# Patient Record
Sex: MALE | Race: WHITE | NOT HISPANIC OR LATINO | Employment: OTHER | URBAN - METROPOLITAN AREA
[De-identification: names, ages, dates, MRNs, and addresses within clinical notes are randomized per-mention and may not be internally consistent; named-entity substitution may affect disease eponyms.]

---

## 2017-01-03 ENCOUNTER — COMMUNICATION - HEALTHEAST (OUTPATIENT)
Dept: FAMILY MEDICINE | Facility: CLINIC | Age: 77
End: 2017-01-03

## 2017-01-03 DIAGNOSIS — M19.90 INFLAMMATORY ARTHRITIS: ICD-10-CM

## 2017-01-05 ENCOUNTER — COMMUNICATION - HEALTHEAST (OUTPATIENT)
Dept: RHEUMATOLOGY | Facility: CLINIC | Age: 77
End: 2017-01-05

## 2017-01-05 DIAGNOSIS — M19.90 INFLAMMATORY ARTHRITIS: ICD-10-CM

## 2017-01-07 ENCOUNTER — COMMUNICATION - HEALTHEAST (OUTPATIENT)
Dept: FAMILY MEDICINE | Facility: CLINIC | Age: 77
End: 2017-01-07

## 2017-01-07 DIAGNOSIS — M19.90 INFLAMMATORY ARTHRITIS: ICD-10-CM

## 2017-01-09 ENCOUNTER — AMBULATORY - HEALTHEAST (OUTPATIENT)
Dept: FAMILY MEDICINE | Facility: CLINIC | Age: 77
End: 2017-01-09

## 2017-01-09 DIAGNOSIS — M19.90 INFLAMMATORY ARTHRITIS: ICD-10-CM

## 2017-01-09 DIAGNOSIS — I10 ESSENTIAL HYPERTENSION, BENIGN: ICD-10-CM

## 2017-01-09 DIAGNOSIS — I25.10 UNSPECIFIED CARDIOVASCULAR DISEASE: ICD-10-CM

## 2017-01-13 ENCOUNTER — COMMUNICATION - HEALTHEAST (OUTPATIENT)
Dept: FAMILY MEDICINE | Facility: CLINIC | Age: 77
End: 2017-01-13

## 2017-01-13 ENCOUNTER — OFFICE VISIT - HEALTHEAST (OUTPATIENT)
Dept: FAMILY MEDICINE | Facility: CLINIC | Age: 77
End: 2017-01-13

## 2017-01-13 DIAGNOSIS — I10 ESSENTIAL HYPERTENSION, BENIGN: ICD-10-CM

## 2017-01-13 DIAGNOSIS — E11.9 DM2 (DIABETES MELLITUS, TYPE 2) (H): ICD-10-CM

## 2017-01-13 DIAGNOSIS — I10 BENIGN ESSENTIAL HYPERTENSION: ICD-10-CM

## 2017-01-13 LAB — HBA1C MFR BLD: 5.6 % (ref 3.5–6)

## 2017-01-13 ASSESSMENT — MIFFLIN-ST. JEOR: SCORE: 1471.96

## 2017-01-18 ENCOUNTER — COMMUNICATION - HEALTHEAST (OUTPATIENT)
Dept: FAMILY MEDICINE | Facility: CLINIC | Age: 77
End: 2017-01-18

## 2017-01-18 DIAGNOSIS — M10.9 GOUT: ICD-10-CM

## 2017-01-30 ENCOUNTER — COMMUNICATION - HEALTHEAST (OUTPATIENT)
Dept: FAMILY MEDICINE | Facility: CLINIC | Age: 77
End: 2017-01-30

## 2017-01-30 DIAGNOSIS — E78.5 HYPERLIPIDEMIA: ICD-10-CM

## 2017-02-01 ENCOUNTER — COMMUNICATION - HEALTHEAST (OUTPATIENT)
Dept: FAMILY MEDICINE | Facility: CLINIC | Age: 77
End: 2017-02-01

## 2017-02-16 ENCOUNTER — COMMUNICATION - HEALTHEAST (OUTPATIENT)
Dept: FAMILY MEDICINE | Facility: CLINIC | Age: 77
End: 2017-02-16

## 2017-02-16 DIAGNOSIS — I10 ESSENTIAL HYPERTENSION, BENIGN: ICD-10-CM

## 2017-02-16 DIAGNOSIS — I25.10 UNSPECIFIED CARDIOVASCULAR DISEASE: ICD-10-CM

## 2017-03-19 ENCOUNTER — COMMUNICATION - HEALTHEAST (OUTPATIENT)
Dept: FAMILY MEDICINE | Facility: CLINIC | Age: 77
End: 2017-03-19

## 2017-03-19 DIAGNOSIS — I10 HYPERTENSION: ICD-10-CM

## 2017-03-22 ENCOUNTER — OFFICE VISIT - HEALTHEAST (OUTPATIENT)
Dept: FAMILY MEDICINE | Facility: CLINIC | Age: 77
End: 2017-03-22

## 2017-03-22 ENCOUNTER — COMMUNICATION - HEALTHEAST (OUTPATIENT)
Dept: FAMILY MEDICINE | Facility: CLINIC | Age: 77
End: 2017-03-22

## 2017-03-22 ENCOUNTER — RECORDS - HEALTHEAST (OUTPATIENT)
Dept: GENERAL RADIOLOGY | Facility: CLINIC | Age: 77
End: 2017-03-22

## 2017-03-22 DIAGNOSIS — E11.9 DM2 (DIABETES MELLITUS, TYPE 2) (H): ICD-10-CM

## 2017-03-22 DIAGNOSIS — F52.8 PSYCHOSEXUAL DYSFUNCTION WITH INHIBITED SEXUAL EXCITEMENT: ICD-10-CM

## 2017-03-22 DIAGNOSIS — M10.9 GOUT: ICD-10-CM

## 2017-03-22 DIAGNOSIS — E79.0 ELEVATED BLOOD URIC ACID LEVEL: ICD-10-CM

## 2017-03-22 DIAGNOSIS — J61 PNEUMOCONIOSIS DUE TO ASBESTOS AND OTHER MINERAL FIBERS (H): ICD-10-CM

## 2017-03-22 DIAGNOSIS — I10 ESSENTIAL HYPERTENSION, BENIGN: ICD-10-CM

## 2017-03-22 DIAGNOSIS — E78.00 HYPERCHOLESTEREMIA: ICD-10-CM

## 2017-03-22 DIAGNOSIS — M10.9 GOUT, UNSPECIFIED: ICD-10-CM

## 2017-03-22 DIAGNOSIS — Z00.00 INITIAL MEDICARE ANNUAL WELLNESS VISIT: ICD-10-CM

## 2017-03-22 DIAGNOSIS — J61 ASBESTOSIS (H): ICD-10-CM

## 2017-03-22 LAB
ATRIAL RATE - MUSE: 51 BPM
CHOLEST SERPL-MCNC: 154 MG/DL
DIASTOLIC BLOOD PRESSURE - MUSE: NORMAL MMHG
FASTING STATUS PATIENT QL REPORTED: YES
HBA1C MFR BLD: 5.4 % (ref 3.5–6)
HDLC SERPL-MCNC: 56 MG/DL
INTERPRETATION ECG - MUSE: NORMAL
LDLC SERPL CALC-MCNC: 76 MG/DL
P AXIS - MUSE: 36 DEGREES
PR INTERVAL - MUSE: 198 MS
PSA SERPL-MCNC: 5.7 NG/ML (ref 0–6.5)
QRS DURATION - MUSE: 84 MS
QT - MUSE: 442 MS
QTC - MUSE: 407 MS
R AXIS - MUSE: 10 DEGREES
SYSTOLIC BLOOD PRESSURE - MUSE: NORMAL MMHG
T AXIS - MUSE: 45 DEGREES
TRIGL SERPL-MCNC: 110 MG/DL
VENTRICULAR RATE- MUSE: 51 BPM

## 2017-03-22 ASSESSMENT — MIFFLIN-ST. JEOR: SCORE: 1459.48

## 2017-03-24 ENCOUNTER — COMMUNICATION - HEALTHEAST (OUTPATIENT)
Dept: FAMILY MEDICINE | Facility: CLINIC | Age: 77
End: 2017-03-24

## 2017-04-03 ENCOUNTER — COMMUNICATION - HEALTHEAST (OUTPATIENT)
Dept: FAMILY MEDICINE | Facility: CLINIC | Age: 77
End: 2017-04-03

## 2017-04-03 DIAGNOSIS — E11.9 DIABETES MELLITUS, TYPE 2 (H): ICD-10-CM

## 2017-04-11 ENCOUNTER — COMMUNICATION - HEALTHEAST (OUTPATIENT)
Dept: FAMILY MEDICINE | Facility: CLINIC | Age: 77
End: 2017-04-11

## 2017-04-11 DIAGNOSIS — I10 BENIGN ESSENTIAL HYPERTENSION: ICD-10-CM

## 2017-04-12 ENCOUNTER — RECORDS - HEALTHEAST (OUTPATIENT)
Dept: ADMINISTRATIVE | Facility: OTHER | Age: 77
End: 2017-04-12

## 2017-04-13 ENCOUNTER — COMMUNICATION - HEALTHEAST (OUTPATIENT)
Dept: FAMILY MEDICINE | Facility: CLINIC | Age: 77
End: 2017-04-13

## 2017-04-13 DIAGNOSIS — M10.9 GOUT: ICD-10-CM

## 2017-04-14 ENCOUNTER — RECORDS - HEALTHEAST (OUTPATIENT)
Dept: ADMINISTRATIVE | Facility: OTHER | Age: 77
End: 2017-04-14

## 2017-04-14 ENCOUNTER — COMMUNICATION - HEALTHEAST (OUTPATIENT)
Dept: FAMILY MEDICINE | Facility: CLINIC | Age: 77
End: 2017-04-14

## 2017-04-27 ENCOUNTER — COMMUNICATION - HEALTHEAST (OUTPATIENT)
Dept: FAMILY MEDICINE | Facility: CLINIC | Age: 77
End: 2017-04-27

## 2017-04-27 DIAGNOSIS — E78.5 HYPERLIPIDEMIA: ICD-10-CM

## 2017-04-28 ENCOUNTER — COMMUNICATION - HEALTHEAST (OUTPATIENT)
Dept: FAMILY MEDICINE | Facility: CLINIC | Age: 77
End: 2017-04-28

## 2017-05-11 ENCOUNTER — COMMUNICATION - HEALTHEAST (OUTPATIENT)
Dept: FAMILY MEDICINE | Facility: CLINIC | Age: 77
End: 2017-05-11

## 2017-05-11 DIAGNOSIS — K21.9 GERD (GASTROESOPHAGEAL REFLUX DISEASE): ICD-10-CM

## 2017-05-16 ENCOUNTER — COMMUNICATION - HEALTHEAST (OUTPATIENT)
Dept: FAMILY MEDICINE | Facility: CLINIC | Age: 77
End: 2017-05-16

## 2017-05-16 DIAGNOSIS — M10.9 GOUT: ICD-10-CM

## 2017-05-27 ENCOUNTER — RECORDS - HEALTHEAST (OUTPATIENT)
Dept: ADMINISTRATIVE | Facility: OTHER | Age: 77
End: 2017-05-27

## 2017-05-31 ENCOUNTER — COMMUNICATION - HEALTHEAST (OUTPATIENT)
Dept: FAMILY MEDICINE | Facility: CLINIC | Age: 77
End: 2017-05-31

## 2017-05-31 ENCOUNTER — RECORDS - HEALTHEAST (OUTPATIENT)
Dept: ADMINISTRATIVE | Facility: OTHER | Age: 77
End: 2017-05-31

## 2017-06-01 ENCOUNTER — COMMUNICATION - HEALTHEAST (OUTPATIENT)
Dept: FAMILY MEDICINE | Facility: CLINIC | Age: 77
End: 2017-06-01

## 2017-06-01 DIAGNOSIS — E11.9 DM TYPE 2 (DIABETES MELLITUS, TYPE 2) (H): ICD-10-CM

## 2017-06-22 ENCOUNTER — COMMUNICATION - HEALTHEAST (OUTPATIENT)
Dept: FAMILY MEDICINE | Facility: CLINIC | Age: 77
End: 2017-06-22

## 2017-06-22 DIAGNOSIS — E11.9 DIABETES MELLITUS, TYPE 2 (H): ICD-10-CM

## 2017-09-01 ENCOUNTER — COMMUNICATION - HEALTHEAST (OUTPATIENT)
Dept: FAMILY MEDICINE | Facility: CLINIC | Age: 77
End: 2017-09-01

## 2017-09-01 DIAGNOSIS — E11.9 DM TYPE 2 (DIABETES MELLITUS, TYPE 2) (H): ICD-10-CM

## 2017-09-02 ENCOUNTER — COMMUNICATION - HEALTHEAST (OUTPATIENT)
Dept: FAMILY MEDICINE | Facility: CLINIC | Age: 77
End: 2017-09-02

## 2017-09-02 DIAGNOSIS — E78.5 HYPERLIPIDEMIA: ICD-10-CM

## 2017-09-03 ENCOUNTER — COMMUNICATION - HEALTHEAST (OUTPATIENT)
Dept: FAMILY MEDICINE | Facility: CLINIC | Age: 77
End: 2017-09-03

## 2017-09-03 DIAGNOSIS — I10 ESSENTIAL HYPERTENSION, BENIGN: ICD-10-CM

## 2017-09-03 DIAGNOSIS — E78.5 HYPERLIPIDEMIA: ICD-10-CM

## 2017-09-05 ENCOUNTER — COMMUNICATION - HEALTHEAST (OUTPATIENT)
Dept: FAMILY MEDICINE | Facility: CLINIC | Age: 77
End: 2017-09-05

## 2017-09-06 ENCOUNTER — AMBULATORY - HEALTHEAST (OUTPATIENT)
Dept: FAMILY MEDICINE | Facility: CLINIC | Age: 77
End: 2017-09-06

## 2017-09-08 ENCOUNTER — COMMUNICATION - HEALTHEAST (OUTPATIENT)
Dept: FAMILY MEDICINE | Facility: CLINIC | Age: 77
End: 2017-09-08

## 2017-09-25 ENCOUNTER — COMMUNICATION - HEALTHEAST (OUTPATIENT)
Dept: FAMILY MEDICINE | Facility: CLINIC | Age: 77
End: 2017-09-25

## 2017-09-25 DIAGNOSIS — E11.9 DIABETES MELLITUS, TYPE 2 (H): ICD-10-CM

## 2017-09-27 ENCOUNTER — COMMUNICATION - HEALTHEAST (OUTPATIENT)
Dept: FAMILY MEDICINE | Facility: CLINIC | Age: 77
End: 2017-09-27

## 2017-09-27 DIAGNOSIS — E11.9 DIABETES MELLITUS, TYPE 2 (H): ICD-10-CM

## 2017-10-11 ENCOUNTER — OFFICE VISIT - HEALTHEAST (OUTPATIENT)
Dept: FAMILY MEDICINE | Facility: CLINIC | Age: 77
End: 2017-10-11

## 2017-10-11 DIAGNOSIS — Z00.00 HEALTHCARE MAINTENANCE: ICD-10-CM

## 2017-10-11 DIAGNOSIS — E11.9 DM TYPE 2 (DIABETES MELLITUS, TYPE 2) (H): ICD-10-CM

## 2017-10-11 DIAGNOSIS — Z51.81 THERAPEUTIC DRUG MONITORING: ICD-10-CM

## 2017-10-11 LAB — HBA1C MFR BLD: 5.8 % (ref 3.5–6)

## 2017-10-11 RX ORDER — ESCITALOPRAM OXALATE 20 MG/1
TABLET ORAL
Refills: 3 | Status: SHIPPED | COMMUNITY
Start: 2017-09-16 | End: 2022-06-07

## 2017-10-11 ASSESSMENT — MIFFLIN-ST. JEOR: SCORE: 1468.56

## 2017-11-14 ENCOUNTER — COMMUNICATION - HEALTHEAST (OUTPATIENT)
Dept: FAMILY MEDICINE | Facility: CLINIC | Age: 77
End: 2017-11-14

## 2017-11-30 ENCOUNTER — COMMUNICATION - HEALTHEAST (OUTPATIENT)
Dept: FAMILY MEDICINE | Facility: CLINIC | Age: 77
End: 2017-11-30

## 2017-11-30 DIAGNOSIS — E78.5 HYPERLIPIDEMIA: ICD-10-CM

## 2017-12-01 ENCOUNTER — RECORDS - HEALTHEAST (OUTPATIENT)
Dept: ADMINISTRATIVE | Facility: OTHER | Age: 77
End: 2017-12-01

## 2017-12-27 ENCOUNTER — COMMUNICATION - HEALTHEAST (OUTPATIENT)
Dept: FAMILY MEDICINE | Facility: CLINIC | Age: 77
End: 2017-12-27

## 2017-12-27 DIAGNOSIS — E11.9 DIABETES MELLITUS, TYPE 2 (H): ICD-10-CM

## 2018-01-05 ENCOUNTER — COMMUNICATION - HEALTHEAST (OUTPATIENT)
Dept: FAMILY MEDICINE | Facility: CLINIC | Age: 78
End: 2018-01-05

## 2018-01-05 DIAGNOSIS — M19.90 INFLAMMATORY ARTHRITIS: ICD-10-CM

## 2018-02-14 ENCOUNTER — COMMUNICATION - HEALTHEAST (OUTPATIENT)
Dept: FAMILY MEDICINE | Facility: CLINIC | Age: 78
End: 2018-02-14

## 2018-02-14 DIAGNOSIS — I25.10 CARDIOVASCULAR DISEASE: ICD-10-CM

## 2018-02-14 DIAGNOSIS — I10 ESSENTIAL HYPERTENSION, BENIGN: ICD-10-CM

## 2018-02-21 ENCOUNTER — COMMUNICATION - HEALTHEAST (OUTPATIENT)
Dept: FAMILY MEDICINE | Facility: CLINIC | Age: 78
End: 2018-02-21

## 2018-02-21 DIAGNOSIS — I25.10 CARDIOVASCULAR DISEASE: ICD-10-CM

## 2018-02-21 DIAGNOSIS — I10 ESSENTIAL HYPERTENSION, BENIGN: ICD-10-CM

## 2018-03-05 ENCOUNTER — COMMUNICATION - HEALTHEAST (OUTPATIENT)
Dept: FAMILY MEDICINE | Facility: CLINIC | Age: 78
End: 2018-03-05

## 2018-03-05 DIAGNOSIS — M19.90 INFLAMMATORY ARTHRITIS: ICD-10-CM

## 2018-03-07 ENCOUNTER — COMMUNICATION - HEALTHEAST (OUTPATIENT)
Dept: FAMILY MEDICINE | Facility: CLINIC | Age: 78
End: 2018-03-07

## 2018-03-07 DIAGNOSIS — M19.90 INFLAMMATORY ARTHRITIS: ICD-10-CM

## 2018-03-08 ENCOUNTER — COMMUNICATION - HEALTHEAST (OUTPATIENT)
Dept: FAMILY MEDICINE | Facility: CLINIC | Age: 78
End: 2018-03-08

## 2018-03-08 DIAGNOSIS — M19.90 INFLAMMATORY ARTHRITIS: ICD-10-CM

## 2018-03-13 ENCOUNTER — COMMUNICATION - HEALTHEAST (OUTPATIENT)
Dept: FAMILY MEDICINE | Facility: CLINIC | Age: 78
End: 2018-03-13

## 2018-03-13 DIAGNOSIS — I10 HYPERTENSION: ICD-10-CM

## 2018-03-21 ENCOUNTER — COMMUNICATION - HEALTHEAST (OUTPATIENT)
Dept: FAMILY MEDICINE | Facility: CLINIC | Age: 78
End: 2018-03-21

## 2018-03-21 DIAGNOSIS — I10 HYPERTENSION: ICD-10-CM

## 2018-04-09 ENCOUNTER — COMMUNICATION - HEALTHEAST (OUTPATIENT)
Dept: FAMILY MEDICINE | Facility: CLINIC | Age: 78
End: 2018-04-09

## 2018-04-09 DIAGNOSIS — I10 BENIGN ESSENTIAL HYPERTENSION: ICD-10-CM

## 2018-04-10 ENCOUNTER — COMMUNICATION - HEALTHEAST (OUTPATIENT)
Dept: FAMILY MEDICINE | Facility: CLINIC | Age: 78
End: 2018-04-10

## 2018-04-10 DIAGNOSIS — I10 BENIGN ESSENTIAL HYPERTENSION: ICD-10-CM

## 2018-04-12 ENCOUNTER — COMMUNICATION - HEALTHEAST (OUTPATIENT)
Dept: FAMILY MEDICINE | Facility: CLINIC | Age: 78
End: 2018-04-12

## 2018-04-12 DIAGNOSIS — M10.9 GOUT: ICD-10-CM

## 2018-04-13 ENCOUNTER — COMMUNICATION - HEALTHEAST (OUTPATIENT)
Dept: FAMILY MEDICINE | Facility: CLINIC | Age: 78
End: 2018-04-13

## 2018-04-13 DIAGNOSIS — M10.9 GOUT: ICD-10-CM

## 2018-05-19 ENCOUNTER — COMMUNICATION - HEALTHEAST (OUTPATIENT)
Dept: FAMILY MEDICINE | Facility: CLINIC | Age: 78
End: 2018-05-19

## 2018-05-19 DIAGNOSIS — I25.10 CARDIOVASCULAR DISEASE: ICD-10-CM

## 2018-05-19 DIAGNOSIS — I10 ESSENTIAL HYPERTENSION, BENIGN: ICD-10-CM

## 2018-05-22 ENCOUNTER — COMMUNICATION - HEALTHEAST (OUTPATIENT)
Dept: FAMILY MEDICINE | Facility: CLINIC | Age: 78
End: 2018-05-22

## 2018-05-22 DIAGNOSIS — I25.10 CARDIOVASCULAR DISEASE: ICD-10-CM

## 2018-05-22 DIAGNOSIS — I10 ESSENTIAL HYPERTENSION, BENIGN: ICD-10-CM

## 2018-05-24 ENCOUNTER — COMMUNICATION - HEALTHEAST (OUTPATIENT)
Dept: FAMILY MEDICINE | Facility: CLINIC | Age: 78
End: 2018-05-24

## 2018-05-24 DIAGNOSIS — K21.9 GERD (GASTROESOPHAGEAL REFLUX DISEASE): ICD-10-CM

## 2018-05-28 ENCOUNTER — COMMUNICATION - HEALTHEAST (OUTPATIENT)
Dept: FAMILY MEDICINE | Facility: CLINIC | Age: 78
End: 2018-05-28

## 2018-05-28 DIAGNOSIS — K21.9 GERD (GASTROESOPHAGEAL REFLUX DISEASE): ICD-10-CM

## 2018-05-29 ENCOUNTER — COMMUNICATION - HEALTHEAST (OUTPATIENT)
Dept: FAMILY MEDICINE | Facility: CLINIC | Age: 78
End: 2018-05-29

## 2018-05-29 ENCOUNTER — RECORDS - HEALTHEAST (OUTPATIENT)
Dept: ADMINISTRATIVE | Facility: OTHER | Age: 78
End: 2018-05-29

## 2018-05-29 DIAGNOSIS — K21.9 GERD (GASTROESOPHAGEAL REFLUX DISEASE): ICD-10-CM

## 2018-05-30 ENCOUNTER — RECORDS - HEALTHEAST (OUTPATIENT)
Dept: ADMINISTRATIVE | Facility: OTHER | Age: 78
End: 2018-05-30

## 2018-05-30 ENCOUNTER — COMMUNICATION - HEALTHEAST (OUTPATIENT)
Dept: FAMILY MEDICINE | Facility: CLINIC | Age: 78
End: 2018-05-30

## 2018-05-31 ENCOUNTER — COMMUNICATION - HEALTHEAST (OUTPATIENT)
Dept: FAMILY MEDICINE | Facility: CLINIC | Age: 78
End: 2018-05-31

## 2018-05-31 DIAGNOSIS — E78.5 HYPERLIPIDEMIA: ICD-10-CM

## 2018-06-02 ENCOUNTER — COMMUNICATION - HEALTHEAST (OUTPATIENT)
Dept: FAMILY MEDICINE | Facility: CLINIC | Age: 78
End: 2018-06-02

## 2018-06-02 DIAGNOSIS — E78.5 HYPERLIPIDEMIA: ICD-10-CM

## 2018-06-04 ENCOUNTER — COMMUNICATION - HEALTHEAST (OUTPATIENT)
Dept: FAMILY MEDICINE | Facility: CLINIC | Age: 78
End: 2018-06-04

## 2018-06-04 DIAGNOSIS — E78.5 HYPERLIPIDEMIA: ICD-10-CM

## 2018-06-14 ENCOUNTER — COMMUNICATION - HEALTHEAST (OUTPATIENT)
Dept: FAMILY MEDICINE | Facility: CLINIC | Age: 78
End: 2018-06-14

## 2018-06-14 DIAGNOSIS — M10.9 GOUT: ICD-10-CM

## 2018-06-15 ENCOUNTER — COMMUNICATION - HEALTHEAST (OUTPATIENT)
Dept: FAMILY MEDICINE | Facility: CLINIC | Age: 78
End: 2018-06-15

## 2018-06-15 DIAGNOSIS — I10 HYPERTENSION: ICD-10-CM

## 2018-06-18 ENCOUNTER — COMMUNICATION - HEALTHEAST (OUTPATIENT)
Dept: FAMILY MEDICINE | Facility: CLINIC | Age: 78
End: 2018-06-18

## 2018-06-18 DIAGNOSIS — M10.9 GOUT: ICD-10-CM

## 2018-06-18 DIAGNOSIS — I10 HYPERTENSION: ICD-10-CM

## 2018-07-13 ENCOUNTER — COMMUNICATION - HEALTHEAST (OUTPATIENT)
Dept: FAMILY MEDICINE | Facility: CLINIC | Age: 78
End: 2018-07-13

## 2018-07-13 ENCOUNTER — OFFICE VISIT - HEALTHEAST (OUTPATIENT)
Dept: FAMILY MEDICINE | Facility: CLINIC | Age: 78
End: 2018-07-13

## 2018-07-13 DIAGNOSIS — J64: ICD-10-CM

## 2018-07-13 DIAGNOSIS — I10 ESSENTIAL HYPERTENSION, BENIGN: ICD-10-CM

## 2018-07-13 DIAGNOSIS — E11.9 DM2 (DIABETES MELLITUS, TYPE 2) (H): ICD-10-CM

## 2018-07-13 DIAGNOSIS — Z00.00 MEDICARE ANNUAL WELLNESS VISIT, SUBSEQUENT: ICD-10-CM

## 2018-07-13 DIAGNOSIS — K21.9 ESOPHAGEAL REFLUX: ICD-10-CM

## 2018-07-13 DIAGNOSIS — E78.00 HYPERCHOLESTEREMIA: ICD-10-CM

## 2018-07-13 LAB
ALBUMIN SERPL-MCNC: 3.8 G/DL (ref 3.5–5)
ALBUMIN UR-MCNC: NEGATIVE MG/DL
ALP SERPL-CCNC: 72 U/L (ref 45–120)
ALT SERPL W P-5'-P-CCNC: 33 U/L (ref 0–45)
ANION GAP SERPL CALCULATED.3IONS-SCNC: 11 MMOL/L (ref 5–18)
APPEARANCE UR: CLEAR
AST SERPL W P-5'-P-CCNC: 35 U/L (ref 0–40)
ATRIAL RATE - MUSE: 48 BPM
BASOPHILS # BLD AUTO: 0.1 THOU/UL (ref 0–0.2)
BASOPHILS NFR BLD AUTO: 1 % (ref 0–2)
BILIRUB SERPL-MCNC: 0.6 MG/DL (ref 0–1)
BILIRUB UR QL STRIP: NEGATIVE
BUN SERPL-MCNC: 15 MG/DL (ref 8–28)
CALCIUM SERPL-MCNC: 9.6 MG/DL (ref 8.5–10.5)
CHLORIDE BLD-SCNC: 104 MMOL/L (ref 98–107)
CHOLEST SERPL-MCNC: 168 MG/DL
CO2 SERPL-SCNC: 26 MMOL/L (ref 22–31)
COLOR UR AUTO: YELLOW
CREAT SERPL-MCNC: 0.98 MG/DL (ref 0.7–1.3)
CREAT UR-MCNC: 62.3 MG/DL
DIASTOLIC BLOOD PRESSURE - MUSE: NORMAL MMHG
EOSINOPHIL # BLD AUTO: 0.4 THOU/UL (ref 0–0.4)
EOSINOPHIL NFR BLD AUTO: 5 % (ref 0–6)
ERYTHROCYTE [DISTWIDTH] IN BLOOD BY AUTOMATED COUNT: 12 % (ref 11–14.5)
FASTING STATUS PATIENT QL REPORTED: YES
GFR SERPL CREATININE-BSD FRML MDRD: >60 ML/MIN/1.73M2
GLUCOSE BLD-MCNC: 93 MG/DL (ref 70–125)
GLUCOSE UR STRIP-MCNC: NEGATIVE MG/DL
HBA1C MFR BLD: 5.7 % (ref 3.5–6)
HCT VFR BLD AUTO: 45.1 % (ref 40–54)
HDLC SERPL-MCNC: 64 MG/DL
HGB BLD-MCNC: 15.5 G/DL (ref 14–18)
HGB UR QL STRIP: NEGATIVE
INTERPRETATION ECG - MUSE: NORMAL
KETONES UR STRIP-MCNC: NEGATIVE MG/DL
LDLC SERPL CALC-MCNC: 84 MG/DL
LEUKOCYTE ESTERASE UR QL STRIP: NEGATIVE
LYMPHOCYTES # BLD AUTO: 2 THOU/UL (ref 0.8–4.4)
LYMPHOCYTES NFR BLD AUTO: 25 % (ref 20–40)
MCH RBC QN AUTO: 34 PG (ref 27–34)
MCHC RBC AUTO-ENTMCNC: 34.2 G/DL (ref 32–36)
MCV RBC AUTO: 99 FL (ref 80–100)
MICROALBUMIN UR-MCNC: <0.5 MG/DL (ref 0–1.99)
MICROALBUMIN/CREAT UR: NORMAL MG/G
MONOCYTES # BLD AUTO: 0.8 THOU/UL (ref 0–0.9)
MONOCYTES NFR BLD AUTO: 9 % (ref 2–10)
NEUTROPHILS # BLD AUTO: 4.8 THOU/UL (ref 2–7.7)
NEUTROPHILS NFR BLD AUTO: 60 % (ref 50–70)
NITRATE UR QL: NEGATIVE
P AXIS - MUSE: 46 DEGREES
PH UR STRIP: 7 [PH] (ref 5–8)
PLATELET # BLD AUTO: 230 THOU/UL (ref 140–440)
PMV BLD AUTO: 7.6 FL (ref 7–10)
POTASSIUM BLD-SCNC: 5 MMOL/L (ref 3.5–5)
PR INTERVAL - MUSE: 208 MS
PROT SERPL-MCNC: 7.5 G/DL (ref 6–8)
PSA SERPL-MCNC: 7.1 NG/ML (ref 0–6.5)
QRS DURATION - MUSE: 80 MS
QT - MUSE: 476 MS
QTC - MUSE: 425 MS
R AXIS - MUSE: 14 DEGREES
RBC # BLD AUTO: 4.54 MILL/UL (ref 4.4–6.2)
SODIUM SERPL-SCNC: 141 MMOL/L (ref 136–145)
SP GR UR STRIP: 1.01 (ref 1–1.03)
SYSTOLIC BLOOD PRESSURE - MUSE: NORMAL MMHG
T AXIS - MUSE: 43 DEGREES
TRIGL SERPL-MCNC: 99 MG/DL
UROBILINOGEN UR STRIP-ACNC: NORMAL
VENTRICULAR RATE- MUSE: 48 BPM
WBC: 8 THOU/UL (ref 4–11)

## 2018-07-13 ASSESSMENT — MIFFLIN-ST. JEOR: SCORE: 1492.14

## 2018-08-19 ENCOUNTER — COMMUNICATION - HEALTHEAST (OUTPATIENT)
Dept: FAMILY MEDICINE | Facility: CLINIC | Age: 78
End: 2018-08-19

## 2018-08-19 DIAGNOSIS — I25.10 CARDIOVASCULAR DISEASE: ICD-10-CM

## 2018-08-19 DIAGNOSIS — I10 ESSENTIAL HYPERTENSION, BENIGN: ICD-10-CM

## 2018-08-26 ENCOUNTER — COMMUNICATION - HEALTHEAST (OUTPATIENT)
Dept: FAMILY MEDICINE | Facility: CLINIC | Age: 78
End: 2018-08-26

## 2018-08-26 DIAGNOSIS — K21.9 GERD (GASTROESOPHAGEAL REFLUX DISEASE): ICD-10-CM

## 2018-09-03 ENCOUNTER — COMMUNICATION - HEALTHEAST (OUTPATIENT)
Dept: FAMILY MEDICINE | Facility: CLINIC | Age: 78
End: 2018-09-03

## 2018-09-03 DIAGNOSIS — E78.5 HYPERLIPIDEMIA: ICD-10-CM

## 2018-09-15 ENCOUNTER — COMMUNICATION - HEALTHEAST (OUTPATIENT)
Dept: FAMILY MEDICINE | Facility: CLINIC | Age: 78
End: 2018-09-15

## 2018-09-15 DIAGNOSIS — M10.9 GOUT: ICD-10-CM

## 2018-09-27 ENCOUNTER — COMMUNICATION - HEALTHEAST (OUTPATIENT)
Dept: FAMILY MEDICINE | Facility: CLINIC | Age: 78
End: 2018-09-27

## 2018-09-27 DIAGNOSIS — E11.9 DM TYPE 2 (DIABETES MELLITUS, TYPE 2) (H): ICD-10-CM

## 2018-09-28 ENCOUNTER — COMMUNICATION - HEALTHEAST (OUTPATIENT)
Dept: FAMILY MEDICINE | Facility: CLINIC | Age: 78
End: 2018-09-28

## 2018-10-05 ENCOUNTER — COMMUNICATION - HEALTHEAST (OUTPATIENT)
Dept: FAMILY MEDICINE | Facility: CLINIC | Age: 78
End: 2018-10-05

## 2018-10-05 DIAGNOSIS — I10 BENIGN ESSENTIAL HYPERTENSION: ICD-10-CM

## 2018-10-07 ENCOUNTER — COMMUNICATION - HEALTHEAST (OUTPATIENT)
Dept: FAMILY MEDICINE | Facility: CLINIC | Age: 78
End: 2018-10-07

## 2018-10-07 DIAGNOSIS — E78.5 HYPERLIPIDEMIA: ICD-10-CM

## 2018-10-13 ENCOUNTER — COMMUNICATION - HEALTHEAST (OUTPATIENT)
Dept: FAMILY MEDICINE | Facility: CLINIC | Age: 78
End: 2018-10-13

## 2018-10-13 DIAGNOSIS — K21.9 GERD (GASTROESOPHAGEAL REFLUX DISEASE): ICD-10-CM

## 2018-11-12 ENCOUNTER — COMMUNICATION - HEALTHEAST (OUTPATIENT)
Dept: FAMILY MEDICINE | Facility: CLINIC | Age: 78
End: 2018-11-12

## 2018-11-12 ENCOUNTER — COMMUNICATION - HEALTHEAST (OUTPATIENT)
Dept: SCHEDULING | Facility: CLINIC | Age: 78
End: 2018-11-12

## 2018-11-12 DIAGNOSIS — I10 ESSENTIAL HYPERTENSION, BENIGN: ICD-10-CM

## 2018-11-15 ENCOUNTER — COMMUNICATION - HEALTHEAST (OUTPATIENT)
Dept: FAMILY MEDICINE | Facility: CLINIC | Age: 78
End: 2018-11-15

## 2018-11-15 DIAGNOSIS — M10.9 GOUT: ICD-10-CM

## 2018-11-15 DIAGNOSIS — I25.10 CARDIOVASCULAR DISEASE: ICD-10-CM

## 2018-11-15 DIAGNOSIS — I10 ESSENTIAL HYPERTENSION, BENIGN: ICD-10-CM

## 2018-11-23 ENCOUNTER — OFFICE VISIT - HEALTHEAST (OUTPATIENT)
Dept: FAMILY MEDICINE | Facility: CLINIC | Age: 78
End: 2018-11-23

## 2018-11-23 DIAGNOSIS — R10.31 ABDOMINAL PAIN, RIGHT LOWER QUADRANT: ICD-10-CM

## 2018-11-23 DIAGNOSIS — I10 ESSENTIAL HYPERTENSION, BENIGN: ICD-10-CM

## 2018-11-23 DIAGNOSIS — K57.32 DIVERTICULITIS OF COLON: ICD-10-CM

## 2018-11-23 DIAGNOSIS — K57.32 DIVERTICULITIS OF COLON (WITHOUT MENTION OF HEMORRHAGE)(562.11): ICD-10-CM

## 2018-11-23 ASSESSMENT — MIFFLIN-ST. JEOR: SCORE: 1509.38

## 2018-11-24 ENCOUNTER — COMMUNICATION - HEALTHEAST (OUTPATIENT)
Dept: FAMILY MEDICINE | Facility: CLINIC | Age: 78
End: 2018-11-24

## 2018-11-25 ENCOUNTER — COMMUNICATION - HEALTHEAST (OUTPATIENT)
Dept: FAMILY MEDICINE | Facility: CLINIC | Age: 78
End: 2018-11-25

## 2018-11-28 ENCOUNTER — COMMUNICATION - HEALTHEAST (OUTPATIENT)
Dept: FAMILY MEDICINE | Facility: CLINIC | Age: 78
End: 2018-11-28

## 2018-12-06 ENCOUNTER — OFFICE VISIT - HEALTHEAST (OUTPATIENT)
Dept: FAMILY MEDICINE | Facility: CLINIC | Age: 78
End: 2018-12-06

## 2018-12-06 DIAGNOSIS — R10.31 RLQ ABDOMINAL PAIN: ICD-10-CM

## 2018-12-06 DIAGNOSIS — I10 ESSENTIAL HYPERTENSION, BENIGN: ICD-10-CM

## 2018-12-06 DIAGNOSIS — E11.9 DM2 (DIABETES MELLITUS, TYPE 2) (H): ICD-10-CM

## 2018-12-06 LAB
ALBUMIN SERPL-MCNC: 3.9 G/DL (ref 3.5–5)
ALP SERPL-CCNC: 72 U/L (ref 45–120)
ALT SERPL W P-5'-P-CCNC: 70 U/L (ref 0–45)
ANION GAP SERPL CALCULATED.3IONS-SCNC: 14 MMOL/L (ref 5–18)
AST SERPL W P-5'-P-CCNC: 51 U/L (ref 0–40)
BILIRUB SERPL-MCNC: 0.6 MG/DL (ref 0–1)
BUN SERPL-MCNC: 15 MG/DL (ref 8–28)
CALCIUM SERPL-MCNC: 10 MG/DL (ref 8.5–10.5)
CHLORIDE BLD-SCNC: 91 MMOL/L (ref 98–107)
CO2 SERPL-SCNC: 23 MMOL/L (ref 22–31)
CREAT SERPL-MCNC: 1.03 MG/DL (ref 0.7–1.3)
GFR SERPL CREATININE-BSD FRML MDRD: >60 ML/MIN/1.73M2
GLUCOSE BLD-MCNC: 76 MG/DL (ref 70–125)
HBA1C MFR BLD: 5.9 % (ref 3.5–6)
POTASSIUM BLD-SCNC: 4.6 MMOL/L (ref 3.5–5)
PROT SERPL-MCNC: 7.4 G/DL (ref 6–8)
SODIUM SERPL-SCNC: 128 MMOL/L (ref 136–145)

## 2018-12-06 ASSESSMENT — MIFFLIN-ST. JEOR: SCORE: 1500.31

## 2018-12-07 ENCOUNTER — COMMUNICATION - HEALTHEAST (OUTPATIENT)
Dept: FAMILY MEDICINE | Facility: CLINIC | Age: 78
End: 2018-12-07

## 2018-12-11 ENCOUNTER — HOSPITAL ENCOUNTER (OUTPATIENT)
Dept: CT IMAGING | Facility: HOSPITAL | Age: 78
Discharge: HOME | End: 2018-12-11
Attending: FAMILY MEDICINE

## 2018-12-11 DIAGNOSIS — R10.31 RLQ ABDOMINAL PAIN: ICD-10-CM

## 2018-12-12 ENCOUNTER — COMMUNICATION - HEALTHEAST (OUTPATIENT)
Dept: FAMILY MEDICINE | Facility: CLINIC | Age: 78
End: 2018-12-12

## 2018-12-12 DIAGNOSIS — I10 HYPERTENSION: ICD-10-CM

## 2018-12-16 ENCOUNTER — COMMUNICATION - HEALTHEAST (OUTPATIENT)
Dept: FAMILY MEDICINE | Facility: CLINIC | Age: 78
End: 2018-12-16

## 2018-12-16 DIAGNOSIS — M10.9 GOUT: ICD-10-CM

## 2019-01-17 ENCOUNTER — RECORDS - HEALTHEAST (OUTPATIENT)
Dept: ADMINISTRATIVE | Facility: OTHER | Age: 79
End: 2019-01-17

## 2019-01-17 ENCOUNTER — COMMUNICATION - HEALTHEAST (OUTPATIENT)
Dept: FAMILY MEDICINE | Facility: CLINIC | Age: 79
End: 2019-01-17

## 2019-01-17 DIAGNOSIS — E11.9 DIABETES MELLITUS, TYPE 2 (H): ICD-10-CM

## 2019-01-24 ENCOUNTER — COMMUNICATION - HEALTHEAST (OUTPATIENT)
Dept: FAMILY MEDICINE | Facility: CLINIC | Age: 79
End: 2019-01-24

## 2019-01-24 ENCOUNTER — OFFICE VISIT - HEALTHEAST (OUTPATIENT)
Dept: FAMILY MEDICINE | Facility: CLINIC | Age: 79
End: 2019-01-24

## 2019-01-24 DIAGNOSIS — I10 ESSENTIAL HYPERTENSION, BENIGN: ICD-10-CM

## 2019-01-24 DIAGNOSIS — R43.0 ANOSMIA: ICD-10-CM

## 2019-01-30 ENCOUNTER — COMMUNICATION - HEALTHEAST (OUTPATIENT)
Dept: FAMILY MEDICINE | Facility: CLINIC | Age: 79
End: 2019-01-30

## 2019-03-11 ENCOUNTER — COMMUNICATION - HEALTHEAST (OUTPATIENT)
Dept: FAMILY MEDICINE | Facility: CLINIC | Age: 79
End: 2019-03-11

## 2019-03-12 RX ORDER — GLUCOSAMINE HCL/CHONDROITIN SU 500-400 MG
1 CAPSULE ORAL DAILY
Qty: 100 STRIP | Refills: 3 | Status: SHIPPED | OUTPATIENT
Start: 2019-03-12

## 2019-03-20 ENCOUNTER — COMMUNICATION - HEALTHEAST (OUTPATIENT)
Dept: FAMILY MEDICINE | Facility: CLINIC | Age: 79
End: 2019-03-20

## 2019-03-20 DIAGNOSIS — G47.00 INSOMNIA: ICD-10-CM

## 2019-03-20 DIAGNOSIS — F32.89 OTHER DEPRESSION: ICD-10-CM

## 2019-03-20 RX ORDER — TRAZODONE HYDROCHLORIDE 50 MG/1
TABLET, FILM COATED ORAL
Qty: 30 TABLET | Refills: 3 | Status: SHIPPED | OUTPATIENT
Start: 2019-03-20

## 2019-03-22 RX ORDER — TRAZODONE HYDROCHLORIDE 50 MG/1
TABLET, FILM COATED ORAL
Qty: 90 TABLET | Refills: 3 | Status: SHIPPED | OUTPATIENT
Start: 2019-03-22 | End: 2022-03-10

## 2019-04-24 ENCOUNTER — COMMUNICATION - HEALTHEAST (OUTPATIENT)
Dept: FAMILY MEDICINE | Facility: CLINIC | Age: 79
End: 2019-04-24

## 2019-04-24 DIAGNOSIS — I10 BENIGN ESSENTIAL HYPERTENSION: ICD-10-CM

## 2019-04-25 ENCOUNTER — COMMUNICATION - HEALTHEAST (OUTPATIENT)
Dept: FAMILY MEDICINE | Facility: CLINIC | Age: 79
End: 2019-04-25

## 2019-04-25 DIAGNOSIS — I10 BENIGN ESSENTIAL HYPERTENSION: ICD-10-CM

## 2019-04-26 RX ORDER — FUROSEMIDE 20 MG
TABLET ORAL
Qty: 90 TABLET | Refills: 1 | Status: SHIPPED | OUTPATIENT
Start: 2019-04-26 | End: 2022-04-11

## 2019-05-21 ENCOUNTER — SURGERY - HEALTHEAST (OUTPATIENT)
Dept: PODIATRY | Facility: CLINIC | Age: 79
End: 2019-05-21

## 2019-05-21 ENCOUNTER — COMMUNICATION - HEALTHEAST (OUTPATIENT)
Dept: VASCULAR SURGERY | Facility: CLINIC | Age: 79
End: 2019-05-21

## 2019-05-21 ENCOUNTER — OFFICE VISIT - HEALTHEAST (OUTPATIENT)
Dept: PODIATRY | Facility: CLINIC | Age: 79
End: 2019-05-21

## 2019-05-21 DIAGNOSIS — M20.42 HAMMER TOE OF LEFT FOOT: ICD-10-CM

## 2019-05-21 ASSESSMENT — MIFFLIN-ST. JEOR: SCORE: 1518.45

## 2019-05-23 ENCOUNTER — COMMUNICATION - HEALTHEAST (OUTPATIENT)
Dept: PODIATRY | Facility: CLINIC | Age: 79
End: 2019-05-23

## 2019-06-03 ENCOUNTER — COMMUNICATION - HEALTHEAST (OUTPATIENT)
Dept: FAMILY MEDICINE | Facility: CLINIC | Age: 79
End: 2019-06-03

## 2019-06-03 DIAGNOSIS — E78.5 HYPERLIPIDEMIA: ICD-10-CM

## 2019-06-11 ENCOUNTER — COMMUNICATION - HEALTHEAST (OUTPATIENT)
Dept: FAMILY MEDICINE | Facility: CLINIC | Age: 79
End: 2019-06-11

## 2019-06-11 DIAGNOSIS — I10 HYPERTENSION: ICD-10-CM

## 2019-06-26 ENCOUNTER — COMMUNICATION - HEALTHEAST (OUTPATIENT)
Dept: FAMILY MEDICINE | Facility: CLINIC | Age: 79
End: 2019-06-26

## 2019-06-26 DIAGNOSIS — K21.9 GERD (GASTROESOPHAGEAL REFLUX DISEASE): ICD-10-CM

## 2019-06-27 ENCOUNTER — COMMUNICATION - HEALTHEAST (OUTPATIENT)
Dept: FAMILY MEDICINE | Facility: CLINIC | Age: 79
End: 2019-06-27

## 2019-07-12 ENCOUNTER — RECORDS - HEALTHEAST (OUTPATIENT)
Dept: ADMINISTRATIVE | Facility: OTHER | Age: 79
End: 2019-07-12

## 2019-07-17 ENCOUNTER — COMMUNICATION - HEALTHEAST (OUTPATIENT)
Dept: FAMILY MEDICINE | Facility: CLINIC | Age: 79
End: 2019-07-17

## 2019-07-22 ENCOUNTER — OFFICE VISIT - HEALTHEAST (OUTPATIENT)
Dept: FAMILY MEDICINE | Facility: CLINIC | Age: 79
End: 2019-07-22

## 2019-07-22 DIAGNOSIS — E11.9 DM2 (DIABETES MELLITUS, TYPE 2) (H): ICD-10-CM

## 2019-07-22 DIAGNOSIS — M1A.09X0 IDIOPATHIC CHRONIC GOUT OF MULTIPLE SITES WITHOUT TOPHUS: ICD-10-CM

## 2019-07-22 DIAGNOSIS — I49.9 CARDIAC ARRHYTHMIA, UNSPECIFIED CARDIAC ARRHYTHMIA TYPE: ICD-10-CM

## 2019-07-22 DIAGNOSIS — I48.91 ATRIAL FIBRILLATION, UNSPECIFIED TYPE (H): ICD-10-CM

## 2019-07-22 LAB
ALBUMIN SERPL-MCNC: 4.1 G/DL (ref 3.5–5)
ALP SERPL-CCNC: 61 U/L (ref 45–120)
ALT SERPL W P-5'-P-CCNC: 44 U/L (ref 0–45)
ANION GAP SERPL CALCULATED.3IONS-SCNC: 11 MMOL/L (ref 5–18)
AST SERPL W P-5'-P-CCNC: 38 U/L (ref 0–40)
BILIRUB SERPL-MCNC: 0.5 MG/DL (ref 0–1)
BUN SERPL-MCNC: 13 MG/DL (ref 8–28)
CALCIUM SERPL-MCNC: 9.5 MG/DL (ref 8.5–10.5)
CHLORIDE BLD-SCNC: 102 MMOL/L (ref 98–107)
CO2 SERPL-SCNC: 26 MMOL/L (ref 22–31)
CREAT SERPL-MCNC: 0.96 MG/DL (ref 0.7–1.3)
CREAT UR-MCNC: 37.8 MG/DL
GFR SERPL CREATININE-BSD FRML MDRD: >60 ML/MIN/1.73M2
GLUCOSE BLD-MCNC: 109 MG/DL (ref 70–125)
HBA1C MFR BLD: 5.9 % (ref 3.5–6)
MICROALBUMIN UR-MCNC: <0.5 MG/DL (ref 0–1.99)
MICROALBUMIN/CREAT UR: NORMAL MG/G
POTASSIUM BLD-SCNC: 4.6 MMOL/L (ref 3.5–5)
PROT SERPL-MCNC: 7.1 G/DL (ref 6–8)
SODIUM SERPL-SCNC: 139 MMOL/L (ref 136–145)
URATE SERPL-MCNC: 6.2 MG/DL (ref 3–8)

## 2019-07-22 ASSESSMENT — MIFFLIN-ST. JEOR: SCORE: 1495.77

## 2019-07-23 ENCOUNTER — COMMUNICATION - HEALTHEAST (OUTPATIENT)
Dept: FAMILY MEDICINE | Facility: CLINIC | Age: 79
End: 2019-07-23

## 2019-07-24 LAB
ATRIAL RATE - MUSE: 55 BPM
DIASTOLIC BLOOD PRESSURE - MUSE: NORMAL MMHG
INTERPRETATION ECG - MUSE: NORMAL
P AXIS - MUSE: NORMAL DEGREES
PR INTERVAL - MUSE: NORMAL MS
QRS DURATION - MUSE: 92 MS
QT - MUSE: 420 MS
QTC - MUSE: 472 MS
R AXIS - MUSE: 17 DEGREES
SYSTOLIC BLOOD PRESSURE - MUSE: NORMAL MMHG
T AXIS - MUSE: 71 DEGREES
VENTRICULAR RATE- MUSE: 76 BPM

## 2019-07-25 ENCOUNTER — COMMUNICATION - HEALTHEAST (OUTPATIENT)
Dept: FAMILY MEDICINE | Facility: CLINIC | Age: 79
End: 2019-07-25

## 2019-08-07 ENCOUNTER — COMMUNICATION - HEALTHEAST (OUTPATIENT)
Dept: CARDIOLOGY | Facility: CLINIC | Age: 79
End: 2019-08-07

## 2019-08-07 ENCOUNTER — OFFICE VISIT - HEALTHEAST (OUTPATIENT)
Dept: CARDIOLOGY | Facility: TELEHEALTH | Age: 79
End: 2019-08-07

## 2019-08-07 DIAGNOSIS — R06.09 EXERTIONAL DYSPNEA: ICD-10-CM

## 2019-08-07 DIAGNOSIS — I48.91 NEW ONSET ATRIAL FIBRILLATION (H): ICD-10-CM

## 2019-08-07 DIAGNOSIS — R00.2 PALPITATIONS: ICD-10-CM

## 2019-08-07 DIAGNOSIS — Z71.89 ENCOUNTER FOR ANTICOAGULATION DISCUSSION AND COUNSELING: ICD-10-CM

## 2019-08-07 ASSESSMENT — MIFFLIN-ST. JEOR: SCORE: 1500.31

## 2019-08-10 ENCOUNTER — COMMUNICATION - HEALTHEAST (OUTPATIENT)
Dept: FAMILY MEDICINE | Facility: CLINIC | Age: 79
End: 2019-08-10

## 2019-08-10 DIAGNOSIS — I25.10 CARDIOVASCULAR DISEASE: ICD-10-CM

## 2019-08-10 DIAGNOSIS — I10 ESSENTIAL HYPERTENSION, BENIGN: ICD-10-CM

## 2019-08-12 ENCOUNTER — COMMUNICATION - HEALTHEAST (OUTPATIENT)
Dept: FAMILY MEDICINE | Facility: CLINIC | Age: 79
End: 2019-08-12

## 2019-08-16 ENCOUNTER — HOSPITAL ENCOUNTER (OUTPATIENT)
Dept: CARDIOLOGY | Facility: HOSPITAL | Age: 79
Discharge: HOME | End: 2019-08-16
Attending: INTERNAL MEDICINE

## 2019-08-16 DIAGNOSIS — I48.91 NEW ONSET ATRIAL FIBRILLATION (H): ICD-10-CM

## 2019-08-16 LAB
AORTIC ROOT: 4.2 CM
AORTIC VALVE MEAN VELOCITY: 71.6 CM/S
ASCENDING AORTA: 3.4 CM
AV DIMENSIONLESS INDEX VTI: 0.9
AV MEAN GRADIENT: 2 MMHG
AV PEAK GRADIENT: 4 MMHG
AV VALVE AREA: 3.3 CM2
BSA FOR ECHO PROCEDURE: 1.98 M2
CV BLOOD PRESSURE: NORMAL MMHG
CV ECHO HEIGHT: 66.5 IN
CV ECHO WEIGHT: 184 LBS
DOP CALC AO PEAK VEL: 100 CM/S
DOP CALC AO VTI: 23.6 CM
DOP CALC LVOT AREA: 3.8 CM2
DOP CALC LVOT DIAMETER: 2.2 CM
DOP CALC LVOT STROKE VOLUME: 79 CM3
DOP CALCLVOT PEAK VEL VTI: 20.8 CM
EJECTION FRACTION: 60 % (ref 55–75)
FRACTIONAL SHORTENING: 28 % (ref 28–44)
INTERVENTRICULAR SEPTUM IN END DIASTOLE: 0.6 CM (ref 0.6–1)
IVS/PW RATIO: 0.9
LA AREA 1: 26.1 CM2
LA AREA 2: 26.1 CM2
LEFT ATRIUM LENGTH: 6.51 CM
LEFT ATRIUM SIZE: 4.1 CM
LEFT ATRIUM TO AORTIC ROOT RATIO: 0.98 NO UNITS
LEFT ATRIUM VOLUME INDEX: 44.9 ML/M2
LEFT ATRIUM VOLUME: 88.9 ML
LEFT VENTRICLE CARDIAC INDEX: 2.9 L/MIN/M2
LEFT VENTRICLE CARDIAC OUTPUT: 5.8 L/MIN
LEFT VENTRICLE DIASTOLIC VOLUME INDEX: 62.6 CM3/M2 (ref 34–74)
LEFT VENTRICLE DIASTOLIC VOLUME: 124 CM3 (ref 62–150)
LEFT VENTRICLE HEART RATE: 73 BPM
LEFT VENTRICLE MASS INDEX: 52.8 G/M2
LEFT VENTRICLE SYSTOLIC VOLUME INDEX: 25.3 CM3/M2 (ref 11–31)
LEFT VENTRICLE SYSTOLIC VOLUME: 50 CM3 (ref 21–61)
LEFT VENTRICULAR INTERNAL DIMENSION IN DIASTOLE: 5 CM (ref 4.2–5.8)
LEFT VENTRICULAR INTERNAL DIMENSION IN SYSTOLE: 3.6 CM (ref 2.5–4)
LEFT VENTRICULAR MASS: 104.6 G
LEFT VENTRICULAR OUTFLOW TRACT MEAN GRADIENT: 2 MMHG
LEFT VENTRICULAR OUTFLOW TRACT MEAN VELOCITY: 61.7 CM/S
LEFT VENTRICULAR POSTERIOR WALL IN END DIASTOLE: 0.7 CM (ref 0.6–1)
LV STROKE VOLUME INDEX: 39.9 ML/M2
MITRAL VALVE E/A RATIO: 3.3
MV AVERAGE E/E' RATIO: 5.3 CM/S
MV DECELERATION TIME: 261 MS
MV E'TISSUE VEL-LAT: 16.4 CM/S
MV E'TISSUE VEL-MED: 13 CM/S
MV LATERAL E/E' RATIO: 4.7
MV MEDIAL E/E' RATIO: 6
MV PEAK A VELOCITY: 23.2 CM/S
MV PEAK E VELOCITY: 77.5 CM/S
NUC REST DIASTOLIC VOLUME INDEX: 2944 LBS
NUC REST SYSTOLIC VOLUME INDEX: 66.5 IN
PR MAX PG: 2 MMHG
PR PEAK VELOCITY: 70.8 CM/S
TRICUSPID REGURGITATION PEAK PRESSURE GRADIENT: 23.4 MMHG
TRICUSPID VALVE ANULAR PLANE SYSTOLIC EXCURSION: 1.2 CM
TRICUSPID VALVE PEAK REGURGITANT VELOCITY: 242 CM/S

## 2019-08-16 ASSESSMENT — MIFFLIN-ST. JEOR: SCORE: 1500.31

## 2019-08-20 ENCOUNTER — COMMUNICATION - HEALTHEAST (OUTPATIENT)
Dept: CARDIOLOGY | Facility: CLINIC | Age: 79
End: 2019-08-20

## 2019-08-20 DIAGNOSIS — I77.89 AORTIC ROOT ENLARGEMENT (H): ICD-10-CM

## 2019-08-20 DIAGNOSIS — R06.09 EXERTIONAL DYSPNEA: ICD-10-CM

## 2019-08-20 DIAGNOSIS — R93.1 ABNORMAL FINDINGS ON DIAGNOSTIC IMAGING OF HEART AND CORONARY CIRCULATION: ICD-10-CM

## 2019-08-20 DIAGNOSIS — I51.7 RIGHT VENTRICULAR ENLARGEMENT: ICD-10-CM

## 2019-08-29 ENCOUNTER — HOSPITAL ENCOUNTER (OUTPATIENT)
Dept: MRI IMAGING | Facility: HOSPITAL | Age: 79
Discharge: HOME | End: 2019-08-29
Attending: INTERNAL MEDICINE

## 2019-08-29 ENCOUNTER — HOSPITAL ENCOUNTER (OUTPATIENT)
Dept: MRI IMAGING | Facility: HOSPITAL | Age: 79
Discharge: HOME | End: 2019-08-29
Attending: PODIATRIST

## 2019-08-29 DIAGNOSIS — R06.09 OTHER FORMS OF DYSPNEA: ICD-10-CM

## 2019-08-29 DIAGNOSIS — I77.89 AORTIC ROOT ENLARGEMENT (H): ICD-10-CM

## 2019-08-29 DIAGNOSIS — M20.42 HAMMER TOE OF LEFT FOOT: ICD-10-CM

## 2019-08-29 DIAGNOSIS — R93.1 ABNORMAL FINDINGS ON DIAGNOSTIC IMAGING OF HEART AND CORONARY CIRCULATION: ICD-10-CM

## 2019-08-29 DIAGNOSIS — I51.7 RIGHT VENTRICULAR ENLARGEMENT: ICD-10-CM

## 2019-08-29 DIAGNOSIS — R06.09 EXERTIONAL DYSPNEA: ICD-10-CM

## 2019-08-29 LAB
CREAT BLD-MCNC: 0.9 MG/DL (ref 0.7–1.3)
GFR SERPL CREATININE-BSD FRML MDRD: >60 ML/MIN/1.73M2

## 2019-08-30 LAB
CCTA INTERVENTRICULAR SETPUM: 1 CM (ref 0.6–1.1)
CCTA POSTERIOR WALL: 1 CM (ref 0.6–1.1)
MR CARDIAC LEFT VENTRIAL CARDIAC INDEX: 2.5 L/MIN/M2 (ref 1.7–4.2)
MR CARDIAC LEFT VENTRICAL CARDIAC OUTPUT: 4.92 L/MIN (ref 2.8–8.8)
MR CARDIAC LEFT VENTRICULAR DIASTOLIC VOLUME INDEX: 66.19 ML/M2 (ref 47–92)
MR CARDIAC LEFT VENTRICULAR MASS INDEX: 72.39 G/M2 (ref 70–113)
MR CARDIAC LEFT VENTRICULAR MASS: 140 G (ref 118–238)
MR CARDIAC LEFT VENTRICULAR STROKE VOLUME INDEX: 36.09 ML/M2 (ref 32–62)
MR CARDIAC LEFT VENTRICULAR SYSTOLIC VOLUME INDEX: 30.09 ML/M2 (ref 13–30)
MR EJECTION FRACTION: 54.53 %
MR HEIGHT: 1.68 M
MR LEFT VENTRICULAR DYSTOLIC VOLUMEN: 128 ML (ref 77–195)
MR LEFT VENTRICULAR STROKE VOLUMEN: 69.8 ML (ref 51–133)
MR LEFT VENTRICULAR SYSTOLIC VOLUME: 58.2 ML (ref 19–72)
MR WEIGHT: 83.5 KG

## 2019-09-12 ENCOUNTER — OFFICE VISIT - HEALTHEAST (OUTPATIENT)
Dept: CARDIOLOGY | Facility: CLINIC | Age: 79
End: 2019-09-12

## 2019-09-12 DIAGNOSIS — E78.5 HYPERLIPIDEMIA LDL GOAL <100: ICD-10-CM

## 2019-09-12 DIAGNOSIS — I48.19 PERSISTENT ATRIAL FIBRILLATION (H): ICD-10-CM

## 2019-09-12 DIAGNOSIS — I48.0 PAROXYSMAL ATRIAL FIBRILLATION (H): ICD-10-CM

## 2019-09-12 DIAGNOSIS — I10 ESSENTIAL HYPERTENSION, BENIGN: ICD-10-CM

## 2019-09-12 ASSESSMENT — MIFFLIN-ST. JEOR: SCORE: 1482.16

## 2019-09-17 ENCOUNTER — HOSPITAL ENCOUNTER (OUTPATIENT)
Dept: CARDIOLOGY | Facility: HOSPITAL | Age: 79
Discharge: HOME | End: 2019-09-17
Attending: NURSE PRACTITIONER

## 2019-09-17 DIAGNOSIS — I48.0 PAROXYSMAL ATRIAL FIBRILLATION (H): ICD-10-CM

## 2019-10-08 ENCOUNTER — COMMUNICATION - HEALTHEAST (OUTPATIENT)
Dept: FAMILY MEDICINE | Facility: CLINIC | Age: 79
End: 2019-10-08

## 2019-10-08 DIAGNOSIS — E11.9 DM TYPE 2 (DIABETES MELLITUS, TYPE 2) (H): ICD-10-CM

## 2019-10-08 DIAGNOSIS — E78.5 HYPERLIPIDEMIA: ICD-10-CM

## 2019-10-23 ENCOUNTER — COMMUNICATION - HEALTHEAST (OUTPATIENT)
Dept: FAMILY MEDICINE | Facility: CLINIC | Age: 79
End: 2019-10-23

## 2019-10-23 DIAGNOSIS — I10 BENIGN ESSENTIAL HYPERTENSION: ICD-10-CM

## 2020-01-07 ENCOUNTER — COMMUNICATION - HEALTHEAST (OUTPATIENT)
Dept: FAMILY MEDICINE | Facility: CLINIC | Age: 80
End: 2020-01-07

## 2020-01-07 DIAGNOSIS — M10.9 GOUT: ICD-10-CM

## 2020-01-07 RX ORDER — PROBENECID 500 MG/1
500 TABLET, FILM COATED ORAL 2 TIMES DAILY
Qty: 180 TABLET | Refills: 3 | Status: SHIPPED | OUTPATIENT
Start: 2020-01-07 | End: 2021-12-13

## 2020-01-22 ENCOUNTER — OFFICE VISIT - HEALTHEAST (OUTPATIENT)
Dept: FAMILY MEDICINE | Facility: CLINIC | Age: 80
End: 2020-01-22

## 2020-01-22 ENCOUNTER — COMMUNICATION - HEALTHEAST (OUTPATIENT)
Dept: FAMILY MEDICINE | Facility: CLINIC | Age: 80
End: 2020-01-22

## 2020-01-22 DIAGNOSIS — I77.89 AORTIC ROOT ENLARGEMENT (H): ICD-10-CM

## 2020-01-22 DIAGNOSIS — M1A.00X0 IDIOPATHIC CHRONIC GOUT WITHOUT TOPHUS, UNSPECIFIED SITE: ICD-10-CM

## 2020-01-22 DIAGNOSIS — I10 ESSENTIAL HYPERTENSION, BENIGN: ICD-10-CM

## 2020-01-22 DIAGNOSIS — J64: ICD-10-CM

## 2020-01-22 DIAGNOSIS — I48.19 PERSISTENT ATRIAL FIBRILLATION (H): ICD-10-CM

## 2020-01-22 DIAGNOSIS — Z00.00 MEDICARE ANNUAL WELLNESS VISIT, SUBSEQUENT: ICD-10-CM

## 2020-01-22 DIAGNOSIS — K21.9 GASTROESOPHAGEAL REFLUX DISEASE WITHOUT ESOPHAGITIS: ICD-10-CM

## 2020-01-22 DIAGNOSIS — E11.65 TYPE 2 DIABETES MELLITUS WITH HYPERGLYCEMIA, WITHOUT LONG-TERM CURRENT USE OF INSULIN (H): ICD-10-CM

## 2020-01-22 DIAGNOSIS — Z12.5 SCREENING FOR PROSTATE CANCER: ICD-10-CM

## 2020-01-22 DIAGNOSIS — M19.90 INFLAMMATORY ARTHRITIS: ICD-10-CM

## 2020-01-22 LAB
ALBUMIN SERPL-MCNC: 3.8 G/DL (ref 3.5–5)
ALBUMIN UR-MCNC: NEGATIVE MG/DL
ALP SERPL-CCNC: 74 U/L (ref 45–120)
ALT SERPL W P-5'-P-CCNC: 38 U/L (ref 0–45)
ANION GAP SERPL CALCULATED.3IONS-SCNC: 12 MMOL/L (ref 5–18)
APPEARANCE UR: CLEAR
AST SERPL W P-5'-P-CCNC: 37 U/L (ref 0–40)
BILIRUB SERPL-MCNC: 0.7 MG/DL (ref 0–1)
BILIRUB UR QL STRIP: NEGATIVE
BUN SERPL-MCNC: 10 MG/DL (ref 8–28)
CALCIUM SERPL-MCNC: 9.1 MG/DL (ref 8.5–10.5)
CHLORIDE BLD-SCNC: 102 MMOL/L (ref 98–107)
CHOLEST SERPL-MCNC: 124 MG/DL
CO2 SERPL-SCNC: 24 MMOL/L (ref 22–31)
COLOR UR AUTO: YELLOW
CREAT SERPL-MCNC: 0.88 MG/DL (ref 0.7–1.3)
ERYTHROCYTE [DISTWIDTH] IN BLOOD BY AUTOMATED COUNT: 11.2 % (ref 11–14.5)
FASTING STATUS PATIENT QL REPORTED: YES
GFR SERPL CREATININE-BSD FRML MDRD: >60 ML/MIN/1.73M2
GLUCOSE BLD-MCNC: 104 MG/DL (ref 70–125)
GLUCOSE UR STRIP-MCNC: NEGATIVE MG/DL
HBA1C MFR BLD: 6 % (ref 3.5–6)
HCT VFR BLD AUTO: 44.3 % (ref 40–54)
HDLC SERPL-MCNC: 66 MG/DL
HGB BLD-MCNC: 15.1 G/DL (ref 14–18)
HGB UR QL STRIP: NEGATIVE
KETONES UR STRIP-MCNC: NEGATIVE MG/DL
LDLC SERPL CALC-MCNC: 46 MG/DL
LEUKOCYTE ESTERASE UR QL STRIP: NEGATIVE
MCH RBC QN AUTO: 33.7 PG (ref 27–34)
MCHC RBC AUTO-ENTMCNC: 34 G/DL (ref 32–36)
MCV RBC AUTO: 99 FL (ref 80–100)
NITRATE UR QL: NEGATIVE
PH UR STRIP: 7 [PH] (ref 5–8)
PLATELET # BLD AUTO: 218 THOU/UL (ref 140–440)
PMV BLD AUTO: 7.7 FL (ref 7–10)
POTASSIUM BLD-SCNC: 4.5 MMOL/L (ref 3.5–5)
PROT SERPL-MCNC: 7 G/DL (ref 6–8)
RBC # BLD AUTO: 4.47 MILL/UL (ref 4.4–6.2)
SODIUM SERPL-SCNC: 138 MMOL/L (ref 136–145)
SP GR UR STRIP: 1.01 (ref 1–1.03)
TRIGL SERPL-MCNC: 59 MG/DL
URATE SERPL-MCNC: 6.6 MG/DL (ref 3–8)
UROBILINOGEN UR STRIP-ACNC: NORMAL
WBC: 6.2 THOU/UL (ref 4–11)

## 2020-01-23 ENCOUNTER — COMMUNICATION - HEALTHEAST (OUTPATIENT)
Dept: FAMILY MEDICINE | Facility: CLINIC | Age: 80
End: 2020-01-23

## 2020-01-23 ENCOUNTER — AMBULATORY - HEALTHEAST (OUTPATIENT)
Dept: FAMILY MEDICINE | Facility: CLINIC | Age: 80
End: 2020-01-23

## 2020-01-23 DIAGNOSIS — Z12.5 ENCOUNTER FOR SCREENING FOR MALIGNANT NEOPLASM OF PROSTATE: ICD-10-CM

## 2020-01-23 DIAGNOSIS — N40.0 PROSTATISM: ICD-10-CM

## 2020-01-23 LAB
ATRIAL RATE - MUSE: 326 BPM
DIASTOLIC BLOOD PRESSURE - MUSE: NORMAL
INTERPRETATION ECG - MUSE: NORMAL
P AXIS - MUSE: NORMAL
PR INTERVAL - MUSE: NORMAL
PSA SERPL-MCNC: 6.9 NG/ML (ref 0–6.5)
QRS DURATION - MUSE: 78 MS
QT - MUSE: 412 MS
QTC - MUSE: 438 MS
R AXIS - MUSE: 144 DEGREES
SYSTOLIC BLOOD PRESSURE - MUSE: NORMAL
T AXIS - MUSE: 98 DEGREES
VENTRICULAR RATE- MUSE: 68 BPM

## 2020-02-10 ENCOUNTER — COMMUNICATION - HEALTHEAST (OUTPATIENT)
Dept: FAMILY MEDICINE | Facility: CLINIC | Age: 80
End: 2020-02-10

## 2020-02-10 DIAGNOSIS — I10 ESSENTIAL HYPERTENSION, BENIGN: ICD-10-CM

## 2020-02-11 ENCOUNTER — COMMUNICATION - HEALTHEAST (OUTPATIENT)
Dept: FAMILY MEDICINE | Facility: CLINIC | Age: 80
End: 2020-02-11

## 2020-03-06 ENCOUNTER — COMMUNICATION - HEALTHEAST (OUTPATIENT)
Dept: FAMILY MEDICINE | Facility: CLINIC | Age: 80
End: 2020-03-06

## 2020-03-06 DIAGNOSIS — E78.5 HYPERLIPIDEMIA: ICD-10-CM

## 2020-03-13 ENCOUNTER — COMMUNICATION - HEALTHEAST (OUTPATIENT)
Dept: FAMILY MEDICINE | Facility: CLINIC | Age: 80
End: 2020-03-13

## 2020-03-13 DIAGNOSIS — I10 ESSENTIAL HYPERTENSION, BENIGN: ICD-10-CM

## 2020-03-14 ENCOUNTER — COMMUNICATION - HEALTHEAST (OUTPATIENT)
Dept: FAMILY MEDICINE | Facility: CLINIC | Age: 80
End: 2020-03-14

## 2020-03-14 DIAGNOSIS — I10 ESSENTIAL HYPERTENSION, BENIGN: ICD-10-CM

## 2020-03-16 RX ORDER — ATENOLOL 50 MG/1
TABLET ORAL
Qty: 90 TABLET | Refills: 1 | Status: SHIPPED | OUTPATIENT
Start: 2020-03-16 | End: 2021-08-05

## 2020-04-13 ENCOUNTER — COMMUNICATION - HEALTHEAST (OUTPATIENT)
Dept: FAMILY MEDICINE | Facility: CLINIC | Age: 80
End: 2020-04-13

## 2020-04-13 DIAGNOSIS — I10 ESSENTIAL HYPERTENSION, BENIGN: ICD-10-CM

## 2020-05-07 ENCOUNTER — COMMUNICATION - HEALTHEAST (OUTPATIENT)
Dept: FAMILY MEDICINE | Facility: CLINIC | Age: 80
End: 2020-05-07

## 2020-05-07 DIAGNOSIS — I25.10 CARDIOVASCULAR DISEASE: ICD-10-CM

## 2020-05-07 DIAGNOSIS — I10 ESSENTIAL HYPERTENSION, BENIGN: ICD-10-CM

## 2020-06-02 ENCOUNTER — COMMUNICATION - HEALTHEAST (OUTPATIENT)
Dept: FAMILY MEDICINE | Facility: CLINIC | Age: 80
End: 2020-06-02

## 2020-06-02 DIAGNOSIS — E78.5 HYPERLIPIDEMIA: ICD-10-CM

## 2020-07-07 ENCOUNTER — COMMUNICATION - HEALTHEAST (OUTPATIENT)
Dept: FAMILY MEDICINE | Facility: CLINIC | Age: 80
End: 2020-07-07

## 2020-07-07 DIAGNOSIS — K21.9 GERD (GASTROESOPHAGEAL REFLUX DISEASE): ICD-10-CM

## 2020-07-09 ENCOUNTER — COMMUNICATION - HEALTHEAST (OUTPATIENT)
Dept: FAMILY MEDICINE | Facility: CLINIC | Age: 80
End: 2020-07-09

## 2020-07-18 ENCOUNTER — COMMUNICATION - HEALTHEAST (OUTPATIENT)
Dept: FAMILY MEDICINE | Facility: CLINIC | Age: 80
End: 2020-07-18

## 2020-07-18 DIAGNOSIS — I10 BENIGN ESSENTIAL HYPERTENSION: ICD-10-CM

## 2020-08-04 ENCOUNTER — COMMUNICATION - HEALTHEAST (OUTPATIENT)
Dept: FAMILY MEDICINE | Facility: CLINIC | Age: 80
End: 2020-08-04

## 2020-08-04 DIAGNOSIS — I25.10 CARDIOVASCULAR DISEASE: ICD-10-CM

## 2020-08-04 DIAGNOSIS — I10 ESSENTIAL HYPERTENSION, BENIGN: ICD-10-CM

## 2020-08-05 ENCOUNTER — COMMUNICATION - HEALTHEAST (OUTPATIENT)
Dept: FAMILY MEDICINE | Facility: CLINIC | Age: 80
End: 2020-08-05

## 2020-08-30 ENCOUNTER — COMMUNICATION - HEALTHEAST (OUTPATIENT)
Dept: FAMILY MEDICINE | Facility: CLINIC | Age: 80
End: 2020-08-30

## 2020-08-30 DIAGNOSIS — I10 HYPERTENSION: ICD-10-CM

## 2020-08-31 RX ORDER — LISINOPRIL 40 MG/1
40 TABLET ORAL DAILY
Qty: 90 TABLET | Refills: 3 | Status: SHIPPED | OUTPATIENT
Start: 2020-08-31 | End: 2021-11-26

## 2020-09-01 ENCOUNTER — COMMUNICATION - HEALTHEAST (OUTPATIENT)
Dept: FAMILY MEDICINE | Facility: CLINIC | Age: 80
End: 2020-09-01

## 2020-09-01 DIAGNOSIS — E78.5 HYPERLIPIDEMIA: ICD-10-CM

## 2020-09-01 DIAGNOSIS — I48.91 NEW ONSET ATRIAL FIBRILLATION (H): ICD-10-CM

## 2020-09-03 ENCOUNTER — COMMUNICATION - HEALTHEAST (OUTPATIENT)
Dept: FAMILY MEDICINE | Facility: CLINIC | Age: 80
End: 2020-09-03

## 2020-09-30 ENCOUNTER — OFFICE VISIT - HEALTHEAST (OUTPATIENT)
Dept: FAMILY MEDICINE | Facility: CLINIC | Age: 80
End: 2020-09-30

## 2020-09-30 DIAGNOSIS — Z12.5 ENCOUNTER FOR SCREENING FOR MALIGNANT NEOPLASM OF PROSTATE: ICD-10-CM

## 2020-09-30 DIAGNOSIS — J64: ICD-10-CM

## 2020-09-30 DIAGNOSIS — R97.20 ABNORMAL PSA: ICD-10-CM

## 2020-09-30 DIAGNOSIS — M1A.00X0 IDIOPATHIC CHRONIC GOUT WITHOUT TOPHUS, UNSPECIFIED SITE: ICD-10-CM

## 2020-09-30 DIAGNOSIS — I10 ESSENTIAL HYPERTENSION, BENIGN: ICD-10-CM

## 2020-09-30 DIAGNOSIS — E11.69 TYPE 2 DIABETES MELLITUS WITH OTHER SPECIFIED COMPLICATION, WITHOUT LONG-TERM CURRENT USE OF INSULIN (H): ICD-10-CM

## 2020-09-30 DIAGNOSIS — E78.5 HYPERLIPIDEMIA LDL GOAL <100: ICD-10-CM

## 2020-09-30 LAB
ALBUMIN SERPL-MCNC: 3.9 G/DL (ref 3.5–5)
ALBUMIN UR-MCNC: NEGATIVE MG/DL
ALP SERPL-CCNC: 90 U/L (ref 45–120)
ALT SERPL W P-5'-P-CCNC: 133 U/L (ref 0–45)
ANION GAP SERPL CALCULATED.3IONS-SCNC: 10 MMOL/L (ref 5–18)
APPEARANCE UR: CLEAR
AST SERPL W P-5'-P-CCNC: 89 U/L (ref 0–40)
BILIRUB SERPL-MCNC: 0.7 MG/DL (ref 0–1)
BILIRUB UR QL STRIP: NEGATIVE
BUN SERPL-MCNC: 8 MG/DL (ref 8–28)
CALCIUM SERPL-MCNC: 8.9 MG/DL (ref 8.5–10.5)
CHLORIDE BLD-SCNC: 103 MMOL/L (ref 98–107)
CO2 SERPL-SCNC: 26 MMOL/L (ref 22–31)
COLOR UR AUTO: YELLOW
CREAT SERPL-MCNC: 0.88 MG/DL (ref 0.7–1.3)
ERYTHROCYTE [DISTWIDTH] IN BLOOD BY AUTOMATED COUNT: 11.9 % (ref 11–14.5)
GFR SERPL CREATININE-BSD FRML MDRD: >60 ML/MIN/1.73M2
GLUCOSE BLD-MCNC: 101 MG/DL (ref 70–125)
GLUCOSE UR STRIP-MCNC: NEGATIVE MG/DL
HBA1C MFR BLD: 5.9 %
HCT VFR BLD AUTO: 41.1 % (ref 40–54)
HGB BLD-MCNC: 13.9 G/DL (ref 14–18)
HGB UR QL STRIP: NEGATIVE
KETONES UR STRIP-MCNC: NEGATIVE MG/DL
LEUKOCYTE ESTERASE UR QL STRIP: NEGATIVE
MCH RBC QN AUTO: 33.2 PG (ref 27–34)
MCHC RBC AUTO-ENTMCNC: 33.7 G/DL (ref 32–36)
MCV RBC AUTO: 98 FL (ref 80–100)
NITRATE UR QL: NEGATIVE
PH UR STRIP: 7.5 [PH] (ref 5–8)
PLATELET # BLD AUTO: 202 THOU/UL (ref 140–440)
PMV BLD AUTO: 8.2 FL (ref 7–10)
POTASSIUM BLD-SCNC: 4.4 MMOL/L (ref 3.5–5)
PROT SERPL-MCNC: 7.2 G/DL (ref 6–8)
PSA SERPL-MCNC: 6.9 NG/ML (ref 0–6.5)
RBC # BLD AUTO: 4.18 MILL/UL (ref 4.4–6.2)
SODIUM SERPL-SCNC: 139 MMOL/L (ref 136–145)
SP GR UR STRIP: 1.01 (ref 1–1.03)
URATE SERPL-MCNC: 6.8 MG/DL (ref 3–8)
UROBILINOGEN UR STRIP-ACNC: NORMAL
WBC: 5.9 THOU/UL (ref 4–11)

## 2020-09-30 ASSESSMENT — MIFFLIN-ST. JEOR: SCORE: 1486.97

## 2020-10-01 ENCOUNTER — COMMUNICATION - HEALTHEAST (OUTPATIENT)
Dept: FAMILY MEDICINE | Facility: CLINIC | Age: 80
End: 2020-10-01

## 2020-10-05 ENCOUNTER — COMMUNICATION - HEALTHEAST (OUTPATIENT)
Dept: FAMILY MEDICINE | Facility: CLINIC | Age: 80
End: 2020-10-05

## 2020-10-05 ENCOUNTER — AMBULATORY - HEALTHEAST (OUTPATIENT)
Dept: FAMILY MEDICINE | Facility: CLINIC | Age: 80
End: 2020-10-05

## 2020-10-05 DIAGNOSIS — E78.5 HYPERLIPIDEMIA: ICD-10-CM

## 2020-10-05 RX ORDER — EZETIMIBE 10 MG/1
10 TABLET ORAL DAILY
Qty: 90 TABLET | Refills: 3 | Status: SHIPPED | OUTPATIENT
Start: 2020-10-05 | End: 2021-09-30

## 2020-10-06 ENCOUNTER — COMMUNICATION - HEALTHEAST (OUTPATIENT)
Dept: FAMILY MEDICINE | Facility: CLINIC | Age: 80
End: 2020-10-06

## 2020-10-06 DIAGNOSIS — E78.5 HYPERLIPIDEMIA: ICD-10-CM

## 2020-10-21 ENCOUNTER — COMMUNICATION - HEALTHEAST (OUTPATIENT)
Dept: FAMILY MEDICINE | Facility: CLINIC | Age: 80
End: 2020-10-21

## 2020-10-21 DIAGNOSIS — E11.9 DM TYPE 2 (DIABETES MELLITUS, TYPE 2) (H): ICD-10-CM

## 2020-11-30 ENCOUNTER — COMMUNICATION - HEALTHEAST (OUTPATIENT)
Dept: FAMILY MEDICINE | Facility: CLINIC | Age: 80
End: 2020-11-30

## 2020-11-30 DIAGNOSIS — E78.5 HYPERLIPIDEMIA: ICD-10-CM

## 2020-11-30 RX ORDER — SIMVASTATIN 40 MG
40 TABLET ORAL DAILY
Qty: 90 TABLET | Refills: 0 | Status: SHIPPED | OUTPATIENT
Start: 2020-11-30 | End: 2022-05-30

## 2020-12-20 ENCOUNTER — COMMUNICATION - HEALTHEAST (OUTPATIENT)
Dept: FAMILY MEDICINE | Facility: CLINIC | Age: 80
End: 2020-12-20

## 2021-01-09 ENCOUNTER — COMMUNICATION - HEALTHEAST (OUTPATIENT)
Dept: FAMILY MEDICINE | Facility: CLINIC | Age: 81
End: 2021-01-09

## 2021-01-09 DIAGNOSIS — K21.9 GERD (GASTROESOPHAGEAL REFLUX DISEASE): ICD-10-CM

## 2021-01-10 ENCOUNTER — COMMUNICATION - HEALTHEAST (OUTPATIENT)
Dept: FAMILY MEDICINE | Facility: CLINIC | Age: 81
End: 2021-01-10

## 2021-01-10 DIAGNOSIS — K21.9 GERD (GASTROESOPHAGEAL REFLUX DISEASE): ICD-10-CM

## 2021-01-11 RX ORDER — OMEPRAZOLE 40 MG/1
CAPSULE, DELAYED RELEASE ORAL
Qty: 180 CAPSULE | Refills: 2 | Status: SHIPPED | OUTPATIENT
Start: 2021-01-11 | End: 2021-11-10

## 2021-01-14 ENCOUNTER — COMMUNICATION - HEALTHEAST (OUTPATIENT)
Dept: FAMILY MEDICINE | Facility: CLINIC | Age: 81
End: 2021-01-14

## 2021-01-14 DIAGNOSIS — E11.9 DM TYPE 2 (DIABETES MELLITUS, TYPE 2) (H): ICD-10-CM

## 2021-01-24 ENCOUNTER — COMMUNICATION - HEALTHEAST (OUTPATIENT)
Dept: FAMILY MEDICINE | Facility: CLINIC | Age: 81
End: 2021-01-24

## 2021-01-24 DIAGNOSIS — I10 ESSENTIAL HYPERTENSION, BENIGN: ICD-10-CM

## 2021-01-24 DIAGNOSIS — I25.10 CARDIOVASCULAR DISEASE: ICD-10-CM

## 2021-01-25 RX ORDER — AMLODIPINE BESYLATE 10 MG/1
TABLET ORAL
Qty: 90 TABLET | Refills: 2 | Status: SHIPPED | OUTPATIENT
Start: 2021-01-25 | End: 2021-10-25

## 2021-02-01 ENCOUNTER — COMMUNICATION - HEALTHEAST (OUTPATIENT)
Dept: FAMILY MEDICINE | Facility: CLINIC | Age: 81
End: 2021-02-01

## 2021-02-01 DIAGNOSIS — I10 ESSENTIAL HYPERTENSION, BENIGN: ICD-10-CM

## 2021-02-01 RX ORDER — ATENOLOL 50 MG/1
TABLET ORAL
Qty: 90 TABLET | Refills: 1 | Status: SHIPPED | OUTPATIENT
Start: 2021-02-01 | End: 2022-10-25

## 2021-03-16 ENCOUNTER — RECORDS - HEALTHEAST (OUTPATIENT)
Dept: ADMINISTRATIVE | Facility: OTHER | Age: 81
End: 2021-03-16

## 2021-04-15 ENCOUNTER — COMMUNICATION - HEALTHEAST (OUTPATIENT)
Dept: FAMILY MEDICINE | Facility: CLINIC | Age: 81
End: 2021-04-15

## 2021-04-15 DIAGNOSIS — I10 BENIGN ESSENTIAL HYPERTENSION: ICD-10-CM

## 2021-04-16 RX ORDER — FUROSEMIDE 20 MG
TABLET ORAL
Qty: 90 TABLET | Refills: 1 | Status: SHIPPED | OUTPATIENT
Start: 2021-04-16 | End: 2022-03-10

## 2021-04-18 ENCOUNTER — COMMUNICATION - HEALTHEAST (OUTPATIENT)
Dept: FAMILY MEDICINE | Facility: CLINIC | Age: 81
End: 2021-04-18

## 2021-04-18 DIAGNOSIS — E11.9 DM TYPE 2 (DIABETES MELLITUS, TYPE 2) (H): ICD-10-CM

## 2021-04-19 RX ORDER — GLIMEPIRIDE 2 MG/1
TABLET ORAL
Qty: 90 TABLET | Refills: 0 | Status: SHIPPED | OUTPATIENT
Start: 2021-04-19 | End: 2021-07-26

## 2021-05-22 ENCOUNTER — HEALTH MAINTENANCE LETTER (OUTPATIENT)
Age: 81
End: 2021-05-22

## 2021-05-25 ENCOUNTER — RECORDS - HEALTHEAST (OUTPATIENT)
Dept: ADMINISTRATIVE | Facility: CLINIC | Age: 81
End: 2021-05-25

## 2021-05-26 NOTE — TELEPHONE ENCOUNTER
Refill NOT Needed -> merely change to 90-day supplies.  Done now.  Medication was renewed by PCP on 3/20/2019.  Last OV 1/24/2019.    Shell Morrison, [facility] Connection Triage/Med Refill 3/22/2019     Requested Prescriptions   Pending Prescriptions Disp Refills     traZODone (DESYREL) 50 MG tablet [Pharmacy Med Name: TRAZODONE 50MG      TAB] 30 tablet 3     Sig: TAKE 1 TO 2 TABLETS BY MOUTH ONCE DAILY AT BEDTIME    Tricyclics/Misc Antidepressant/Antianxiety Meds Refill Protocol Passed - 3/20/2019  7:48 AM       Passed - PCP or prescribing provider visit in last year    Last office visit with prescriber/PCP: 1/24/2019 Mike Mcdowell MD OR same dept: 1/24/2019 Mike Mcdowell MD OR same specialty: 1/24/2019 Mike Mcdowell MD  Last physical: 7/13/2018 Last MTM visit: Visit date not found   Next visit within 3 mo: Visit date not found  Next physical within 3 mo: Visit date not found  Prescriber OR PCP: Mike Mcdowell MD  Last diagnosis associated with med order: There are no diagnoses linked to this encounter.  If protocol passes may refill for 12 months if within 3 months of last provider visit (or a total of 15 months).

## 2021-05-28 ENCOUNTER — RECORDS - HEALTHEAST (OUTPATIENT)
Dept: ADMINISTRATIVE | Facility: CLINIC | Age: 81
End: 2021-05-28

## 2021-05-28 NOTE — TELEPHONE ENCOUNTER
RN cannot approve Refill Request    RN can NOT refill this medication Protocol failed and NO refill given. Last office visit: 1/24/2019 Mike Mcdowell MD Last Physical: 7/13/2018 Last MTM visit: Visit date not found Last visit same specialty: 1/24/2019 Mike Mcdowell MD.  Next visit within 3 mo: Visit date not found  Next physical within 3 mo: Visit date not found      Isidra Caceres, [facility] Connection Triage/Med Refill 4/26/2019    Requested Prescriptions   Pending Prescriptions Disp Refills     furosemide (LASIX) 20 MG tablet 90 tablet 0     Sig: Take 1 tablet (20 mg total) by mouth daily.       There is no refill protocol information for this order

## 2021-05-28 NOTE — TELEPHONE ENCOUNTER
RN cannot approve Refill Request    RN can NOT refill this medication historical medication requested.       Alicia Dinero, [facility] Connection Triage/Med Refill 4/26/2019    Requested Prescriptions   Pending Prescriptions Disp Refills     furosemide (LASIX) 20 MG tablet [Pharmacy Med Name: FUROSEMIDE 20MG     TAB] 90 tablet 1     Sig: TAKE 1 TABLET BY MOUTH ONCE DAILY       Diuretics/Combination Diuretics Refill Protocol  Passed - 4/24/2019  8:01 AM        Passed - Visit with PCP or prescribing provider visit in past 12 months     Last office visit with prescriber/PCP: 1/24/2019 Mike Mcdowell MD OR same dept: 1/24/2019 Mike Mcdowell MD OR same specialty: 1/24/2019 Mike Mcdowell MD  Last physical: 7/13/2018 Last MTM visit: Visit date not found   Next visit within 3 mo: Visit date not found  Next physical within 3 mo: Visit date not found  Prescriber OR PCP: Mike Mcdowell MD  Last diagnosis associated with med order: 1. Benign essential hypertension  - furosemide (LASIX) 20 MG tablet [Pharmacy Med Name: FUROSEMIDE 20MG     TAB]; TAKE 1 TABLET BY MOUTH ONCE DAILY  Dispense: 90 tablet; Refill: 1    If protocol passes may refill for 12 months if within 3 months of last provider visit (or a total of 15 months).             Passed - Serum Potassium in past 12 months      Lab Results   Component Value Date    Potassium 4.6 12/06/2018             Passed - Serum Sodium in past 12 months      Lab Results   Component Value Date    Sodium 128 (L) 12/06/2018             Passed - Blood pressure on file in past 12 months     BP Readings from Last 1 Encounters:   01/24/19 170/80             Passed - Serum Creatinine in past 12 months      Creatinine   Date Value Ref Range Status   12/06/2018 1.03 0.70 - 1.30 mg/dL Final

## 2021-05-29 ENCOUNTER — RECORDS - HEALTHEAST (OUTPATIENT)
Dept: ADMINISTRATIVE | Facility: CLINIC | Age: 81
End: 2021-05-29

## 2021-05-29 ENCOUNTER — COMMUNICATION - HEALTHEAST (OUTPATIENT)
Dept: FAMILY MEDICINE | Facility: CLINIC | Age: 81
End: 2021-05-29

## 2021-05-29 DIAGNOSIS — E78.5 HYPERLIPIDEMIA: ICD-10-CM

## 2021-05-29 RX ORDER — SIMVASTATIN 40 MG
TABLET ORAL
Qty: 90 TABLET | Refills: 1 | Status: SHIPPED | OUTPATIENT
Start: 2021-05-29 | End: 2022-03-10

## 2021-05-29 NOTE — PATIENT INSTRUCTIONS - HE
Surgery Information    Surgery: Analia will call to schedule surgery date and time.        ( Arrive at the hospital one and a half hours prior to your surgery and 1 hour prior at the surgery center. Check in at the . The only exception is if you are scheduled for surgery at 7am, you should arrive at 5:30am) The nurse will call you a couple of day before surgery go with the time the nurse tells you.  All surgery times are estimated please go with what the nurse tells you when she calls.  Emergency's do happen and surgery times do get changed the nurse will let you know.  We give you the best estimate of time that we can at the time.      IF YOU NEED TO RESCHEDULE OR CANCEL YOUR SURGERY FOR ANY REASON PLEASE CALL THE CLINIC AS SOON AS POSSIBLE -[phone].    Admission Type: Outpatient    Surgeon: Dr. Levi    Surgery Procedure: Hammer toe correction, 5th toe left foot.    Surgery Location: [facility],[address] [place] [address], FAX [phone]    Additional Information: If you use a CPAP machine for sleep apnea please bring it with you for surgery. We will want to monitor your breathing using your normal equipment.     HOSPITAL AND SURGERY CENTER INFORMATION    We need to know a lot of information about you before surgery.     1-5 Days Before:     A nurse will call you for a pre-screening interview. The phone call with the nurse will be much faster and easier if you  Have organized your information prior to the call.   This is when you will be told when to show up and what to bring.    Please have the following information available:    Preoperative Exam completed and faxed to our office  has to be within 30 days will not except 31 days.    Primary care provider's and any specialty providers' contact information available. .    If requested by your primary care provider, have any heart and lung exams at least 3 days before surgery.    Have a complete and accurate list of medications  available.     Have a list of your allergies/sensitivities and reactions    Know your health history, surgical history, and medical problems    Know any anesthesia issues with you or within your family.     BE SURE TO NOTIFY US IF YOU ARE TAKING ANY BLOOD THINNING AGENTS: Coumadin, Plavix, Aspirin, Xarelto, Eliquis    Someone plan to bring you and stay with you after the surgery for 24 hours    Day Before Surgery    1. STOP Smoking and Drinking: It is important to stop smoking and drinking at least 24 hours before surgery.  Smoking and drinking may cause complications in your recovery from anesthesia and may lengthen the healing process.    Please bring with you the day of surgery      List of medication    Eyeglass case or contact case with solution. You cannot wear contacts during surgery    Copy of Health Care Directives, Living Will or Power of     Insurance Cards    Photo ID    3. NOTHING BY MOUTH 8 HOURS BEFORE. This includes gum, hard candy, water and mints. The only exception is if you have been instructed by your doctor to take your medications with sips of water. You may rinse your mouth or brush your teeth, but do not swallow water.     4. Remove Nail Polish on fingers as well as toes  Day of Surgery    1. Medications- Take as indicated with sips of water     2. Wear comfortable loose fitting clothes. Wear your glasses-Not contacts. Do not wear jewelry and remove body piercing's. Surgery may be cancelled if they are not removed.     3. If same day surgery-Have a someone come with you to surgery that can help you understand the surgeon's instructions, drive you home and stay with you overnight the first night.     4. A nurse will call you at home within 72 hours following surgery to see how you are doing. Our nurses and doctors will discuss recovery with you.

## 2021-05-29 NOTE — TELEPHONE ENCOUNTER
Refill Approved    Rx renewed per Medication Renewal Policy. Medication was last renewed on 9/4/18.    Alicia Dienro, [facility] Connection Triage/Med Refill 6/4/2019     Requested Prescriptions   Pending Prescriptions Disp Refills     simvastatin (ZOCOR) 40 MG tablet [Pharmacy Med Name: SIMVASTATIN 40MG    TAB] 90 tablet 2     Sig: TAKE 1 TABLET BY MOUTH ONCE DAILY       Statins Refill Protocol (Hmg CoA Reductase Inhibitors) Passed - 6/3/2019  6:51 AM        Passed - PCP or prescribing provider visit in past 12 months      Last office visit with prescriber/PCP: 1/24/2019 Mike Mcdowell MD OR same dept: 1/24/2019 Mike Mcdowell MD OR same specialty: 1/24/2019 Mike Mcdowell MD  Last physical: 7/13/2018 Last MTM visit: Visit date not found   Next visit within 3 mo: Visit date not found  Next physical within 3 mo: Visit date not found  Prescriber OR PCP: Mike Mcdowell MD  Last diagnosis associated with med order: 1. Hyperlipidemia  - simvastatin (ZOCOR) 40 MG tablet [Pharmacy Med Name: SIMVASTATIN 40MG    TAB]; TAKE 1 TABLET BY MOUTH ONCE DAILY  Dispense: 90 tablet; Refill: 2    If protocol passes may refill for 12 months if within 3 months of last provider visit (or a total of 15 months).

## 2021-05-29 NOTE — TELEPHONE ENCOUNTER
Pt. called requesting shoe's or inserts for shoe's to correct hammer toe. Dr. Levi advised: there is not a shoe or insert for this problem. The only correction is surgery.

## 2021-05-29 NOTE — PROGRESS NOTES
FOOT AND ANKLE SURGERY/PODIATRY CONSULT NOTE          ASSESSMENT:   Hammertoe fifth toe left foot      TREATMENT:  Recommended hammertoe correction painful fifth toe left foot        HPI:Jabari Haddad is a pleasant 78-year-old male who presented to the clinic today complaining of a very painful fifth toe left foot.  The patient stated that periodically he develops a severely painful corn on the top of this toe.  It becomes very painful and aggravated by his shoes.  He attempted to shave this corn down and caused some significant bleeding.  The patient stated that the pain can be moderate to severe.  He denies any trauma to the toe.  The pain is only relieved after removing his shoe gear.  He denies any other previous treatment.       Past Medical History:   Diagnosis Date     Arteriosclerotic Cardiovascular Disease (ASCVD)      Arthritis      Diverticulitis      DM2 (diabetes mellitus, type 2) (H)      Generalized Osteoarthritis Of Multiple Sites      Gout      Hemorrhoids      Hyperlipidemia      Hypertension      Male Erectile Disorder        History reviewed. No pertinent surgical history.    No Known Allergies      Current Outpatient Medications:      amLODIPine (NORVASC) 10 MG tablet, Take 1 tablet (10 mg total) by mouth daily., Disp: 90 tablet, Rfl: 2     aspirin 81 MG EC tablet, Take 1 tablet (81 mg total) by mouth daily., Disp: 150 tablet, Rfl: 2     atenolol (TENORMIN) 50 MG tablet, Take 1 tablet (50 mg total) by mouth daily., Disp: 30 tablet, Rfl: 11     blood glucose test (RELION PRIME TEST STRIPS) strips, Use 1 each As Directed daily. Dispense brand per patient's insurance at pharmacy discretion., Disp: 100 strip, Rfl: 3     escitalopram oxalate (LEXAPRO) 20 MG tablet, TK 1 T PO QAM, Disp: , Rfl: 3     ezetimibe (ZETIA) 10 mg tablet, Take 1 tablet (10 mg total) by mouth daily., Disp: 90 tablet, Rfl: 3     furosemide (LASIX) 20 MG tablet, TAKE 1 TABLET BY MOUTH ONCE DAILY, Disp: 90 tablet, Rfl: 1      furosemide (LASIX) 20 MG tablet, Take 1 tablet (20 mg total) by mouth daily., Disp: 90 tablet, Rfl: 0     glimepiride (AMARYL) 2 MG tablet, TAKE ONE TABLET BY MOUTH ONCE DAILY IN THE MORNING BEFORE BREAKFAST, Disp: 90 tablet, Rfl: 3     lisinopril (PRINIVIL,ZESTRIL) 40 MG tablet, Take 1 tablet (40 mg total) by mouth daily., Disp: 90 tablet, Rfl: 3     omeprazole (PRILOSEC) 40 MG capsule, TAKE ONE CAPSULE BY MOUTH 2 TIMES DAILY, Disp: 180 capsule, Rfl: 2     probenecid (BENEMID) 500 mg tablet, Take 1 tablet (500 mg total) by mouth 2 (two) times a day., Disp: 180 tablet, Rfl: 3     simvastatin (ZOCOR) 40 MG tablet, TAKE 1 TABLET BY MOUTH ONCE DAILY, Disp: 90 tablet, Rfl: 2     traZODone (DESYREL) 50 MG tablet, TAKE 1 TO 2 TABLETS BY MOUTH ONCE DAILY AT BEDTIME, Disp: 90 tablet, Rfl: 3     traZODone (DESYREL) 50 MG tablet, TK 1 TO 2 TS PO HS, Disp: 30 tablet, Rfl: 3     ULTIMA TEST STRIPS strips, USE ONE STRIP TO CHECK GLUCOSE ONCE DAILY DX  E11.9, Disp: 100 strip, Rfl: 3    Family History   Problem Relation Age of Onset     Arthritis Other      Rheum arthritis Other      Lupus Other      Osteoporosis Other      Stroke Other      Hypertension Other        Social History     Socioeconomic History     Marital status:      Spouse name: Not on file     Number of children: Not on file     Years of education: Not on file     Highest education level: Not on file   Occupational History     Not on file   Social Needs     Financial resource strain: Not on file     Food insecurity:     Worry: Not on file     Inability: Not on file     Transportation needs:     Medical: Not on file     Non-medical: Not on file   Tobacco Use     Smoking status: Former Smoker     Last attempt to quit: 1967     Years since quittin.4     Smokeless tobacco: Never Used   Substance and Sexual Activity     Alcohol use: Yes     Alcohol/week: 5.0 - 6.0 oz     Types: 10 - 12 Standard drinks or equivalent per week     Drug use: Not on file      Sexual activity: Not on file   Lifestyle     Physical activity:     Days per week: Not on file     Minutes per session: Not on file     Stress: Not on file   Relationships     Social connections:     Talks on phone: Not on file     Gets together: Not on file     Attends Mosque service: Not on file     Active member of club or organization: Not on file     Attends meetings of clubs or organizations: Not on file     Relationship status: Not on file     Intimate partner violence:     Fear of current or ex partner: Not on file     Emotionally abused: Not on file     Physically abused: Not on file     Forced sexual activity: Not on file   Other Topics Concern     Not on file   Social History Narrative    1/13/15- The patient lives alone.        Review of Systems - Patient denies fever, chills, rash, wound, stiffness, limping, numbness, weakness, heart burn, blood in stool, chest pain with activity, calf pain when walking, shortness of breath with activity, chronic cough, easy bleeding/bruising, swelling of ankles, excessive thirst, fatigue, depression, anxiety.  Patient admits to painful fifth toe left foot.      OBJECTIVE:  Appearance: alert, well appearing, and in no distress.    Vitals:    05/21/19 0841   BP: 122/70       BMI= Body mass index is 29.89 kg/m .    General appearance: Patient is alert and fully cooperative with history & exam.  No sign of distress is noted during the visit.  Psychiatric: Affect is pleasant & appropriate.  Patient appears motivated to improve health.  Respiratory: Breathing is regular & unlabored while sitting.  HEENT: Hearing is intact to spoken word.  Speech is clear.  No gross evidence of visual impairment that would impact ambulation.    Vascular: Dorsalis pedis and posterior tibial pulses are palpable. There is no pedal hair growth bilaterally.  CFT < 3 sec from anterior tibial surface to distal digits bilaterally. There is no appreciable edema noted.  Dermatologic: There is a  large hyperkeratotic lesion on the dorsal aspect fifth toe left foot.  Turgor and texture are within normal limits. No coloration or temperature changes. No other primary or secondary lesions noted.  Neurologic: All epicritic and proprioceptive sensations are grossly intact bilaterally.  Musculoskeletal: All active and passive ankle, subtalar, midtarsal, and 1st MPJ range of motion are grossly intact without pain or crepitus, with the exception of fifth proximal interphalangeal joint left foot. Manual muscle strength is within normal limits bilaterally.  The fifth toe left foot is held in flexion at the proximal interphalangeal joint.  This is a rigid deformity.  All dorsiflexors, plantarflexors, invertors, evertors are intact bilaterally. Tenderness present to fifth toe left foot on palpation. Tenderness to fifth toe left foot with range of motion. Calf is soft/non-tender without warmth/induration    Imaging:     No results found.       TREATMENT:  I have recommended a hammertoe correction of the fifth toe left foot to alleviate the patient's recurring pain.  He was told that this procedure is performed on an outpatient basis under local anesthesia with IV sedation.  He was told the procedure takes approximately 10 minutes to perform.  He would then be discharged weightbearing in a postop shoe for 2 weeks.  He was asked to go n.p.o. at midnight prior to the procedure and he was asked to see his primary care physician for preoperative consultation.  An x-ray of the left foot was also taken today.  The patient was given a written list of potential postoperative complications with the procedure.    Drew Levi; KAREL  [facility] Foot & Ankle Surgery/Podiatry

## 2021-05-30 ENCOUNTER — RECORDS - HEALTHEAST (OUTPATIENT)
Dept: ADMINISTRATIVE | Facility: CLINIC | Age: 81
End: 2021-05-30

## 2021-05-30 VITALS — HEIGHT: 67 IN | BODY MASS INDEX: 27.47 KG/M2 | WEIGHT: 175 LBS

## 2021-05-30 VITALS — HEIGHT: 67 IN | WEIGHT: 176 LBS | BODY MASS INDEX: 27.62 KG/M2

## 2021-05-30 NOTE — PROGRESS NOTES
Assessment:     1. Idiopathic chronic gout of multiple sites without tophus  Uric Acid    Comprehensive Metabolic Panel   2. Cardiac arrhythmia, unspecified cardiac arrhythmia type  Electrocardiogram Perform - Clinic   3. DM2 (diabetes mellitus, type 2) (H)  Microalbumin, Random Urine    Glycosylated Hemoglobin A1c   4. Atrial fibrillation, unspecified type (H)  Ambulatory referral to Cardiology       Plan:     1. Cardiac arrhythmia, unspecified cardiac arrhythmia type  Patient is in atrial fibrillation; this is a new onset finding; he has been on a baby aspirin; he is on beta-blockade.  He may be a candidate for ablation.  Referral to cardiology as indicated if blood pressure noted no change in medication at this time  - Electrocardiogram Perform - Clinic    2. DM2 (diabetes mellitus, type 2) (H)  Follow-up pending results  - Microalbumin, Random Urine  - Glycosylated Hemoglobin A1c    3. Atrial fibrillation, unspecified type (H)  Continue beta-blockade continue baby aspirin  - Ambulatory referral to Cardiology    4. Idiopathic chronic gout of multiple sites without tophus  Continue probenecid  - Uric Acid  - Comprehensive Metabolic Panel      Subjective:   João comes in.  He feels well and describes an asymptomatic state.  He did do a [facility] screening via the mail prompt.  Results came back essentially normal he was concerned about his carotid artery disease which is minimal according to that screening but he was told he had atrial fibrillation.  The patient comes in today denies any palpitations he may be a little bit short of breath after walking up 10 f stairs from his basement which is a new finding otherwise no symptoms.  We repeated the EKG today which does show atrial fibrillation.  Patient is already on beta-blockade baby aspirin blood pressure is controlled.  Best course here is cardiology referral and possibility of ablation therapy or medication change.  Patient understands we will screen him for his  "usual A1c and his microalbumin status at this visit.  Patient is on probenecid for chronic gout.  Cost of prescription has gone up almost $600 in 1 year.  Patient is now taking a half a tablet he is interested in his uric acid level he has not had a breakthrough gout attack.  No history of tophi.  No urinary tract symptoms system review otherwise unremarkable plan will be follow-up with cardiology once appointment is made.25 minute visit    Review of Systems: A complete 14 point review of systems was obtained and is negative or as stated in the history of present illness.    Past Medical History:   Diagnosis Date     Arteriosclerotic Cardiovascular Disease (ASCVD)      Arthritis      Diverticulitis      DM2 (diabetes mellitus, type 2) (H)      Generalized Osteoarthritis Of Multiple Sites      Gout      Hemorrhoids      Hyperlipidemia      Hypertension      Male Erectile Disorder      Family History   Problem Relation Age of Onset     Arthritis Other      Rheum arthritis Other      Lupus Other      Osteoporosis Other      Stroke Other      Hypertension Other      No past surgical history on file.  Social History     Tobacco Use     Smoking status: Former Smoker     Last attempt to quit: 1967     Years since quittin.5     Smokeless tobacco: Never Used   Substance Use Topics     Alcohol use: Yes     Alcohol/week: 5.0 - 6.0 oz     Types: 10 - 12 Standard drinks or equivalent per week     Drug use: Not on file         Objective:   /80   Pulse 72   Ht 5' 6.5\" (1.689 m)   Wt 183 lb (83 kg)   BMI 29.09 kg/m      General Appearance:  Alert, cooperative, no distress  Head:  Normocephalic, no obvious abnormality  Ears: TM anatomy normal  Eyes:  PERRL, EOM's intact, conjunctiva and corneas clear  Nose:  Nares symmetrical, septum midline, mucosa pink, no sinus tenderness  Throat:  Lips, tongue, and mucosa are moist, pink, and intact  Neck:  Supple, symmetrical, trachea midline, no adenopathy; thyroid: no " enlargement, symmetric,no tenderness/mass/nodules; no carotid bruit, no JVD  Back:  Symmetrical, no curvature, ROM normal, no CVA tenderness  Chest/Breast:  No mass or tenderness  Lungs:  Clear to auscultation bilaterally, respirations unlabored   Heart:  Normal PMI, regular rate & rhythm, S1 and S2 normal, no murmurs, rubs, or gallops  Abdomen:  Soft, non-tender, bowel sounds active all four quadrants, no mass, or organomegaly  Musculoskeletal:  Tone and strength strong and symmetrical, all extremities  Lymphatic:  No adenopathy  Skin/Hair/Nails:  Skin warm, dry, and intact, no rashes  Neurologic:  Alert and oriented x3, no cranial nerve deficits, normal strength and tone, gait steady  Extremities:  No edema.  Evi's sign negative.    Genitourinary: deferred  Pulses:  Equal bilaterally     I have had an Advance Directives discussion with the patient.      This note has been dictated using voice recognition software. Any grammatical or context distortions are unintentional and inherent to the the software.

## 2021-05-30 NOTE — TELEPHONE ENCOUNTER
"Called patient regarding message that he received a call from \"[facility]\" telling him he has \"AFIB\".      I did review this patient's chart to see if I could locate any tests that were ordered by Dr. Mcdowell or any of our specialists, and couldn't find anything.     Patient explained that he saw a commercial on TV offering to screen people for Stroke risks. He made an appointment with \"[facility]\" and said they performed an US of the neck, chest and legs, as well as an EKG.     I asked the patient if he had the results or if there was a way for his doctor's office to obtain the results and the patient said that \"[facility]\" would send the results to the patient and they are responsible to get results to their PCP.     I did inquire if patient was currently experiencing any concerning symptoms and he said he felt fine.     I asked patient to get the results to us ASA, and then we could make an appt to see Dr. Mcdowell.     I will forward this to Dr. Mcdowell, in case he wishes to see patient sooner.     Ayah Amaya RN  "

## 2021-05-31 ENCOUNTER — RECORDS - HEALTHEAST (OUTPATIENT)
Dept: ADMINISTRATIVE | Facility: CLINIC | Age: 81
End: 2021-05-31

## 2021-05-31 VITALS — WEIGHT: 177 LBS | HEIGHT: 67 IN | BODY MASS INDEX: 27.78 KG/M2

## 2021-05-31 NOTE — TELEPHONE ENCOUNTER
----- Message from Riley Mandujano DO sent at 8/19/2019 10:04 AM CDT -----  Please inform the patient that his left ventricle is normal size.  His right ventricle appears to be mildly enlarged with mild right ventricular systolic dysfunction.  He also has mild aortic root enlargement.  Given this I would recommend a cardiac MRI with MRA to assess his root in his right ventricular function.

## 2021-05-31 NOTE — TELEPHONE ENCOUNTER
Called patient review of recommendations. Pt agreeable to cardiac MRI test. Reports he has had MRI before, denies claustrophobia. Discussion of process; order placed, IV team, then call to schedule. Pt verbalized understanding. Also informed pt that cardiac MRI only scheduled at [facility]. Verbalized understanding. Order placed. SAMIRA,RN

## 2021-05-31 NOTE — TELEPHONE ENCOUNTER
=View-only below this line===    ----- Message -----  From: Riley Mandujano DO  Sent: 8/7/2019   2:16 PM  To: ANALI Valdes, are we able to get this patient a free trial card for Eliquis.  Could call the patient need to come  at [facility] or [facility].    Ankit

## 2021-05-31 NOTE — TELEPHONE ENCOUNTER
Spoke with pharmacy tech. Gave Eliquis 30 day free trial offer card information to start. No activation necessary. Pharmacy stated the 30 day free was accepted and ready for the patient. SAMIRA,RN

## 2021-05-31 NOTE — TELEPHONE ENCOUNTER
Called and informed patient that [facility] pharmacy was given the 30 day free trial card and is set for the patient. Pt verbalized understanding and appreciative. SAMIRA,RN

## 2021-06-01 VITALS — WEIGHT: 182.2 LBS | BODY MASS INDEX: 28.6 KG/M2 | HEIGHT: 67 IN

## 2021-06-01 NOTE — PATIENT INSTRUCTIONS - HE
Jabari Haddad,    It was a pleasure to see you today at the [facility].     My recommendations after this visit include:    24 hour monitor on a normal day for you.  Do a few hours of some time of work and jessica it on the diary.    Call me within the next 4 weeks if you change your mind and you want a cardioversion.     You will likely be in atrial fibrillation from now on.    To followup with Dr. Mandujano or me as needed.      My contact information:  Harpreet Soto CNP  After Hours or Scheduling  [phone]  My Nurse---Lucía Bowling [phone]

## 2021-06-02 ENCOUNTER — RECORDS - HEALTHEAST (OUTPATIENT)
Dept: ADMINISTRATIVE | Facility: CLINIC | Age: 81
End: 2021-06-02

## 2021-06-02 VITALS — BODY MASS INDEX: 29.19 KG/M2 | WEIGHT: 186 LBS | HEIGHT: 67 IN

## 2021-06-02 VITALS — BODY MASS INDEX: 28.88 KG/M2 | HEIGHT: 67 IN | WEIGHT: 184 LBS

## 2021-06-02 VITALS — BODY MASS INDEX: 29.95 KG/M2 | WEIGHT: 188.4 LBS

## 2021-06-02 NOTE — TELEPHONE ENCOUNTER
Refill Approved    Rx renewed per Medication Renewal Policy. Medication was last renewed on 4/26/19.    Alicia Dinero, [facility] Connection Triage/Med Refill 10/23/2019     Requested Prescriptions   Pending Prescriptions Disp Refills     furosemide (LASIX) 20 MG tablet 90 tablet 0     Sig: Take 1 tablet (20 mg total) by mouth daily.       Diuretics/Combination Diuretics Refill Protocol  Passed - 10/23/2019  8:37 AM        Passed - Visit with PCP or prescribing provider visit in past 12 months     Last office visit with prescriber/PCP: 7/22/2019 Mike Mcdowell MD OR same dept: 7/22/2019 Mike Mcdowell MD OR same specialty: 7/22/2019 Mike Mcdowell MD  Last physical: 7/13/2018 Last MTM visit: Visit date not found   Next visit within 3 mo: Visit date not found  Next physical within 3 mo: Visit date not found  Prescriber OR PCP: Mike Mcdowell MD  Last diagnosis associated with med order: 1. Benign essential hypertension  - furosemide (LASIX) 20 MG tablet; Take 1 tablet (20 mg total) by mouth daily.  Dispense: 90 tablet; Refill: 0    If protocol passes may refill for 12 months if within 3 months of last provider visit (or a total of 15 months).             Passed - Serum Potassium in past 12 months      Lab Results   Component Value Date    Potassium 4.6 07/22/2019             Passed - Serum Sodium in past 12 months      Lab Results   Component Value Date    Sodium 139 07/22/2019             Passed - Blood pressure on file in past 12 months     BP Readings from Last 1 Encounters:   09/12/19 136/76             Passed - Serum Creatinine in past 12 months      Creatinine   Date Value Ref Range Status   07/22/2019 0.96 0.70 - 1.30 mg/dL Final

## 2021-06-03 VITALS — WEIGHT: 183 LBS | HEIGHT: 67 IN | BODY MASS INDEX: 28.72 KG/M2

## 2021-06-03 VITALS — WEIGHT: 184 LBS | HEIGHT: 67 IN | BODY MASS INDEX: 28.88 KG/M2

## 2021-06-03 VITALS — HEIGHT: 67 IN | BODY MASS INDEX: 28.88 KG/M2 | WEIGHT: 184 LBS

## 2021-06-03 VITALS
HEART RATE: 77 BPM | WEIGHT: 180 LBS | HEIGHT: 67 IN | DIASTOLIC BLOOD PRESSURE: 76 MMHG | BODY MASS INDEX: 28.25 KG/M2 | RESPIRATION RATE: 16 BRPM | SYSTOLIC BLOOD PRESSURE: 136 MMHG

## 2021-06-03 VITALS — BODY MASS INDEX: 29.51 KG/M2 | HEIGHT: 67 IN | WEIGHT: 188 LBS

## 2021-06-04 VITALS
DIASTOLIC BLOOD PRESSURE: 86 MMHG | HEART RATE: 75 BPM | WEIGHT: 176.8 LBS | OXYGEN SATURATION: 99 % | BODY MASS INDEX: 28.11 KG/M2 | SYSTOLIC BLOOD PRESSURE: 126 MMHG

## 2021-06-05 VITALS
DIASTOLIC BLOOD PRESSURE: 80 MMHG | SYSTOLIC BLOOD PRESSURE: 150 MMHG | HEART RATE: 54 BPM | OXYGEN SATURATION: 96 % | BODY MASS INDEX: 29.23 KG/M2 | WEIGHT: 181.9 LBS | HEIGHT: 66 IN

## 2021-06-05 NOTE — PROGRESS NOTES
Assessment and Plan:   Jake returns for his annual wellness examination.  Patient has had chronic ongoing problems with benign essential hypertension which is required different management over the years currently he is stable on his space amlodipine atenolol furosemide.  He also takes lisinopril currently at 40 mg.  Does have type 2 diabetes for difficulty with metformin over the years type 2 diabetes is managed with glimepiride 2 mg.  He is on simvastatin for his high lipids.  Trazodone helps him with his sleep as well as his mild ongoing depression.  He is being managed currently for chronic atrial fib.  As opposed to ablation he is elected to go on chronic Eliquis therapy currently on 5 mg twice daily.  Patient also sees cardiology for follow-up.  He has had chronic gout in the past no recent breakthroughs stabilized well on probenecid 250 mg/day.  He denies any visual disturbances hearing loss he did lose his taste and smell a year ago which we have worked up without any negative findings.  Probably secondary to viral illness.  Denies any shortness of breath dyspnea chest pain angina no abdominal pain bowel changes or diarrhea constipation he has 1X nocturia.  He gets up on the cat wakes him up at night.  He has no real symptoms of prostatism no rectal bleeding he does have some external hemorrhoids colonoscopy is up-to-date all medical questions asked were answered we will see the patient for follow-up 6 months work-up as outlined under plan.    1. Medicare annual wellness visit, subsequent  Work-up to include  - Comprehensive Metabolic Panel  - Urinalysis-UC if Indicated    2. Benign Essential Hypertension    - Comprehensive Metabolic Panel  - Electrocardiogram Perform - Clinic  - Urinalysis-UC if Indicated    3. Persistent atrial fibrillation  Continue to follow with cardiology  - Electrocardiogram Perform - Clinic    4. Gastroesophageal reflux disease without esophagitis  HM2(CBC w/o Differential)    5.  Type 2 diabetes mellitus with hyperglycemia, without long-term current use of insulin (H)    - Glycosylated Hemoglobin A1c  - Lipid Cascade    6. Idiopathic chronic gout without tophus, unspecified site    - Uric Acid    7. Inflammatory arthritis  No intervention at this time    8. Aortic root enlargement (H)  Continue to follow with cardiology    9. Pneumoconiosis (H)       The patient's current medical problems were reviewed.    I have had an Advance Directives discussion with the patient.  The following health maintenance schedule was reviewed with the patient and provided in printed form in the after visit summary:   Health Maintenance   Topic Date Due     ZOSTER VACCINES (1 of 2) 12/27/1991     MEDICARE ANNUAL WELLNESS VISIT  02/03/2017     DIABETIC FOOT EXAM  10/11/2018     LIPID  07/13/2019     INFLUENZA VACCINE RULE BASED (1) 08/01/2019     DIABETIC EYE EXAM  01/17/2020     A1C  01/22/2020     TD 18+ HE  07/30/2020 (Originally 12/27/1958)     BMP  07/22/2020     MICROALBUMIN  07/22/2020     FALL RISK ASSESSMENT  07/22/2020     ADVANCE CARE PLANNING  07/22/2024     PNEUMOCOCCAL IMMUNIZATION 65+ LOW/MEDIUM RISK  Completed        Subjective:   Chief Complaint: Jabari Haddad is an 79 y.o. male here for an Annual Wellness visit.   HPI: See under assessment and plan    Review of Systems: 14 point review of system negative please see above.  The rest of the review of systems are negative for all systems.    Patient Care Team:  Mike Mcdowell MD as PCP - General  Mike Mcdowell MD as Assigned PCP     Patient Active Problem List   Diagnosis     Gout     Hemorrhoids     Esophageal Reflux     Osteoarthritis Of The Wrist     DM2 (diabetes mellitus, type 2) (H)     Hyperlipidemia LDL goal <100     Benign Essential Hypertension     Arteriosclerotic Cardiovascular Disease (ASCVD)     Diverticulitis Of Colon     Lethargy     Itching Of The Ears     Abdominal Pain In The Right Lower Belly (RLQ)     Male Erectile  Disorder     Exposed To Dusts - Asbestos     Chronic Gout     Hyperkalemia     Pharyngitis     Generalized Osteoarthritis Of Multiple Sites     Pseudogout     Symmetric Polyarticular Inflammation     Pseudogout     Inflammatory arthritis     Persistent atrial fibrillation     Past Medical History:   Diagnosis Date     Arteriosclerotic Cardiovascular Disease (ASCVD)      Arthritis      Diverticulitis      DM2 (diabetes mellitus, type 2) (H)      Generalized Osteoarthritis Of Multiple Sites      Gout      Hemorrhoids      Hyperlipidemia      Hypertension      Male Erectile Disorder       No past surgical history on file.   Family History   Problem Relation Age of Onset     Arthritis Other      Rheum arthritis Other      Lupus Other      Osteoporosis Other      Stroke Other      Hypertension Other       Social History     Socioeconomic History     Marital status:      Spouse name: Not on file     Number of children: Not on file     Years of education: Not on file     Highest education level: Not on file   Occupational History     Not on file   Social Needs     Financial resource strain: Not on file     Food insecurity:     Worry: Not on file     Inability: Not on file     Transportation needs:     Medical: Not on file     Non-medical: Not on file   Tobacco Use     Smoking status: Former Smoker     Last attempt to quit: 1967     Years since quittin.0     Smokeless tobacco: Never Used   Substance and Sexual Activity     Alcohol use: Yes     Alcohol/week: 8.3 - 10.0 standard drinks     Types: 10 - 12 Standard drinks or equivalent per week     Drug use: Not on file     Sexual activity: Not on file   Lifestyle     Physical activity:     Days per week: Not on file     Minutes per session: Not on file     Stress: Not on file   Relationships     Social connections:     Talks on phone: Not on file     Gets together: Not on file     Attends Anabaptism service: Not on file     Active member of club or organization:  Not on file     Attends meetings of clubs or organizations: Not on file     Relationship status: Not on file     Intimate partner violence:     Fear of current or ex partner: Not on file     Emotionally abused: Not on file     Physically abused: Not on file     Forced sexual activity: Not on file   Other Topics Concern     Not on file   Social History Narrative    1/13/15- The patient lives alone.       Current Outpatient Medications   Medication Sig Dispense Refill     amLODIPine (NORVASC) 10 MG tablet Take 1 tablet (10 mg total) by mouth daily. 90 tablet 2     apixaban (ELIQUIS) 5 mg Tab tablet Take 1 tablet (5 mg total) by mouth 2 (two) times a day. 60 tablet 11     atenolol (TENORMIN) 50 MG tablet Take 1 tablet (50 mg total) by mouth daily. 30 tablet 11     blood glucose test (RELION PRIME TEST STRIPS) strips Use 1 each As Directed daily. Dispense brand per patient's insurance at pharmacy discretion. 100 strip 3     escitalopram oxalate (LEXAPRO) 20 MG tablet TK 1 T PO QAM  3     ezetimibe (ZETIA) 10 mg tablet Take 1 tablet (10 mg total) by mouth daily. 90 tablet 3     furosemide (LASIX) 20 MG tablet TAKE 1 TABLET BY MOUTH ONCE DAILY 90 tablet 1     furosemide (LASIX) 20 MG tablet Take 1 tablet (20 mg total) by mouth daily. 90 tablet 2     glimepiride (AMARYL) 2 MG tablet TAKE ONE TABLET BY MOUTH ONCE DAILY IN THE MORNING BEFORE BREAKFAST 90 tablet 3     lisinopril (PRINIVIL,ZESTRIL) 40 MG tablet Take 1 tablet (40 mg total) by mouth daily. 90 tablet 3     omeprazole (PRILOSEC) 40 MG capsule TAKE ONE CAPSULE BY MOUTH 2 TIMES DAILY 180 capsule 2     probenecid (BENEMID) 500 mg tablet Take 1 tablet (500 mg total) by mouth 2 (two) times a day. 180 tablet 3     simvastatin (ZOCOR) 40 MG tablet TAKE 1 TABLET BY MOUTH ONCE DAILY 90 tablet 2     traZODone (DESYREL) 50 MG tablet TAKE 1 TO 2 TABLETS BY MOUTH ONCE DAILY AT BEDTIME 90 tablet 3     traZODone (DESYREL) 50 MG tablet TK 1 TO 2 TS PO HS 30 tablet 3     ULTIMA  TEST STRIPS strips USE ONE STRIP TO CHECK GLUCOSE ONCE DAILY DX  E11.9 100 strip 3     No current facility-administered medications for this visit.       Objective:   Vital Signs:   Visit Vitals  /86 (Patient Site: Right Arm, Patient Position: Sitting, Cuff Size: Adult Regular)   Pulse 75   Wt 176 lb 12.8 oz (80.2 kg) Comment: Shoes off   SpO2 99%   BMI 28.11 kg/m         VisionScreening:  No exam data present     PHYSICAL EXAM  General Appearance:  Alert, cooperative, no distress  Head:  Normocephalic, no obvious abnormality  Ears: TM anatomy normal  Eyes:  PERRL, EOM's intact, conjunctiva and corneas clear  Nose:  Nares symmetrical, septum midline, mucosa pink, no sinus tenderness  Throat:  Lips, tongue, and mucosa are moist, pink, and intact  Neck:  Supple, symmetrical, trachea midline, no adenopathy; thyroid: no enlargement, symmetric,no tenderness/mass/nodules; no carotid bruit, no JVD  Back:  Symmetrical, no curvature, ROM normal, no CVA tenderness  Chest/Breast:  No mass or tenderness  Lungs:  Clear to auscultation bilaterally, respirations unlabored   Heart:  Normal PMI, regular rate & rhythm, S1 and S2 normal, no murmurs, rubs, or gallops  Abdomen:  Soft, non-tender, bowel sounds active all four quadrants, no mass, or organomegaly  Musculoskeletal:  Tone and strength strong and symmetrical, all extremities  Lymphatic:  No adenopathy  Skin/Hair/Nails:  Skin warm, dry, and intact, no rashes  Neurologic:  Alert and oriented x3, no cranial nerve deficits, normal strength and tone, gait steady  Extremities:  No edema.  Evi's sign negative.    Genitourinary: Prostate small no masses felt external hemorrhoids noted  Pulses:  Equal bilaterally    Assessment Results 1/22/2020   Activities of Daily Living No help needed   Instrumental Activities of Daily Living No help needed   Mini Cog Total Score -   Some recent data might be hidden     A Mini-Cog score of 0-2 suggests the possibility of dementia, score of  3-5 suggests no dementia    Identified Health Risks:     He is at risk for lack of exercise and has been provided with information to increase physical activity for the benefit of his well-being.  The patient reports that he drinks more than one alcoholic drink per day but denies binge or excessive drinking. He was counseled and given information about possible harmful effects of excessive alcohol intake.  The patient was counseled and encouraged to consider modifying their diet and eating habits. He was provided with information on recommended healthy diet options.  The patient's PHQ-9 score is consistent with mild depression. He was provided with information regarding depression and was advised to schedule a follow up appointment in 52 weeks to further address this issue.  Patient's advanced directive was discussed and I am comfortable with the patient's wishes.

## 2021-06-05 NOTE — TELEPHONE ENCOUNTER
Refill Approved    Rx renewed per Medication Renewal Policy. Medication was last renewed on   probenecid (BENEMID) 500 mg tablet 180 tablet 3 1/7/2020  No   Sig - Route: Take 1 tablet (500 mg total) by mouth 2 (two) times a day. - Oral   Sent to pharmacy as: probenecid 500 mg tablet (BENEMID)   E-Prescribing Status: Receipt confirmed by pharmacy (1/7/2020 10:26 AM CST)     .    Vianca Frederick, [facility] Connection Triage/Med Refill 1/7/2020     Requested Prescriptions   Pending Prescriptions Disp Refills     probenecid (BENEMID) 500 mg tablet [Pharmacy Med Name: Probenecid 500 MG Oral Tablet] 60 tablet 0     Sig: TAKE 1 TABLET BY MOUTH TWICE DAILY       Probenecid Refill Protocol  Passed - 1/7/2020  8:51 AM        Passed - Visit with PCP or prescribing provider visit in past 12 months     Last office visit with prescriber/PCP: 7/22/2019 Mike Mcdowell MD OR same dept: 7/22/2019 Mike Mcdowell MD OR same specialty: 7/22/2019 Mike Mcdowell MD  Last physical: 7/13/2018 Last MTM visit: Visit date not found   Next visit within 3 mo: Visit date not found  Next physical within 3 mo: Visit date not found  Prescriber OR PCP: Mike Mcdowell MD  Last diagnosis associated with med order: 1. Gout  - probenecid (BENEMID) 500 mg tablet [Pharmacy Med Name: Probenecid 500 MG Oral Tablet]; TAKE 1 TABLET BY MOUTH TWICE DAILY  Dispense: 60 tablet; Refill: 0    If protocol passes may refill for 12 months if within 3 months of last provider visit (or a total of 15 months).             Passed - Serum Creatinine in past 12 months      Creatinine   Date Value Ref Range Status   07/22/2019 0.96 0.70 - 1.30 mg/dL Final

## 2021-06-06 NOTE — TELEPHONE ENCOUNTER
Refill Approved    Rx renewed per Medication Renewal Policy. Medication was last renewed on 3/12/20.    Alicia Dinero, [facility] Connection Triage/Med Refill 3/16/2020     Requested Prescriptions   Pending Prescriptions Disp Refills     atenoloL (TENORMIN) 50 MG tablet [Pharmacy Med Name: Atenolol 50 MG Oral Tablet] 30 tablet 0     Sig: Take 1 tablet by mouth once daily       Beta-Blockers Refill Protocol Passed - 3/14/2020  9:49 AM        Passed - PCP or prescribing provider visit in past 12 months or next 3 months     Last office visit with prescriber/PCP: Visit date not found OR same dept: 7/22/2019 Mkie Mcdowell MD OR same specialty: 7/22/2019 Mike Mcdowell MD  Last physical: Visit date not found Last MTM visit: Visit date not found   Next visit within 3 mo: Visit date not found  Next physical within 3 mo: Visit date not found  Prescriber OR PCP: Karen Dempsey CNP  Last diagnosis associated with med order: 1. Benign Essential Hypertension  - atenoloL (TENORMIN) 50 MG tablet [Pharmacy Med Name: Atenolol 50 MG Oral Tablet]; Take 1 tablet by mouth once daily  Dispense: 30 tablet; Refill: 0    If protocol passes may refill for 12 months if within 3 months of last provider visit (or a total of 15 months).             Passed - Blood pressure filed in past 12 months     BP Readings from Last 1 Encounters:   01/22/20 126/86

## 2021-06-07 NOTE — TELEPHONE ENCOUNTER
Refill Approved    Rx renewed per Medication Renewal Policy. Medication was last renewed on 3/15/20.    Alicia Dinero, [facility] Connection Triage/Med Refill 4/14/2020     Requested Prescriptions   Pending Prescriptions Disp Refills     atenoloL (TENORMIN) 50 MG tablet 30 tablet 0     Sig: Take 1 tablet (50 mg total) by mouth daily.       Beta-Blockers Refill Protocol Passed - 4/13/2020  5:58 AM        Passed - PCP or prescribing provider visit in past 12 months or next 3 months     Last office visit with prescriber/PCP: Visit date not found OR same dept: 7/22/2019 Mike Mcdowell MD OR same specialty: 7/22/2019 Mike Mcdowell MD  Last physical: Visit date not found Last MTM visit: Visit date not found   Next visit within 3 mo: Visit date not found  Next physical within 3 mo: Visit date not found  Prescriber OR PCP: Karen Dempsey CNP  Last diagnosis associated with med order: 1. Benign Essential Hypertension  - atenoloL (TENORMIN) 50 MG tablet; Take 1 tablet (50 mg total) by mouth daily.  Dispense: 30 tablet; Refill: 0    If protocol passes may refill for 12 months if within 3 months of last provider visit (or a total of 15 months).             Passed - Blood pressure filed in past 12 months     BP Readings from Last 1 Encounters:   01/22/20 126/86

## 2021-06-08 NOTE — PROGRESS NOTES
Assessment:     1. DM2 (diabetes mellitus, type 2)  Glycosylated Hemoglobin A1c    Microalbumin, Random Urine   2. Benign Essential Hypertension         Plan:     1. DM2 (diabetes mellitus, type 2)  Monitor the following  - Glycosylated Hemoglobin A1c  - Microalbumin, Random Urine    2. Benign Essential Hypertension  Patient is to continue his lisinopril and his atenolol      Subjective:   Jabari comes in for 3 month follow-up of his type 2 diabetes mellitus.  Home blood sugars under good control.  A1c will be monitored today.  Patient is on methotrexate for rheumatoid arthritis.  Last profile was reviewed today.  BUN/creatinine normal.  Patient is scheduled for complete physical in 3 months.  Patient denies any constitutional complaints at this exam.  No visual complaints, hearing problems, dysphagia, shortness of breath, dyspnea, chest pain, angina, abdominal complaints, diarrhea, constipation, urgency, frequency, dysuria, patient's arthritis is under good control of his methotrexate and he is following with his rheumatologist.  We do share laboratory information QUESTIONS that were asked were answered I personally reviewed, family, social history, surgeries, allergies, problems list.  Patient has not had a gout attack.  Potassium has been normal    Review of Systems: A complete 14 point review of systems was obtained and is negative or as stated in the history of present illness.    Past Medical History   Diagnosis Date     Arthritis      Diabetes mellitus      Diverticulitis      Hypertension      Family History   Problem Relation Age of Onset     Arthritis       Rheum arthritis       Lupus       Osteoporosis       Stroke       Hypertension       No past surgical history on file.  Social History   Substance Use Topics     Smoking status: Former Smoker     Quit date: 1/1/1967     Smokeless tobacco: Never Used     Alcohol use 5.0 - 6.0 oz/week     10 - 12 Standard drinks or equivalent per week         Objective:  "    Visit Vitals     /80     Pulse (!) 51     Ht 5' 7\" (1.702 m)     Wt 176 lb (79.8 kg)     SpO2 98%     BMI 27.57 kg/m2       General Appearance:  Alert, cooperative, no distress  Head:  Normocephalic, no obvious abnormality  Ears: TM anatomy normal  Eyes:  PERRL, EOM's intact, conjunctiva and corneas clear  Nose:  Nares symmetrical, septum midline, mucosa pink, no sinus tenderness  Throat:  Lips, tongue, and mucosa are moist, pink, and intact  Neck:  Supple, symmetrical, trachea midline, no adenopathy; thyroid: no enlargement, symmetric,no tenderness/mass/nodules; no carotid bruit, no JVD  Back:  Symmetrical, no curvature, ROM normal, no CVA tenderness  Chest/Breast:  No mass or tenderness  Lungs:  Clear to auscultation bilaterally, respirations unlabored   Heart:  Normal PMI, regular rate & rhythm, S1 and S2 normal, no murmurs, rubs, or gallops  Abdomen:  Soft, non-tender, bowel sounds active all four quadrants, no mass, or organomegaly  Musculoskeletal:  Tone and strength strong and symmetrical, all extremities  Lymphatic:  No adenopathy  Skin/Hair/Nails:  Skin warm, dry, and intact, no rashes  Neurologic:  Alert and oriented x3, no cranial nerve deficits, normal strength and tone, gait steady  Extremities:  No edema.  Evi's sign negative.  Monofilament test negative   Genitourinary: deferred  Pulses:  Equal bilaterally     The following high BMI interventions were performed this visit: weight monitoring      This note has been dictated using voice recognition software. Any grammatical or context distortions are unintentional and inherent to the the software.   "

## 2021-06-08 NOTE — TELEPHONE ENCOUNTER
Patient sent a [employer] message requesting a refill of amlodipine.   He had an AWV on 1/22/2020

## 2021-06-09 NOTE — PROGRESS NOTES
Assessment and Plan:   Following diagnosis will be treated:    1. Initial Medicare annual wellness visit  Workup to include  - Comprehensive Metabolic Panel  - PSA (Prostatic-Specific Antigen), Annual Screen  - Urinalysis-UC if Indicated  - HM1(CBC and Differential)  - HM1 (CBC with Diff)  - Pneumococcal polysaccharide vaccine 23-valent 1 yo or older, subq/IM    2. Benign Essential Hypertension  Patient is to continue amlodipine atenolol Lasix Zestril  - Electrocardiogram Perform - Clinic    3. DM2 (diabetes mellitus, type 2)  Patient is to continue Amaryl  - Glycosylated Hemoglobin A1c  - Microalbumin, Random Urine    4. Hypercholesteremia  Patient is to continue his Zetia  - Lipid Cascade    5. Gout  We will check the patient's uric acid  - Comprehensive Metabolic Panel    6. Male Erectile Disorder  Continue to use Cialis 5 mg  - PSA (Prostatic-Specific Antigen), Annual Screen  - Urinalysis-UC if Indicated    7. Elevated blood uric acid level  Acid- avoidance diet recommended    8. Asbestosis  Time for his yearly x-ray  - Pneumococcal polysaccharide vaccine 23-valent 1 yo or older, subq/IM  - XR Chest AP; Future    9. Gout  We will treat if out of order  - Uric Acid      The patient's current medical problems were reviewed.    The following high BMI interventions were performed this visit: weight monitoring  The following health maintenance schedule was reviewed with the patient and provided in printed form in the after visit summary:   Health Maintenance   Topic Date Due     DIABETES FOOT EXAM  12/27/1950     DIABETES OPHTHALMOLOGY EXAM  12/27/1950     TD 18+ HE  12/27/1959     ZOSTER VACCINE  12/27/2001     PNEUMOCOCCAL POLYSACCHARIDE VACCINE AGE 65 AND OVER  12/27/2006     DIABETES FOLLOW-UP  09/22/2017     DIABETES URINE MICROALBUMIN  01/13/2018     FALL RISK ASSESSMENT  03/22/2018     ADVANCE DIRECTIVES DISCUSSED WITH PATIENT  10/25/2018     COLONOSCOPY  03/26/2025     INFLUENZA VACCINE RULE BASED   Completed     PNEUMOCOCCAL CONJUGATE VACCINE FOR ADULTS (PCV13 OR PREVNAR)  Completed        Subjective:   Chief Complaint: Jabari Haddad is an 76 y.o. male here for an Annual Wellness visit.   HPI: Jabari is being seen for his initial welcome a wellness exam through Medicare patient has been screened for cognitive impairment past medical history social history have been reviewed past surgeries reviewed medication reconciliation done today patient is on medications for GERD benign essential hypertension colitis erectile dysfunction now is requesting medication for sleep he is also on Benemid for chronic gout patient has had a past medical history of exposure to asbestos and does get yearly chest x-rays which we will order today his EKG was essentially unremarkable patient denies any headaches hearing loss dysphagia shortness of breath dyspnea or chest pain angina abdominal pain no flare of colitis recently is followed by gastroenterologist for his colitis 0-1 time nocturia prostate under good control with daily use of Cialis he has no disturbance of gait or station his hobbies include car collecting.  I have personally reviewed his past medical history is also due for his diabetes microalbumin checked today and his A1c evaluation which we will order treat adjust if necessary all medical questions that were asked were answered today    Review of Systems:  .  Please see above.  The rest of the review of systems are negative for all systems.    Patient Care Team:  Mike Mcdowell MD as PCP - General     Patient Active Problem List   Diagnosis     Gout     Hemorrhoids     Esophageal Reflux     Osteoarthritis Of The Wrist     DM2 (diabetes mellitus, type 2)     Hyperlipidemia     Benign Essential Hypertension     Arteriosclerotic Cardiovascular Disease (ASCVD)     Diverticulitis Of Colon     Lethargy     Itching Of The Ears     Abdominal Pain In The Right Lower Belly (RLQ)     Male Erectile Disorder     Exposed To Dusts  - Asbestos     Chronic Gout     Hyperkalemia     Pharyngitis     Generalized Osteoarthritis Of Multiple Sites     Pseudogout     Symmetric Polyarticular Inflammation     Pseudogout     Inflammatory arthritis     Past Medical History:   Diagnosis Date     Arthritis      Diabetes mellitus      Diverticulitis      Hypertension       No past surgical history on file.   Family History   Problem Relation Age of Onset     Arthritis       Rheum arthritis       Lupus       Osteoporosis       Stroke       Hypertension        Social History     Social History     Marital status:      Spouse name: N/A     Number of children: N/A     Years of education: N/A     Occupational History     Not on file.     Social History Main Topics     Smoking status: Former Smoker     Quit date: 1/1/1967     Smokeless tobacco: Never Used     Alcohol use 5.0 - 6.0 oz/week     10 - 12 Standard drinks or equivalent per week     Drug use: Not on file     Sexual activity: Not on file     Other Topics Concern     Not on file     Social History Narrative    1/13/15- The patient lives alone.       Current Outpatient Prescriptions   Medication Sig Dispense Refill     amLODIPine (NORVASC) 10 MG tablet TAKE 1 TABLET BY MOUTH DAILY 90 tablet 3     atenolol (TENORMIN) 100 MG tablet TAKE 1 AND 1/2 TABLETS BY MOUTH EVERY  tablet 2     doxycycline (PERIOSTAT) 20 MG tablet Take 1 tablet by mouth daily.       ezetimibe (ZETIA) 10 mg tablet TAKE 1 TABLET BY MOUTH DAILY 90 tablet 0     furosemide (LASIX) 20 MG tablet TAKE 1 TABLET(20 MG) BY MOUTH DAILY 90 tablet 2     glimepiride (AMARYL) 2 MG tablet TAKE ONE TABLET BY MOUTH ONCE DAILY IN THE MORNING BEFORE BREAKFAST REPLACE  GLYBURIDE 30 tablet 5     lisinopril (PRINIVIL,ZESTRIL) 40 MG tablet TAKE 1 TABLET BY MOUTH DAILY 90 tablet 3     methotrexate 2.5 MG tablet TAKE 4 TABLETS BY MOUTH ONCE PER WEEK (Patient taking differently: TAKE 3 TABLETS BY MOUTH ONCE PER WEEK) 32 tablet 3     omeprazole  "(PRILOSEC) 20 MG capsule Take 1 capsule (20 mg total) by mouth 3 (three) times a day. 270 capsule 3     probenecid (BENEMID) 500 mg tablet TAKE 1 TABLET BY MOUTH TWICE DAILY 180 tablet 0     simvastatin (ZOCOR) 40 MG tablet TAKE 1 TABLET BY MOUTH EVERY DAY 90 tablet 2     tadalafil (CIALIS) 5 MG tablet Take 5 mg by mouth daily as needed for erectile dysfunction (1 hour before needed).       ULTIMA TEST STRIPS strips USE ONE STRIP TO CHECK GLUCOSE ONCE DAILY DX  E119  DUE  FOR  AN  AIC  AT  THE  12/309  APPT 100 each 2     No current facility-administered medications for this visit.       Objective:   Vital Signs:   Visit Vitals     /84     Pulse (!) 50     Ht 5' 6.5\" (1.689 m)     Wt 175 lb (79.4 kg)     SpO2 96%     BMI 27.82 kg/m2        VisionScreening:  No exam data present     PHYSICAL EXAM  General Appearance:  Alert, cooperative, no distress  Head:  Normocephalic, no obvious abnormality  Ears: TM anatomy normal  Eyes:  PERRL, EOM's intact, conjunctiva and corneas clear  Nose:  Nares symmetrical, septum midline, mucosa pink, no sinus tenderness  Throat:  Lips, tongue, and mucosa are moist, pink, and intact  Neck:  Supple, symmetrical, trachea midline, no adenopathy; thyroid: no enlargement, symmetric,no tenderness/mass/nodules; no carotid bruit, no JVD  Back:  Symmetrical, no curvature, ROM normal, no CVA tenderness  Chest/Breast:  No mass or tenderness  Lungs:  Clear to auscultation bilaterally, respirations unlabored   Heart:  Normal PMI, regular rate & rhythm, S1 and S2 normal, no murmurs, rubs, or gallops  Abdomen:  Soft, non-tender, bowel sounds active all four quadrants, no mass, or organomegaly  Musculoskeletal:  Tone and strength strong and symmetrical, all extremities  Lymphatic:  No adenopathy  Skin/Hair/Nails:  Skin warm, dry, and intact, no rashes  Neurologic:  Alert and oriented x3, no cranial nerve deficits, normal strength and tone, gait steady  Extremities:  No edema.  Evi's sign " negative.    Genitourinary: Circumcised testes are down rectal exam shows external hemorrhoids prostate small firm no nodules  Pulses:  Equal bilaterally        Assessment Results 3/22/2017   Activities of Daily Living No help needed   Instrumental Activities of Daily Living No help needed   Mini Cog Total Score 4     A Mini-Cog score of 0-2 suggests the possibility of dementia, score of 3-5 suggests no dementia    Identified Health Risks:     He is at risk for lack of exercise and has been provided with information to increase physical activity for the benefit of his well-being.  The patient reports that he drinks more than one alcoholic drink per day but denies binge or excessive drinking. He was counseled and given information about possible harmful effects of excessive alcohol intake.  The patient was counseled and encouraged to consider modifying their diet and eating habits. He was provided with information on recommended healthy diet options.  Patient's advanced directive was discussed and I am comfortable with the patient's wishes.

## 2021-06-10 NOTE — TELEPHONE ENCOUNTER
Refill Approved    Rx renewed per Medication Renewal Policy. Medication was last renewed on 5/7/20, last OV 1/22/20.    Karie Calvillo, [facility] Triage/Med Refill 8/5/2020     Requested Prescriptions   Pending Prescriptions Disp Refills     amLODIPine (NORVASC) 10 MG tablet [Pharmacy Med Name: amLODIPine Besylate 10 MG Oral Tablet] 90 tablet 0     Sig: Take 1 tablet by mouth once daily       Calcium-Channel Blockers Protocol Passed - 8/4/2020 10:50 AM        Passed - PCP or prescribing provider visit in past 12 months or next 3 months     Last office visit with prescriber/PCP: Visit date not found OR same dept: Visit date not found OR same specialty: 7/22/2019 Mike Mcdowell MD  Last physical: Visit date not found Last MTM visit: Visit date not found   Next visit within 3 mo: Visit date not found  Next physical within 3 mo: Visit date not found  Prescriber OR PCP: Karen Dempsey CNP  Last diagnosis associated with med order: 1. Benign Essential Hypertension  - amLODIPine (NORVASC) 10 MG tablet [Pharmacy Med Name: amLODIPine Besylate 10 MG Oral Tablet]; Take 1 tablet by mouth once daily  Dispense: 90 tablet; Refill: 0    2. Cardiovascular disease  - amLODIPine (NORVASC) 10 MG tablet [Pharmacy Med Name: amLODIPine Besylate 10 MG Oral Tablet]; Take 1 tablet by mouth once daily  Dispense: 90 tablet; Refill: 0    If protocol passes may refill for 12 months if within 3 months of last provider visit (or a total of 15 months).             Passed - Blood pressure filed in past 12 months     BP Readings from Last 1 Encounters:   01/22/20 126/86

## 2021-06-11 NOTE — PROGRESS NOTES
Assessment:     1. Pneumoconiosis (H)  XR Chest 2 Views   2. Benign Essential Hypertension  Comprehensive Metabolic Panel    HM2(CBC w/o Differential)    Urinalysis-UC if Indicated   3. Hyperlipidemia LDL goal <100     4. Type 2 diabetes mellitus with other specified complication, without long-term current use of insulin (H)  Glycosylated Hemoglobin A1c    Urinalysis-UC if Indicated       Plan:     1. Pneumoconiosis (H)  Patient has a history of asbestosis; we will have radiology review and compare old films  - XR Chest 2 Views    2. Benign Essential Hypertension  Patient is to continue with his amlodipine and beta-blockade space and diuretics  - Comprehensive Metabolic Panel  - HM2(CBC w/o Differential)  - Urinalysis-UC if Indicated    3. Hyperlipidemia LDL goal <100  Continue simvastatin at 40 mg    4. Type 2 diabetes mellitus with other specified complication, without long-term current use of insulin (H)  Continue glimepiride 2 mg  - Glycosylated Hemoglobin A1c  - Urinalysis-UC if Indicated      Subjective:   Brendan is being seen for multiple issues today including his diabetes mellitus benign essential hypertension chronic anticoagulation for atrial fib and for his chronic gout.  He also has GERD and takes omeprazole on a chronic basis.  He reports good health for the last 6 months.  We did note that his PSA was 6.96 months ago and I want a repeat that today.  Patient takes probenecid for his chronic gout.  He is to continue on amlodipine atenolol Zetia lisinopril simvastatin.  He denies any recent visual disturbances or problems with the cranial nerve hearing is good.  He has no dysphasia no chest symptoms no abdominal pain diarrhea constipation.  He is very alert and active.  We encouraged him to return in January.  I personally reviewed family social history surgeries allergies problem list.  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  All will be reviewed and transmitted to the patient via my chart.  Pleasure to see him again    Review of Systems: A complete 14 point review of systems was obtained and is negative or as stated in the history of present illness.    Past Medical History:   Diagnosis Date     Arteriosclerotic Cardiovascular Disease (ASCVD)      Arthritis      Diverticulitis      DM2 (diabetes mellitus, type 2) (H)      Generalized Osteoarthritis Of Multiple Sites      Gout      Hemorrhoids      Hyperlipidemia      Hypertension      Male Erectile Disorder      Family History   Problem Relation Age of Onset     Arthritis Other      Rheum arthritis Other      Lupus Other      Osteoporosis Other      Stroke Other      Hypertension Other      No past surgical history on file.  Social History     Tobacco Use     Smoking status: Former Smoker     Last attempt to quit: 1967     Years since quittin.7     Smokeless tobacco: Never Used   Substance Use Topics     Alcohol use: Yes     Alcohol/week: 8.3 - 10.0 standard drinks     Types: 10 - 12 Standard drinks or equivalent per week     Drug use: Not on file         Objective:   /80 (Patient Site: Right Arm, Patient Position: Sitting, Cuff Size: Adult Large)   Pulse (!) 54   Ht 5' 6.26\" (1.683 m)   Wt 181 lb 14.4 oz (82.5 kg)   SpO2 96%   BMI 29.13 kg/m      General Appearance:  Normal  Head:  Normal  Ears: Normal  Eyes:  Normal  Nose:  Normal  Throat:  Normal  Neck:  Normal  Back:  Normal  Chest/Breast:Normal  Lungs:  Normal  Heart:  Normal  Abdomen:  Normal  Musculoskeletal:  Normal  Lymphatic:  Normal  Skin/Hair/Nails:  Normal  Neurologic:  Normal  Extremities:  Normal  Genitourinary: Normal  Pulses:  Normal     I have had an Advance Directives discussion with the patient.      This note has been dictated using voice recognition software. Any grammatical or context distortions are unintentional and inherent to the the " software.

## 2021-06-13 NOTE — PROGRESS NOTES
Assessment/Plan:     Problem List Items Addressed This Visit     None      Visit Diagnoses     DM type 2 (diabetes mellitus, type 2)    -  Primary    Relevant Medications    aspirin 81 MG EC tablet    Other Relevant Orders    Glycosylated Hemoglobin A1c (Completed)    Comprehensive Metabolic Panel    Ambulatory referral to Ophthalmology    Healthcare maintenance        Relevant Orders    Comprehensive Metabolic Panel    Thyroid Stimulating Hormone (TSH)    Therapeutic drug monitoring        Relevant Orders    Comprehensive Metabolic Panel          AVS printed and given to patient.  Return to clinic in 6 months.    Subjective:      Jabari Haddad is a 76 y.o. male who presents to clinic today for 6 month follow-up.  His HbA1c prior to being seen in clinic was 5.8.  He is happy with his current diabetes regimen.  He has no concerns about his feet.  He has not been to an ophthalmologist in over a year, and is willing to go see one in the near future.    He is on methotrexate for arthritis and would like to evaluate his kidney function.    He has no other concerns.    Current Outpatient Prescriptions   Medication Sig Dispense Refill     amLODIPine (NORVASC) 10 MG tablet TAKE 1 TABLET BY MOUTH DAILY 90 tablet 3     escitalopram oxalate (LEXAPRO) 20 MG tablet TK 1 T PO QAM  3     furosemide (LASIX) 20 MG tablet TAKE 1 TABLET(20 MG) BY MOUTH DAILY 90 tablet 2     glimepiride (AMARYL) 2 MG tablet TAKE ONE TABLET BY MOUTH ONCE DAILY IN THE MORNING BEFORE BREAKFAST REPLACE  GLYBURIDE 90 tablet 3     lisinopril (PRINIVIL,ZESTRIL) 40 MG tablet TAKE 1 TABLET BY MOUTH DAILY 90 tablet 3     methotrexate 2.5 MG tablet TAKE 4 TABLETS BY MOUTH ONCE PER WEEK (Patient taking differently: TAKE 3 TABLETS BY MOUTH ONCE PER WEEK) 32 tablet 3     metoprolol succinate (TOPROL XL) 100 MG 24 hr tablet Take 1 tablet (100 mg total) by mouth daily. 30 tablet 11     omeprazole (PRILOSEC) 20 MG capsule TAKE ONE CAPSULE BY MOUTH 3 TIMES DAILY  "270 capsule 2     probenecid (BENEMID) 500 mg tablet TAKE 1 TABLET BY MOUTH TWICE DAILY 180 tablet 3     simvastatin (ZOCOR) 40 MG tablet TAKE 1 TABLET BY MOUTH EVERY DAY 90 tablet 1     tadalafil (CIALIS) 5 MG tablet Take 5 mg by mouth daily as needed for erectile dysfunction (1 hour before needed).       traZODone (DESYREL) 50 MG tablet TK 1 TO 2 TS PO HS  3     ULTIMA TEST STRIPS strips USE ONE STRIP TO CHECK GLUCOSE ONCE DAILY DX  E11.9  DUE  FOR  APPOINTMENT 100 strip 0     ZETIA 10 mg tablet TAKE 1 TABLET BY MOUTH DAILY 90 tablet 3     aspirin 81 MG EC tablet Take 1 tablet (81 mg total) by mouth daily. 150 tablet 2     No current facility-administered medications for this visit.        Past Medical History, Family History, and Social History reviewed.    Review of systems is as stated in HPI, and the remainder of the 10 system review is otherwise negative.    Objective:     /86  Pulse (!) 55  Ht 5' 6.5\" (1.689 m)  Wt 177 lb (80.3 kg)  SpO2 97%  BMI 28.14 kg/m2 Body mass index is 28.14 kg/(m^2).    Gen: Alert, NAD, appears stated age  HEENT: normocephalic, without obvious deformities, atraumatic, nares normal, septum midline, no sinus tenderness, neck is supple, symmetrical, trachea midline  CV: regular rhythm but bradycardic in the high 50s, no murmur appreciated  Pulm: CTAB, no wheezes, rales or ronchi  Chest/breast: no tenderness or deformity, no masses  Abdomen: normoactive, nontender, nondistended, soft  MSK: grossly full range of motion in all joints, no obvious deformity, no edema  Neuro: CN II-XII grossly intact, alert, oriented x3, intact sensation in bilateral feet  Psych: no apparent hallucinations or delusions, no pressured speech, denies SI/HI  SKIN: no lesions on feet or anywhere else      This note has been dictated using voice recognition software. Any grammatical or context distortions are unintentional and inherent to the the software.     Isidra Madera MD  Family Medicine " [facility]

## 2021-06-14 NOTE — TELEPHONE ENCOUNTER
Refill Approved    Rx renewed per Medication Renewal Policy. Medication was last renewed on 8/5/20.    Alicia Dinero, [facility] Triage/Med Refill 1/25/2021     Requested Prescriptions   Pending Prescriptions Disp Refills     amLODIPine (NORVASC) 10 MG tablet [Pharmacy Med Name: amLODIPine Besylate 10 MG Oral Tablet] 90 tablet 0     Sig: Take 1 tablet by mouth once daily       Calcium-Channel Blockers Protocol Passed - 1/24/2021 10:40 AM        Passed - PCP or prescribing provider visit in past 12 months or next 3 months     Last office visit with prescriber/PCP: Visit date not found OR same dept: 9/30/2020 Mike Mcdowell MD OR same specialty: 9/30/2020 Mike Mcdowell MD  Last physical: Visit date not found Last MTM visit: Visit date not found   Next visit within 3 mo: Visit date not found  Next physical within 3 mo: Visit date not found  Prescriber OR PCP: Karen Dempsey CNP  Last diagnosis associated with med order: 1. Benign Essential Hypertension  - amLODIPine (NORVASC) 10 MG tablet [Pharmacy Med Name: amLODIPine Besylate 10 MG Oral Tablet]; Take 1 tablet by mouth once daily  Dispense: 90 tablet; Refill: 0    2. Cardiovascular disease  - amLODIPine (NORVASC) 10 MG tablet [Pharmacy Med Name: amLODIPine Besylate 10 MG Oral Tablet]; Take 1 tablet by mouth once daily  Dispense: 90 tablet; Refill: 0    If protocol passes may refill for 12 months if within 3 months of last provider visit (or a total of 15 months).             Passed - Blood pressure filed in past 12 months     BP Readings from Last 1 Encounters:   09/30/20 150/80

## 2021-06-14 NOTE — TELEPHONE ENCOUNTER
RN cannot approve Refill Request    RN can NOT refill this medication PCP messaged that patient is overdue for Labs. Last office visit: 9/30/2020 Mike Mcdowell MD Last Physical: 1/22/2020 Last MTM visit: Visit date not found Last visit same specialty: 9/30/2020 Mike Mcdowell MD.  Next visit within 3 mo: Visit date not found  Next physical within 3 mo: Visit date not found      Hailey Boggs, [facility] Triage/Med Refill 1/14/2021    Requested Prescriptions   Pending Prescriptions Disp Refills     glimepiride (AMARYL) 2 MG tablet [Pharmacy Med Name: Glimepiride 2 MG Oral Tablet] 90 tablet 0     Sig: TAKE 1 TABLET BY MOUTH ONCE DAILY IN THE MORNING BEFORE BREAKFAST       Oral Hypoglycemics Refill Protocol Failed - 1/14/2021  8:00 AM        Failed - Microalbumin in last year      Microalbumin, Random Urine   Date Value Ref Range Status   07/22/2019 <0.50 0.00 - 1.99 mg/dL Final                  Passed - Visit with PCP or prescribing provider visit in last 6 months       Last office visit with prescriber/PCP: 9/30/2020 OR same dept: 9/30/2020 Mike Mcdowell MD OR same specialty: 9/30/2020 Mike Mcdowell MD Last physical: Visit date not found Last MTM visit: Visit date not found         Next appt within 3 mo: Visit date not found  Next physical within 3 mo: Visit date not found  Prescriber OR PCP: Mike Mcdowell MD  Last diagnosis associated with med order: 1. DM type 2 (diabetes mellitus, type 2) (H)  - glimepiride (AMARYL) 2 MG tablet [Pharmacy Med Name: Glimepiride 2 MG Oral Tablet]; TAKE 1 TABLET BY MOUTH ONCE DAILY IN THE MORNING BEFORE BREAKFAST  Dispense: 90 tablet; Refill: 0     If protocol passes may refill for 12 months if within 3 months of last provider visit (or a total of 15 months).           Passed - A1C in last 6 months     Hemoglobin A1c   Date Value Ref Range Status   09/30/2020 5.9 (H) <=5.6 % Final     Comment:     Normal <5.7% Prediabete 5.7-6.4% Diabletes 6.5% or higher - adopted  from ADA consensus guidelines               Passed - Blood pressure in last year     BP Readings from Last 1 Encounters:   09/30/20 150/80             Passed - Serum creatinine in last year     Creatinine   Date Value Ref Range Status   09/30/2020 0.88 0.70 - 1.30 mg/dL Final

## 2021-06-14 NOTE — TELEPHONE ENCOUNTER
Refill Approved    Rx renewed per Medication Renewal Policy. Medication was last renewed on 7/9/20, last OV 9/30/20.    Karie Calvillo, [facility] Triage/Med Refill 1/10/2021     Requested Prescriptions   Pending Prescriptions Disp Refills     omeprazole (PRILOSEC) 40 MG capsule [Pharmacy Med Name: Omeprazole 40 MG Oral Capsule Delayed Release] 180 capsule 0     Sig: Take 1 capsule by mouth twice daily       GI Medications Refill Protocol Passed - 1/9/2021 11:40 AM        Passed - PCP or prescribing provider visit in last 12 or next 3 months.     Last office visit with prescriber/PCP: 9/30/2020 Mike Mcdowell MD OR same dept: 9/30/2020 Mike Mcdowell MD OR same specialty: 9/30/2020 Mike Mcdowell MD  Last physical: 1/22/2020 Last MTM visit: Visit date not found   Next visit within 3 mo: Visit date not found  Next physical within 3 mo: Visit date not found  Prescriber OR PCP: Mike Mcdowell MD  Last diagnosis associated with med order: 1. GERD (gastroesophageal reflux disease)  - omeprazole (PRILOSEC) 40 MG capsule [Pharmacy Med Name: Omeprazole 40 MG Oral Capsule Delayed Release]; Take 1 capsule by mouth twice daily  Dispense: 180 capsule; Refill: 0    If protocol passes may refill for 12 months if within 3 months of last provider visit (or a total of 15 months).

## 2021-06-14 NOTE — TELEPHONE ENCOUNTER
Refill Approved    Rx renewed per Medication Renewal Policy. Medication was last renewed on 4/14/20.    Alicia Dinero, [facility] Triage/Med Refill 2/1/2021     Requested Prescriptions   Pending Prescriptions Disp Refills     atenoloL (TENORMIN) 50 MG tablet [Pharmacy Med Name: Atenolol 50 MG Oral Tablet] 90 tablet 0     Sig: Take 1 tablet by mouth once daily       Beta-Blockers Refill Protocol Passed - 2/1/2021  8:39 AM        Passed - PCP or prescribing provider visit in past 12 months or next 3 months     Last office visit with prescriber/PCP: Visit date not found OR same dept: 9/30/2020 Mike Mcdowell MD OR same specialty: 9/30/2020 Mike Mcdowell MD  Last physical: Visit date not found Last MTM visit: Visit date not found   Next visit within 3 mo: Visit date not found  Next physical within 3 mo: Visit date not found  Prescriber OR PCP: Karen Dempsey CNP  Last diagnosis associated with med order: 1. Benign Essential Hypertension  - atenoloL (TENORMIN) 50 MG tablet [Pharmacy Med Name: Atenolol 50 MG Oral Tablet]; Take 1 tablet by mouth once daily  Dispense: 90 tablet; Refill: 0    If protocol passes may refill for 12 months if within 3 months of last provider visit (or a total of 15 months).             Passed - Blood pressure filed in past 12 months     BP Readings from Last 1 Encounters:   09/30/20 150/80

## 2021-06-16 PROBLEM — I77.89 AORTIC ROOT ENLARGEMENT (H): Status: ACTIVE | Noted: 2020-01-22

## 2021-06-16 PROBLEM — I48.19 PERSISTENT ATRIAL FIBRILLATION (H): Status: ACTIVE | Noted: 2019-09-12

## 2021-06-16 PROBLEM — J64: Status: ACTIVE | Noted: 2020-01-22

## 2021-06-16 NOTE — TELEPHONE ENCOUNTER
Refill Approved    Rx renewed per Medication Renewal Policy. Medication was last renewed on 7/20/20.    Jose Luis Wright, [facility] Triage/Med Refill 4/16/2021     Requested Prescriptions   Pending Prescriptions Disp Refills     furosemide (LASIX) 20 MG tablet [Pharmacy Med Name: Furosemide 20 MG Oral Tablet] 90 tablet 0     Sig: Take 1 tablet by mouth once daily       Diuretics/Combination Diuretics Refill Protocol  Passed - 4/15/2021  9:45 AM        Passed - Visit with PCP or prescribing provider visit in past 12 months     Last office visit with prescriber/PCP: 9/30/2020 Mike Mcdowell MD OR same dept: 9/30/2020 Mike Mcdowell MD OR same specialty: 9/30/2020 Mike Mcdowell MD  Last physical: 1/22/2020 Last MTM visit: Visit date not found   Next visit within 3 mo: Visit date not found  Next physical within 3 mo: Visit date not found  Prescriber OR PCP: Mike Mcdowell MD  Last diagnosis associated with med order: 1. Benign essential hypertension  - furosemide (LASIX) 20 MG tablet [Pharmacy Med Name: Furosemide 20 MG Oral Tablet]; Take 1 tablet by mouth once daily  Dispense: 90 tablet; Refill: 0    If protocol passes may refill for 12 months if within 3 months of last provider visit (or a total of 15 months).             Passed - Serum Potassium in past 12 months      Lab Results   Component Value Date    Potassium 4.4 09/30/2020             Passed - Serum Sodium in past 12 months      Lab Results   Component Value Date    Sodium 139 09/30/2020             Passed - Blood pressure on file in past 12 months     BP Readings from Last 1 Encounters:   09/30/20 150/80             Passed - Serum Creatinine in past 12 months      Creatinine   Date Value Ref Range Status   09/30/2020 0.88 0.70 - 1.30 mg/dL Final

## 2021-06-16 NOTE — TELEPHONE ENCOUNTER
RN cannot approve Refill Request    RN can NOT refill this medication PCP messaged that patient is overdue for Labs and Office Visit. Last office visit: 9/30/2020 Mike Mcdowell MD Last Physical: 1/22/2020 Last MTM visit: Visit date not found Last visit same specialty: 9/30/2020 Mike Mcdowell MD.  Next visit within 3 mo: Visit date not found  Next physical within 3 mo: Visit date not found      Susie Duval, [facility] Triage/Med Refill 4/18/2021    Requested Prescriptions   Pending Prescriptions Disp Refills     glimepiride (AMARYL) 2 MG tablet [Pharmacy Med Name: Glimepiride 2 MG Oral Tablet] 90 tablet 0     Sig: TAKE 1 TABLET BY MOUTH ONCE DAILY IN THE MORNING BEFORE BREAKFAST       Oral Hypoglycemics Refill Protocol Failed - 4/18/2021 10:03 AM        Failed - Visit with PCP or prescribing provider visit in last 6 months       Last office visit with prescriber/PCP: Visit date not found OR same dept: 9/30/2020 Mike Mcdowell MD OR same specialty: 9/30/2020 Mike Mcdowell MD Last physical: Visit date not found Last MTM visit: Visit date not found         Next appt within 3 mo: Visit date not found  Next physical within 3 mo: Visit date not found  Prescriber OR PCP: Mike Mcdowell MD  Last diagnosis associated with med order: 1. DM type 2 (diabetes mellitus, type 2) (H)  - glimepiride (AMARYL) 2 MG tablet [Pharmacy Med Name: Glimepiride 2 MG Oral Tablet]; TAKE 1 TABLET BY MOUTH ONCE DAILY IN THE MORNING BEFORE BREAKFAST  Dispense: 90 tablet; Refill: 0     If protocol passes may refill for 12 months if within 3 months of last provider visit (or a total of 15 months).           Failed - A1C in last 6 months     Hemoglobin A1c   Date Value Ref Range Status   09/30/2020 5.9 (H) <=5.6 % Final     Comment:     Normal <5.7% Prediabete 5.7-6.4% Diabletes 6.5% or higher - adopted from ADA consensus guidelines               Failed - Microalbumin in last year      Microalbumin, Random Urine   Date Value Ref Range  Status   07/22/2019 <0.50 0.00 - 1.99 mg/dL Final                  Passed - Blood pressure in last year     BP Readings from Last 1 Encounters:   09/30/20 150/80             Passed - Serum creatinine in last year     Creatinine   Date Value Ref Range Status   09/30/2020 0.88 0.70 - 1.30 mg/dL Final

## 2021-06-17 NOTE — PATIENT INSTRUCTIONS - HE
Patient Instructions by Riley Mandujano DO at 8/7/2019  1:50 PM     Author: Riley Mandujano DO Service: -- Author Type: Physician    Filed: 8/7/2019  1:58 PM Encounter Date: 8/7/2019 Status: Signed    : Riley Mandujano DO (Physician)         It was a pleasure to meet with you today in clinic.  Please do not hesitate to call the [facility] clinic with any questions or concerns at [phone].    Additional Provider Instructions:   Below is a list of any additional instructions from your provider:   1. Start Eliquis 5 mg two times a day for stroke prevention  2. Transthoracic echocardiogram   3. Stop aspirin   4. If ongoing shortness of breath will need to consider heart stress testing.   5. Atrial fib clinic follow-up in 4 weeks.        Test Results:   You should receive a phone call from this office informing you of test or procedure results within 3 business days of the test being performed.  If you do not hear from our office with the test results within 1 week please do not hesitate to call asking for these results.      Sincerely,

## 2021-06-17 NOTE — PATIENT INSTRUCTIONS - HE
Patient Instructions by Mike Mcdowell MD at 1/22/2020  9:40 AM     Author: Mike Mcdowell MD Service: -- Author Type: Physician    Filed: 1/22/2020 10:20 AM Encounter Date: 1/22/2020 Status: Signed    : Mike Mcdowell MD (Physician)         Patient Education     Exercise for a Healthier Heart  You may wonder how you can improve the health of your heart. If youre thinking about exercise, youre on the right track. You dont need to become an athlete, but you do need a certain amount of brisk exercise to help strengthen your heart. If you have been diagnosed with a heart condition, your doctor may recommend exercise to help stabilize your condition. To help make exercise a habit, choose safe, fun activities.       Be sure to check with your health care provider before starting an exercise program.    Why exercise?  Exercising regularly offers many healthy rewards. It can help you do all of the following:    Improve your blood cholesterol levels to help prevent further heart trouble    Lower your blood pressure to help prevent a stroke or heart attack    Control diabetes, or reduce your risk of getting this disease    Improve your heart and lung function    Reach and maintain a healthy weight    Make your muscles stronger and more limber so you can stay active    Prevent falls and fractures by slowing the loss of bone mass (osteoporosis)    Manage stress better  Exercise tips  Ease into your routine. Set small goals. Then build on them.  Exercise on most days. Aim for a total of 150 or more minutes of moderate to  vigorous intensity activity each week. Consider 40 minutes, 3 to 4 times a week. For best results, activity should last for 40 minutes on average. It is OK to work up to the 40 minute period over time. Examples of moderate-intensity activity is walking one mile in 15 minutes or 30 to 45 minutes of yard work.  Step up your daily activity level. Along with your exercise program, try being more active  throughout the day. Walk instead of drive. Do more household tasks or yard work.  Choose one or more activities you enjoy. Walking is one of the easiest things you can do. You can also try swimming, riding a bike, or taking an exercise class.  Stop exercising and call your doctor if you:    Have chest pain or feel dizzy or lightheaded    Feel burning, tightness, pressure, or heaviness in your chest, neck, shoulders, back, or arms    Have unusual shortness of breath    Have increased joint or muscle pain    Have palpitations or an irregular heartbeat      2846-7248 [employer]. [address] [place] [address]. All rights reserved. This information is not intended as a substitute for professional medical care. Always follow your healthcare professional's instructions.         Patient Education   Alcohol Use   Many people can enjoy a glass of wine or beer without any negative consequences to their health. According to the Centers for Disease Control and Prevention (CDC), having one or fewer drinks per day for women and two or fewer per day for men is considered moderate drinking.     When people drink more than moderately, it can become concerning. Excessive drinking is defined as consuming 15 drinks or more per week for men and 8 drinks or more per week for women. There are various health problems associated with excessive drinking, which include:    Damage to vital organs like the heart, brain, liver and pancreas    Harm to the digestive tract    Weaken the immune system    Higher risk for heart disease and cancer       Patient Education   Understanding USDA MyPlate  The USDA (US Department of Agriculture) has guidelines to help you make healthy food choices. These are called MyPlate. MyPlate shows the food groups that make up healthy meals using the image of a place setting. Before you eat, think about the healthiest choices for what to put onto your plate or into your cup or bowl. To learn  more about building a healthy plate, visit www.choosemyplate.gov.       The Food Groups    Fruits: Any fruit or 100% fruit juice counts as part of the Fruit Group. Fruits may be fresh, canned, frozen, or dried, and may be whole, cut-up, or pureed. Make half your plate fruits and vegetables.    Vegetables: Any vegetable or 100% vegetable juice counts as a member of the Vegetable Group. Vegetables may be fresh, frozen, canned, or dried. They can be served raw or cooked and may be whole, cut-up, or mashed. Make half your plate fruits and vegetables.     Grains: All foods made from grains are part of the Grains Group. These include wheat, rice, oats, cornmeal, and barley such as bread, pasta, oatmeal, cereal, tortillas, and grits. Grains should be no more than a quarter of your plate. At least half of your grains should be whole grains.    Protein: This group includes meat, poultry, seafood, beans and peas, eggs, processed soy products (like tofu), nuts (including nut butters), and seeds. Make protein choices no more than a quarter of your plate. Meat and poultry choices should be lean or low fat.    Dairy: All fluid milk products and foods made from milk that contain calcium, like yogurt and cheese are part of the Dairy Group. (Foods that have little calcium, such as cream, butter, and cream cheese, are not part of the group.) Most dairy choices should be low-fat or fat-free.    Oils: These are fats that are liquid at room temperature. They include canola, corn, olive, soybean, and sunflower oil. Foods that are mainly oil include mayonnaise, certain salad dressings, and soft margarines. You should have only 5 to 7 teaspoons of oils a day. You probably already get this much from the food you eat.  Use [employer] to Help Build Your Meals  The SuperTracker can help you plan and track your meals and activity. You can look up individual foods to see or compare their nutritional value. You can get guidelines for what and  how much you should eat. You can compare your food choices. And you can assess personal physical activities and see ways you can improve. Go to www.[employer].gov/supertracker/.    7909-4453 [employer]. [address] [place] [address], [place] [address]. All rights reserved. This information is not intended as a substitute for professional medical care. Always follow your healthcare professional's instructions.           Patient Education   Depression and Suicide in Older Adults  Nearly 2 million older Americans have some type of depression. Sadly, some of them even take their own lives. Yet depression among older adults is often ignored. Learn the warning signs. You may help spare a loved one needless pain. You may also save a life.       What Is Depression?  Depression is a mood disorder that affects the way you think and feel. The most common symptom is a feeling of deep sadness. People who are depressed also may seem tired and listless. And nothing seems to give them pleasure. Its normal to grieve or be sad sometimes. But sadness lessens or passes with time. Depression rarely goes away or improves on its own. Other symptoms of depression are:    Sleeping more or less than normal    Eating more or less than normal    Having headaches, stomachaches, or other pains that dont go away    Feeling nervous, empty, or worthless    Crying a great deal    Thinking or talking about suicide or death    Feeling confused or forgetful  What Causes It?  The causes of depression arent fully known. Certain chemicals in the brain play a role. Depression does run in families. And life stresses can also trigger depression in some people. That may be the case with older adults. They often face great burdens, such as the death of friends or a spouse. They may have failing health. And they are more likely to be alone, lonely, or poor.  How You Can Help  Often, depressed people may not want to ask for help. When they do, they  may be ignored. Or, they may receive the wrong treatment. You can help by showing parents and older friends love and support. If they seem depressed, help them find the right treatment. Talk to your doctor. Or contact a local mental health center, social service agency, or hospital. With modern treatment, no one has to suffer from depression.  Resources:    National [place] of [place] [employer]  504.632.2929  www.nimh.nih.gov    National [place] on Mental Illness  899.580.5455  www.christi.org    [facility]  941.527.6186  www.[facility].org    National Suicide Hotline  621.265.8717 (800-SUICIDE)      0191-6535 [employer]. [address] [place] [address], [place] [address]. All rights reserved. This information is not intended as a substitute for professional medical care. Always follow your healthcare professional's instructions.           Advance Directive  Patients advance directive was discussed and I am comfortable with the patients wishes.  Patient Education   Personalized Prevention Plan  You are due for the preventive services outlined below.  Your care team is available to assist you in scheduling these services.  If you have already completed any of these items, please share that information with your care team to update in your medical record.  Health Maintenance   Topic Date Due   ? ZOSTER VACCINES (1 of 2) 12/27/1991   ? MEDICARE ANNUAL WELLNESS VISIT  02/03/2017   ? DIABETIC FOOT EXAM  10/11/2018   ? LIPID  07/13/2019   ? INFLUENZA VACCINE RULE BASED (1) 08/01/2019   ? DIABETIC EYE EXAM  01/17/2020   ? A1C  01/22/2020   ? TD 18+ HE  07/30/2020 (Originally 12/27/1958)   ? BMP  07/22/2020   ? MICROALBUMIN  07/22/2020   ? FALL RISK ASSESSMENT  07/22/2020   ? ADVANCE CARE PLANNING  07/22/2024   ? PNEUMOCOCCAL IMMUNIZATION 65+ LOW/MEDIUM RISK  Completed

## 2021-06-19 NOTE — PROGRESS NOTES
Assessment and Plan:   Jabari presents for his annual space wellness examination in addition to monitoring his chronic medical problems which include benign essential hypertension diabetes mellitus and pneumoconioses.  Patient also has intermittent pedal edema managed with occasional use of Lasix.  Blood pressure controlled with lisinopril.  Blood sugars have been running 130.  Patient takes Amaryl 2 mg daily.  Patient is on methotrexate for rheumatoid arthritis and psoriasis.  Patient is on beta-blockade with metoprolol.  He gets GERD from his medications and takes occasional omeprazole.  Also has gout has not had any recent flares but takes probenecid for management of chronic gout.  Lipids are managed with simvastatin and Zetia.  Patient feels well weight stable no complaints no deterioration in pulmonary function no headaches hearing loss dysphagia shortness of breath.  We will do a chest x-ray today as we do annual chest x-rays because of his pneumoconioses no abdominal pain diarrhea constipation urgency frequency dysuria.  Patient is 5 years out from colonoscopy and is a 10 year cycle.  All medical questions that were asked were answered I personally reviewed family social history surgeries allergies problems list.  Today's examination includes a welcome to Medicare screening.  Complete physical and management of multiple medical problems.    1. Medicare annual wellness visit, subsequent  Workup to include  - Comprehensive Metabolic Panel  - Urinalysis-UC if Indicated  - HM1(CBC and Differential)  - HM1 (CBC with Diff)    2. DM2 (diabetes mellitus, type 2) (H)  Workup to include  - Comprehensive Metabolic Panel  - Glycosylated Hemoglobin A1c  - PSA (Prostatic-Specific Antigen), Annual Screen  - Microalbumin, Random Urine    3. Benign Essential Hypertension  Workup to include  - Comprehensive Metabolic Panel  - Electrocardiogram Perform - Clinic    4. Hypercholesteremia  Continue same medication  -  Comprehensive Metabolic Panel  - Electrocardiogram Perform - Clinic  - Lipid [place]    5. Esophageal reflux  Continue as needed omeprazole  - Comprehensive Metabolic Panel  - HM1(CBC and Differential)  - HM1 (CBC with Diff)    6. Pneumoconiosis (H)  Will be reviewed by radiology  - XR Chest 2 Views     The patient's current medical problems were reviewed.    I have had an Advance Directives discussion with the patient.  The following health maintenance schedule was reviewed with the patient and provided in printed form in the after visit summary:   Health Maintenance   Topic Date Due     ZOSTER VACCINE  12/27/2001     TD 18+ HE  07/30/2020 (Originally 12/27/1958)     INFLUENZA VACCINE RULE BASED (1) 08/01/2018     DIABETES FOOT EXAM  10/11/2018     DIABETES OPHTHALMOLOGY EXAM  10/11/2018     DIABETES HEMOGLOBIN A1C  01/13/2019     DIABETES FOLLOW-UP  01/13/2019     DIABETES URINE MICROALBUMIN  07/13/2019     FALL RISK ASSESSMENT  07/13/2019     ADVANCE DIRECTIVES DISCUSSED WITH PATIENT  03/22/2022     PNEUMOCOCCAL POLYSACCHARIDE VACCINE AGE 65 AND OVER  Completed     PNEUMOCOCCAL CONJUGATE VACCINE FOR ADULTS (PCV13 OR PREVNAR)  Completed        Subjective:   Chief Complaint: Jabari Haddad is an 77 y.o. male here for an Annual Wellness visit.   HPI: See under history and physical and plan    Review of Systems: 14 point review of system once again negative please see above.  The rest of the review of systems are negative for all systems.    Patient Care Team:  Mike Mcdowell MD as PCP - General     Patient Active Problem List   Diagnosis     Gout     Hemorrhoids     Esophageal Reflux     Osteoarthritis Of The Wrist     DM2 (diabetes mellitus, type 2) (H)     Hyperlipidemia     Benign Essential Hypertension     Arteriosclerotic Cardiovascular Disease (ASCVD)     Diverticulitis Of Colon     Lethargy     Itching Of The Ears     Abdominal Pain In The Right Lower Belly (RLQ)     Male Erectile Disorder     Exposed To  Dusts - Asbestos     Chronic Gout     Hyperkalemia     Pharyngitis     Generalized Osteoarthritis Of Multiple Sites     Pseudogout     Symmetric Polyarticular Inflammation     Pseudogout     Inflammatory arthritis     Past Medical History:   Diagnosis Date     Arteriosclerotic Cardiovascular Disease (ASCVD)      Arthritis      Diverticulitis      DM2 (diabetes mellitus, type 2) (H)      Generalized Osteoarthritis Of Multiple Sites      Gout      Hemorrhoids      Hyperlipidemia      Hypertension      Male Erectile Disorder       No past surgical history on file.   Family History   Problem Relation Age of Onset     Arthritis       Rheum arthritis       Lupus       Osteoporosis       Stroke       Hypertension        Social History     Social History     Marital status:      Spouse name: N/A     Number of children: N/A     Years of education: N/A     Occupational History     Not on file.     Social History Main Topics     Smoking status: Former Smoker     Quit date: 1/1/1967     Smokeless tobacco: Never Used     Alcohol use 5.0 - 6.0 oz/week     10 - 12 Standard drinks or equivalent per week     Drug use: Not on file     Sexual activity: Not on file     Other Topics Concern     Not on file     Social History Narrative    1/13/15- The patient lives alone.       Current Outpatient Prescriptions   Medication Sig Dispense Refill     amLODIPine (NORVASC) 10 MG tablet TAKE 1 TABLET BY MOUTH ONCE DAILY 90 tablet 0     amLODIPine (NORVASC) 10 MG tablet TAKE 1 TABLET BY MOUTH ONCE DAILY 90 tablet 0     aspirin 81 MG EC tablet Take 1 tablet (81 mg total) by mouth daily. 150 tablet 2     atenolol (TENORMIN) 100 MG tablet Take 1.5 tablets (150 mg total) by mouth daily. 135 tablet 3     escitalopram oxalate (LEXAPRO) 20 MG tablet TK 1 T PO QAM  3     ezetimibe (ZETIA) 10 mg tablet TAKE ONE TABLET BY MOUTH ONCE DAILY 120 tablet 0     furosemide (LASIX) 20 MG tablet TAKE 1 TABLET(20 MG) BY MOUTH DAILY 90 tablet 2      "glimepiride (AMARYL) 2 MG tablet TAKE ONE TABLET BY MOUTH ONCE DAILY IN THE MORNING BEFORE BREAKFAST REPLACE  GLYBURIDE 90 tablet 3     lisinopril (PRINIVIL,ZESTRIL) 40 MG tablet Take 1 tablet (40 mg total) by mouth daily. 90 tablet 1     lisinopril (PRINIVIL,ZESTRIL) 40 MG tablet Take 1 tablet (40 mg total) by mouth daily. 90 tablet 1     methotrexate 2.5 MG tablet TAKE 4 TABLETS BY MOUTH 1 TIME EVERY WEEK 32 tablet 0     metoprolol succinate (TOPROL XL) 100 MG 24 hr tablet Take 1 tablet (100 mg total) by mouth daily. 30 tablet 11     omeprazole (PRILOSEC) 40 MG capsule TAKE ONE CAPSULE BY MOUTH 2 TIMES DAILY 180 capsule 1     probenecid (BENEMID) 500 mg tablet Take 1 tablet (500 mg total) by mouth 2 (two) times a day. 180 tablet 0     probenecid (BENEMID) 500 mg tablet Take 1 tablet (500 mg total) by mouth 2 (two) times a day. 180 tablet 0     simvastatin (ZOCOR) 40 MG tablet TAKE ONE TABLET BY MOUTH ONCE DAILY 90 tablet 0     tadalafil (CIALIS) 5 MG tablet Take 5 mg by mouth daily as needed for erectile dysfunction (1 hour before needed).       traZODone (DESYREL) 50 MG tablet TK 1 TO 2 TS PO HS 30 tablet 3     ULTIMA TEST STRIPS strips USE ONE STRIP TO CHECK GLUCOSE ONCE DAILY DX  E11.9 100 strip 3     furosemide (LASIX) 20 MG tablet Take 1 tablet (20 mg total) by mouth daily. 90 tablet 1     No current facility-administered medications for this visit.       Objective:   Vital Signs:   Visit Vitals     /70     Pulse (!) 56     Ht 5' 6.5\" (1.689 m)     Wt 182 lb 3.2 oz (82.6 kg)     BMI 28.97 kg/m2        VisionScreening:  No exam data present     PHYSICAL EXAM  General Appearance:  Alert, cooperative, no distress  Head:  Normocephalic, no obvious abnormality  Ears: TM anatomy normal  Eyes:  PERRL, EOM's intact, conjunctiva and corneas clear  Nose:  Nares symmetrical, septum midline, mucosa pink, no sinus tenderness  Throat:  Lips, tongue, and mucosa are moist, pink, and intact  Neck:  Supple, symmetrical, " trachea midline, no adenopathy; thyroid: no enlargement, symmetric,no tenderness/mass/nodules; no carotid bruit, no JVD  Back:  Symmetrical, no curvature, ROM normal, no CVA tenderness  Chest/Breast:  No mass or tenderness  Lungs:  Clear to auscultation bilaterally, respirations unlabored   Heart:  Normal PMI, regular rate & rhythm, S1 and S2 normal, no murmurs, rubs, or gallops  Abdomen:  Soft, non-tender, bowel sounds active all four quadrants, no mass, or organomegaly  Musculoskeletal:  Tone and strength strong and symmetrical, all extremities  Lymphatic:  No adenopathy  Skin/Hair/Nails:  Skin warm, dry, and intact, no rashes  Neurologic:  Alert and oriented x3, no cranial nerve deficits, normal strength and tone, gait steady  Extremities:  No edema.  Evi's sign negative.    Genitourinary: Circumcised male testes down prostate normal no masses  Pulses:  Equal bilaterally    Assessment Results 7/13/2018   Activities of Daily Living No help needed   Instrumental Activities of Daily Living No help needed   Mini Cog Total Score 5   Some recent data might be hidden     A Mini-Cog score of 0-2 suggests the possibility of dementia, score of 3-5 suggests no dementia    Identified Health Risks:     He is at risk for lack of exercise and has been provided with information to increase physical activity for the benefit of his well-being.  The patient reports that he drinks more than one alcoholic drink per day but denies binge or excessive drinking. He was counseled and given information about possible harmful effects of excessive alcohol intake.  The patient was counseled and encouraged to consider modifying their diet and eating habits. He was provided with information on recommended healthy diet options.  The patient's PHQ-9 score is consistent with mild depression. He was provided with information regarding depression and was advised to schedule a follow up appointment in 50 to weeks to further address this  issue.  Patient's advanced directive was discussed and I am comfortable with the patient's wishes.

## 2021-06-19 NOTE — LETTER
"Letter by Mike Mcdowell MD at      Author: Mike Mcdowell MD Service: -- Author Type: --    Filed:  Encounter Date: 7/23/2019 Status: (Other)         Jabari Haddad  [address] [place] [id number]             July 23, 2019         Dear Mr. Haddad,    Below are the results from your recent visit:    Resulted Orders   Microalbumin, Random Urine   Result Value Ref Range    Microalbumin, Random Urine <0.50 0.00 - 1.99 mg/dL    Creatinine, Urine 37.8 mg/dL    Microalbumin/Creatinine Ratio Random Urine  <=19.9 mg/g      Comment:      \"Unable to calculate: Creatinine and/or Microalbumin value below detectable level\"    Narrative    Microalbumin, Random Urine  <2.0 mg/dL . . . . . . . . Normal  3.0-30.0 mg/dL . . . . . . Microalbuminuria  >30.0 mg/dL . . . . . .  . Clinical Proteinuria    Microalbumin/Creatinine Ratio, Random Urine  <20 mg/g . . . . .. . . . Normal   mg/g . . . . . . . Microalbuminuria  >300 mg/g . . . . . . . . Clinical Proteinuria       Glycosylated Hemoglobin A1c   Result Value Ref Range    Hemoglobin A1c 5.9 3.5 - 6.0 %   Uric Acid   Result Value Ref Range    Uric Acid 6.2 3.0 - 8.0 mg/dL   Comprehensive Metabolic Panel   Result Value Ref Range    Sodium 139 136 - 145 mmol/L    Potassium 4.6 3.5 - 5.0 mmol/L    Chloride 102 98 - 107 mmol/L    CO2 26 22 - 31 mmol/L    Anion Gap, Calculation 11 5 - 18 mmol/L    Glucose 109 70 - 125 mg/dL    BUN 13 8 - 28 mg/dL    Creatinine 0.96 0.70 - 1.30 mg/dL    GFR MDRD Af Amer >60 >60 mL/min/1.73m2    GFR MDRD Non Af Amer >60 >60 mL/min/1.73m2    Bilirubin, Total 0.5 0.0 - 1.0 mg/dL    Calcium 9.5 8.5 - 10.5 mg/dL    Protein, Total 7.1 6.0 - 8.0 g/dL    Albumin 4.1 3.5 - 5.0 g/dL    Alkaline Phosphatase 61 45 - 120 U/L    AST 38 0 - 40 U/L    ALT 44 0 - 45 U/L    Narrative    Fasting Glucose reference range is 70-99 mg/dL per  American Diabetes Association (ADA) guidelines.       Uric acid went up so take full pill to avoid trouble    Please " call with questions or contact us using [employer].    Sincerely,        Electronically signed by Mike Mcdowell MD

## 2021-06-20 NOTE — LETTER
Letter by Mike Mcdowell MD at      Author: Miek Mcdowell MD Service: -- Author Type: --    Filed:  Encounter Date: 1/22/2020 Status: (Other)         Jabari Blair [facility] [address]             January 26, 2020         Dear Mr. Haddad,    Below are the results from your recent visit:    Resulted Orders   Comprehensive Metabolic Panel   Result Value Ref Range    Sodium 138 136 - 145 mmol/L    Potassium 4.5 3.5 - 5.0 mmol/L    Chloride 102 98 - 107 mmol/L    CO2 24 22 - 31 mmol/L    Anion Gap, Calculation 12 5 - 18 mmol/L    Glucose 104 70 - 125 mg/dL    BUN 10 8 - 28 mg/dL    Creatinine 0.88 0.70 - 1.30 mg/dL    GFR MDRD Af Amer >60 >60 mL/min/1.73m2    GFR MDRD Non Af Amer >60 >60 mL/min/1.73m2    Bilirubin, Total 0.7 0.0 - 1.0 mg/dL    Calcium 9.1 8.5 - 10.5 mg/dL    Protein, Total 7.0 6.0 - 8.0 g/dL    Albumin 3.8 3.5 - 5.0 g/dL    Alkaline Phosphatase 74 45 - 120 U/L    AST 37 0 - 40 U/L    ALT 38 0 - 45 U/L    Narrative    Fasting Glucose reference range is 70-99 mg/dL per  American Diabetes Association (ADA) guidelines.   Glycosylated Hemoglobin A1c   Result Value Ref Range    Hemoglobin A1c 6.0 3.5 - 6.0 %   HM2(CBC w/o Differential)   Result Value Ref Range    WBC 6.2 4.0 - 11.0 thou/uL    RBC 4.47 4.40 - 6.20 mill/uL    Hemoglobin 15.1 14.0 - 18.0 g/dL    Hematocrit 44.3 40.0 - 54.0 %    MCV 99 80 - 100 fL    MCH 33.7 27.0 - 34.0 pg    MCHC 34.0 32.0 - 36.0 g/dL    RDW 11.2 11.0 - 14.5 %    Platelets 218 140 - 440 thou/uL    MPV 7.7 7.0 - 10.0 fL   Lipid [place]   Result Value Ref Range    Cholesterol 124 <=199 mg/dL    Triglycerides 59 <=149 mg/dL    HDL Cholesterol 66 >=40 mg/dL    LDL Calculated 46 <=129 mg/dL    Patient Fasting > 8hrs? Yes    Urinalysis-UC if Indicated   Result Value Ref Range    Color, UA Yellow Colorless, Yellow, Straw, Light Yellow    Clarity, UA Clear Clear    Glucose, UA Negative Negative    Bilirubin, UA Negative Negative    Ketones, UA Negative  Negative    Specific [place], UA 1.015 1.005 - 1.030    Blood, UA Negative Negative    pH, UA 7.0 5.0 - 8.0    Protein, UA Negative Negative mg/dL    Urobilinogen, UA 0.2 E.U./dL 0.2 E.U./dL, 1.0 E.U./dL    Nitrite, UA Negative Negative    Leukocytes, UA Negative Negative    Narrative    Microscopic not indicated  UC not indicated   Uric Acid   Result Value Ref Range    Uric Acid 6.6 3.0 - 8.0 mg/dL   PSA, Annual Screen (Prostatic-Specific Antigen)   Result Value Ref Range    PSA 6.9 (H) 0.0 - 6.5 ng/mL    Narrative    Method is Abbott Prostate-Specific Antigen (PSA)  Standard-WHO 1st International (90:10)       PSA is stable; repeat one year    Please call with questions or contact us using [employer]    Sincerely,        Electronically signed by Mike Mcdowell MD

## 2021-06-20 NOTE — LETTER
Letter by Mike Mcdowell MD at      Author: Mike Mcdowell MD Service: -- Author Type: --    Filed:  Encounter Date: 1/22/2020 Status: (Other)         Jabari Blair [facility] [address]             January 22, 2020         Dear Mr. Haddad,    Below are the results from your recent visit:    Resulted Orders   Comprehensive Metabolic Panel   Result Value Ref Range    Sodium 138 136 - 145 mmol/L    Potassium 4.5 3.5 - 5.0 mmol/L    Chloride 102 98 - 107 mmol/L    CO2 24 22 - 31 mmol/L    Anion Gap, Calculation 12 5 - 18 mmol/L    Glucose 104 70 - 125 mg/dL    BUN 10 8 - 28 mg/dL    Creatinine 0.88 0.70 - 1.30 mg/dL    GFR MDRD Af Amer >60 >60 mL/min/1.73m2    GFR MDRD Non Af Amer >60 >60 mL/min/1.73m2    Bilirubin, Total 0.7 0.0 - 1.0 mg/dL    Calcium 9.1 8.5 - 10.5 mg/dL    Protein, Total 7.0 6.0 - 8.0 g/dL    Albumin 3.8 3.5 - 5.0 g/dL    Alkaline Phosphatase 74 45 - 120 U/L    AST 37 0 - 40 U/L    ALT 38 0 - 45 U/L    Narrative    Fasting Glucose reference range is 70-99 mg/dL per  American Diabetes Association (ADA) guidelines.   Glycosylated Hemoglobin A1c   Result Value Ref Range    Hemoglobin A1c 6.0 3.5 - 6.0 %   HM2(CBC w/o Differential)   Result Value Ref Range    WBC 6.2 4.0 - 11.0 thou/uL    RBC 4.47 4.40 - 6.20 mill/uL    Hemoglobin 15.1 14.0 - 18.0 g/dL    Hematocrit 44.3 40.0 - 54.0 %    MCV 99 80 - 100 fL    MCH 33.7 27.0 - 34.0 pg    MCHC 34.0 32.0 - 36.0 g/dL    RDW 11.2 11.0 - 14.5 %    Platelets 218 140 - 440 thou/uL    MPV 7.7 7.0 - 10.0 fL   Lipid [place]   Result Value Ref Range    Cholesterol 124 <=199 mg/dL    Triglycerides 59 <=149 mg/dL    HDL Cholesterol 66 >=40 mg/dL    LDL Calculated 46 <=129 mg/dL    Patient Fasting > 8hrs? Yes    Urinalysis-UC if Indicated   Result Value Ref Range    Color, UA Yellow Colorless, Yellow, Straw, Light Yellow    Clarity, UA Clear Clear    Glucose, UA Negative Negative    Bilirubin, UA Negative Negative    Ketones, UA Negative  Negative    Specific [place], UA 1.015 1.005 - 1.030    Blood, UA Negative Negative    pH, UA 7.0 5.0 - 8.0    Protein, UA Negative Negative mg/dL    Urobilinogen, UA 0.2 E.U./dL 0.2 E.U./dL, 1.0 E.U./dL    Nitrite, UA Negative Negative    Leukocytes, UA Negative Negative    Narrative    Microscopic not indicated  UC not indicated   Uric Acid   Result Value Ref Range    Uric Acid 6.6 3.0 - 8.0 mg/dL       Everything looks good...    Please call with questions or contact us using [employer].    Sincerely,        Electronically signed by Mike Mcdowell MD

## 2021-06-21 NOTE — PROGRESS NOTES
Assessment/Plan:     Patient presented to discuss his hypertension.    1. Benign Essential Hypertension  Patient and I had an extensive discussion about his medication regimen.  With shared decision making, shows to make following changes:  -Continue metoprolol, amlodipine, and lisinopril at current doses  -Discontinue atenolol as patient is on 2 separate beta-blockers and I suspect it is artificially causing bradycardia and is also unlikely to be the most effective  -Initiate on chlorthalidone as patient is not yet on a diuretic medication aside from furosemide  -Recheck in 1-2 weeks when patient returns for his diabetes follow-up  -Consider discontinuation or intermittent use of furosemide patient is no history of congestive heart failure and I am uncertain why patient is having fluid retention.  It is possible the furosemide is on board in order to treat fluid retention from the amlodipine, but would have this clarified by patient's PCP.    2. Diverticulitis Of Colon  Patient and I had very little time to discuss his possible diverticulitis.  He seems convinced that he is having a flare and I feel it is reasonable to treat with antibiotics per his request.  However, given the mild nature of symptoms and the duration so far, I have some suspicion that there may be another etiology.  Patient is scheduled to have close follow-up, he can see his primary care provider with any lingering concerns.  -Metronidazole 500 mg twice daily for 10 days  - ciprofloxacin HCl (CIPRO) 500 MG tablet; Take 1 tablet (500 mg total) by mouth 2 (two) times a day for 10 days.  Dispense: 20 tablet; Refill: 0    Return to clinic for abuse follow-up in approximately 3 months.    Total time spent with patient was 15 minutes with greater than 50% spent in face-to-face counseling regarding the above plan.    Subjective:      Jabari Haddad is a 77 y.o. male who presents to clinic for blood pressure concerns.  Patient does have a history of  benign essential hypertension.  He was last seen in clinic in July.  He currently takes lisinopril, atenolol, amlodipine and metoprolol.    Patient has noticed no side effects with respect to his medication or his high blood pressure but he did bring in a blood pressure log.  Every single value was slightly elevated aside from just a few at lunchtime.    Patient also takes daily furosemide with potassium for water retention.  He has no echo on file.    At the very conclusion of her visit, after most of the visit was spent discussing hypertension, patient did note that he is having a diverticulitis flare.  It has been present for several weeks and he does not have a fever.  It is a mild right lower quadrant pain that occasionally is felt in the left lower quadrant.  He has had diverticulitis in the past and would like antibiotics.    Past Medical History, Family History, and Social History reviewed.     Current Outpatient Medications on File Prior to Visit   Medication Sig Dispense Refill     amLODIPine (NORVASC) 10 MG tablet Take 1 tablet (10 mg total) by mouth daily. 90 tablet 2     aspirin 81 MG EC tablet Take 1 tablet (81 mg total) by mouth daily. 150 tablet 2     escitalopram oxalate (LEXAPRO) 20 MG tablet TK 1 T PO QAM  3     ezetimibe (ZETIA) 10 mg tablet Take 1 tablet (10 mg total) by mouth daily. 90 tablet 3     furosemide (LASIX) 20 MG tablet TAKE 1 TABLET(20 MG) BY MOUTH DAILY 90 tablet 2     glimepiride (AMARYL) 2 MG tablet TAKE ONE TABLET BY MOUTH ONCE DAILY IN THE MORNING BEFORE BREAKFAST 90 tablet 3     lisinopril (PRINIVIL,ZESTRIL) 40 MG tablet Take 1 tablet (40 mg total) by mouth daily. 90 tablet 1     omeprazole (PRILOSEC) 40 MG capsule TAKE ONE CAPSULE BY MOUTH 2 TIMES DAILY 180 capsule 2     probenecid (BENEMID) 500 mg tablet Take 1 tablet (500 mg total) by mouth 2 (two) times a day. 180 tablet 0     simvastatin (ZOCOR) 40 MG tablet TAKE 1 TABLET BY MOUTH ONCE DAILY 90 tablet 2     traZODone  "(DESYREL) 50 MG tablet TK 1 TO 2 TS PO HS 30 tablet 3     ULTIMA TEST STRIPS strips USE ONE STRIP TO CHECK GLUCOSE ONCE DAILY DX  E11.9 100 strip 3     [DISCONTINUED] atenolol (TENORMIN) 100 MG tablet TAKE ONE & ONE-HALF TABLETS BY MOUTH ONCE DAILY 135 tablet 2     metoprolol succinate (TOPROL XL) 100 MG 24 hr tablet Take 1 tablet (100 mg total) by mouth daily. 30 tablet 11     [DISCONTINUED] amLODIPine (NORVASC) 10 MG tablet Take 1 tablet (10 mg total) by mouth daily. 90 tablet 2     [DISCONTINUED] ciprofloxacin (CIPRO) 500 MG tablet Take 1 tablet (500 mg total) by mouth 2 (two) times a day. 20 tablet 0     [DISCONTINUED] furosemide (LASIX) 20 MG tablet Take 1 tablet (20 mg total) by mouth daily. 90 tablet 1     [DISCONTINUED] furosemide (LASIX) 20 MG tablet TAKE 1 TABLET(20 MG) BY MOUTH DAILY 90 tablet 1     [DISCONTINUED] lisinopril (PRINIVIL,ZESTRIL) 40 MG tablet Take 1 tablet (40 mg total) by mouth daily. 90 tablet 1     [DISCONTINUED] methotrexate 2.5 MG tablet TAKE 4 TABLETS BY MOUTH 1 TIME EVERY WEEK 32 tablet 0     [DISCONTINUED] probenecid (BENEMID) 500 mg tablet Take 1 tablet (500 mg total) by mouth 2 (two) times a day. 180 tablet 2     [DISCONTINUED] tadalafil (CIALIS) 5 MG tablet Take 5 mg by mouth daily as needed for erectile dysfunction (1 hour before needed).       No current facility-administered medications on file prior to visit.        Review of systems is as stated in HPI.  The remainder of the 10 system review is otherwise negative.    Objective:     /80 (Patient Site: Right Arm, Patient Position: Sitting, Cuff Size: Adult Large)   Pulse (!) 52   Temp 98.5  F (36.9  C) (Oral)   Ht 5' 6.5\" (1.689 m)   Wt 186 lb (84.4 kg)   SpO2 98%   BMI 29.57 kg/m   Body mass index is 29.57 kg/m .    Gen: Alert, NAD, appears stated age, normal hygiene   Psych: no apparent hallucinations or delusions, no pressured speech; alert, oriented x3      This note has been dictated using voice recognition " software. Any grammatical or context distortions are unintentional and inherent to the the software.     Isidra Madera MD

## 2021-06-22 NOTE — PROGRESS NOTES
Assessment:     1. DM2 (diabetes mellitus, type 2) (H)  Glycosylated Hemoglobin A1c   2. Benign Essential Hypertension  Glycosylated Hemoglobin A1c    Electrolyte Profile   3. RLQ abdominal pain  CT Abdomen Without Oral With and Without IV Contrast    Comprehensive Metabolic Panel       Plan:     1. DM2 (diabetes mellitus, type 2) (H)  Updated lab as follows  - Glycosylated Hemoglobin A1c    2. Benign Essential Hypertension  Patient will continue to take amlodipine 10 mg daily, lisinopril 40 mg daily; chlorthalidone 25 mg daily  - Glycosylated Hemoglobin A1c  - Electrolyte Profile    3. RLQ abdominal pain  Patient has right upper quadrant and right flank discomfort  - CT Abdomen Without Oral With and Without IV Contrast; Future  - Comprehensive Metabolic Panel      Subjective:   Patient is being seen for follow-up blood pressure today normal 120/70 should have is on chlorthalidone 25 atenolol 10 lisinopril 40 mg.  We want him to discontinue his Lasix.  He is been on 2 diuretics for the last month or so and has been experiencing cramps.  Prior to that he has had pain in his right upper quadrant right flank area he is concerned about her past medical history diverticulosis but that is on the other side.  Patient will undergo CT examination after appropriate laboratory screening.  Patient is to continue his Amaryl 2 mg daily for his diabetes will check his A1c here today.  A total of 25 minutes was spent going over his medications to make sure were all on the same page and we now are his blood pressure is good I will see him for follow-up 2 months all medical questions that were asked were answered we will follow-up with this abdominal and flank problem after the images are NTammie Joseph understands pleasure to see him again    Review of Systems: A complete 14 point review of systems was obtained and is negative or as stated in the history of present illness.    Past Medical History:   Diagnosis Date     Arteriosclerotic  "Cardiovascular Disease (ASCVD)      Arthritis      Diverticulitis      DM2 (diabetes mellitus, type 2) (H)      Generalized Osteoarthritis Of Multiple Sites      Gout      Hemorrhoids      Hyperlipidemia      Hypertension      Male Erectile Disorder      Family History   Problem Relation Age of Onset     Arthritis Unknown      Rheum arthritis Unknown      Lupus Unknown      Osteoporosis Unknown      Stroke Unknown      Hypertension Unknown      No past surgical history on file.  Social History     Tobacco Use     Smoking status: Former Smoker     Last attempt to quit: 1967     Years since quittin.9     Smokeless tobacco: Never Used   Substance Use Topics     Alcohol use: Yes     Alcohol/week: 5.0 - 6.0 oz     Types: 10 - 12 Standard drinks or equivalent per week     Drug use: Not on file         Objective:   /70   Pulse 96   Ht 5' 6.5\" (1.689 m)   Wt 184 lb (83.5 kg)   BMI 29.25 kg/m      General Appearance:  Alert, cooperative, no distress  Head:  Normocephalic, no obvious abnormality  Ears: TM anatomy normal  Eyes:  PERRL, EOM's intact, conjunctiva and corneas clear  Nose:  Nares symmetrical, septum midline, mucosa pink, no sinus tenderness  Throat:  Lips, tongue, and mucosa are moist, pink, and intact  Neck:  Supple, symmetrical, trachea midline, no adenopathy; thyroid: no enlargement, symmetric,no tenderness/mass/nodules; no carotid bruit, no JVD  Back:  Symmetrical, no curvature, ROM normal, no CVA tenderness  Chest/Breast:  No mass or tenderness  Lungs:  Clear to auscultation bilaterally, respirations unlabored   Heart:  Normal PMI, regular rate & rhythm, S1 and S2 normal, no murmurs, rubs, or gallops  Abdomen: Patient is complaining of pain below the rib cage on the right side in the right lateral flank and in the right upper quadrant no palpable mass however suspect musculoskeletal problem but will do CT to look at soft tissue and retroperitoneal areas  Musculoskeletal:  Tone and strength " strong and symmetrical, all extremities  Lymphatic:  No adenopathy  Skin/Hair/Nails:  Skin warm, dry, and intact, no rashes  Neurologic:  Alert and oriented x3, no cranial nerve deficits, normal strength and tone, gait steady  Extremities:  No edema.  Evi's sign negative.    Genitourinary: deferred  Pulses:  Equal bilaterally           This note has been dictated using voice recognition software. Any grammatical or context distortions are unintentional and inherent to the the software.

## 2021-06-23 NOTE — PROGRESS NOTES
Assessment:     1. Anosmia     2. Benign Essential Hypertension         Plan:     1. Anosmia  Patient developed an upper respiratory infection and sinus infection on 26 December and noticed that his taste and smell disappeared.  He presents now almost 30 days later with no improvement in the symptomatology.  He has had no recent medication changes and already has been on lisinopril for many many years with relatively good control of his blood pressure.  I suspect that the cause of this problem is a viral illness these can be stubborn and hopefully he gets some return but we can expect this to last months instead of weeks.  He should keep up his hydration avoid over-the-counter medications especially any zinc-containing products.  He needs to make sure he has adequate intake of vitamin C.  We will follow-up 2 months referral to ENT after that time if no improvement    2. Benign Essential Hypertension  Medication adjustment time today.  We have reviewed the patient's requirements for lisinopril  amlodipine furosemide and atenolol.  Repeat blood pressure today 148/88 patient sitting not talking 5 minutes      Subjective:   Seeing the patient today who is concerned is for loss of his taste and smell as outlined in the plan above.  This follows a viral illness is now 30 days since onset.  Patient is frustrated we did explain the pathophysiology of some viral illnesses attacking receptors concurrently.  Patient will avoid OTC medications for a nagging rhinorrhea.  Constitutionally he feels well his blood pressure has been bouncing in the past and various alterations have been made to his regimen.  We have reviewed all of this today and updated the problem list so we are all on the same page to include the above agents as mentioned in the plan.  He denies any constitutional complaints shortness of breath dyspnea chest pain or cough no abdominal complaints I will see the patient for follow-up 2 months he is to keep his  hydration adequate and to be patient with this anosmia problem.  ENT referral hinted at but not necessary at this time.    Review of Systems: A complete 14 point review of systems was obtained and is negative or as stated in the history of present illness.    Past Medical History:   Diagnosis Date     Arteriosclerotic Cardiovascular Disease (ASCVD)      Arthritis      Diverticulitis      DM2 (diabetes mellitus, type 2) (H)      Generalized Osteoarthritis Of Multiple Sites      Gout      Hemorrhoids      Hyperlipidemia      Hypertension      Male Erectile Disorder      Family History   Problem Relation Age of Onset     Arthritis Unknown      Rheum arthritis Unknown      Lupus Unknown      Osteoporosis Unknown      Stroke Unknown      Hypertension Unknown      No past surgical history on file.  Social History     Tobacco Use     Smoking status: Former Smoker     Last attempt to quit: 1967     Years since quittin.0     Smokeless tobacco: Never Used   Substance Use Topics     Alcohol use: Yes     Alcohol/week: 5.0 - 6.0 oz     Types: 10 - 12 Standard drinks or equivalent per week     Drug use: Not on file         Objective:   /80   Pulse (!) 56   Wt 188 lb 6.4 oz (85.5 kg)   BMI 29.95 kg/m      General Appearance:  Alert, cooperative, no distress  Head:  Normocephalic, no obvious abnormality  Ears: TM anatomy normal  Eyes:  PERRL, EOM's intact, conjunctiva and corneas clear  Nose:  Nares symmetrical, septum midline, mucosa pink, no sinus tenderness patient has mild rhinorrhea which is clear  Throat:  Lips, tongue, and mucosa are moist, pink, and intact  Neck:  Supple, symmetrical, trachea midline, no adenopathy; thyroid: no enlargement, symmetric,no tenderness/mass/nodules; no carotid bruit, no JVD  Back:  Symmetrical, no curvature, ROM normal, no CVA tenderness  Chest/Breast:  No mass or tenderness  Lungs:  Clear to auscultation bilaterally, respirations unlabored   Heart:  Normal PMI, regular rate &  rhythm, S1 and S2 normal, no murmurs, rubs, or gallops  Abdomen:  Soft, non-tender, bowel sounds active all four quadrants, no mass, or organomegaly  Musculoskeletal:  Tone and strength strong and symmetrical, all extremities  Lymphatic:  No adenopathy  Skin/Hair/Nails:  Skin warm, dry, and intact, no rashes  Neurologic:  Alert and oriented x3, no cranial nerve deficits, normal strength and tone, gait steady  Extremities:  No edema.  Evi's sign negative.    Genitourinary: deferred  Pulses:  Equal bilaterally     I have had an Advance Directives discussion with the patient.      This note has been dictated using voice recognition software. Any grammatical or context distortions are unintentional and inherent to the the software.

## 2021-06-23 NOTE — TELEPHONE ENCOUNTER
Refill Approved    Rx renewed per Medication Renewal Policy. Medication was last renewed on 12/6/18.    Alicia Dinero, [facility] Triage/Med Refill 1/18/2019     Requested Prescriptions   Pending Prescriptions Disp Refills     ULTIMA TEST STRIPS strips [Pharmacy Med Name: REL-ULTIMA TEST STRIPS JOHANNE]  3     Sig: USE ONE STRIP TO CHECK GLUCOSE ONCE DAILY DX  E11.9    Diabetic Supplies Refill Protocol Passed - 1/17/2019  9:04 AM       Passed - Visit with PCP or prescribing provider visit in last 6 months    Last office visit with prescriber/PCP: 12/6/2018 Mike Mcdowell MD OR same dept: 12/6/2018 Mike Mcdowell MD OR same specialty: 12/6/2018 Mike Mcdowell MD  Last physical: 7/13/2018 Last MTM visit: Visit date not found   Next visit within 3 mo: Visit date not found  Next physical within 3 mo: Visit date not found  Prescriber OR PCP: Mike Mcdowell MD  Last diagnosis associated with med order: 1. Diabetes mellitus, type 2 (H)  - ULTIMA TEST STRIPS strips [Pharmacy Med Name: REL-ULTIMA TEST STRIPS JOHANNE]; USE ONE STRIP TO CHECK GLUCOSE ONCE DAILY DX  E11.9; Refill: 3    If protocol passes may refill for 12 months if within 3 months of last provider visit (or a total of 15 months).            Passed - A1C in last 6 months    Hemoglobin A1c   Date Value Ref Range Status   12/06/2018 5.9 3.5 - 6.0 % Final

## 2021-06-24 ENCOUNTER — COMMUNICATION - HEALTHEAST (OUTPATIENT)
Dept: FAMILY MEDICINE | Facility: CLINIC | Age: 81
End: 2021-06-24

## 2021-06-24 DIAGNOSIS — M10.9 GOUT: ICD-10-CM

## 2021-06-25 RX ORDER — PROBENECID 500 MG/1
TABLET, FILM COATED ORAL
Qty: 180 TABLET | Refills: 0 | Status: SHIPPED | OUTPATIENT
Start: 2021-06-25 | End: 2021-11-08

## 2021-06-25 NOTE — TELEPHONE ENCOUNTER
Refill Approved    Rx renewed per Medication Renewal Policy. Medication was last renewed on 09/03/2020.  Last office visit was 09/30/2020 with PCP.    Carla Ortiz, [facility] Triage/Med Refill 5/29/2021     Requested Prescriptions   Pending Prescriptions Disp Refills     simvastatin (ZOCOR) 40 MG tablet [Pharmacy Med Name: Simvastatin 40 MG Oral Tablet] 90 tablet 0     Sig: Take 1 tablet by mouth once daily       Statins Refill Protocol (Hmg CoA Reductase Inhibitors) Passed - 5/29/2021  9:17 AM        Passed - PCP or prescribing provider visit in past 12 months      Last office visit with prescriber/PCP: 9/30/2020 Mike Mcdowell MD OR same dept: 9/30/2020 Mike Mcdowell MD OR same specialty: 9/30/2020 Mike Mcdowell MD  Last physical: 1/22/2020 Last MTM visit: Visit date not found   Next visit within 3 mo: Visit date not found  Next physical within 3 mo: Visit date not found  Prescriber OR PCP: Mike Mcdowell MD  Last diagnosis associated with med order: 1. Hyperlipidemia  - simvastatin (ZOCOR) 40 MG tablet [Pharmacy Med Name: Simvastatin 40 MG Oral Tablet]; Take 1 tablet by mouth once daily  Dispense: 90 tablet; Refill: 0    If protocol passes may refill for 12 months if within 3 months of last provider visit (or a total of 15 months).

## 2021-06-26 NOTE — TELEPHONE ENCOUNTER
RN cannot approve Refill Request    RN can NOT refill this medication Not taking as prescribed. Last office visit: 9/30/2020 Mike Mcdowell MD Last Physical: 1/22/2020 Last MTM visit: Visit date not found Last visit same specialty: 9/30/2020 Mike Mcdowell MD.  Next visit within 3 mo: Visit date not found  Next physical within 3 mo: Visit date not found      Sailaja William, [facility] Triage/Med Refill 6/24/2021    Requested Prescriptions   Pending Prescriptions Disp Refills     probenecid (BENEMID) 500 mg tablet [Pharmacy Med Name: Probenecid 500 MG Oral Tablet] 180 tablet 0     Sig: Take 1 tablet by mouth twice daily       Probenecid Refill Protocol  Passed - 6/23/2021  2:28 PM        Passed - Visit with PCP or prescribing provider visit in past 12 months     Last office visit with prescriber/PCP: 9/30/2020 Mike Mcdowell MD OR same dept: 9/30/2020 Mike Mcdowell MD OR same specialty: 9/30/2020 Mike Mcdowell MD  Last physical: 1/22/2020 Last MTM visit: Visit date not found   Next visit within 3 mo: Visit date not found  Next physical within 3 mo: Visit date not found  Prescriber OR PCP: Mike Mcdowell MD  Last diagnosis associated with med order: 1. Gout  - probenecid (BENEMID) 500 mg tablet [Pharmacy Med Name: Probenecid 500 MG Oral Tablet]; Take 1 tablet by mouth twice daily  Dispense: 180 tablet; Refill: 0    If protocol passes may refill for 12 months if within 3 months of last provider visit (or a total of 15 months).             Passed - Serum Creatinine in past 12 months      Creatinine   Date Value Ref Range Status   09/30/2020 0.88 0.70 - 1.30 mg/dL Final

## 2021-06-27 NOTE — PROGRESS NOTES
Progress Notes by Riley Mandujano DO at 8/7/2019  1:50 PM     Author: Riley Mandujano DO Service: -- Author Type: Physician    Filed: 8/7/2019  2:30 PM Encounter Date: 8/7/2019 Status: Signed    : Riley Mandujano DO (Physician)           Click to link to [facility] HEART CARE NOTE    Thank you, Dr. Mcdowell, for asking the [facility] Care team to see Mr. Jabari Haddad to evaluate for atrial fibrillation.      Assessment/Recommendations   Assessment:    1.  Atrial fibrillation, new onset (dx:July 2019), CHADSVASC: (age;2, HTN-1, DM:1):4.  Lead EKG reviewed from July 22, 2019 confirmed atrial fibrillation  2.  Diabetes mellitus, type II  3.  Hypertension     Plan:  1.  Eliquis 5 mg twice daily.  Discussion with the patient regarding anatomic coagulation in the setting of atrial realization with increased risk for embolic stroke.  Did discuss with him no anticoagulation, aspirin, Eliquis, Xarelto and watchman device.  At this time after discussing the risk and benefits of each of these medications he would prefer to be on Eliquis twice daily.  He states he will be compliant with medication.  He understands that there is not a rapid reversal agent with Eliquis therapy if major bleeding occurs.   2.  Echocardiogram to assess for structural heart disease and valvular heart disease  3.  Patient will follow-up in A. fib clinic in 4 weeks.  If ongoing palpitations will need to consider cardioversion.  If he notes ongoing dyspnea will need exercise nuclear stress test.  4.  Continue aspirin once starting Eliquis  5.  Continue rate control at this time atenolol.  If he requires cardioversion would recommend sotalol therapy       History of Present Illness    Mr. Jabari Haddad is a 78 y.o. male with retention, hyperlipidemia, diabetes mellitus who presents in cardiology clinic at [place] for atrial realization, new onset.    Seen today in consultation for  newly diagnosed atrial fibrillation on July 22, 2019 by Dr. Infante.  According to patient he underwent life screening which demonstrated atrial fibrillation was for back to his primary care provider.  Patient is real past month he is noticed mild palpitation symptoms and mild dyspnea particular when he ablating.  He states that he has not had significant lower extremity edema.  No lightheadedness or syncope.  Denies any anginal chest pain at this time.  He has had no issues with bleeding.  Does note some bruising with aspirin therapy.  His mother and father both had strokes.     No history of cardiac surgery.    EKG was reviewed from Dr. Infante's office.  The demonstrated fibrillation with controlled heart rates.  Heart rates today are controlled.     Physical Examination Review of Systems   Vitals:    08/07/19 1328   BP: 130/80   Pulse: 62   Resp: 16     Body mass index is 29.25 kg/m .  Wt Readings from Last 3 Encounters:   08/07/19 184 lb (83.5 kg)   07/22/19 183 lb (83 kg)   05/21/19 188 lb (85.3 kg)       General Appearance:   no distress, normal body habitus   ENT/Mouth: membranes moist, no oral lesions or bleeding gums.      EYES:  no scleral icterus, normal conjunctivae   Neck: no carotid bruits or thyromegaly   Chest/Lungs:   lungs are clear to auscultation, no rales or wheezing, no sternal scar, equal chest wall expansion    Cardiovascular:   Irregular. Normal first and second heart sounds with no murmurs, rubs, or gallops; the carotid, radial and posterior tibial pulses are intact, Jugular venous pressure normal, no pitting edema bilaterally    Abdomen:  no organomegaly, masses, bruits, or tenderness; bowel sounds are present   Extremities: no cyanosis or clubbing   Skin: no xanthelasma, warm.    Neurologic: normal gait, normal  bilateral, no tremors     Psychiatric: alert and oriented x3, calm     General: WNL  Eyes: WNL  Ears/Nose/Throat: WNL  Lungs: WNL  Heart: Irregular Heartbeat  Stomach:  WNL  Bladder: WNL  Muscle/Joints: WNL  Skin: WNL  Nervous System: WNL  Mental Health: WNL     Blood: WNL     Medical History  Surgical History Family History Social History   Past Medical History:   Diagnosis Date   ? Arteriosclerotic Cardiovascular Disease (ASCVD)    ? Arthritis    ? Diverticulitis    ? DM2 (diabetes mellitus, type 2) (H)    ? Generalized Osteoarthritis Of Multiple Sites    ? Gout    ? Hemorrhoids    ? Hyperlipidemia    ? Hypertension    ? Male Erectile Disorder     No prior cardiac surgeries Family History   Problem Relation Age of Onset   ? Arthritis Other    ? Rheum arthritis Other    ? Lupus Other    ? Osteoporosis Other    ? Stroke Other    ? Hypertension Other     Social History     Socioeconomic History   ? Marital status:      Spouse name: Not on file   ? Number of children: Not on file   ? Years of education: Not on file   ? Highest education level: Not on file   Occupational History   ? Not on file   Social Needs   ? Financial resource strain: Not on file   ? Food insecurity:     Worry: Not on file     Inability: Not on file   ? Transportation needs:     Medical: Not on file     Non-medical: Not on file   Tobacco Use   ? Smoking status: Former Smoker     Last attempt to quit: 1967     Years since quittin.6   ? Smokeless tobacco: Never Used   Substance and Sexual Activity   ? Alcohol use: Yes     Alcohol/week: 5.0 - 6.0 oz     Types: 10 - 12 Standard drinks or equivalent per week   ? Drug use: Not on file   ? Sexual activity: Not on file   Lifestyle   ? Physical activity:     Days per week: Not on file     Minutes per session: Not on file   ? Stress: Not on file   Relationships   ? Social connections:     Talks on phone: Not on file     Gets together: Not on file     Attends [employer]ism service: Not on file     Active member of club or organization: Not on file     Attends meetings of clubs or organizations: Not on file     Relationship status: Not on file   ? Intimate  partner violence:     Fear of current or ex partner: Not on file     Emotionally abused: Not on file     Physically abused: Not on file     Forced sexual activity: Not on file   Other Topics Concern   ? Not on file   Social History Narrative    1/13/15- The patient lives alone.           Medications  Allergies   Current Outpatient Medications   Medication Sig Dispense Refill   ? amLODIPine (NORVASC) 10 MG tablet Take 1 tablet (10 mg total) by mouth daily. 90 tablet 2   ? aspirin 81 MG EC tablet Take 1 tablet (81 mg total) by mouth daily. 150 tablet 2   ? atenolol (TENORMIN) 50 MG tablet Take 1 tablet (50 mg total) by mouth daily. 30 tablet 11   ? blood glucose test (RELION PRIME TEST STRIPS) strips Use 1 each As Directed daily. Dispense brand per patient's insurance at pharmacy discretion. 100 strip 3   ? escitalopram oxalate (LEXAPRO) 20 MG tablet TK 1 T PO QAM  3   ? ezetimibe (ZETIA) 10 mg tablet Take 1 tablet (10 mg total) by mouth daily. 90 tablet 3   ? furosemide (LASIX) 20 MG tablet TAKE 1 TABLET BY MOUTH ONCE DAILY 90 tablet 1   ? furosemide (LASIX) 20 MG tablet Take 1 tablet (20 mg total) by mouth daily. 90 tablet 0   ? glimepiride (AMARYL) 2 MG tablet TAKE ONE TABLET BY MOUTH ONCE DAILY IN THE MORNING BEFORE BREAKFAST 90 tablet 3   ? lisinopril (PRINIVIL,ZESTRIL) 40 MG tablet Take 1 tablet (40 mg total) by mouth daily. 90 tablet 3   ? omeprazole (PRILOSEC) 40 MG capsule TAKE ONE CAPSULE BY MOUTH 2 TIMES DAILY 180 capsule 2   ? probenecid (BENEMID) 500 mg tablet Take 1 tablet (500 mg total) by mouth 2 (two) times a day. 180 tablet 3   ? simvastatin (ZOCOR) 40 MG tablet TAKE 1 TABLET BY MOUTH ONCE DAILY 90 tablet 2   ? traZODone (DESYREL) 50 MG tablet TAKE 1 TO 2 TABLETS BY MOUTH ONCE DAILY AT BEDTIME 90 tablet 3   ? traZODone (DESYREL) 50 MG tablet TK 1 TO 2 TS PO HS 30 tablet 3   ? ULTIMA TEST STRIPS strips USE ONE STRIP TO CHECK GLUCOSE ONCE DAILY DX  E11.9 100 strip 3     No current facility-administered  medications for this visit.       No Known Allergies      Lab Results    Chemistry/lipid CBC Cardiac Enzymes/BNP/TSH/INR   Lab Results   Component Value Date    CHOL 168 07/13/2018    HDL 64 07/13/2018    LDLCALC 84 07/13/2018    TRIG 99 07/13/2018    CREATININE 0.96 07/22/2019    BUN 13 07/22/2019    K 4.6 07/22/2019     07/22/2019     07/22/2019    CO2 26 07/22/2019    Lab Results   Component Value Date    WBC 8.0 07/13/2018    HGB 15.5 07/13/2018    HCT 45.1 07/13/2018    MCV 99 07/13/2018     07/13/2018    Lab Results   Component Value Date    TSH 1.40 10/11/2017

## 2021-06-28 NOTE — PROGRESS NOTES
Progress Notes by Gi Soto CNP at 9/12/2019  2:50 PM     Author: Gi Soto CNP Service: -- Author Type: Nurse Practitioner    Filed: 9/12/2019  4:52 PM Encounter Date: 9/12/2019 Status: Signed    : Gi Soto CNP (Nurse Practitioner)          Click to link to [facility]     [facility] ELECTROPHYSIOLOGY NOTE      Assessment/Recommendations   Assessment/Plan:    Diagnoses and all orders for this visit:    Persistent atrial fibrillation (H) and discussed general incidence in the population.  I discussed natural progression with atrial fib and rate versus rhythm control decided on symptoms.  He denies any symptoms.  I discussed that if he does not do trial of sinus now he will likely continue to be in A. fib.  I reviewed studies with chronic A. fib which showed no difference in longevity or cardiac events as long as good rate control.  I recommended 24-hour Holter on an active day and to do at least a few hours of yard activity during the time he is wearing it.  Adamant not symptomatic and declined trial of sinus.  I briefly discussed DCCV and will call me within the next month if he wants to do trial of sinus.  -     Holter monitor - 24 hour; Future; Expected date: 09/12/2019    Benign Essential Hypertension and well-controlled.    Hyperlipidemia LDL goal <100 and lipids appear at goal.  Would recommend discontinuation of Zetia but will defer to Dr. Mcdowell to decide this.    [id number] score of 4 and on Eliquis without bleeding problems and affordable for him.  Follow up in cardiology clinic as needed with Dr. Mandujano or myself.     History of Present Illness    Mr. Jabari Haddad is a very pleasant 78 y.o. male who comes in today for EP follow-up regarding newly diagnosed atrial fib.  Jabari Haddad has a known history of type 2 diabetes, hypertension and hyperlipidemia.  He tells me he has been told a number of times in his adult life that he has a  slightly irregular heart rhythm.  On physical exam it was difficult to tell initially if he was in A. fib or having more frequent early beats.  However, I had him walk fast several times back and forth in the room and then on physical exam, it was obvious that he was in A. fib.  On close questioning, Jabari reports he does not do any exercise or strenuous work.  He does yard work and is doing little things around the house much of the day.  He goes up and down stairs frequently.  He is adamant that he has not noticed any change in energy level or tolerance of activity.  He denies any cardiac symptoms today.    Cardiographics (personally reviewed):  Results for orders placed during the hospital encounter of 08/16/19   Echo Complete [ECH10] 08/16/2019    Narrative   Left ventricle ejection fraction is normal. The calculated left   ventricular ejection fraction is 60%.    Normal left ventricular size and systolic function.    Right Ventricle: The right ventricle is mildly dilated. The systolic   function is mildly reduced. TAPSE is abnormal, which is consistent with   abnormal right ventricular systolic function.    Mild biatrial enlargement.    Mild mitral regurgitation.    The aortic root is mildly dilated.        August 2019 cardiac MR shows moderate left atrial enlargement.  Normal thoracic aortic size.  Normal RV and LV size and function.     Problem List:  Patient Active Problem List   Diagnosis   ? Gout   ? Hemorrhoids   ? Esophageal Reflux   ? Osteoarthritis Of The Wrist   ? DM2 (diabetes mellitus, type 2) (H)   ? Hyperlipidemia LDL goal <100   ? Benign Essential Hypertension   ? Arteriosclerotic Cardiovascular Disease (ASCVD)   ? Diverticulitis Of Colon   ? Lethargy   ? Itching Of The Ears   ? Abdominal Pain In The Right Lower Belly (RLQ)   ? Male Erectile Disorder   ? Exposed To Dusts - Asbestos   ? Chronic Gout   ? Hyperkalemia   ? Pharyngitis   ? Generalized Osteoarthritis Of Multiple Sites   ? Pseudogout    ? Symmetric Polyarticular Inflammation   ? Pseudogout   ? Inflammatory arthritis   ? Paroxysmal atrial fibrillation (H)       Physical Examination Review of Systems   Vitals:    09/12/19 1458   BP: 136/76   Pulse: 77   Resp: 16     Body mass index is 28.62 kg/m .  Wt Readings from Last 3 Encounters:   09/12/19 180 lb (81.6 kg)   08/16/19 184 lb (83.5 kg)   08/07/19 184 lb (83.5 kg)     General Appearance:   Alert, well-appearing and in no acute distress.   HEENT: Atraumatic, normocephalic.  No scleral icterus, normal conjunctivae; mucous membranes pink and moist.     Chest: Chest symmetric, spine straight.   Lungs:   Respirations unlabored: Lungs are clear to auscultation.   Cardiovascular:   Normal first and second heart sounds with no murmurs, rubs, or gallops.  Irregular, irregular.  Radial and posterior tibial pulses are intact.  Normal JVD, no edema.       Extremities: No cyanosis or clubbing   Musculoskeletal: Moves all extremities   Skin: Warm, dry, intact.    Neurologic: Mood and affect are appropriate, alert and oriented to person, place, time, and situation    General: WNL  Eyes: WNL  Ears/Nose/Throat: WNL  Lungs: WNL  Heart: Irregular Heartbeat  Stomach: WNL  Bladder: WNL  Muscle/Joints: WNL  Skin: WNL  Nervous System: WNL  Mental Health: WNL     Blood: WNL       Medical History  Surgical History Family History Social History   Past Medical History:   Diagnosis Date   ? Arteriosclerotic Cardiovascular Disease (ASCVD)    ? Arthritis    ? Diverticulitis    ? DM2 (diabetes mellitus, type 2) (H)    ? Generalized Osteoarthritis Of Multiple Sites    ? Gout    ? Hemorrhoids    ? Hyperlipidemia    ? Hypertension    ? Male Erectile Disorder     No past surgical history on file. Family History   Problem Relation Age of Onset   ? Arthritis Other    ? Rheum arthritis Other    ? Lupus Other    ? Osteoporosis Other    ? Stroke Other    ? Hypertension Other     Social History     Tobacco Use   Smoking Status Former  Smoker   ? Last attempt to quit: 1967   ? Years since quittin.7   Smokeless Tobacco Never Used     Social History     Substance and Sexual Activity   Alcohol Use Yes   ? Alcohol/week: 5.0 - 6.0 oz   ? Types: 10 - 12 Standard drinks or equivalent per week          Medications  Allergies   Current Outpatient Medications   Medication Sig Dispense Refill   ? amLODIPine (NORVASC) 10 MG tablet Take 1 tablet (10 mg total) by mouth daily. 90 tablet 2   ? apixaban (ELIQUIS) 5 mg Tab tablet Take 1 tablet (5 mg total) by mouth 2 (two) times a day. 60 tablet 11   ? atenolol (TENORMIN) 50 MG tablet Take 1 tablet (50 mg total) by mouth daily. 30 tablet 11   ? blood glucose test (RELION PRIME TEST STRIPS) strips Use 1 each As Directed daily. Dispense brand per patient's insurance at pharmacy discretion. 100 strip 3   ? escitalopram oxalate (LEXAPRO) 20 MG tablet TK 1 T PO QAM  3   ? ezetimibe (ZETIA) 10 mg tablet Take 1 tablet (10 mg total) by mouth daily. 90 tablet 3   ? furosemide (LASIX) 20 MG tablet TAKE 1 TABLET BY MOUTH ONCE DAILY 90 tablet 1   ? furosemide (LASIX) 20 MG tablet Take 1 tablet (20 mg total) by mouth daily. 90 tablet 0   ? glimepiride (AMARYL) 2 MG tablet TAKE ONE TABLET BY MOUTH ONCE DAILY IN THE MORNING BEFORE BREAKFAST 90 tablet 3   ? lisinopril (PRINIVIL,ZESTRIL) 40 MG tablet Take 1 tablet (40 mg total) by mouth daily. 90 tablet 3   ? omeprazole (PRILOSEC) 40 MG capsule TAKE ONE CAPSULE BY MOUTH 2 TIMES DAILY 180 capsule 2   ? probenecid (BENEMID) 500 mg tablet Take 1 tablet (500 mg total) by mouth 2 (two) times a day. 180 tablet 3   ? simvastatin (ZOCOR) 40 MG tablet TAKE 1 TABLET BY MOUTH ONCE DAILY 90 tablet 2   ? traZODone (DESYREL) 50 MG tablet TAKE 1 TO 2 TABLETS BY MOUTH ONCE DAILY AT BEDTIME 90 tablet 3   ? traZODone (DESYREL) 50 MG tablet TK 1 TO 2 TS PO HS 30 tablet 3   ? ULTIMA TEST STRIPS strips USE ONE STRIP TO CHECK GLUCOSE ONCE DAILY DX  E11.9 100 strip 3     No current  facility-administered medications for this visit.       No Known Allergies   Medical, surgical, family, social history, and medications were all reviewed and updated as necessary.   Lab Results    Chemistry CBC/INR CHOLESTROL   Lab Results   Component Value Date    CREATININE 0.96 07/22/2019    BUN 13 07/22/2019     07/22/2019    K 4.6 07/22/2019     07/22/2019    CO2 26 07/22/2019     Creatinine (mg/dL)   Date Value   07/22/2019 0.96   12/06/2018 1.03   07/13/2018 0.98   10/11/2017 1.02     No results found for: BNP Lab Results   Component Value Date    WBC 8.0 07/13/2018    HGB 15.5 07/13/2018    HCT 45.1 07/13/2018    MCV 99 07/13/2018     07/13/2018     No results found for: INR   Lab Results   Component Value Date    CHOL 168 07/13/2018    HDL 64 07/13/2018    LDLCALC 84 07/13/2018    TRIG 99 07/13/2018          Total Time- 45 minutes with greater than 50% spent talking to patient regarding patient's relevant diagnoses.  This note has been dictated using voice recognition software. Any grammatical, typographical, or context distortions are unintentional and inherent to the software.    Gi Soto RN,  [facility]   Electrophysiology  [phone]

## 2021-07-03 NOTE — ADDENDUM NOTE
Addendum Note by Debi Post CMA at 8/31/2020  2:28 PM     Author: Debi Post CMA Service: -- Author Type: Certified Medical Assistant    Filed: 8/31/2020  2:28 PM Encounter Date: 8/30/2020 Status: Signed    : Debi Post CMA (Certified Medical Assistant)    Addended by: DEBI POST on: 8/31/2020 02:28 PM        Modules accepted: Orders

## 2021-07-03 NOTE — ADDENDUM NOTE
Addendum Note by Jannette Melendez CMA at 1/22/2020  9:40 AM     Author: Jannette Melendez CMA Service: -- Author Type: Certified Medical Assistant    Filed: 1/23/2020  2:10 PM Encounter Date: 1/22/2020 Status: Signed    : Jannette Melendez CMA (Certified Medical Assistant)    Addended by: JANNETTE MELENDEZ on: 1/23/2020 02:10 PM        Modules accepted: Orders

## 2021-07-03 NOTE — ADDENDUM NOTE
Addendum Note by Lisa Mack at 9/30/2020 11:20 AM     Author: Lisa Mack Service: -- Author Type:     Filed: 9/30/2020 12:20 PM Encounter Date: 9/30/2020 Status: Signed    : Lisa Mack ()    Addended by: LISA MACK on: 9/30/2020 12:20 PM        Modules accepted: Orders

## 2021-07-03 NOTE — ADDENDUM NOTE
Addendum Note by Radha Koch LPN at 12/6/2018 11:20 AM     Author: Radha Koch LPN Service: -- Author Type: Licensed Nurse    Filed: 12/6/2018 12:41 PM Encounter Date: 12/6/2018 Status: Signed    : Radha oKch LPN (Licensed Nurse)    Addended by: RADHA KOCH on: 12/6/2018 12:41 PM        Modules accepted: Orders

## 2021-07-03 NOTE — ADDENDUM NOTE
Addendum Note by Debi Post CMA at 9/28/2018  9:20 AM     Author: Debi Post CMA Service: -- Author Type: Certified Medical Assistant    Filed: 9/28/2018  9:20 AM Encounter Date: 9/27/2018 Status: Signed    : Debi Post CMA (Certified Medical Assistant)    Addended by: DEBI POST on: 9/28/2018 09:20 AM        Modules accepted: Orders

## 2021-07-23 ENCOUNTER — MYC MEDICAL ADVICE (OUTPATIENT)
Dept: FAMILY MEDICINE | Facility: CLINIC | Age: 81
End: 2021-07-23

## 2021-07-23 DIAGNOSIS — E11.9 DM TYPE 2 (DIABETES MELLITUS, TYPE 2) (H): ICD-10-CM

## 2021-07-23 DIAGNOSIS — E11.69 TYPE 2 DIABETES MELLITUS WITH OTHER SPECIFIED COMPLICATION, WITHOUT LONG-TERM CURRENT USE OF INSULIN (H): Primary | ICD-10-CM

## 2021-07-26 RX ORDER — GLIMEPIRIDE 2 MG/1
TABLET ORAL
Qty: 90 TABLET | Refills: 0 | Status: SHIPPED | OUTPATIENT
Start: 2021-07-26 | End: 2021-10-19

## 2021-08-04 DIAGNOSIS — I10 BENIGN ESSENTIAL HYPERTENSION: Primary | ICD-10-CM

## 2021-08-04 RX ORDER — ATENOLOL 50 MG/1
TABLET ORAL
Qty: 90 TABLET | Refills: 0 | Status: SHIPPED | OUTPATIENT
Start: 2021-08-04 | End: 2022-03-10

## 2021-08-05 DIAGNOSIS — I10 ESSENTIAL HYPERTENSION, BENIGN: ICD-10-CM

## 2021-08-05 RX ORDER — ATENOLOL 50 MG/1
TABLET ORAL
Qty: 90 TABLET | Refills: 3 | Status: SHIPPED | OUTPATIENT
Start: 2021-08-05 | End: 2022-03-10

## 2021-09-04 DIAGNOSIS — I48.91 NEW ONSET ATRIAL FIBRILLATION (H): ICD-10-CM

## 2021-09-05 NOTE — TELEPHONE ENCOUNTER
Routing refill request to provider for review/approval because:  Anticoag protocol - route to provider    Last Written Prescription Date:  9/3/20  Last Fill Quantity: 180,  # refills: 3   Last office visit provider:  9/30/20     Requested Prescriptions   Pending Prescriptions Disp Refills     ELIQUIS ANTICOAGULANT 5 MG tablet [Pharmacy Med Name: Eliquis 5 MG Oral Tablet] 60 tablet 0     Sig: Take 1 tablet by mouth twice daily       Direct Oral Anticoagulant Agents Failed - 9/4/2021  9:36 AM        Failed - Recent (6 mo) or future (30 days) visit within the authorizing provider's specialty        Passed - Normal Platelets on file in past 12 months     Recent Labs   Lab Test 09/30/20  1209                  Passed - Medication is active on med list        Passed - Patient is 18-79 years of age        Passed - Serum creatinine less than or equal to 1.4 on file in past 12 mos     Recent Labs   Lab Test 09/30/20  1209   CR 0.88       Ok to refill medication if creatinine is low          Passed - Weight is greater than 60 kg for the past year     Wt Readings from Last 3 Encounters:   09/30/20 82.5 kg (181 lb 14.4 oz)   01/22/20 80.2 kg (176 lb 12.8 oz)   09/12/19 81.6 kg (180 lb)                  Jose Luis Wright RN 09/05/21 8:04 AM

## 2021-09-07 RX ORDER — APIXABAN 5 MG/1
TABLET, FILM COATED ORAL
Qty: 60 TABLET | Refills: 0 | Status: SHIPPED | OUTPATIENT
Start: 2021-09-07 | End: 2021-10-04

## 2021-09-11 ENCOUNTER — HEALTH MAINTENANCE LETTER (OUTPATIENT)
Age: 81
End: 2021-09-11

## 2021-09-28 DIAGNOSIS — E78.5 HYPERLIPIDEMIA: ICD-10-CM

## 2021-09-28 DIAGNOSIS — E78.00 HYPERCHOLESTEREMIA: Primary | ICD-10-CM

## 2021-09-29 NOTE — TELEPHONE ENCOUNTER
"Routing refill request to provider for review/approval because:  Labs out of range:  Lipid panel, ALT    Last Written Prescription Date:  10/5/2020  Last Fill Quantity: 90,  # refills: 3   Last office visit provider:  9/30/2020     Requested Prescriptions   Pending Prescriptions Disp Refills     ezetimibe (ZETIA) 10 MG tablet [Pharmacy Med Name: Ezetimibe 10 MG Oral Tablet] 90 tablet 0     Sig: Take 1 tablet by mouth once daily       Antihyperlipidemic agents Failed - 9/28/2021  9:56 AM        Failed - Lipid panel on file in past 12 mos     Recent Labs   Lab Test 01/22/20  1028   CHOL 124   TRIG 59   HDL 66   LDL 46               Failed - Normal serum ALT on record in past 12 mos     Recent Labs   Lab Test 09/30/20  1209   *             Passed - Recent (12 mo) or future (30 days) visit within the authorizing provider's specialty     Patient has had an office visit with the authorizing provider or a provider within the authorizing providers department within the previous 12 mos or has a future within next 30 days. See \"Patient Info\" tab in inbasket, or \"Choose Columns\" in Meds & Orders section of the refill encounter.              Passed - Medication is active on med list        Passed - Patient is age 18 years or older             Nabor Dupree RN 09/29/21 3:17 PM  "

## 2021-09-30 RX ORDER — EZETIMIBE 10 MG/1
TABLET ORAL
Qty: 90 TABLET | Refills: 0 | Status: SHIPPED | OUTPATIENT
Start: 2021-09-30 | End: 2021-12-30

## 2021-10-03 DIAGNOSIS — I48.91 NEW ONSET ATRIAL FIBRILLATION (H): ICD-10-CM

## 2021-10-03 NOTE — TELEPHONE ENCOUNTER
Routing refill request to provider for review/approval because:  Drug not on the [facility] refill protocol   Patient needs to be seen because it has been more than 1 year since last office visit.    Last Written Prescription Date:  9/7/2021  Last Fill Quantity: 60,  # refills: 0   Last office visit provider:  9/30/2020 Dr. Mcdowell     Requested Prescriptions   Pending Prescriptions Disp Refills     ELIQUIS ANTICOAGULANT 5 MG tablet [Pharmacy Med Name: Eliquis 5 MG Oral Tablet] 60 tablet 0     Sig: Take 1 tablet by mouth twice daily       Direct Oral Anticoagulant Agents Failed - 10/3/2021  8:13 AM        Failed - Normal Platelets on file in past 12 months     Recent Labs   Lab Test 09/30/20  1209                  Failed - Serum creatinine less than or equal to 1.4 on file in past 12 mos     Recent Labs   Lab Test 09/30/20  1209   CR 0.88       Ok to refill medication if creatinine is low          Failed - Weight is greater than 60 kg for the past year     Wt Readings from Last 3 Encounters:   09/30/20 82.5 kg (181 lb 14.4 oz)   01/22/20 80.2 kg (176 lb 12.8 oz)   09/12/19 81.6 kg (180 lb)             Failed - Recent (6 mo) or future (30 days) visit within the authorizing provider's specialty        Passed - Medication is active on med list        Passed - Patient is 18-79 years of age             Cate Butler RN 10/03/21 3:34 PM

## 2021-10-04 RX ORDER — APIXABAN 5 MG/1
TABLET, FILM COATED ORAL
Qty: 60 TABLET | Refills: 0 | Status: SHIPPED | OUTPATIENT
Start: 2021-10-04 | End: 2021-11-03

## 2021-10-11 DIAGNOSIS — I10 BENIGN ESSENTIAL HYPERTENSION: Primary | ICD-10-CM

## 2021-10-12 RX ORDER — FUROSEMIDE 20 MG
TABLET ORAL
Qty: 90 TABLET | Refills: 0 | Status: SHIPPED | OUTPATIENT
Start: 2021-10-12 | End: 2022-01-12

## 2021-10-12 NOTE — TELEPHONE ENCOUNTER
"Routing refill request to provider for review/approval because:  A break in medication  Patient needs to be seen because it has been more than 1 year since last office visit.  Labs not current: NA, Potassium, CR  Blood pressure    Last Written Prescription Date:  4/16/2021  Last Fill Quantity: 90,  # refills: 1   Last office visit provider:  9/30/2020     Requested Prescriptions   Pending Prescriptions Disp Refills     furosemide (LASIX) 20 MG tablet [Pharmacy Med Name: Furosemide 20 MG Oral Tablet] 90 tablet 0     Sig: Take 1 tablet by mouth once daily       Diuretics (Including Combos) Protocol Failed - 10/11/2021  8:30 AM        Failed - Blood pressure under 140/90 in past 12 months     BP Readings from Last 3 Encounters:   09/30/20 (!) 150/80   01/22/20 126/86   09/12/19 136/76                 Failed - Recent (12 mo) or future (30 days) visit within the authorizing provider's specialty     Patient has had an office visit with the authorizing provider or a provider within the authorizing providers department within the previous 12 mos or has a future within next 30 days. See \"Patient Info\" tab in inbasket, or \"Choose Columns\" in Meds & Orders section of the refill encounter.              Failed - Normal serum creatinine on file in past 12 months     Recent Labs   Lab Test 09/30/20  1209   CR 0.88              Failed - Normal serum potassium on file in past 12 months     Recent Labs   Lab Test 09/30/20  1209   POTASSIUM 4.4                    Failed - Normal serum sodium on file in past 12 months     Recent Labs   Lab Test 09/30/20  1209                 Passed - Medication is active on med list        Passed - Patient is age 18 or older             Felipa Hinojosa RN 10/11/21 11:11 PM  "

## 2021-10-18 DIAGNOSIS — E11.69 TYPE 2 DIABETES MELLITUS WITH OTHER SPECIFIED COMPLICATION, WITHOUT LONG-TERM CURRENT USE OF INSULIN (H): ICD-10-CM

## 2021-10-19 RX ORDER — GLIMEPIRIDE 2 MG/1
TABLET ORAL
Qty: 90 TABLET | Refills: 0 | Status: SHIPPED | OUTPATIENT
Start: 2021-10-19 | End: 2022-01-17

## 2021-10-19 NOTE — TELEPHONE ENCOUNTER
"Routing refill request to provider for review/approval because:  Labs not current:  Multiple   Patient needs to be seen because it has been more than 1 year since last office visit.    Last Written Prescription Date:  7/26/21  Last Fill Quantity: 90,  # refills: 0   Last office visit provider:  9/30/20     Requested Prescriptions   Pending Prescriptions Disp Refills     glimepiride (AMARYL) 2 MG tablet [Pharmacy Med Name: Glimepiride 2 MG Oral Tablet] 90 tablet 0     Sig: TAKE 1 TABLET BY MOUTH IN THE MORNING BEFORE BREAKFAST       Sulfonylurea Agents Failed - 10/18/2021  9:25 AM        Failed - Patient has documented A1c within the specified period of time.     If HgbA1C is 8 or greater, it needs to be on file within the past 3 months.  If less than 8, must be on file within the past 6 months.     Recent Labs   Lab Test 09/30/20  1209   A1C 5.9*             Failed - Patient has a recent creatinine (normal) within the past 12 mos.     Recent Labs   Lab Test 09/30/20  1209   CR 0.88       Ok to refill medication if creatinine is low          Failed - Recent (6 mo) or future (30 days) visit within the authorizing provider's specialty     Patient had office visit in the last 6 months or has a visit in the next 30 days with authorizing provider or within the authorizing provider's specialty.  See \"Patient Info\" tab in inbasket, or \"Choose Columns\" in Meds & Orders section of the refill encounter.            Passed - Medication is active on med list        Passed - Patient is age 18 or older             Jose Luis Wright RN 10/19/21 9:22 AM  "

## 2021-10-22 DIAGNOSIS — I10 ESSENTIAL HYPERTENSION, BENIGN: Primary | ICD-10-CM

## 2021-10-22 DIAGNOSIS — I25.10 CARDIOVASCULAR DISEASE: ICD-10-CM

## 2021-10-24 NOTE — TELEPHONE ENCOUNTER
"Routing refill request to provider for review/approval because:  Labs not current:  Creatinine  Patient needs to be seen because it has been more than 1 year since last office visit.  Blood pressure not current    Last Written Prescription Date:  1/25/2021  Last Fill Quantity: 90,  # refills: 2   Last office visit provider:  9/30/2020- Dr Mcdowell    amLODIPine (NORVASC) 10 MG tablet 90 tablet 2 1/25/2021  No   Sig: Take 1 tablet by mouth once daily   Sent to pharmacy as: amLODIPine 10 mg tablet (NORVASC)   E-Prescribing Status: Receipt confirmed by pharmacy (1/25/2021 10:02 AM CST     Requested Prescriptions   Pending Prescriptions Disp Refills     amLODIPine (NORVASC) 10 MG tablet [Pharmacy Med Name: amLODIPine Besylate 10 MG Oral Tablet] 90 tablet 0     Sig: Take 1 tablet by mouth once daily       Calcium Channel Blockers Protocol  Failed - 10/22/2021  9:39 AM        Failed - Blood pressure under 140/90 in past 12 months     BP Readings from Last 3 Encounters:   09/30/20 (!) 150/80   01/22/20 126/86   09/12/19 136/76                 Failed - Recent (12 mo) or future (30 days) visit within the authorizing provider's specialty     Patient has had an office visit with the authorizing provider or a provider within the authorizing providers department within the previous 12 mos or has a future within next 30 days. See \"Patient Info\" tab in inbasket, or \"Choose Columns\" in Meds & Orders section of the refill encounter.              Failed - Normal serum creatinine on file in past 12 months     Recent Labs   Lab Test 09/30/20  1209   CR 0.88       Ok to refill medication if creatinine is low          Passed - Medication is active on med list        Passed - Patient is age 18 or older             Cordelia Bui RN 10/24/21 12:08 AM  "

## 2021-10-25 RX ORDER — AMLODIPINE BESYLATE 10 MG/1
TABLET ORAL
Qty: 90 TABLET | Refills: 0 | Status: SHIPPED | OUTPATIENT
Start: 2021-10-25 | End: 2022-01-17

## 2021-11-01 DIAGNOSIS — I48.91 NEW ONSET ATRIAL FIBRILLATION (H): ICD-10-CM

## 2021-11-02 NOTE — TELEPHONE ENCOUNTER
Routing refill request to provider for review/approval because:  Labs not current:  Plts  Anticoagulation protocol - route to provider.  Patient needs to be seen because it has been more than 1 year since last office visit.    Last Written Prescription Date:  10/4/21  Last Fill Quantity: 60,  # refills: 0   Last office visit provider:  9/30/20     Requested Prescriptions   Pending Prescriptions Disp Refills     ELIQUIS ANTICOAGULANT 5 MG tablet [Pharmacy Med Name: Eliquis 5 MG Oral Tablet] 60 tablet 0     Sig: Take 1 tablet by mouth twice daily       Direct Oral Anticoagulant Agents Failed - 11/1/2021 10:51 AM        Failed - Normal Platelets on file in past 12 months     Recent Labs   Lab Test 09/30/20  1209                  Failed - Serum creatinine less than or equal to 1.4 on file in past 12 mos     Recent Labs   Lab Test 09/30/20  1209   CR 0.88       Ok to refill medication if creatinine is low          Failed - Weight is greater than 60 kg for the past year     Wt Readings from Last 3 Encounters:   09/30/20 82.5 kg (181 lb 14.4 oz)   01/22/20 80.2 kg (176 lb 12.8 oz)   09/12/19 81.6 kg (180 lb)             Failed - Recent (6 mo) or future (30 days) visit within the authorizing provider's specialty        Passed - Medication is active on med list        Passed - Patient is 18-79 years of age             Jose Luis Wright RN 11/02/21 12:56 PM

## 2021-11-03 RX ORDER — APIXABAN 5 MG/1
TABLET, FILM COATED ORAL
Qty: 60 TABLET | Refills: 0 | Status: SHIPPED | OUTPATIENT
Start: 2021-11-03 | End: 2021-12-01

## 2021-11-07 DIAGNOSIS — K21.9 GERD (GASTROESOPHAGEAL REFLUX DISEASE): ICD-10-CM

## 2021-11-09 NOTE — TELEPHONE ENCOUNTER
"Routing refill request to provider for review/approval because:  Patient needs to be seen because it has been more than 1 year since last office visit.    Last Written Prescription Date:  1/11/21  Last Fill Quantity: 180,  # refills: 2   Last office visit provider:  9/30/20     Requested Prescriptions   Pending Prescriptions Disp Refills     omeprazole (PRILOSEC) 40 MG DR capsule [Pharmacy Med Name: Omeprazole 40 MG Oral Capsule Delayed Release] 180 capsule 0     Sig: Take 1 capsule by mouth twice daily       PPI Protocol Failed - 11/7/2021 10:53 AM        Failed - Recent (12 mo) or future (30 days) visit within the authorizing provider's specialty     Patient has had an office visit with the authorizing provider or a provider within the authorizing providers department within the previous 12 mos or has a future within next 30 days. See \"Patient Info\" tab in inbasket, or \"Choose Columns\" in Meds & Orders section of the refill encounter.              Passed - Not on Clopidogrel (unless Pantoprazole ordered)        Passed - No diagnosis of osteoporosis on record        Passed - Medication is active on med list        Passed - Patient is age 18 or older             Nicki Kent RN 11/09/21 10:36 AM  "

## 2021-11-10 RX ORDER — OMEPRAZOLE 40 MG/1
CAPSULE, DELAYED RELEASE ORAL
Qty: 180 CAPSULE | Refills: 0 | Status: SHIPPED | OUTPATIENT
Start: 2021-11-10 | End: 2022-02-04

## 2021-11-24 DIAGNOSIS — I10 HYPERTENSION: ICD-10-CM

## 2021-11-26 DIAGNOSIS — E78.00 HYPERCHOLESTEREMIA: Primary | ICD-10-CM

## 2021-11-26 RX ORDER — LISINOPRIL 40 MG/1
TABLET ORAL
Qty: 90 TABLET | Refills: 0 | Status: SHIPPED | OUTPATIENT
Start: 2021-11-26 | End: 2022-02-22

## 2021-11-26 NOTE — TELEPHONE ENCOUNTER
"Routing refill request to provider for review/approval because:  Labs not current:  No creatinine or potassium on file in over 12 months.  Patient needs to be seen because it has been more than 1 year since last office visit.  No blood pressure on file in over 1 year.    Last Written Prescription Date:  08/31/2020  Last Fill Quantity: 90,  # refills: 3   Last office visit provider:  09/30/2020 with Dr Mcdowell.    Requested Prescriptions   Pending Prescriptions Disp Refills     lisinopril (ZESTRIL) 40 MG tablet [Pharmacy Med Name: LISINOPRIL 40MG     TAB] 90 tablet 0     Sig: Take 1 tablet by mouth once daily       ACE Inhibitors (Including Combos) Protocol Failed - 11/24/2021 10:53 AM        Failed - Blood pressure under 140/90 in past 12 months     BP Readings from Last 3 Encounters:   09/30/20 (!) 150/80   01/22/20 126/86   09/12/19 136/76                 Failed - Recent (12 mo) or future (30 days) visit within the authorizing provider's specialty     Patient has had an office visit with the authorizing provider or a provider within the authorizing providers department within the previous 12 mos or has a future within next 30 days. See \"Patient Info\" tab in inbasket, or \"Choose Columns\" in Meds & Orders section of the refill encounter.              Failed - Normal serum creatinine on file in past 12 months     Recent Labs   Lab Test 09/30/20  1209   CR 0.88       Ok to refill medication if creatinine is low          Failed - Normal serum potassium on file in past 12 months     Recent Labs   Lab Test 09/30/20  1209   POTASSIUM 4.4             Passed - Medication is active on med list        Passed - Patient is age 18 or older             Carla Ortiz 11/25/21 11:16 PM  "

## 2021-11-27 NOTE — TELEPHONE ENCOUNTER
"Routing refill request to provider for review/approval because:  Labs not current:  LDL  Patient needs to be seen because it has been more than 1 year since last office visit.    Last Written Prescription Date:  5/29/21  Last Fill Quantity: 90,  # refills: 1   Last office visit provider:  9/30/20     Requested Prescriptions   Pending Prescriptions Disp Refills     simvastatin (ZOCOR) 40 MG tablet [Pharmacy Med Name: Simvastatin 40 MG Oral Tablet] 90 tablet 0     Sig: Take 1 tablet by mouth once daily       Statins Protocol Failed - 11/26/2021 10:58 AM        Failed - LDL on file in past 12 months     Recent Labs   Lab Test 01/22/20  1028   LDL 46             Failed - Recent (12 mo) or future (30 days) visit within the authorizing provider's specialty     Patient has had an office visit with the authorizing provider or a provider within the authorizing providers department within the previous 12 mos or has a future within next 30 days. See \"Patient Info\" tab in inbasket, or \"Choose Columns\" in Meds & Orders section of the refill encounter.              Passed - No abnormal creatine kinase in past 12 months     No lab results found.             Passed - Medication is active on med list        Passed - Patient is age 18 or older             tiffanie wallis RN 11/27/21 5:59 AM  "

## 2021-11-28 RX ORDER — SIMVASTATIN 40 MG
TABLET ORAL
Qty: 90 TABLET | Refills: 0 | Status: SHIPPED | OUTPATIENT
Start: 2021-11-28 | End: 2022-03-03

## 2021-11-30 RX ORDER — SIMVASTATIN 40 MG
TABLET ORAL
Qty: 90 TABLET | Refills: 0 | OUTPATIENT
Start: 2021-11-30

## 2021-11-30 NOTE — TELEPHONE ENCOUNTER
"Outpatient Medication Detail     Disp Refills Start End MILES   simvastatin (ZOCOR) 40 MG tablet 90 tablet 0 11/28/2021  No   Sig: Take 1 tablet by mouth once daily   Sent to pharmacy as: Simvastatin 40 MG Oral Tablet (ZOCOR)   Class: E-Prescribe   Order: [id number]   E-Prescribing Status: Receipt confirmed by pharmacy (11/28/2021  6:21 PM CST)         Requested Prescriptions   Pending Prescriptions Disp Refills     simvastatin (ZOCOR) 40 MG tablet [Pharmacy Med Name: Simvastatin 40 MG Oral Tablet] 90 tablet 0     Sig: Take 1 tablet by mouth once daily       Statins Protocol Failed - 11/29/2021 11:53 AM        Failed - LDL on file in past 12 months     Recent Labs   Lab Test 01/22/20  1028   LDL 46             Failed - Recent (12 mo) or future (30 days) visit within the authorizing provider's specialty     Patient has had an office visit with the authorizing provider or a provider within the authorizing providers department within the previous 12 mos or has a future within next 30 days. See \"Patient Info\" tab in inbasket, or \"Choose Columns\" in Meds & Orders section of the refill encounter.              Passed - No abnormal creatine kinase in past 12 months     No lab results found.             Passed - Medication is active on med list        Passed - Patient is age 18 or older             Chery Kramer RN 11/30/21 5:52 PM  "

## 2021-12-01 ENCOUNTER — MYC MEDICAL ADVICE (OUTPATIENT)
Dept: FAMILY MEDICINE | Facility: CLINIC | Age: 81
End: 2021-12-01
Payer: MEDICARE

## 2021-12-01 DIAGNOSIS — I48.91 NEW ONSET ATRIAL FIBRILLATION (H): ICD-10-CM

## 2021-12-10 ENCOUNTER — MYC MEDICAL ADVICE (OUTPATIENT)
Dept: FAMILY MEDICINE | Facility: CLINIC | Age: 81
End: 2021-12-10
Payer: MEDICARE

## 2021-12-10 DIAGNOSIS — M1A.00X0 IDIOPATHIC CHRONIC GOUT WITHOUT TOPHUS, UNSPECIFIED SITE: Primary | ICD-10-CM

## 2021-12-10 DIAGNOSIS — M10.9 GOUT: ICD-10-CM

## 2021-12-12 NOTE — TELEPHONE ENCOUNTER
It looks like Dr. Mcdowell refilled this on 11/9.  I will defer to Dr. Mcdowell if he needs another refills this soon. Thanks.

## 2021-12-13 RX ORDER — PROBENECID 500 MG/1
500 TABLET, FILM COATED ORAL 2 TIMES DAILY
Qty: 180 TABLET | Refills: 0 | Status: SHIPPED | OUTPATIENT
Start: 2021-12-13 | End: 2022-06-15

## 2021-12-29 DIAGNOSIS — E78.00 HYPERCHOLESTEREMIA: ICD-10-CM

## 2021-12-30 ENCOUNTER — MYC MEDICAL ADVICE (OUTPATIENT)
Dept: FAMILY MEDICINE | Facility: CLINIC | Age: 81
End: 2021-12-30
Payer: MEDICARE

## 2021-12-30 RX ORDER — EZETIMIBE 10 MG/1
TABLET ORAL
Qty: 90 TABLET | Refills: 3 | Status: SHIPPED | OUTPATIENT
Start: 2021-12-30 | End: 2022-12-31

## 2022-01-01 ENCOUNTER — HEALTH MAINTENANCE LETTER (OUTPATIENT)
Age: 82
End: 2022-01-01

## 2022-01-01 DIAGNOSIS — I48.91 NEW ONSET ATRIAL FIBRILLATION (H): ICD-10-CM

## 2022-01-04 NOTE — TELEPHONE ENCOUNTER
Routing refill request to provider for review/approval because:  Labs not current:  Multiple  Anticoagulation protocol - route to provider.  Patient needs to be seen because it has been more than 1 year since last office visit.    Last Written Prescription Date:  12/1/21  Last Fill Quantity: 60,  # refills: 0   Last office visit provider:  9/3/20     Requested Prescriptions   Pending Prescriptions Disp Refills     ELIQUIS ANTICOAGULANT 5 MG tablet [Pharmacy Med Name: Eliquis 5 MG Oral Tablet] 60 tablet 0     Sig: Take 1 tablet by mouth twice daily       Direct Oral Anticoagulant Agents Failed - 1/3/2022  4:07 PM        Failed - Normal Platelets on file in past 12 months     Recent Labs   Lab Test 09/30/20  1209                  Failed - Patient is 18-79 years of age        Failed - Serum creatinine less than or equal to 1.4 on file in past 12 mos     Recent Labs   Lab Test 09/30/20  1209   CR 0.88       Ok to refill medication if creatinine is low          Failed - Weight is greater than 60 kg for the past year     Wt Readings from Last 3 Encounters:   09/30/20 82.5 kg (181 lb 14.4 oz)   01/22/20 80.2 kg (176 lb 12.8 oz)   09/12/19 81.6 kg (180 lb)             Failed - Recent (6 mo) or future (30 days) visit within the authorizing provider's specialty        Passed - Medication is active on med list             Jose Luis Wright RN 01/04/22 11:34 AM

## 2022-01-05 RX ORDER — APIXABAN 5 MG/1
TABLET, FILM COATED ORAL
Qty: 60 TABLET | Refills: 0 | Status: SHIPPED | OUTPATIENT
Start: 2022-01-05 | End: 2022-02-05

## 2022-01-09 DIAGNOSIS — I10 BENIGN ESSENTIAL HYPERTENSION: ICD-10-CM

## 2022-01-12 RX ORDER — FUROSEMIDE 20 MG
TABLET ORAL
Qty: 90 TABLET | Refills: 0 | Status: SHIPPED | OUTPATIENT
Start: 2022-01-12 | End: 2022-03-10

## 2022-01-12 NOTE — TELEPHONE ENCOUNTER
"Routing refill request to provider for review/approval because:  Labs not current:  Creatinine, K+ , sodium  Patient needs to be seen because it has been more than 1 year since last office visit.  Blood pressure    Last Written Prescription Date:  10/12/2021  Last Fill Quantity: 90,  # refills: 0   Last office visit provider:  9/30/2020     Requested Prescriptions   Pending Prescriptions Disp Refills     furosemide (LASIX) 20 MG tablet [Pharmacy Med Name: Furosemide 20 MG Oral Tablet] 90 tablet 0     Sig: Take 1 tablet by mouth once daily       Diuretics (Including Combos) Protocol Failed - 1/9/2022 12:05 PM        Failed - Blood pressure under 140/90 in past 12 months     BP Readings from Last 3 Encounters:   09/30/20 (!) 150/80   01/22/20 126/86   09/12/19 136/76                 Failed - Recent (12 mo) or future (30 days) visit within the authorizing provider's specialty     Patient has had an office visit with the authorizing provider or a provider within the authorizing providers department within the previous 12 mos or has a future within next 30 days. See \"Patient Info\" tab in inbasket, or \"Choose Columns\" in Meds & Orders section of the refill encounter.              Failed - Normal serum creatinine on file in past 12 months     Recent Labs   Lab Test 09/30/20  1209   CR 0.88              Failed - Normal serum potassium on file in past 12 months     Recent Labs   Lab Test 09/30/20  1209   POTASSIUM 4.4                    Failed - Normal serum sodium on file in past 12 months     Recent Labs   Lab Test 09/30/20  1209                 Passed - Medication is active on med list        Passed - Patient is age 18 or older             Isidra Sidhu RN 01/12/22 1:16 AM  "

## 2022-01-13 DIAGNOSIS — E11.69 TYPE 2 DIABETES MELLITUS WITH OTHER SPECIFIED COMPLICATION, WITHOUT LONG-TERM CURRENT USE OF INSULIN (H): ICD-10-CM

## 2022-01-16 NOTE — TELEPHONE ENCOUNTER
"Routing refill request to provider for review/approval because:  Labs not current:  Multiple  Patient needs to be seen because it has been more than 1 year since last office visit.    Last Written Prescription Date:  10/19/21  Last Fill Quantity: 90,  # refills: 0   Last office visit provider:  9/30/20     Requested Prescriptions   Pending Prescriptions Disp Refills     glimepiride (AMARYL) 2 MG tablet [Pharmacy Med Name: Glimepiride 2 MG Oral Tablet] 90 tablet 0     Sig: TAKE 1 TABLET BY MOUTH IN THE MORNING BEFORE BREAKFAST       Sulfonylurea Agents Failed - 1/13/2022 10:14 AM        Failed - Patient has documented A1c within the specified period of time.     If HgbA1C is 8 or greater, it needs to be on file within the past 3 months.  If less than 8, must be on file within the past 6 months.     Recent Labs   Lab Test 09/30/20  1209   A1C 5.9*             Failed - Patient has a recent creatinine (normal) within the past 12 mos.     Recent Labs   Lab Test 09/30/20  1209   CR 0.88       Ok to refill medication if creatinine is low          Failed - Recent (6 mo) or future (30 days) visit within the authorizing provider's specialty     Patient had office visit in the last 6 months or has a visit in the next 30 days with authorizing provider or within the authorizing provider's specialty.  See \"Patient Info\" tab in inbasket, or \"Choose Columns\" in Meds & Orders section of the refill encounter.            Passed - Medication is active on med list        Passed - Patient is age 18 or older             Jose Luis Wright RN 01/16/22 12:41 PM  "

## 2022-01-17 ENCOUNTER — MYC MEDICAL ADVICE (OUTPATIENT)
Dept: FAMILY MEDICINE | Facility: CLINIC | Age: 82
End: 2022-01-17
Payer: COMMERCIAL

## 2022-01-17 DIAGNOSIS — I10 ESSENTIAL HYPERTENSION, BENIGN: ICD-10-CM

## 2022-01-17 DIAGNOSIS — E11.69 TYPE 2 DIABETES MELLITUS WITH OTHER SPECIFIED COMPLICATION, WITHOUT LONG-TERM CURRENT USE OF INSULIN (H): ICD-10-CM

## 2022-01-17 DIAGNOSIS — I25.10 CARDIOVASCULAR DISEASE: ICD-10-CM

## 2022-01-17 RX ORDER — GLIMEPIRIDE 2 MG/1
TABLET ORAL
Qty: 90 TABLET | Refills: 0 | Status: SHIPPED | OUTPATIENT
Start: 2022-01-17 | End: 2022-01-17

## 2022-01-17 RX ORDER — AMLODIPINE BESYLATE 10 MG/1
10 TABLET ORAL DAILY
Qty: 90 TABLET | Refills: 0 | Status: SHIPPED | OUTPATIENT
Start: 2022-01-17 | End: 2022-04-21

## 2022-01-17 RX ORDER — GLIMEPIRIDE 2 MG/1
TABLET ORAL
Qty: 90 TABLET | Refills: 0 | Status: SHIPPED | OUTPATIENT
Start: 2022-01-17 | End: 2022-04-13

## 2022-01-19 RX ORDER — AMLODIPINE BESYLATE 10 MG/1
TABLET ORAL
Qty: 90 TABLET | Refills: 0 | OUTPATIENT
Start: 2022-01-19

## 2022-01-19 RX ORDER — GLIMEPIRIDE 2 MG/1
TABLET ORAL
Qty: 90 TABLET | Refills: 0 | OUTPATIENT
Start: 2022-01-19

## 2022-01-19 NOTE — TELEPHONE ENCOUNTER
Duplicate Rxs, medication was sent in recently to same pharmacy requested.     Hailey Boggs RN, BSN Nurse Triage Advisor 12:09 PM 1/19/2022

## 2022-02-02 DIAGNOSIS — I48.91 NEW ONSET ATRIAL FIBRILLATION (H): ICD-10-CM

## 2022-02-04 DIAGNOSIS — K21.9 GERD (GASTROESOPHAGEAL REFLUX DISEASE): ICD-10-CM

## 2022-02-04 DIAGNOSIS — K21.9 GASTROESOPHAGEAL REFLUX DISEASE, UNSPECIFIED WHETHER ESOPHAGITIS PRESENT: Primary | ICD-10-CM

## 2022-02-04 RX ORDER — OMEPRAZOLE 40 MG/1
40 CAPSULE, DELAYED RELEASE ORAL 2 TIMES DAILY
Qty: 180 CAPSULE | Refills: 3 | Status: SHIPPED | OUTPATIENT
Start: 2022-02-04 | End: 2023-05-29

## 2022-02-04 NOTE — TELEPHONE ENCOUNTER
Routing refill request to provider for review/approval because:  Labs not current:  Last checked 9/30/2020.  Last OV 9/30/2020.    Last Written Prescription Date:  1/5/2022  Last Fill Quantity: 60,  # refills: 0   Last office visit provider:  9/30/2020     Requested Prescriptions   Pending Prescriptions Disp Refills     ELIQUIS ANTICOAGULANT 5 MG tablet [Pharmacy Med Name: Eliquis 5 MG Oral Tablet] 60 tablet 0     Sig: Take 1 tablet by mouth twice daily       Direct Oral Anticoagulant Agents Failed - 2/2/2022 11:41 AM        Failed - Normal Platelets on file in past 12 months     Recent Labs   Lab Test 09/30/20  1209                  Failed - Patient is 18-79 years of age        Failed - Serum creatinine less than or equal to 1.4 on file in past 12 mos     Recent Labs   Lab Test 09/30/20  1209   CR 0.88       Ok to refill medication if creatinine is low          Failed - Weight is greater than 60 kg for the past year     Wt Readings from Last 3 Encounters:   09/30/20 82.5 kg (181 lb 14.4 oz)   01/22/20 80.2 kg (176 lb 12.8 oz)   09/12/19 81.6 kg (180 lb)             Failed - Recent (6 mo) or future (30 days) visit within the authorizing provider's specialty        Passed - Medication is active on med list             Shell Morrison RN 02/04/22 11:49 AM

## 2022-02-05 ENCOUNTER — MYC MEDICAL ADVICE (OUTPATIENT)
Dept: FAMILY MEDICINE | Facility: CLINIC | Age: 82
End: 2022-02-05
Payer: COMMERCIAL

## 2022-02-05 RX ORDER — APIXABAN 5 MG/1
TABLET, FILM COATED ORAL
Qty: 60 TABLET | Refills: 0 | Status: SHIPPED | OUTPATIENT
Start: 2022-02-05 | End: 2022-03-04

## 2022-02-06 RX ORDER — OMEPRAZOLE 40 MG/1
CAPSULE, DELAYED RELEASE ORAL
Qty: 180 CAPSULE | Refills: 0 | OUTPATIENT
Start: 2022-02-06

## 2022-02-07 NOTE — TELEPHONE ENCOUNTER
"Duplicate.    Last Written Prescription Date:  02/04/2022  Last Fill Quantity: 180,  # refills: 3   Last office visit provider:   09/30/2020 with Dr Mcdowell.    Requested Prescriptions   Pending Prescriptions Disp Refills     omeprazole (PRILOSEC) 40 MG DR capsule [Pharmacy Med Name: Omeprazole 40 MG Oral Capsule Delayed Release] 180 capsule 0     Sig: Take 1 capsule by mouth twice daily       PPI Protocol Failed - 2/4/2022 11:51 AM        Failed - Recent (12 mo) or future (30 days) visit within the authorizing provider's specialty     Patient has had an office visit with the authorizing provider or a provider within the authorizing providers department within the previous 12 mos or has a future within next 30 days. See \"Patient Info\" tab in inbasket, or \"Choose Columns\" in Meds & Orders section of the refill encounter.              Passed - Not on Clopidogrel (unless Pantoprazole ordered)        Passed - No diagnosis of osteoporosis on record        Passed - Medication is active on med list        Passed - Patient is age 18 or older             Carla Ortiz 02/06/22 9:41 PM  "

## 2022-02-28 DIAGNOSIS — E78.00 HYPERCHOLESTEREMIA: ICD-10-CM

## 2022-03-02 NOTE — TELEPHONE ENCOUNTER
"Routing refill request to provider for review/approval because:  Labs not current:  LDL  Patient needs to be seen because it has been more than 1 year since last office visit.    Last Written Prescription Date:  11/28/21  Last Fill Quantity: 90,  # refills: 0   Last office visit provider:  9/30/20     Requested Prescriptions   Pending Prescriptions Disp Refills     simvastatin (ZOCOR) 40 MG tablet [Pharmacy Med Name: Simvastatin 40 MG Oral Tablet] 90 tablet 0     Sig: Take 1 tablet by mouth once daily       Statins Protocol Failed - 3/2/2022  8:27 AM        Failed - LDL on file in past 12 months     Recent Labs   Lab Test 01/22/20  1028   LDL 46             Passed - No abnormal creatine kinase in past 12 months     No lab results found.             Passed - Recent (12 mo) or future (30 days) visit within the authorizing provider's specialty     Patient has had an office visit with the authorizing provider or a provider within the authorizing providers department within the previous 12 mos or has a future within next 30 days. See \"Patient Info\" tab in inbasket, or \"Choose Columns\" in Meds & Orders section of the refill encounter.              Passed - Medication is active on med list        Passed - Patient is age 18 or older             Jose Luis Wright RN 03/02/22 8:27 AM  "

## 2022-03-03 DIAGNOSIS — I48.91 NEW ONSET ATRIAL FIBRILLATION (H): ICD-10-CM

## 2022-03-03 DIAGNOSIS — I10 HYPERTENSION: ICD-10-CM

## 2022-03-03 RX ORDER — SIMVASTATIN 40 MG
TABLET ORAL
Qty: 90 TABLET | Refills: 0 | Status: SHIPPED | OUTPATIENT
Start: 2022-03-03 | End: 2022-03-10

## 2022-03-04 RX ORDER — LISINOPRIL 40 MG/1
TABLET ORAL
Qty: 90 TABLET | Refills: 0 | Status: SHIPPED | OUTPATIENT
Start: 2022-03-04 | End: 2022-08-23

## 2022-03-04 RX ORDER — APIXABAN 5 MG/1
TABLET, FILM COATED ORAL
Qty: 60 TABLET | Refills: 0 | Status: SHIPPED | OUTPATIENT
Start: 2022-03-04 | End: 2022-04-05

## 2022-03-04 ASSESSMENT — ACTIVITIES OF DAILY LIVING (ADL): CURRENT_FUNCTION: NO ASSISTANCE NEEDED

## 2022-03-08 PROBLEM — I25.10 ARTERIOSCLEROTIC CARDIOVASCULAR DISEASE (ASCVD): Status: ACTIVE | Noted: 2022-03-08

## 2022-03-10 ENCOUNTER — ANCILLARY PROCEDURE (OUTPATIENT)
Dept: GENERAL RADIOLOGY | Facility: CLINIC | Age: 82
End: 2022-03-10
Attending: FAMILY MEDICINE
Payer: COMMERCIAL

## 2022-03-10 ENCOUNTER — OFFICE VISIT (OUTPATIENT)
Dept: FAMILY MEDICINE | Facility: CLINIC | Age: 82
End: 2022-03-10
Payer: COMMERCIAL

## 2022-03-10 VITALS
BODY MASS INDEX: 27.47 KG/M2 | HEIGHT: 67 IN | RESPIRATION RATE: 18 BRPM | SYSTOLIC BLOOD PRESSURE: 130 MMHG | WEIGHT: 175 LBS | HEART RATE: 68 BPM | DIASTOLIC BLOOD PRESSURE: 76 MMHG | OXYGEN SATURATION: 99 %

## 2022-03-10 DIAGNOSIS — E78.00 HYPERCHOLESTEREMIA: ICD-10-CM

## 2022-03-10 DIAGNOSIS — I10 PRIMARY HYPERTENSION: ICD-10-CM

## 2022-03-10 DIAGNOSIS — Z00.00 ROUTINE GENERAL MEDICAL EXAMINATION AT A HEALTH CARE FACILITY: ICD-10-CM

## 2022-03-10 DIAGNOSIS — E11.69 TYPE 2 DIABETES MELLITUS WITH OTHER SPECIFIED COMPLICATION, WITHOUT LONG-TERM CURRENT USE OF INSULIN (H): ICD-10-CM

## 2022-03-10 DIAGNOSIS — J64: ICD-10-CM

## 2022-03-10 DIAGNOSIS — Z00.00 ENCOUNTER FOR MEDICARE ANNUAL WELLNESS EXAM: Primary | ICD-10-CM

## 2022-03-10 DIAGNOSIS — Z13.220 SCREENING FOR HYPERLIPIDEMIA: ICD-10-CM

## 2022-03-10 DIAGNOSIS — Z23 ENCOUNTER FOR IMMUNIZATION: ICD-10-CM

## 2022-03-10 LAB
ALBUMIN SERPL-MCNC: 3.7 G/DL (ref 3.5–5)
ALP SERPL-CCNC: 96 U/L (ref 45–120)
ALT SERPL W P-5'-P-CCNC: 37 U/L (ref 0–45)
ANION GAP SERPL CALCULATED.3IONS-SCNC: 11 MMOL/L (ref 5–18)
AST SERPL W P-5'-P-CCNC: 42 U/L (ref 0–40)
BILIRUB SERPL-MCNC: 0.8 MG/DL (ref 0–1)
BUN SERPL-MCNC: 6 MG/DL (ref 8–28)
CALCIUM SERPL-MCNC: 9 MG/DL (ref 8.5–10.5)
CHLORIDE BLD-SCNC: 97 MMOL/L (ref 98–107)
CHOLEST SERPL-MCNC: 136 MG/DL
CO2 SERPL-SCNC: 27 MMOL/L (ref 22–31)
CREAT SERPL-MCNC: 0.85 MG/DL (ref 0.7–1.3)
CREAT UR-MCNC: 21 MG/DL
FASTING STATUS PATIENT QL REPORTED: YES
GFR SERPL CREATININE-BSD FRML MDRD: 88 ML/MIN/1.73M2
GLUCOSE BLD-MCNC: 133 MG/DL (ref 70–125)
HBA1C MFR BLD: 5.5 % (ref 0–5.6)
HDLC SERPL-MCNC: 65 MG/DL
LDLC SERPL CALC-MCNC: 58 MG/DL
MICROALBUMIN UR-MCNC: <0.5 MG/DL (ref 0–1.99)
MICROALBUMIN/CREAT UR: NORMAL MG/G{CREAT}
POTASSIUM BLD-SCNC: 4.9 MMOL/L (ref 3.5–5)
PROT SERPL-MCNC: 7.1 G/DL (ref 6–8)
SODIUM SERPL-SCNC: 135 MMOL/L (ref 136–145)
TRIGL SERPL-MCNC: 64 MG/DL

## 2022-03-10 PROCEDURE — 82043 UR ALBUMIN QUANTITATIVE: CPT | Performed by: FAMILY MEDICINE

## 2022-03-10 PROCEDURE — 99397 PER PM REEVAL EST PAT 65+ YR: CPT | Performed by: FAMILY MEDICINE

## 2022-03-10 PROCEDURE — 36415 COLL VENOUS BLD VENIPUNCTURE: CPT | Performed by: FAMILY MEDICINE

## 2022-03-10 PROCEDURE — 99214 OFFICE O/P EST MOD 30 MIN: CPT | Mod: 25 | Performed by: FAMILY MEDICINE

## 2022-03-10 PROCEDURE — 83036 HEMOGLOBIN GLYCOSYLATED A1C: CPT | Performed by: FAMILY MEDICINE

## 2022-03-10 PROCEDURE — 80061 LIPID PANEL: CPT | Performed by: FAMILY MEDICINE

## 2022-03-10 PROCEDURE — 80053 COMPREHEN METABOLIC PANEL: CPT | Performed by: FAMILY MEDICINE

## 2022-03-10 PROCEDURE — 71046 X-RAY EXAM CHEST 2 VIEWS: CPT | Mod: TC | Performed by: RADIOLOGY

## 2022-03-10 RX ORDER — TRIAMCINOLONE ACETONIDE 1 MG/G
CREAM TOPICAL
COMMUNITY
Start: 2021-03-30

## 2022-03-10 RX ORDER — FLUOCINONIDE TOPICAL SOLUTION USP, 0.05% 0.5 MG/ML
SOLUTION TOPICAL
COMMUNITY
Start: 2021-03-16

## 2022-03-10 RX ORDER — BUPROPION HYDROCHLORIDE 150 MG/1
TABLET ORAL
COMMUNITY
Start: 2022-02-04

## 2022-03-10 RX ORDER — KETOCONAZOLE 20 MG/G
CREAM TOPICAL
COMMUNITY
Start: 2021-03-16

## 2022-03-10 ASSESSMENT — ACTIVITIES OF DAILY LIVING (ADL)
DIFFICULTY_EATING/SWALLOWING: NO
DRESSING/BATHING_DIFFICULTY: NO
HEARING_DIFFICULTY_OR_DEAF: NO
WALKING_OR_CLIMBING_STAIRS_DIFFICULTY: NO
DOING_ERRANDS_INDEPENDENTLY_DIFFICULTY: NO
WEAR_GLASSES_OR_BLIND: YES
CONCENTRATING,_REMEMBERING_OR_MAKING_DECISIONS_DIFFICULTY: NO
CHANGE_IN_FUNCTIONAL_STATUS_SINCE_ONSET_OF_CURRENT_ILLNESS/INJURY: NO
DIFFICULTY_COMMUNICATING: NO
FALL_HISTORY_WITHIN_LAST_SIX_MONTHS: NO
TOILETING_ISSUES: NO

## 2022-03-10 NOTE — PROGRESS NOTES
SUBJECTIVE:   Jabari Haddad is a 81 year old male who presents for Preventive Visit.    Chief complaint I am here for my physical; wanted talk over my medications  Patient has been advised of split billing requirements and indicates understanding: Yes  Are you in the first 12 months of your Medicare coverage?  No    Do you feel safe in your environment? Yes    Have you ever done Advance Care Planning? (For example, a Health Directive, POLST, or a discussion with a medical provider or your loved ones about your wishes): Yes, advance care planning is on file.  History of present illness: Jake is been under my care for over 30 years; he reports a good year of health.  He has not been hospitalized.  He does have mild prostatism and gets up twice a night he is a beer drinker has 4-5 drinks per night.  He is living alone family is scattered around [place] and the call him on a regular basis.  He is very active for 81 years old he drives does all his own shopping and in general keeps up with the news and does some reading.  He denies any shortness of breath or dyspnea or chest pain no change in his skin conditions.  He is due for an ophthalmic examination.  Blood pressure meds are working blood pressure is under excellent control.  He is here for medical evaluation and we are not going to change any of his medications as it may affect were going to renew him his immunizations were updated and reviewed today.  Overall he is very satisfied with life.  We did discuss that if he were to cut back on his beer that his prostate symptoms are probably improve as his prostate exam today was normal but he says that he enjoys his alcohol has not increased it and drinks at home and does not drive and has not had any issues.  Therefore we are not going to change his habits.  All medical questions asked were answered I personally reviewed family social history surgeries allergies problems list  Cognitive Screening   1) Repeat 3  items: 3/3  2) Clock draw: Normal  3) 3 item recall: Recalls 3 objects  Results: 3 items recalled: COGNITIVE IMPAIRMENT LESS LIKELY    Mini-CogTM Copyright OJ Acevedo. Licensed by the author for use in [facility]; reprinted with permission (paco@[facility]). All rights reserved.      Do you have sleep apnea, excessive snoring or daytime drowsiness?: no    Reviewed and updated as needed this visit by clinical staff  Reviewed and updated as needed this visit by Provider    Social History     Tobacco Use     Smoking status: Former Smoker     Quit date: 1967     Years since quittin.2     Smokeless tobacco: Never Used   Substance Use Topics     Alcohol use: Yes     Alcohol/week: 8.3 - 10.0 standard drinks     If you drink alcohol do you typically have >3 drinks per day or >7 drinks per week? No    Alcohol Use 3/4/2022   Prescreen: >3 drinks/day or >7 drinks/week? Yes   AUDIT SCORE  5     AUDIT - Alcohol Use Disorders Identification Test - Reproduced from the World Health Organization Audit 2001 (Second Edition) 3/4/2022   1.  How often do you have a drink containing alcohol? 2 to 3 times a week   2.  How many drinks containing alcohol do you have on a typical day when you are drinking? 3 or 4   3.  How often do you have five or more drinks on one occasion? Less than monthly   4.  How often during the last year have you found that you were not able to stop drinking once you had started? Never   5.  How often during the last year have you failed to do what was normally expected of you because of drinking? Never   6.  How often during the last year have you needed a first drink in the morning to get yourself going after a heavy drinking session? Never   7.  How often during the last year have you had a feeling of guilt or remorse after drinking? Never   8.  How often during the last year have you been unable to remember what happened the night before because of your drinking? Never   9.  Have you or someone  "else been injured because of your drinking? No   10. Has a relative, friend, doctor or other health care worker been concerned about your drinking or suggested you cut down? No   TOTAL SCORE 5          Current providers sharing in care for this patient include: Answers for HPI/ROS submitted by the patient on 3/4/2022  In general, how would you rate your overall physical health?: good  Frequency of exercise:: None  Do you usually eat at least 4 servings of fruit and vegetables a day, include whole grains & fiber, and avoid regularly eating high fat or \"junk\" foods? : No  Taking medications regularly:: Yes  Medication side effects:: None  Activities of Daily Living: no assistance needed  Home safety: no safety concerns identified  Hearing Impairment:: no hearing concerns  In the past 6 months, have you been bothered by leaking of urine?: No  In general, how would you rate your overall mental or emotional health?: good  Additional concerns today:: No      Patient Care Team:  Mike Mcdowell MD as PCP - General (Family Practice)  Mike Mcdowell MD as Assigned PCP    The following health maintenance items are reviewed in Epic and correct as of today:  Health Maintenance Due   Topic Date Due     DIABETIC FOOT EXAM  Never done     ANNUAL REVIEW OF  ORDERS  Never done     COVID-19 Vaccine (1) Never done     DTAP/TDAP/TD IMMUNIZATION (1 - Tdap) Never done     ZOSTER IMMUNIZATION (1 of 2) Never done     EYE EXAM  01/17/2020     MICROALBUMIN  07/22/2020     FALL RISK ASSESSMENT  07/22/2020     MEDICARE ANNUAL WELLNESS VISIT  01/22/2021     LIPID  01/22/2021     A1C  03/30/2021     INFLUENZA VACCINE (1) 09/01/2021     BMP  09/30/2021     Lab work is in process          Review of Systems  Constitutional, HEENT, cardiovascular, pulmonary, GI, , musculoskeletal, neuro, skin, endocrine and psych systems are negative, except as otherwise noted.    OBJECTIVE:   /76   Pulse 68   Resp 18   Ht 1.702 m (5' 7\")   Wt 79.4 kg " "(175 lb)   SpO2 99%   BMI 27.41 kg/m   Estimated body mass index is 27.41 kg/m  as calculated from the following:    Height as of this encounter: 1.702 m (5' 7\").    Weight as of this encounter: 79.4 kg (175 lb).  Physical Exam  GENERAL: healthy, alert and no distress  EYES: Eyes grossly normal to inspection, PERRL and conjunctivae and sclerae normal  HENT: ear canals and TM's normal, nose and mouth without ulcers or lesions  NECK: no adenopathy, no asymmetry, masses, or scars and thyroid normal to palpation  RESP: lungs clear to auscultation - no rales, rhonchi or wheezes  CV: regular rate and rhythm, normal S1 S2, no S3 or S4, no murmur, click or rub, no peripheral edema and peripheral pulses strong  ABDOMEN: soft, nontender, no hepatosplenomegaly, no masses and bowel sounds normal  MS: no gross musculoskeletal defects noted, no edema  SKIN: no suspicious lesions or rashes  NEURO: Normal strength and tone, mentation intact and speech normal  PSYCH: mentation appears normal, affect normal/bright  Prostate exam was normal      ASSESSMENT / PLAN:       ICD-10-CM    1. Screening for hyperlipidemia  Z13.220 Lipid panel reflex to direct LDL Fasting   2. Primary hypertension  I10 Comprehensive metabolic panel (BMP + Alb, Alk Phos, ALT, AST, Total. Bili, TP)   3. Type 2 diabetes mellitus with other specified complication, without long-term current use of insulin (H)  E11.69 Albumin Random Urine Quantitative with Creat Ratio     HEMOGLOBIN A1C     Comprehensive metabolic panel (BMP + Alb, Alk Phos, ALT, AST, Total. Bili, TP)   4. Routine general medical examination at a health care facility  Z00.00 Lipid panel reflex to direct LDL Fasting   5. Hypercholesteremia  E78.00        Patient has been advised of split billing requirements and indicates understanding: Yes    COUNSELING:  Alcohol use discussed as per above    Estimated body mass index is 27.41 kg/m  as calculated from the following:    Height as of this encounter: " "1.702 m (5' 7\").    Weight as of this encounter: 79.4 kg (175 lb).        He reports that he quit smoking about 55 years ago. He has never used smokeless tobacco.      Appropriate preventive services were discussed with this patient, including applicable screening as appropriate for cardiovascular disease, diabetes, osteopenia/osteoporosis, and glaucoma.  As appropriate for age/gender, discussed screening for colorectal cancer, prostate cancer, breast cancer, and cervical cancer. Checklist reviewing preventive services available has been given to the patient.Reviewed patients plan of care or Jabari meets the Care Plan requirement. This Care Plan has been established and reviewed with the  Counseling Resources:  ATP IV Guidelines  Pooled Cohorts Equation Calculator  Breast Cancer Risk Calculator  Breast Cancer: Medication to Reduce Risk  FRAX Risk Assessment  ICSI Preventive Guidelines  Dietary Guidelines for Americans, 2010  USDA's MyPlate  ASA Prophylaxis  Lung CA Screening    Mike Mcdowell MD  [facility]    Identified Health Risks:  "

## 2022-03-10 NOTE — PATIENT INSTRUCTIONS
Patient Education   Personalized Prevention Plan  You are due for the preventive services outlined below.  Your care team is available to assist you in scheduling these services.  If you have already completed any of these items, please share that information with your care team to update in your medical record.  Health Maintenance Due   Topic Date Due     Diabetic Foot Exam  Never done     ANNUAL REVIEW OF HM ORDERS  Never done     COVID-19 Vaccine (1) Never done     Diptheria Tetanus Pertussis (DTAP/TDAP/TD) Vaccine (1 - Tdap) Never done     Zoster (Shingles) Vaccine (1 of 2) Never done     Eye Exam  01/17/2020     Kidney Microalbumin Urine Test  07/22/2020     FALL RISK ASSESSMENT  07/22/2020     Cholesterol Lab  01/22/2021     A1C Lab  03/30/2021     Flu Vaccine (1) 09/01/2021     Basic Metabolic Panel  09/30/2021

## 2022-03-15 ENCOUNTER — MYC MEDICAL ADVICE (OUTPATIENT)
Dept: FAMILY MEDICINE | Facility: CLINIC | Age: 82
End: 2022-03-15
Payer: COMMERCIAL

## 2022-03-15 DIAGNOSIS — Z12.5 ENCOUNTER FOR SCREENING FOR MALIGNANT NEOPLASM OF PROSTATE: ICD-10-CM

## 2022-03-15 DIAGNOSIS — R97.20 ABNORMAL PSA: Primary | ICD-10-CM

## 2022-03-21 ENCOUNTER — LAB (OUTPATIENT)
Dept: LAB | Facility: CLINIC | Age: 82
End: 2022-03-21
Payer: COMMERCIAL

## 2022-03-21 DIAGNOSIS — R97.20 ABNORMAL PSA: ICD-10-CM

## 2022-03-21 DIAGNOSIS — Z12.5 ENCOUNTER FOR SCREENING FOR MALIGNANT NEOPLASM OF PROSTATE: ICD-10-CM

## 2022-03-21 LAB — PSA SERPL-MCNC: 11.61 UG/L (ref 0–6.5)

## 2022-03-21 PROCEDURE — 36415 COLL VENOUS BLD VENIPUNCTURE: CPT

## 2022-03-21 PROCEDURE — G0103 PSA SCREENING: HCPCS

## 2022-03-22 ENCOUNTER — MYC MEDICAL ADVICE (OUTPATIENT)
Dept: FAMILY MEDICINE | Facility: CLINIC | Age: 82
End: 2022-03-22
Payer: COMMERCIAL

## 2022-03-22 DIAGNOSIS — R97.20 ABNORMAL PSA: Primary | ICD-10-CM

## 2022-04-05 DIAGNOSIS — I48.91 NEW ONSET ATRIAL FIBRILLATION (H): ICD-10-CM

## 2022-04-05 RX ORDER — APIXABAN 5 MG/1
TABLET, FILM COATED ORAL
Qty: 60 TABLET | Refills: 0 | Status: SHIPPED | OUTPATIENT
Start: 2022-04-05 | End: 2022-05-10

## 2022-04-05 NOTE — TELEPHONE ENCOUNTER
Requested Medication:   Pending Prescriptions:                       Disp   Refills    ELIQUIS ANTICOAGULANT 5 MG tablet [Pharma*60 tab*0            Sig: Take 1 tablet by mouth twice daily       Last Refill:  3/4/22  Last Office Visit:  3/10/2022   Next Appointment with Provider:  Visit date not found

## 2022-04-08 DIAGNOSIS — I10 BENIGN ESSENTIAL HYPERTENSION: ICD-10-CM

## 2022-04-10 ENCOUNTER — MYC MEDICAL ADVICE (OUTPATIENT)
Dept: FAMILY MEDICINE | Facility: CLINIC | Age: 82
End: 2022-04-10
Payer: COMMERCIAL

## 2022-04-11 RX ORDER — FUROSEMIDE 20 MG
TABLET ORAL
Qty: 90 TABLET | Refills: 0 | Status: SHIPPED | OUTPATIENT
Start: 2022-04-11 | End: 2022-07-10

## 2022-04-11 NOTE — TELEPHONE ENCOUNTER
"Routing refill request to provider for review/approval because:  Labs out of range:  Na    furosemide (LASIX) 20 MG tablet 90 tablet 1 4/16/2021  No   Sig: Take 1 tablet by mouth once daily   Sent to pharmacy as: furosemide 20 mg tablet (LASIX)   E-Prescribing Status: Receipt confirmed by pharmacy (4/16/2021  1:22 PM CDT)     Last Written Prescription Date:  4/16/2021  Last Fill Quantity: 90,  # refills: 1   Last office visit provider:  3/10/2022     Requested Prescriptions   Pending Prescriptions Disp Refills     furosemide (LASIX) 20 MG tablet [Pharmacy Med Name: Furosemide 20 MG Oral Tablet] 90 tablet 0     Sig: Take 1 tablet by mouth once daily       Diuretics (Including Combos) Protocol Failed - 4/11/2022  9:11 AM        Failed - Normal serum sodium on file in past 12 months     Recent Labs   Lab Test 03/10/22  0949   *              Passed - Blood pressure under 140/90 in past 12 months     BP Readings from Last 3 Encounters:   03/10/22 130/76   09/30/20 (!) 150/80   01/22/20 126/86                 Passed - Recent (12 mo) or future (30 days) visit within the authorizing provider's specialty     Patient has had an office visit with the authorizing provider or a provider within the authorizing providers department within the previous 12 mos or has a future within next 30 days. See \"Patient Info\" tab in inbasket, or \"Choose Columns\" in Meds & Orders section of the refill encounter.              Passed - Medication is active on med list        Passed - Patient is age 18 or older        Passed - Normal serum creatinine on file in past 12 months     Recent Labs   Lab Test 03/10/22  0949   CR 0.85              Passed - Normal serum potassium on file in past 12 months     Recent Labs   Lab Test 03/10/22  0949   POTASSIUM 4.9                         Shell Morrison RN 04/11/22 10:16 AM  "

## 2022-04-11 NOTE — TELEPHONE ENCOUNTER
Last clinic visit: 3/10/2022    Clinic visit frequency required: Q 12 months    Next clinic visit: N/A    Refill request from pharmacy. Medication has not been prescribed since 2019. Please advise.     Pending Prescriptions:                       Disp   Refills    furosemide (LASIX) 20 MG tablet [Pharmacy*90 tab*0            Sig: Take 1 tablet by mouth once daily

## 2022-04-12 DIAGNOSIS — E11.69 TYPE 2 DIABETES MELLITUS WITH OTHER SPECIFIED COMPLICATION, WITHOUT LONG-TERM CURRENT USE OF INSULIN (H): ICD-10-CM

## 2022-04-13 RX ORDER — GLIMEPIRIDE 2 MG/1
TABLET ORAL
Qty: 90 TABLET | Refills: 0 | Status: SHIPPED | OUTPATIENT
Start: 2022-04-13 | End: 2022-07-11

## 2022-04-13 NOTE — TELEPHONE ENCOUNTER
Requested Medication:   Pending Prescriptions:                       Disp   Refills    glimepiride (AMARYL) 2 MG tablet [Pharmac*90 tab*0            Sig: TAKE 1 TABLET BY MOUTH IN THE MORNING BEFORE           BREAKFAST       Last Refill:  1/17/22  Last Office Visit:  3/10/2022   Next Appointment with Provider:  Visit date not found

## 2022-04-15 DIAGNOSIS — E11.69 TYPE 2 DIABETES MELLITUS WITH OTHER SPECIFIED COMPLICATION, WITHOUT LONG-TERM CURRENT USE OF INSULIN (H): ICD-10-CM

## 2022-04-17 RX ORDER — GLIMEPIRIDE 2 MG/1
TABLET ORAL
Qty: 90 TABLET | Refills: 0 | OUTPATIENT
Start: 2022-04-17

## 2022-04-17 NOTE — TELEPHONE ENCOUNTER
"Duplicate.    Last Written Prescription Date:  04/13/2022  Last Fill Quantity: 90,  # refills: 0   Last office visit provider:  03/10/2022     Requested Prescriptions   Pending Prescriptions Disp Refills     glimepiride (AMARYL) 2 MG tablet [Pharmacy Med Name: Glimepiride 2 MG Oral Tablet] 90 tablet 0     Sig: TAKE 1 TABLET BY MOUTH IN THE MORNING BEFORE BREAKFAST       Sulfonylurea Agents Passed - 4/15/2022 11:08 AM        Passed - Patient has documented A1c within the specified period of time.     If HgbA1C is 8 or greater, it needs to be on file within the past 3 months.  If less than 8, must be on file within the past 6 months.     Recent Labs   Lab Test 03/10/22  0949   A1C 5.5             Passed - Medication is active on med list        Passed - Patient is age 18 or older        Passed - Patient has a recent creatinine (normal) within the past 12 mos.     Recent Labs   Lab Test 03/10/22  0949   CR 0.85       Ok to refill medication if creatinine is low          Passed - Recent (6 mo) or future (30 days) visit within the authorizing provider's specialty     Patient had office visit in the last 6 months or has a visit in the next 30 days with authorizing provider or within the authorizing provider's specialty.  See \"Patient Info\" tab in inbasket, or \"Choose Columns\" in Meds & Orders section of the refill encounter.                 Carla Ortiz 04/17/22 6:40 PM  "

## 2022-04-19 DIAGNOSIS — I10 ESSENTIAL HYPERTENSION, BENIGN: ICD-10-CM

## 2022-04-19 DIAGNOSIS — I25.10 CARDIOVASCULAR DISEASE: ICD-10-CM

## 2022-04-21 RX ORDER — AMLODIPINE BESYLATE 10 MG/1
TABLET ORAL
Qty: 90 TABLET | Refills: 0 | Status: SHIPPED | OUTPATIENT
Start: 2022-04-21 | End: 2022-07-20

## 2022-05-04 ENCOUNTER — TRANSFERRED RECORDS (OUTPATIENT)
Dept: HEALTH INFORMATION MANAGEMENT | Facility: CLINIC | Age: 82
End: 2022-05-04
Payer: COMMERCIAL

## 2022-05-06 DIAGNOSIS — I48.91 NEW ONSET ATRIAL FIBRILLATION (H): ICD-10-CM

## 2022-05-09 NOTE — TELEPHONE ENCOUNTER
Routing refill request to provider for review/approval because:  Labs not current:  Platelets   Age warning  Anticoagulation protocol - route to provider.    Last Written Prescription Date:  4/5/22  Last Fill Quantity: 60,  # refills: 0   Last office visit provider:  3/10/22     Requested Prescriptions   Pending Prescriptions Disp Refills     ELIQUIS ANTICOAGULANT 5 MG tablet [Pharmacy Med Name: Eliquis 5 MG Oral Tablet] 60 tablet 0     Sig: Take 1 tablet by mouth twice daily       Direct Oral Anticoagulant Agents Failed - 5/9/2022  1:57 PM        Failed - Normal Platelets on file in past 12 months     Recent Labs   Lab Test 09/30/20  1209                  Failed - Patient is 18-79 years of age        Passed - Medication is active on med list        Passed - Serum creatinine less than or equal to 1.4 on file in past 12 mos     Recent Labs   Lab Test 03/10/22  0949   CR 0.85       Ok to refill medication if creatinine is low          Passed - Weight is greater than 60 kg for the past year     Wt Readings from Last 3 Encounters:   03/10/22 79.4 kg (175 lb)   09/30/20 82.5 kg (181 lb 14.4 oz)   01/22/20 80.2 kg (176 lb 12.8 oz)             Passed - Recent (6 mo) or future (30 days) visit within the authorizing provider's specialty             Jose Luis Wright RN 05/09/22 1:57 PM

## 2022-05-10 RX ORDER — APIXABAN 5 MG/1
TABLET, FILM COATED ORAL
Qty: 60 TABLET | Refills: 0 | Status: SHIPPED | OUTPATIENT
Start: 2022-05-10 | End: 2022-06-07

## 2022-05-28 DIAGNOSIS — E78.5 HYPERLIPIDEMIA: ICD-10-CM

## 2022-05-30 RX ORDER — SIMVASTATIN 40 MG
TABLET ORAL
Qty: 90 TABLET | Refills: 2 | Status: SHIPPED | OUTPATIENT
Start: 2022-05-30 | End: 2023-02-27

## 2022-05-30 NOTE — TELEPHONE ENCOUNTER
"Last Written Prescription Date:  11/30/20  Last Fill Quantity: 90,  # refills: 0   Last office visit provider:  3/10/22     Requested Prescriptions   Pending Prescriptions Disp Refills     simvastatin (ZOCOR) 40 MG tablet [Pharmacy Med Name: Simvastatin 40 MG Oral Tablet] 90 tablet 0     Sig: Take 1 tablet by mouth once daily       Statins Protocol Passed - 5/28/2022  4:10 PM        Passed - LDL on file in past 12 months     Recent Labs   Lab Test 03/10/22  0949   LDL 58             Passed - No abnormal creatine kinase in past 12 months     No lab results found.             Passed - Recent (12 mo) or future (30 days) visit within the authorizing provider's specialty     Patient has had an office visit with the authorizing provider or a provider within the authorizing providers department within the previous 12 mos or has a future within next 30 days. See \"Patient Info\" tab in inbasket, or \"Choose Columns\" in Meds & Orders section of the refill encounter.              Passed - Medication is active on med list        Passed - Patient is age 18 or older             Alicia Dinero RN 05/30/22 5:13 PM  "

## 2022-06-06 DIAGNOSIS — I48.91 NEW ONSET ATRIAL FIBRILLATION (H): ICD-10-CM

## 2022-06-07 ENCOUNTER — MYC MEDICAL ADVICE (OUTPATIENT)
Dept: FAMILY MEDICINE | Facility: CLINIC | Age: 82
End: 2022-06-07
Payer: COMMERCIAL

## 2022-06-07 DIAGNOSIS — F41.1 GENERALIZED ANXIETY DISORDER: Primary | ICD-10-CM

## 2022-06-07 RX ORDER — APIXABAN 5 MG/1
TABLET, FILM COATED ORAL
Qty: 60 TABLET | Refills: 0 | Status: SHIPPED | OUTPATIENT
Start: 2022-06-07 | End: 2022-07-06

## 2022-06-07 RX ORDER — ESCITALOPRAM OXALATE 20 MG/1
TABLET ORAL
Qty: 30 TABLET | Refills: 3 | Status: SHIPPED | OUTPATIENT
Start: 2022-06-07 | End: 2023-03-30

## 2022-06-07 NOTE — TELEPHONE ENCOUNTER
Routing refill request to provider for review/approval because:  Labs not current:      Last Written Prescription Date:  5/10/22  Last Fill Quantity: 60,  # refills: 0   Last office visit provider:  3/10/22     Requested Prescriptions   Pending Prescriptions Disp Refills     ELIQUIS ANTICOAGULANT 5 MG tablet [Pharmacy Med Name: Eliquis 5 MG Oral Tablet] 60 tablet 0     Sig: Take 1 tablet by mouth twice daily       Direct Oral Anticoagulant Agents Failed - 6/6/2022  8:38 AM        Failed - Normal Platelets on file in past 12 months     Recent Labs   Lab Test 09/30/20  1209                  Failed - Patient is 18-79 years of age        Passed - Medication is active on med list        Passed - Serum creatinine less than or equal to 1.4 on file in past 12 mos     Recent Labs   Lab Test 03/10/22  0949   CR 0.85       Ok to refill medication if creatinine is low          Passed - Weight is greater than 60 kg for the past year     Wt Readings from Last 3 Encounters:   03/10/22 79.4 kg (175 lb)   09/30/20 82.5 kg (181 lb 14.4 oz)   01/22/20 80.2 kg (176 lb 12.8 oz)             Passed - Recent (6 mo) or future (30 days) visit within the authorizing provider's specialty             Alicia Dinero RN 06/06/22 10:22 PM

## 2022-06-13 DIAGNOSIS — M1A.00X0 IDIOPATHIC CHRONIC GOUT WITHOUT TOPHUS, UNSPECIFIED SITE: ICD-10-CM

## 2022-06-14 ENCOUNTER — TRANSFERRED RECORDS (OUTPATIENT)
Dept: HEALTH INFORMATION MANAGEMENT | Facility: CLINIC | Age: 82
End: 2022-06-14
Payer: COMMERCIAL

## 2022-06-14 NOTE — TELEPHONE ENCOUNTER
"Routing refill request to provider for review/approval because:  Labs not current:  cbc    Last Written Prescription Date:  12/13/21  Last Fill Quantity: 180,  # refills: 0   Last office visit provider:  3/10/22     Requested Prescriptions   Pending Prescriptions Disp Refills     probenecid (BENEMID) 500 MG tablet [Pharmacy Med Name: Probenecid 500 MG Oral Tablet] 180 tablet 0     Sig: Take 1 tablet by mouth twice daily       Gout Agents Protocol Failed - 6/14/2022  1:17 PM        Failed - CBC on file in past 12 months     Recent Labs   Lab Test 09/30/20  1209   WBC 5.9   RBC 4.18*   HGB 13.9*   HCT 41.1                    Failed - Has Uric Acid on file in past 12 months and value is less than 6     Recent Labs   Lab Test 09/30/20  1209   URIC 6.8     If level is 6mg/dL or greater, ok to refill one time and refer to provider.           Passed - ALT on file in past 12 months     Recent Labs   Lab Test 03/10/22  0949   ALT 37             Passed - Recent (12 mo) or future (30 days) visit within the authorizing provider's specialty     Patient has had an office visit with the authorizing provider or a provider within the authorizing providers department within the previous 12 mos or has a future within next 30 days. See \"Patient Info\" tab in inbasket, or \"Choose Columns\" in Meds & Orders section of the refill encounter.              Passed - Medication is active on med list        Passed - Patient is age 18 or older        Passed - Normal serum creatinine on file in the past 12 months     Recent Labs   Lab Test 03/10/22  0949   CR 0.85       Ok to refill medication if creatinine is low               Alicia Dinero, RN 06/14/22 4:19 PM  "

## 2022-06-15 RX ORDER — PROBENECID 500 MG/1
TABLET, FILM COATED ORAL
Qty: 180 TABLET | Refills: 0 | Status: SHIPPED | OUTPATIENT
Start: 2022-06-15 | End: 2022-12-19

## 2022-06-27 ENCOUNTER — TRANSFERRED RECORDS (OUTPATIENT)
Dept: HEALTH INFORMATION MANAGEMENT | Facility: CLINIC | Age: 82
End: 2022-06-27

## 2022-07-05 DIAGNOSIS — I48.91 NEW ONSET ATRIAL FIBRILLATION (H): ICD-10-CM

## 2022-07-06 RX ORDER — APIXABAN 5 MG/1
TABLET, FILM COATED ORAL
Qty: 60 TABLET | Refills: 0 | Status: SHIPPED | OUTPATIENT
Start: 2022-07-06 | End: 2022-08-05

## 2022-07-06 NOTE — TELEPHONE ENCOUNTER
Routing refill request to provider for review/approval because:  Labs not current:  plt    Last Written Prescription Date:  6/7/22  Last Fill Quantity: 60,  # refills: 0   Last office visit provider:  3/10/22     Requested Prescriptions   Pending Prescriptions Disp Refills     ELIQUIS ANTICOAGULANT 5 MG tablet [Pharmacy Med Name: Eliquis 5 MG Oral Tablet] 60 tablet 0     Sig: Take 1 tablet by mouth twice daily       Direct Oral Anticoagulant Agents Failed - 7/5/2022 11:32 AM        Failed - Normal Platelets on file in past 12 months     Recent Labs   Lab Test 09/30/20  1209                  Failed - Patient is 18-79 years of age        Passed - Medication is active on med list        Passed - Serum creatinine less than or equal to 1.4 on file in past 12 mos     Recent Labs   Lab Test 03/10/22  0949   CR 0.85       Ok to refill medication if creatinine is low          Passed - Weight is greater than 60 kg for the past year     Wt Readings from Last 3 Encounters:   03/10/22 79.4 kg (175 lb)   09/30/20 82.5 kg (181 lb 14.4 oz)   01/22/20 80.2 kg (176 lb 12.8 oz)             Passed - Recent (6 mo) or future (30 days) visit within the authorizing provider's specialty             Alicia Dinero RN 07/05/22 8:00 PM

## 2022-07-09 DIAGNOSIS — I10 BENIGN ESSENTIAL HYPERTENSION: ICD-10-CM

## 2022-07-10 RX ORDER — FUROSEMIDE 20 MG
TABLET ORAL
Qty: 90 TABLET | Refills: 0 | Status: SHIPPED | OUTPATIENT
Start: 2022-07-10 | End: 2022-10-04

## 2022-07-10 NOTE — TELEPHONE ENCOUNTER
"Routing refill request to provider for review/approval because:  Labs out of range:  na    Last Written Prescription Date:  4/11/22  Last Fill Quantity: 90,  # refills: 0   Last office visit provider:  3/10/22     Requested Prescriptions   Pending Prescriptions Disp Refills     furosemide (LASIX) 20 MG tablet [Pharmacy Med Name: Furosemide 20 MG Oral Tablet] 90 tablet 0     Sig: Take 1 tablet by mouth once daily       Diuretics (Including Combos) Protocol Failed - 7/9/2022  8:36 AM        Failed - Normal serum sodium on file in past 12 months     Recent Labs   Lab Test 03/10/22  0949   *              Passed - Blood pressure under 140/90 in past 12 months     BP Readings from Last 3 Encounters:   03/10/22 130/76   09/30/20 (!) 150/80   01/22/20 126/86                 Passed - Recent (12 mo) or future (30 days) visit within the authorizing provider's specialty     Patient has had an office visit with the authorizing provider or a provider within the authorizing providers department within the previous 12 mos or has a future within next 30 days. See \"Patient Info\" tab in inbasket, or \"Choose Columns\" in Meds & Orders section of the refill encounter.              Passed - Medication is active on med list        Passed - Patient is age 18 or older        Passed - Normal serum creatinine on file in past 12 months     Recent Labs   Lab Test 03/10/22  0949   CR 0.85              Passed - Normal serum potassium on file in past 12 months     Recent Labs   Lab Test 03/10/22  0949   POTASSIUM 4.9                         Alicia Dinero RN 07/10/22 11:57 AM  "

## 2022-07-11 DIAGNOSIS — E11.69 TYPE 2 DIABETES MELLITUS WITH OTHER SPECIFIED COMPLICATION, WITHOUT LONG-TERM CURRENT USE OF INSULIN (H): ICD-10-CM

## 2022-07-11 RX ORDER — GLIMEPIRIDE 2 MG/1
TABLET ORAL
Qty: 90 TABLET | Refills: 0 | Status: SHIPPED | OUTPATIENT
Start: 2022-07-11 | End: 2022-10-11

## 2022-07-12 DIAGNOSIS — I10 BENIGN ESSENTIAL HYPERTENSION: ICD-10-CM

## 2022-07-12 NOTE — TELEPHONE ENCOUNTER
"Last Written Prescription Date:  4/13/22  Last Fill Quantity: 90,  # refills: 0   Last office visit provider:  3/10/22     Requested Prescriptions   Pending Prescriptions Disp Refills     glimepiride (AMARYL) 2 MG tablet [Pharmacy Med Name: Glimepiride 2 MG Oral Tablet] 90 tablet 0     Sig: TAKE 1 TABLET BY MOUTH IN THE MORNING BEFORE BREAKFAST       Sulfonylurea Agents Passed - 7/11/2022 10:40 AM        Passed - Patient has documented A1c within the specified period of time.     If HgbA1C is 8 or greater, it needs to be on file within the past 3 months.  If less than 8, must be on file within the past 6 months.     Recent Labs   Lab Test 03/10/22  0949   A1C 5.5             Passed - Medication is active on med list        Passed - Patient is age 18 or older        Passed - Patient has a recent creatinine (normal) within the past 12 mos.     Recent Labs   Lab Test 03/10/22  0949   CR 0.85       Ok to refill medication if creatinine is low          Passed - Recent (6 mo) or future (30 days) visit within the authorizing provider's specialty     Patient had office visit in the last 6 months or has a visit in the next 30 days with authorizing provider or within the authorizing provider's specialty.  See \"Patient Info\" tab in inbasket, or \"Choose Columns\" in Meds & Orders section of the refill encounter.                 Alicia Dinero RN 07/11/22 8:35 PM  "

## 2022-07-13 RX ORDER — FUROSEMIDE 20 MG
TABLET ORAL
Qty: 90 TABLET | Refills: 0 | OUTPATIENT
Start: 2022-07-13

## 2022-07-20 ENCOUNTER — MYC MEDICAL ADVICE (OUTPATIENT)
Dept: FAMILY MEDICINE | Facility: CLINIC | Age: 82
End: 2022-07-20

## 2022-07-20 DIAGNOSIS — I10 ESSENTIAL HYPERTENSION, BENIGN: ICD-10-CM

## 2022-07-20 DIAGNOSIS — I25.10 CARDIOVASCULAR DISEASE: ICD-10-CM

## 2022-07-20 RX ORDER — AMLODIPINE BESYLATE 10 MG/1
10 TABLET ORAL DAILY
Qty: 90 TABLET | Refills: 2 | Status: SHIPPED | OUTPATIENT
Start: 2022-07-20 | End: 2023-04-15

## 2022-07-20 NOTE — TELEPHONE ENCOUNTER
Refill Request  Medication name: Pending Prescriptions:                       Disp   Refills    amLODIPine (NORVASC) 10 MG tablet         90 tab*0            Sig: Take 1 tablet (10 mg) by mouth daily    Who prescribed the medication: Mike Mcdowell  Last refill on medication: 3/10/22  Requested Pharmacy: [facility]  Last appointment with PCP: 3/10/22  Next appointment: [facility]

## 2022-07-20 NOTE — TELEPHONE ENCOUNTER
"Last Written Prescription Date:  4/21/22  Last Fill Quantity: 90,  # refills: 0   Last office visit provider:  3/10/22     Requested Prescriptions   Pending Prescriptions Disp Refills     amLODIPine (NORVASC) 10 MG tablet 90 tablet 0     Sig: Take 1 tablet (10 mg) by mouth daily       Calcium Channel Blockers Protocol  Passed - 7/20/2022  1:54 PM        Passed - Blood pressure under 140/90 in past 12 months     BP Readings from Last 3 Encounters:   03/10/22 130/76   09/30/20 (!) 150/80   01/22/20 126/86                 Passed - Recent (12 mo) or future (30 days) visit within the authorizing provider's specialty     Patient has had an office visit with the authorizing provider or a provider within the authorizing providers department within the previous 12 mos or has a future within next 30 days. See \"Patient Info\" tab in inbasket, or \"Choose Columns\" in Meds & Orders section of the refill encounter.              Passed - Medication is active on med list        Passed - Patient is age 18 or older        Passed - Normal serum creatinine on file in past 12 months     Recent Labs   Lab Test 03/10/22  0949   CR 0.85       Ok to refill medication if creatinine is low               Jose Luis Wright RN 07/20/22 1:54 PM  "

## 2022-08-05 ENCOUNTER — MYC MEDICAL ADVICE (OUTPATIENT)
Dept: FAMILY MEDICINE | Facility: CLINIC | Age: 82
End: 2022-08-05

## 2022-08-05 DIAGNOSIS — I48.91 NEW ONSET ATRIAL FIBRILLATION (H): ICD-10-CM

## 2022-08-05 RX ORDER — APIXABAN 5 MG/1
TABLET, FILM COATED ORAL
Qty: 60 TABLET | Refills: 0 | Status: SHIPPED | OUTPATIENT
Start: 2022-08-05 | End: 2022-09-05

## 2022-08-05 NOTE — TELEPHONE ENCOUNTER
Last clinic visit: 3/10/2022    Clinic visit frequency required: Q 6  months    Next clinic visit: N/A    Pending Prescriptions:                       Disp   Refills    ELIQUIS ANTICOAGULANT 5 MG tablet [Pharma*60 tab*0            Sig: Take 1 tablet by mouth twice daily

## 2022-08-16 ENCOUNTER — TRANSFERRED RECORDS (OUTPATIENT)
Dept: HEALTH INFORMATION MANAGEMENT | Facility: CLINIC | Age: 82
End: 2022-08-16

## 2022-08-22 DIAGNOSIS — I10 HYPERTENSION: ICD-10-CM

## 2022-08-23 RX ORDER — LISINOPRIL 40 MG/1
40 TABLET ORAL DAILY
Qty: 90 TABLET | Refills: 2 | Status: SHIPPED | OUTPATIENT
Start: 2022-08-23 | End: 2023-03-16

## 2022-08-23 NOTE — TELEPHONE ENCOUNTER
"Last Written Prescription Date:  3/4/22  Last Fill Quantity: 90,  # refills: 0   Last office visit provider:  3/10/22     Requested Prescriptions   Pending Prescriptions Disp Refills     lisinopril (ZESTRIL) 40 MG tablet [Pharmacy Med Name: Lisinopril 40 MG Oral Tablet] 90 tablet 0     Sig: Take 1 tablet by mouth once daily       ACE Inhibitors (Including Combos) Protocol Passed - 8/23/2022 10:39 AM        Passed - Blood pressure under 140/90 in past 12 months     BP Readings from Last 3 Encounters:   03/10/22 130/76   09/30/20 (!) 150/80   01/22/20 126/86                 Passed - Recent (12 mo) or future (30 days) visit within the authorizing provider's specialty     Patient has had an office visit with the authorizing provider or a provider within the authorizing providers department within the previous 12 mos or has a future within next 30 days. See \"Patient Info\" tab in inbasket, or \"Choose Columns\" in Meds & Orders section of the refill encounter.              Passed - Medication is active on med list        Passed - Patient is age 18 or older        Passed - Normal serum creatinine on file in past 12 months     Recent Labs   Lab Test 03/10/22  0949   CR 0.85       Ok to refill medication if creatinine is low          Passed - Normal serum potassium on file in past 12 months     Recent Labs   Lab Test 03/10/22  0949   POTASSIUM 4.9                  Jose Luis Wright RN 08/23/22 10:39 AM  "

## 2022-09-03 DIAGNOSIS — I48.91 NEW ONSET ATRIAL FIBRILLATION (H): ICD-10-CM

## 2022-09-04 NOTE — TELEPHONE ENCOUNTER
Routing refill request to provider for review/approval because:  Labs not current:  plt    Last Written Prescription Date:  8/5/22  Last Fill Quantity: 60,  # refills: 0   Last office visit provider:  3/10/22     Requested Prescriptions   Pending Prescriptions Disp Refills     ELIQUIS ANTICOAGULANT 5 MG tablet [Pharmacy Med Name: Eliquis 5 MG Oral Tablet] 60 tablet 0     Sig: Take 1 tablet by mouth twice daily       Direct Oral Anticoagulant Agents Failed - 9/3/2022 10:08 AM        Failed - Normal Platelets on file in past 12 months     Recent Labs   Lab Test 09/30/20  1209                  Failed - Patient is 18-79 years of age        Passed - Medication is active on med list        Passed - Serum creatinine less than or equal to 1.4 on file in past 12 mos     Recent Labs   Lab Test 03/10/22  0949   CR 0.85       Ok to refill medication if creatinine is low          Passed - Weight is greater than 60 kg for the past year     Wt Readings from Last 3 Encounters:   03/10/22 79.4 kg (175 lb)   09/30/20 82.5 kg (181 lb 14.4 oz)   01/22/20 80.2 kg (176 lb 12.8 oz)             Passed - Recent (6 mo) or future (30 days) visit within the authorizing provider's specialty             Alicia Dinero RN 09/03/22 9:06 PM

## 2022-09-05 RX ORDER — APIXABAN 5 MG/1
TABLET, FILM COATED ORAL
Qty: 60 TABLET | Refills: 0 | Status: SHIPPED | OUTPATIENT
Start: 2022-09-05 | End: 2022-10-04

## 2022-09-15 ENCOUNTER — LAB (OUTPATIENT)
Dept: LAB | Facility: CLINIC | Age: 82
End: 2022-09-15
Payer: COMMERCIAL

## 2022-09-15 DIAGNOSIS — Z12.5 SPECIAL SCREENING FOR MALIGNANT NEOPLASM OF PROSTATE: ICD-10-CM

## 2022-09-15 DIAGNOSIS — C61 MALIGNANT NEOPLASM OF PROSTATE (H): ICD-10-CM

## 2022-09-15 DIAGNOSIS — C61 PROSTATE CANCER (H): ICD-10-CM

## 2022-09-15 LAB — PSA SERPL-MCNC: 13.9 NG/ML

## 2022-09-15 PROCEDURE — G0103 PSA SCREENING: HCPCS

## 2022-09-15 PROCEDURE — 36415 COLL VENOUS BLD VENIPUNCTURE: CPT

## 2022-09-26 ENCOUNTER — HOSPITAL ENCOUNTER (OUTPATIENT)
Dept: MRI IMAGING | Facility: HOSPITAL | Age: 82
Discharge: HOME | End: 2022-09-26
Attending: RADIOLOGY
Payer: COMMERCIAL

## 2022-09-26 ENCOUNTER — HOSPITAL ENCOUNTER (OUTPATIENT)
Dept: NUCLEAR MEDICINE | Facility: HOSPITAL | Age: 82
Discharge: HOME | End: 2022-09-26
Attending: RADIOLOGY
Payer: COMMERCIAL

## 2022-09-26 DIAGNOSIS — C61 MALIGNANT NEOPLASM OF PROSTATE (H): ICD-10-CM

## 2022-09-26 PROCEDURE — 255N000002 HC RX 255 OP 636

## 2022-09-26 PROCEDURE — A9585 GADOBUTROL INJECTION: HCPCS

## 2022-09-26 PROCEDURE — 78306 BONE IMAGING WHOLE BODY: CPT

## 2022-09-26 PROCEDURE — A9503 TC99M MEDRONATE: HCPCS

## 2022-09-26 PROCEDURE — 343N000001 HC RX 343

## 2022-09-26 PROCEDURE — 72197 MRI PELVIS W/O & W/DYE: CPT

## 2022-09-26 RX ORDER — GADOBUTROL 604.72 MG/ML
8 INJECTION INTRAVENOUS ONCE
Status: COMPLETED | OUTPATIENT
Start: 2022-09-26 | End: 2022-09-26

## 2022-09-26 RX ORDER — TC 99M MEDRONATE 20 MG/10ML
20-30 INJECTION, POWDER, LYOPHILIZED, FOR SOLUTION INTRAVENOUS ONCE
Status: COMPLETED | OUTPATIENT
Start: 2022-09-26 | End: 2022-09-26

## 2022-09-26 RX ADMIN — TC 99M MEDRONATE 26.6 MCI.: 20 INJECTION, POWDER, LYOPHILIZED, FOR SOLUTION INTRAVENOUS at 12:12

## 2022-09-26 RX ADMIN — GADOBUTROL 8 ML: 604.72 INJECTION INTRAVENOUS at 17:22

## 2022-09-30 ENCOUNTER — TRANSFERRED RECORDS (OUTPATIENT)
Dept: HEALTH INFORMATION MANAGEMENT | Facility: CLINIC | Age: 82
End: 2022-09-30

## 2022-10-04 ENCOUNTER — MYC MEDICAL ADVICE (OUTPATIENT)
Dept: FAMILY MEDICINE | Facility: CLINIC | Age: 82
End: 2022-10-04

## 2022-10-04 DIAGNOSIS — I48.91 NEW ONSET ATRIAL FIBRILLATION (H): ICD-10-CM

## 2022-10-04 DIAGNOSIS — I10 BENIGN ESSENTIAL HYPERTENSION: ICD-10-CM

## 2022-10-04 RX ORDER — FUROSEMIDE 20 MG
20 TABLET ORAL DAILY
Qty: 90 TABLET | Refills: 0 | Status: SHIPPED | OUTPATIENT
Start: 2022-10-04 | End: 2023-01-03

## 2022-10-05 ENCOUNTER — MYC MEDICAL ADVICE (OUTPATIENT)
Dept: FAMILY MEDICINE | Facility: CLINIC | Age: 82
End: 2022-10-05

## 2022-10-05 DIAGNOSIS — I48.91 NEW ONSET ATRIAL FIBRILLATION (H): ICD-10-CM

## 2022-10-05 RX ORDER — APIXABAN 5 MG/1
TABLET, FILM COATED ORAL
Qty: 60 TABLET | Refills: 0 | OUTPATIENT
Start: 2022-10-05

## 2022-10-11 DIAGNOSIS — E11.69 TYPE 2 DIABETES MELLITUS WITH OTHER SPECIFIED COMPLICATION, WITHOUT LONG-TERM CURRENT USE OF INSULIN (H): ICD-10-CM

## 2022-10-11 RX ORDER — GLIMEPIRIDE 2 MG/1
TABLET ORAL
Qty: 90 TABLET | Refills: 0 | Status: SHIPPED | OUTPATIENT
Start: 2022-10-11 | End: 2023-01-11

## 2022-10-11 NOTE — TELEPHONE ENCOUNTER
"Former patient of [facility] & has not established care with another provider.  Please assign refill request to covering provider per clinic standard process.    Routing refill request to provider for review/approval because:  Labs not current:  A1C  Patient needs to be seen because it has been more than 6 months since last office visit.  No PCP      Last Written Prescription Date:  7/11/22  Last Fill Quantity: 90,  # refills: 0   Last office visit provider:  3/10/22     Requested Prescriptions   Pending Prescriptions Disp Refills     glimepiride (AMARYL) 2 MG tablet [Pharmacy Med Name: Glimepiride 2 MG Oral Tablet] 90 tablet 0     Sig: TAKE 1 TABLET BY MOUTH IN THE MORNING BEFORE BREAKFAST       Sulfonylurea Agents Failed - 10/11/2022  8:46 AM        Failed - Patient has documented A1c within the specified period of time.     If HgbA1C is 8 or greater, it needs to be on file within the past 3 months.  If less than 8, must be on file within the past 6 months.     Recent Labs   Lab Test 03/10/22  0949   A1C 5.5             Failed - Recent (6 mo) or future (30 days) visit within the authorizing provider's specialty     Patient had office visit in the last 6 months or has a visit in the next 30 days with authorizing provider or within the authorizing provider's specialty.  See \"Patient Info\" tab in inbasket, or \"Choose Columns\" in Meds & Orders section of the refill encounter.            Passed - Medication is active on med list        Passed - Patient is age 18 or older        Passed - Patient has a recent creatinine (normal) within the past 12 mos.     Recent Labs   Lab Test 03/10/22  0949   CR 0.85       Ok to refill medication if creatinine is low               Jose Luis Wright RN 10/11/22 8:46 AM  "

## 2022-10-23 DIAGNOSIS — I10 ESSENTIAL HYPERTENSION, BENIGN: ICD-10-CM

## 2022-10-24 NOTE — TELEPHONE ENCOUNTER
"Routing refill request to provider for review/approval because:  A break in medication    Last Written Prescription Date:  2/1/21  Last Fill Quantity: 90,  # refills: 1   Last office visit provider:  3/10/22     Requested Prescriptions   Pending Prescriptions Disp Refills     atenolol (TENORMIN) 50 MG tablet [Pharmacy Med Name: Atenolol 50 MG Oral Tablet] 90 tablet 0     Sig: Take 1 tablet by mouth once daily       Beta-Blockers Protocol Passed - 10/23/2022  9:42 AM        Passed - Blood pressure under 140/90 in past 12 months     BP Readings from Last 3 Encounters:   03/10/22 130/76   09/30/20 (!) 150/80   01/22/20 126/86                 Passed - Patient is age 6 or older        Passed - Recent (12 mo) or future (30 days) visit within the authorizing provider's specialty     Patient has had an office visit with the authorizing provider or a provider within the authorizing providers department within the previous 12 mos or has a future within next 30 days. See \"Patient Info\" tab in inbasket, or \"Choose Columns\" in Meds & Orders section of the refill encounter.              Passed - Medication is active on med list             Maddy Madera RN 10/24/22 4:46 PM  "

## 2022-10-25 ENCOUNTER — MYC MEDICAL ADVICE (OUTPATIENT)
Dept: FAMILY MEDICINE | Facility: CLINIC | Age: 82
End: 2022-10-25

## 2022-10-25 RX ORDER — ATENOLOL 50 MG/1
TABLET ORAL
Qty: 90 TABLET | Refills: 0 | Status: SHIPPED | OUTPATIENT
Start: 2022-10-25 | End: 2023-01-20

## 2022-10-26 DIAGNOSIS — I10 ESSENTIAL HYPERTENSION, BENIGN: ICD-10-CM

## 2022-10-26 NOTE — TELEPHONE ENCOUNTER
[employer] message sent to patient. Refill sent in yesterday by Dr. Mcdowell.    Isidra Cooper RN on 10/26/2022 at 9:02 AM

## 2022-10-27 RX ORDER — ATENOLOL 50 MG/1
TABLET ORAL
Qty: 90 TABLET | Refills: 0 | OUTPATIENT
Start: 2022-10-27

## 2022-10-29 ENCOUNTER — HEALTH MAINTENANCE LETTER (OUTPATIENT)
Age: 82
End: 2022-10-29

## 2022-11-04 DIAGNOSIS — I48.91 NEW ONSET ATRIAL FIBRILLATION (H): ICD-10-CM

## 2022-11-05 ENCOUNTER — MYC MEDICAL ADVICE (OUTPATIENT)
Dept: FAMILY MEDICINE | Facility: CLINIC | Age: 82
End: 2022-11-05

## 2022-11-05 NOTE — TELEPHONE ENCOUNTER
Routing refill request to provider for review/approval because:  D[place] [facility] [place]     ?PCP    Last Written Prescription Date:  10/4/22  Last Fill Quantity: 60,  # refills: 0   Last office visit provider:  3/10/22     Requested Prescriptions   Pending Prescriptions Disp Refills     ELIQUIS ANTICOAGULANT 5 MG tablet [Pharmacy Med Name: Eliquis 5 MG Oral Tablet] 60 tablet 0     Sig: Take 1 tablet by mouth twice daily       Direct Oral Anticoagulant Agents Failed - 11/4/2022  9:38 AM        Failed - Normal Platelets on file in past 12 months     Recent Labs   Lab Test 09/30/20  1209                  Failed - Patient is 18-79 years of age        Failed - Recent (6 mo) or future (30 days) visit within the authorizing provider's specialty        Passed - Medication is active on med list        Passed - Serum creatinine less than or equal to 1.4 on file in past 12 mos     Recent Labs   Lab Test 03/10/22  0949   CR 0.85       Ok to refill medication if creatinine is low          Passed - Weight is greater than 60 kg for the past year     Wt Readings from Last 3 Encounters:   03/10/22 79.4 kg (175 lb)   09/30/20 82.5 kg (181 lb 14.4 oz)   01/22/20 80.2 kg (176 lb 12.8 oz)                  Alicia Dinero, RN 11/05/22 5:28 PM

## 2022-11-06 RX ORDER — APIXABAN 5 MG/1
TABLET, FILM COATED ORAL
Qty: 60 TABLET | Refills: 0 | Status: SHIPPED | OUTPATIENT
Start: 2022-11-06 | End: 2022-12-05

## 2022-11-07 NOTE — TELEPHONE ENCOUNTER
[employer] message sent to patient. Dr. Mcdowell sent refill on 11/06 for patient to pharmacy.    Isidra Cooper RN on 11/7/2022 at 9:14 AM

## 2022-12-05 ENCOUNTER — MYC MEDICAL ADVICE (OUTPATIENT)
Dept: FAMILY MEDICINE | Facility: CLINIC | Age: 82
End: 2022-12-05

## 2022-12-05 DIAGNOSIS — I48.91 NEW ONSET ATRIAL FIBRILLATION (H): ICD-10-CM

## 2022-12-06 RX ORDER — APIXABAN 5 MG/1
TABLET, FILM COATED ORAL
Qty: 60 TABLET | Refills: 0 | OUTPATIENT
Start: 2022-12-06

## 2022-12-19 ENCOUNTER — MYC MEDICAL ADVICE (OUTPATIENT)
Dept: FAMILY MEDICINE | Facility: CLINIC | Age: 82
End: 2022-12-19

## 2022-12-19 DIAGNOSIS — M1A.00X0 IDIOPATHIC CHRONIC GOUT WITHOUT TOPHUS, UNSPECIFIED SITE: ICD-10-CM

## 2022-12-19 RX ORDER — PROBENECID 500 MG/1
500 TABLET, FILM COATED ORAL 2 TIMES DAILY
Qty: 180 TABLET | Refills: 1 | Status: SHIPPED | OUTPATIENT
Start: 2022-12-19 | End: 2024-01-30

## 2022-12-20 RX ORDER — PROBENECID 500 MG/1
TABLET, FILM COATED ORAL
Qty: 180 TABLET | Refills: 0 | OUTPATIENT
Start: 2022-12-20

## 2022-12-20 NOTE — TELEPHONE ENCOUNTER
"Duplicate Refill request. Refill denied due to being a duplicate.         Requested Prescriptions   Pending Prescriptions Disp Refills     probenecid (BENEMID) 500 MG tablet [Pharmacy Med Name: Probenecid 500 MG Oral Tablet] 180 tablet 0     Sig: Take 1 tablet by mouth twice daily       Gout Agents Protocol Failed - 12/19/2022  9:58 AM        Failed - CBC on file in past 12 months     Recent Labs   Lab Test 09/30/20  1209   WBC 5.9   RBC 4.18*   HGB 13.9*   HCT 41.1                    Failed - Has Uric Acid on file in past 12 months and value is less than 6     Recent Labs   Lab Test 09/30/20  1209   URIC 6.8     If level is 6mg/dL or greater, ok to refill one time and refer to provider.           Passed - ALT on file in past 12 months     Recent Labs   Lab Test 03/10/22  0949   ALT 37             Passed - Recent (12 mo) or future (30 days) visit within the authorizing provider's specialty     Patient has had an office visit with the authorizing provider or a provider within the authorizing providers department within the previous 12 mos or has a future within next 30 days. See \"Patient Info\" tab in inbasket, or \"Choose Columns\" in Meds & Orders section of the refill encounter.              Passed - Medication is active on med list        Passed - Patient is age 18 or older        Passed - Normal serum creatinine on file in the past 12 months     Recent Labs   Lab Test 03/10/22  0949   CR 0.85       Ok to refill medication if creatinine is low               eNlly Fiore RN 12/20/22 2:19 PM  "

## 2022-12-23 ENCOUNTER — DOCUMENTATION ONLY (OUTPATIENT)
Dept: LAB | Facility: CLINIC | Age: 82
End: 2022-12-23

## 2022-12-23 NOTE — PROGRESS NOTES
Jabari RENETTA Haddad has an upcoming lab appointment.        Future Appointments   Date Time Provider Department Center   12/26/2022  8:45 AM [facility]       The appointment note says: fasting labs    There is no Lab order available.  Please review and place future orders as appropriate.  If no Lab order will be placed please advise patient.      Also for consideration: PO    Health Maintenance Due   Topic     ANNUAL REVIEW OF  ORDERS      A1C        Thanks,    Radha Jimenez

## 2022-12-26 ENCOUNTER — MYC MEDICAL ADVICE (OUTPATIENT)
Dept: FAMILY MEDICINE | Facility: CLINIC | Age: 82
End: 2022-12-26

## 2022-12-26 ENCOUNTER — APPOINTMENT (OUTPATIENT)
Dept: LAB | Facility: CLINIC | Age: 82
End: 2022-12-26
Payer: COMMERCIAL

## 2022-12-26 DIAGNOSIS — E11.69 TYPE 2 DIABETES MELLITUS WITH OTHER SPECIFIED COMPLICATION, WITHOUT LONG-TERM CURRENT USE OF INSULIN (H): Primary | ICD-10-CM

## 2022-12-26 NOTE — PROGRESS NOTES
I left a message to not come - stating Dr. Mcdowell would be in tomorrow and could put in lab results

## 2022-12-30 DIAGNOSIS — E78.00 HYPERCHOLESTEREMIA: ICD-10-CM

## 2022-12-31 RX ORDER — EZETIMIBE 10 MG/1
TABLET ORAL
Qty: 90 TABLET | Refills: 0 | Status: SHIPPED | OUTPATIENT
Start: 2022-12-31 | End: 2023-01-03

## 2023-01-01 ENCOUNTER — MYC MEDICAL ADVICE (OUTPATIENT)
Dept: FAMILY MEDICINE | Facility: CLINIC | Age: 83
End: 2023-01-01

## 2023-01-01 NOTE — TELEPHONE ENCOUNTER
"Last Written Prescription Date: 12/30/2021  Last Fill Quantity: 90,  # refills: 3  Last office visit provider:  3/10/2022 with Dr TREY Mcdowell     Requested Prescriptions   Pending Prescriptions Disp Refills     ezetimibe (ZETIA) 10 MG tablet [Pharmacy Med Name: Ezetimibe 10 MG Oral Tablet] 90 tablet 0     Sig: Take 1 tablet by mouth once daily       Antihyperlipidemic agents Passed - 12/30/2022 10:26 AM        Passed - Lipid panel on file in past 12 mos     Recent Labs   Lab Test 03/10/22  0949   CHOL 136   TRIG 64   HDL 65   LDL 58               Passed - Normal serum ALT on record in past 12 mos     Recent Labs   Lab Test 03/10/22  0949   ALT 37             Passed - Recent (12 mo) or future (30 days) visit within the authorizing provider's specialty     Patient has had an office visit with the authorizing provider or a provider within the authorizing providers department within the previous 12 mos or has a future within next 30 days. See \"Patient Info\" tab in inbasket, or \"Choose Columns\" in Meds & Orders section of the refill encounter.              Passed - Medication is active on med list        Passed - Patient is age 18 years or older             Analia Duval RN 12/31/22 8:16 PM  "

## 2023-01-03 ENCOUNTER — MYC REFILL (OUTPATIENT)
Dept: FAMILY MEDICINE | Facility: CLINIC | Age: 83
End: 2023-01-03

## 2023-01-03 DIAGNOSIS — I10 BENIGN ESSENTIAL HYPERTENSION: ICD-10-CM

## 2023-01-03 DIAGNOSIS — E78.00 HYPERCHOLESTEREMIA: ICD-10-CM

## 2023-01-03 RX ORDER — FUROSEMIDE 20 MG
20 TABLET ORAL DAILY
Qty: 90 TABLET | Refills: 0 | Status: SHIPPED | OUTPATIENT
Start: 2023-01-03 | End: 2023-04-04

## 2023-01-03 RX ORDER — EZETIMIBE 10 MG/1
10 TABLET ORAL DAILY
Qty: 90 TABLET | Refills: 0 | Status: SHIPPED | OUTPATIENT
Start: 2023-01-03 | End: 2023-06-27

## 2023-01-11 ENCOUNTER — MEDICAL CORRESPONDENCE (OUTPATIENT)
Dept: HEALTH INFORMATION MANAGEMENT | Facility: CLINIC | Age: 83
End: 2023-01-11

## 2023-01-11 ENCOUNTER — MYC MEDICAL ADVICE (OUTPATIENT)
Dept: FAMILY MEDICINE | Facility: CLINIC | Age: 83
End: 2023-01-11

## 2023-01-11 ENCOUNTER — TRANSFERRED RECORDS (OUTPATIENT)
Dept: HEALTH INFORMATION MANAGEMENT | Facility: CLINIC | Age: 83
End: 2023-01-11

## 2023-01-11 DIAGNOSIS — E11.69 TYPE 2 DIABETES MELLITUS WITH OTHER SPECIFIED COMPLICATION, WITHOUT LONG-TERM CURRENT USE OF INSULIN (H): ICD-10-CM

## 2023-01-11 RX ORDER — GLIMEPIRIDE 2 MG/1
2 TABLET ORAL
Qty: 90 TABLET | Refills: 0 | Status: SHIPPED | OUTPATIENT
Start: 2023-01-11 | End: 2023-04-10

## 2023-01-11 NOTE — TELEPHONE ENCOUNTER
Last clinic visit: 3/10/2022    Clinic visit frequency required: Q 6  months    Next clinic visit: N/A    Pending Prescriptions:                       Disp   Refills    glimepiride (AMARYL) 2 MG tablet          90 tab*0            Sig: Take 1 tablet (2 mg) by mouth every morning           (before breakfast)

## 2023-01-12 ENCOUNTER — TELEPHONE (OUTPATIENT)
Dept: FAMILY MEDICINE | Facility: CLINIC | Age: 83
End: 2023-01-12

## 2023-01-12 ENCOUNTER — LAB (OUTPATIENT)
Dept: LAB | Facility: CLINIC | Age: 83
End: 2023-01-12
Payer: COMMERCIAL

## 2023-01-12 DIAGNOSIS — Z12.5 PROSTATE CANCER SCREENING: ICD-10-CM

## 2023-01-12 DIAGNOSIS — E11.69 TYPE 2 DIABETES MELLITUS WITH OTHER SPECIFIED COMPLICATION, WITHOUT LONG-TERM CURRENT USE OF INSULIN (H): ICD-10-CM

## 2023-01-12 DIAGNOSIS — F52.8 OTHER SEXUAL DYSFUNCTION NOT DUE TO A SUBSTANCE OR KNOWN PHYSIOLOGICAL CONDITION: Primary | ICD-10-CM

## 2023-01-12 DIAGNOSIS — F52.8 OTHER SEXUAL DYSFUNCTION NOT DUE TO A SUBSTANCE OR KNOWN PHYSIOLOGICAL CONDITION: ICD-10-CM

## 2023-01-12 LAB
ALBUMIN SERPL BCG-MCNC: 4.1 G/DL (ref 3.5–5.2)
ALBUMIN UR-MCNC: NEGATIVE MG/DL
ALP SERPL-CCNC: 81 U/L (ref 40–129)
ALT SERPL W P-5'-P-CCNC: 45 U/L (ref 10–50)
ANION GAP SERPL CALCULATED.3IONS-SCNC: 13 MMOL/L (ref 7–15)
APPEARANCE UR: CLEAR
AST SERPL W P-5'-P-CCNC: 45 U/L (ref 10–50)
BILIRUB SERPL-MCNC: 0.5 MG/DL
BILIRUB UR QL STRIP: NEGATIVE
BUN SERPL-MCNC: 13 MG/DL (ref 8–23)
CALCIUM SERPL-MCNC: 9.3 MG/DL (ref 8.8–10.2)
CHLORIDE SERPL-SCNC: 95 MMOL/L (ref 98–107)
COLOR UR AUTO: YELLOW
CREAT SERPL-MCNC: 0.92 MG/DL (ref 0.67–1.17)
DEPRECATED HCO3 PLAS-SCNC: 22 MMOL/L (ref 22–29)
GFR SERPL CREATININE-BSD FRML MDRD: 84 ML/MIN/1.73M2
GLUCOSE SERPL-MCNC: 188 MG/DL (ref 70–99)
GLUCOSE UR STRIP-MCNC: NEGATIVE MG/DL
HBA1C MFR BLD: 5.6 % (ref 0–5.6)
HGB UR QL STRIP: NEGATIVE
KETONES UR STRIP-MCNC: NEGATIVE MG/DL
LEUKOCYTE ESTERASE UR QL STRIP: NEGATIVE
NITRATE UR QL: NEGATIVE
PH UR STRIP: 5.5 [PH] (ref 5–8)
POTASSIUM SERPL-SCNC: 4.4 MMOL/L (ref 3.4–5.3)
PROT SERPL-MCNC: 7.1 G/DL (ref 6.4–8.3)
PSA SERPL-MCNC: 1.37 NG/ML
SODIUM SERPL-SCNC: 130 MMOL/L (ref 136–145)
SP GR UR STRIP: 1.01 (ref 1–1.03)
UROBILINOGEN UR STRIP-ACNC: 0.2 E.U./DL

## 2023-01-12 PROCEDURE — G0103 PSA SCREENING: HCPCS

## 2023-01-12 PROCEDURE — 84270 ASSAY OF SEX HORMONE GLOBUL: CPT

## 2023-01-12 PROCEDURE — 36415 COLL VENOUS BLD VENIPUNCTURE: CPT

## 2023-01-12 PROCEDURE — 83036 HEMOGLOBIN GLYCOSYLATED A1C: CPT

## 2023-01-12 PROCEDURE — 81003 URINALYSIS AUTO W/O SCOPE: CPT

## 2023-01-12 PROCEDURE — 80053 COMPREHEN METABOLIC PANEL: CPT

## 2023-01-12 PROCEDURE — 84403 ASSAY OF TOTAL TESTOSTERONE: CPT

## 2023-01-12 NOTE — TELEPHONE ENCOUNTER
Pt is scheduled for labs this afternoon at 12:45. Dr. Macdonald at [facility] was supposed to send orders; nothing received so far. Pt is supposed to have testosterone, PSA, and A1c ordered. Please order appropriate labs.

## 2023-01-13 LAB — SHBG SERPL-SCNC: 41 NMOL/L (ref 11–80)

## 2023-01-19 DIAGNOSIS — I10 ESSENTIAL HYPERTENSION, BENIGN: ICD-10-CM

## 2023-01-20 RX ORDER — ATENOLOL 50 MG/1
TABLET ORAL
Qty: 90 TABLET | Refills: 0 | Status: SHIPPED | OUTPATIENT
Start: 2023-01-20 | End: 2023-04-21

## 2023-01-21 NOTE — TELEPHONE ENCOUNTER
"Last Written Prescription Date:  10/25/22  Last Fill Quantity: 90,  # refills: 0   Last office visit provider:  3/10/22     Requested Prescriptions   Pending Prescriptions Disp Refills     atenolol (TENORMIN) 50 MG tablet [Pharmacy Med Name: Atenolol 50 MG Oral Tablet] 90 tablet 0     Sig: Take 1 tablet by mouth once daily       Beta-Blockers Protocol Passed - 1/19/2023  2:56 PM        Passed - Blood pressure under 140/90 in past 12 months     BP Readings from Last 3 Encounters:   03/10/22 130/76   09/30/20 (!) 150/80   01/22/20 126/86                 Passed - Patient is age 6 or older        Passed - Recent (12 mo) or future (30 days) visit within the authorizing provider's specialty     Patient has had an office visit with the authorizing provider or a provider within the authorizing providers department within the previous 12 mos or has a future within next 30 days. See \"Patient Info\" tab in inbasket, or \"Choose Columns\" in Meds & Orders section of the refill encounter.              Passed - Medication is active on med list             Alicia Dinero RN 01/20/23 7:22 PM  "

## 2023-01-22 LAB
TESTOST FREE SERPL-MCNC: 1.1 NG/DL
TESTOST SERPL-MCNC: 69 NG/DL (ref 240–950)

## 2023-03-16 DIAGNOSIS — I10 PRIMARY HYPERTENSION: ICD-10-CM

## 2023-03-16 RX ORDER — LISINOPRIL 40 MG/1
TABLET ORAL
Qty: 90 TABLET | Refills: 0 | Status: SHIPPED | OUTPATIENT
Start: 2023-03-16 | End: 2023-09-08

## 2023-03-24 ENCOUNTER — TELEPHONE (OUTPATIENT)
Dept: FAMILY MEDICINE | Facility: CLINIC | Age: 83
End: 2023-03-24

## 2023-03-24 NOTE — TELEPHONE ENCOUNTER
Please watch for lab orders from Dr. Murphy office for patient to check his PSA levels. Pt has appointment on Monday with Lab.

## 2023-03-27 ENCOUNTER — LAB (OUTPATIENT)
Dept: LAB | Facility: CLINIC | Age: 83
End: 2023-03-27
Payer: COMMERCIAL

## 2023-03-27 DIAGNOSIS — C61 MALIGNANT NEOPLASM OF PROSTATE (H): Primary | ICD-10-CM

## 2023-03-27 LAB — PSA SERPL DL<=0.01 NG/ML-MCNC: 0.8 NG/ML

## 2023-03-27 PROCEDURE — 36415 COLL VENOUS BLD VENIPUNCTURE: CPT

## 2023-03-27 PROCEDURE — 84403 ASSAY OF TOTAL TESTOSTERONE: CPT

## 2023-03-27 PROCEDURE — 84153 ASSAY OF PSA TOTAL: CPT

## 2023-03-29 LAB — TESTOST SERPL-MCNC: 10 NG/DL (ref 240–950)

## 2023-03-30 ENCOUNTER — MYC MEDICAL ADVICE (OUTPATIENT)
Dept: FAMILY MEDICINE | Facility: CLINIC | Age: 83
End: 2023-03-30
Payer: COMMERCIAL

## 2023-03-30 DIAGNOSIS — F41.1 GENERALIZED ANXIETY DISORDER: ICD-10-CM

## 2023-03-30 RX ORDER — ESCITALOPRAM OXALATE 20 MG/1
TABLET ORAL
Qty: 30 TABLET | Refills: 3 | Status: SHIPPED | OUTPATIENT
Start: 2023-03-30 | End: 2024-02-26

## 2023-04-03 DIAGNOSIS — I10 BENIGN ESSENTIAL HYPERTENSION: ICD-10-CM

## 2023-04-03 NOTE — TELEPHONE ENCOUNTER
Routing refill request to provider for review/approval because:  Labs out of range:    Sodium   Date Value Ref Range Status   01/12/2023 130 (L) 136 - 145 mmol/L Final     Patient needs to be seen because it has been more than 1 year since last office visit.    No BP readings in >12 months   BP Readings from Last 3 Encounters:   03/10/22 130/76   09/30/20 (!) 150/80   01/22/20 126/86      Pending Prescriptions:                       Disp   Refills    furosemide (LASIX) 20 MG tablet [Pharmacy *90 tab*0        Sig: Take 1 tablet by mouth once daily    Last Written Prescription Date:  1/3/23  Last Fill Quantity: 90,  # refills: 0   Last office visitwith prescribing provider: 3/10/2022   Future Office Visit: No appointments scheduled       TAMMIE Diaz, RN  [facility]

## 2023-04-04 ENCOUNTER — MYC MEDICAL ADVICE (OUTPATIENT)
Dept: FAMILY MEDICINE | Facility: CLINIC | Age: 83
End: 2023-04-04
Payer: COMMERCIAL

## 2023-04-04 DIAGNOSIS — I10 BENIGN ESSENTIAL HYPERTENSION: ICD-10-CM

## 2023-04-04 DIAGNOSIS — I48.91 NEW ONSET ATRIAL FIBRILLATION (H): ICD-10-CM

## 2023-04-04 RX ORDER — FUROSEMIDE 20 MG
20 TABLET ORAL DAILY
Qty: 90 TABLET | Refills: 0 | Status: SHIPPED | OUTPATIENT
Start: 2023-04-04 | End: 2023-10-19

## 2023-04-04 RX ORDER — FUROSEMIDE 20 MG
TABLET ORAL
Qty: 90 TABLET | Refills: 0 | Status: SHIPPED | OUTPATIENT
Start: 2023-04-04 | End: 2023-04-04

## 2023-04-08 ENCOUNTER — MYC MEDICAL ADVICE (OUTPATIENT)
Dept: FAMILY MEDICINE | Facility: CLINIC | Age: 83
End: 2023-04-08
Payer: COMMERCIAL

## 2023-04-08 DIAGNOSIS — E11.69 TYPE 2 DIABETES MELLITUS WITH OTHER SPECIFIED COMPLICATION, WITHOUT LONG-TERM CURRENT USE OF INSULIN (H): ICD-10-CM

## 2023-04-10 RX ORDER — GLIMEPIRIDE 2 MG/1
2 TABLET ORAL
Qty: 90 TABLET | Refills: 0 | Status: SHIPPED | OUTPATIENT
Start: 2023-04-10 | End: 2023-11-08

## 2023-04-10 NOTE — TELEPHONE ENCOUNTER
Routing refill request to provider for review/approval because:  Patient needs to be seen because:  >6 months since last office visit  Pending Prescriptions:                       Disp   Refills    glimepiride (AMARYL) 2 MG tablet           90 tab*0        Sig: Take 1 tablet (2 mg) by mouth every morning (before           breakfast)    Last Written Prescription Date:  1/11/23  Last Fill Quantity: 90,  # refills: 0   Last office visitwith prescribing provider: 3/10/2022   Future Office Visit: No visits scheduled      TAMMIE Diaz, RN  [facility]

## 2023-04-14 DIAGNOSIS — I10 ESSENTIAL HYPERTENSION, BENIGN: ICD-10-CM

## 2023-04-14 DIAGNOSIS — I25.10 CARDIOVASCULAR DISEASE: ICD-10-CM

## 2023-04-15 NOTE — TELEPHONE ENCOUNTER
"Routing refill request to provider for review/approval because:  Patient needs to be seen because it has been more than 1 year since last office visit.  Blood pressure outside of protocol parameters    Last Written Prescription Date:  7/20/2022  Last Fill Quantity: 90,  # refills: 2   Last office visit provider:  3/10/2022     Requested Prescriptions   Pending Prescriptions Disp Refills     amLODIPine (NORVASC) 10 MG tablet [Pharmacy Med Name: amLODIPine Besylate 10 MG Oral Tablet] 90 tablet 0     Sig: Take 1 tablet by mouth once daily       Calcium Channel Blockers Protocol  Failed - 4/14/2023  9:27 AM        Failed - Blood pressure under 140/90 in past 12 months     BP Readings from Last 3 Encounters:   03/10/22 130/76   09/30/20 (!) 150/80   01/22/20 126/86                 Failed - Recent (12 mo) or future (30 days) visit within the authorizing provider's specialty     Patient has had an office visit with the authorizing provider or a provider within the authorizing providers department within the previous 12 mos or has a future within next 30 days. See \"Patient Info\" tab in inbasket, or \"Choose Columns\" in Meds & Orders section of the refill encounter.              Passed - Medication is active on med list        Passed - Patient is age 18 or older        Passed - Normal serum creatinine on file in past 12 months     Recent Labs   Lab Test 01/12/23  1301   CR 0.92       Ok to refill medication if creatinine is low               Mia Young RN 04/15/23 7:32 AM      "

## 2023-04-16 ENCOUNTER — MYC MEDICAL ADVICE (OUTPATIENT)
Dept: FAMILY MEDICINE | Facility: CLINIC | Age: 83
End: 2023-04-16
Payer: COMMERCIAL

## 2023-04-18 RX ORDER — AMLODIPINE BESYLATE 10 MG/1
TABLET ORAL
Qty: 90 TABLET | Refills: 0 | Status: SHIPPED | OUTPATIENT
Start: 2023-04-18 | End: 2023-07-14

## 2023-04-20 DIAGNOSIS — I10 ESSENTIAL HYPERTENSION, BENIGN: ICD-10-CM

## 2023-04-21 RX ORDER — ATENOLOL 50 MG/1
TABLET ORAL
Qty: 90 TABLET | Refills: 0 | OUTPATIENT
Start: 2023-04-21

## 2023-04-21 NOTE — TELEPHONE ENCOUNTER
"Former patient of Mike Mcdowell & has not established care with another provider.  Please assign refill request to covering provider per clinic standard process.        Routing refill request to provider for review/approval because:  Labs not current:  BP  Patient needs to be seen because it has been more than 1 year since last office visit.    Last Written Prescription Date:  1/20/2023  Last Fill Quantity: 90,  # refills: 0   Last office visit provider:  3/10/2022     Requested Prescriptions   Pending Prescriptions Disp Refills     atenolol (TENORMIN) 50 MG tablet [Pharmacy Med Name: Atenolol 50 MG Oral Tablet] 90 tablet 0     Sig: Take 1 tablet by mouth once daily       Beta-Blockers Protocol Failed - 4/20/2023 11:18 AM        Failed - Blood pressure under 140/90 in past 12 months     BP Readings from Last 3 Encounters:   03/10/22 130/76   09/30/20 (!) 150/80   01/22/20 126/86                 Failed - Recent (12 mo) or future (30 days) visit within the authorizing provider's specialty     Patient has had an office visit with the authorizing provider or a provider within the authorizing providers department within the previous 12 mos or has a future within next 30 days. See \"Patient Info\" tab in inbasket, or \"Choose Columns\" in Meds & Orders section of the refill encounter.              Passed - Patient is age 6 or older        Passed - Medication is active on med list             Annie Jaimes RN 04/21/23 12:32 AM  "

## 2023-04-21 NOTE — TELEPHONE ENCOUNTER
This refill request is a duplicate request, previously received or sent.  Sent denial notification to pharmacy.    CAREY DiazN, RN  [facility]

## 2023-04-21 NOTE — TELEPHONE ENCOUNTER
Duplicate encounter. Please refuse this prescription. Medication refill sent to provider. See my chart encounter below.

## 2023-05-27 DIAGNOSIS — K21.9 GASTROESOPHAGEAL REFLUX DISEASE, UNSPECIFIED WHETHER ESOPHAGITIS PRESENT: ICD-10-CM

## 2023-05-28 NOTE — TELEPHONE ENCOUNTER
"Routing refill request to provider for review/approval because:  Patient needs to be seen because it has been more than 1 year since last office visit.    Last Written Prescription Date:  2/4/22  Last Fill Quantity: 180,  # refills: 3   Last office visit provider:  3/10/22     Requested Prescriptions   Pending Prescriptions Disp Refills     omeprazole (PRILOSEC) 40 MG DR capsule [Pharmacy Med Name: Omeprazole 40 MG Oral Capsule Delayed Release] 180 capsule 0     Sig: Take 1 capsule by mouth twice daily       PPI Protocol Failed - 5/27/2023 10:07 AM        Failed - Recent (12 mo) or future (30 days) visit within the authorizing provider's specialty     Patient has had an office visit with the authorizing provider or a provider within the authorizing providers department within the previous 12 mos or has a future within next 30 days. See \"Patient Info\" tab in inbasket, or \"Choose Columns\" in Meds & Orders section of the refill encounter.              Passed - Not on Clopidogrel (unless Pantoprazole ordered)        Passed - No diagnosis of osteoporosis on record        Passed - Medication is active on med list        Passed - Patient is age 18 or older             Alicia Dinero, RN 05/28/23 11:51 AM  "

## 2023-05-29 DIAGNOSIS — E78.2 MIXED HYPERLIPIDEMIA: ICD-10-CM

## 2023-05-29 RX ORDER — OMEPRAZOLE 40 MG/1
CAPSULE, DELAYED RELEASE ORAL
Qty: 180 CAPSULE | Refills: 0 | Status: SHIPPED | OUTPATIENT
Start: 2023-05-29 | End: 2023-11-28

## 2023-05-29 NOTE — TELEPHONE ENCOUNTER
"Routing refill request to provider for review/approval because:  Labs not current:  LDL  Patient needs to be seen because it has been more than 1 year since last office visit.    Last Written Prescription Date:  2/27/2023  Last Fill Quantity: 90,  # refills: 0   Last office visit provider:  3/10/2022     Requested Prescriptions   Pending Prescriptions Disp Refills     simvastatin (ZOCOR) 40 MG tablet [Pharmacy Med Name: Simvastatin 40 MG Oral Tablet] 90 tablet 0     Sig: Take 1 tablet by mouth once daily       Statins Protocol Failed - 5/29/2023  1:07 PM        Failed - LDL on file in past 12 months     Recent Labs   Lab Test 03/10/22  0949   LDL 58             Failed - Recent (12 mo) or future (30 days) visit within the authorizing provider's specialty     Patient has had an office visit with the authorizing provider or a provider within the authorizing providers department within the previous 12 mos or has a future within next 30 days. See \"Patient Info\" tab in inbasket, or \"Choose Columns\" in Meds & Orders section of the refill encounter.              Passed - No abnormal creatine kinase in past 12 months     No lab results found.             Passed - Medication is active on med list        Passed - Patient is age 18 or older             May Arriaga RN 05/29/23 2:50 PM  "

## 2023-05-30 RX ORDER — SIMVASTATIN 40 MG
TABLET ORAL
Qty: 90 TABLET | Refills: 0 | Status: SHIPPED | OUTPATIENT
Start: 2023-05-30 | End: 2023-09-26

## 2023-06-01 ENCOUNTER — HEALTH MAINTENANCE LETTER (OUTPATIENT)
Age: 83
End: 2023-06-01

## 2023-06-19 ENCOUNTER — LAB (OUTPATIENT)
Dept: LAB | Facility: CLINIC | Age: 83
End: 2023-06-19
Payer: COMMERCIAL

## 2023-06-19 DIAGNOSIS — R97.20 ABNORMAL PSA: Primary | ICD-10-CM

## 2023-06-19 DIAGNOSIS — R97.20 ABNORMAL PSA: ICD-10-CM

## 2023-06-19 PROCEDURE — 84153 ASSAY OF PSA TOTAL: CPT

## 2023-06-19 PROCEDURE — 36415 COLL VENOUS BLD VENIPUNCTURE: CPT

## 2023-06-20 LAB — PSA SERPL DL<=0.01 NG/ML-MCNC: 0.75 NG/ML

## 2023-06-26 ENCOUNTER — TELEPHONE (OUTPATIENT)
Dept: PHARMACY | Facility: CLINIC | Age: 83
End: 2023-06-26
Payer: COMMERCIAL

## 2023-06-26 DIAGNOSIS — E78.00 HYPERCHOLESTEREMIA: ICD-10-CM

## 2023-06-26 NOTE — TELEPHONE ENCOUNTER
Routing refill request to provider for review/approval because:  Labs not current:    Recent Labs   Lab Test 03/10/22  0949 01/22/20  1028   CHOL 136 124   HDL 65 66   LDL 58 46   TRIG 64 59      Patient has not been seen by a provider in the last 12 months.    Pending Prescriptions:                       Disp   Refills    ezetimibe (ZETIA) 10 MG tablet [Pharmacy M*90 tab*0        Sig: Take 1 tablet by mouth once daily    Last Written Prescription Date:  3/10/2022  Last Fill Quantity: 90 tabs,  # refills: 0   Last office visit with prescribing provider: 3/10/2022   Future Office Visit: None scheduled    Cheri Sung RN, BSN  [facility]

## 2023-06-26 NOTE — TELEPHONE ENCOUNTER
MTM referral from: Patient's insurance     MTM referral outreach attempt #1on June 26, 2023     Outcome:  Called patient twice, and he did not pick me up, LVM X 1. Will try on a later date.     Brittany Jones, PharmD     Medication Therapy Management (MTM) Pharmacist     [phone]     [email or web address]

## 2023-06-27 RX ORDER — EZETIMIBE 10 MG/1
TABLET ORAL
Qty: 90 TABLET | Refills: 0 | Status: SHIPPED | OUTPATIENT
Start: 2023-06-27 | End: 2023-11-06

## 2023-07-06 ENCOUNTER — TRANSFERRED RECORDS (OUTPATIENT)
Dept: HEALTH INFORMATION MANAGEMENT | Facility: CLINIC | Age: 83
End: 2023-07-06
Payer: COMMERCIAL

## 2023-07-07 NOTE — TELEPHONE ENCOUNTER
Patient was referred for an MTM appointment by their insurance plan.  Calling patient and left a voicemail to see if they had time to review their medications today, or if we could schedule an appointment.  Appointment would be a general review of their medications to make sure they are working well, don't have any serious interactions/aren't causing side effects, and if there are ways to simplify them, make them easier to take, or more affordable.  Patient can call [phone] to schedule an appointment with me, or another MTM pharmacist.    Stephanie Alan, Pharm.D.  Medication Therapy Management Pharmacist  [facility]

## 2023-07-13 DIAGNOSIS — I25.10 CARDIOVASCULAR DISEASE: ICD-10-CM

## 2023-07-13 DIAGNOSIS — I10 ESSENTIAL HYPERTENSION, BENIGN: ICD-10-CM

## 2023-07-13 NOTE — TELEPHONE ENCOUNTER
"Routing refill request to provider for review/approval because:  Labs not current  Patient needs to be seen because it has been more than 1 year since last office visit.    Last Written Prescription Date:  4/18/2023  Last Fill Quantity: 90,  # refills: 0   Last office visit provider:  3/10/2022    Requested Prescriptions   Pending Prescriptions Disp Refills     amLODIPine (NORVASC) 10 MG tablet [Pharmacy Med Name: amLODIPine Besylate 10 MG Oral Tablet] 90 tablet 0     Sig: Take 1 tablet by mouth once daily       Calcium Channel Blockers Protocol  Failed - 7/13/2023  8:33 AM        Failed - Blood pressure under 140/90 in past 12 months     BP Readings from Last 3 Encounters:   03/10/22 130/76   09/30/20 (!) 150/80   01/22/20 126/86                 Failed - Recent (12 mo) or future (30 days) visit within the authorizing provider's specialty     Patient has had an office visit with the authorizing provider or a provider within the authorizing providers department within the previous 12 mos or has a future within next 30 days. See \"Patient Info\" tab in inbasket, or \"Choose Columns\" in Meds & Orders section of the refill encounter.              Passed - Medication is active on med list        Passed - Patient is age 18 or older        Passed - Normal serum creatinine on file in past 12 months     Recent Labs   Lab Test 01/12/23  1301   CR 0.92       Ok to refill medication if creatinine is low               Nelly Fiore RN 07/13/23 2:44 PM  "

## 2023-07-14 RX ORDER — AMLODIPINE BESYLATE 10 MG/1
TABLET ORAL
Qty: 90 TABLET | Refills: 0 | Status: SHIPPED | OUTPATIENT
Start: 2023-07-14 | End: 2023-11-02

## 2023-07-31 ENCOUNTER — TELEPHONE (OUTPATIENT)
Dept: GERIATRICS | Facility: CLINIC | Age: 83
End: 2023-07-31
Payer: COMMERCIAL

## 2023-07-31 DIAGNOSIS — R52 PAIN: Primary | ICD-10-CM

## 2023-07-31 PROBLEM — F10.90 ALCOHOL USE, UNSPECIFIED, UNCOMPLICATED: Status: ACTIVE | Noted: 2023-07-27

## 2023-07-31 PROBLEM — R97.20 HIGH PROSTATE SPECIFIC ANTIGEN (PSA): Status: ACTIVE | Noted: 2022-05-04

## 2023-07-31 PROBLEM — S22.080A COMPRESSION FRACTURE OF T12 VERTEBRA (H): Status: ACTIVE | Noted: 2023-07-25

## 2023-07-31 PROBLEM — F32.A DEPRESSIVE DISORDER: Status: ACTIVE | Noted: 2022-05-04

## 2023-07-31 PROBLEM — C61 PRIMARY MALIGNANT NEOPLASM OF PROSTATE (H): Status: ACTIVE | Noted: 2022-06-27
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
## 2023-07-31 PROBLEM — S32.82XA: Status: ACTIVE | Noted: 2023-07-24

## 2023-07-31 PROBLEM — R45.89 DEPRESSED MOOD: Status: ACTIVE | Noted: 2023-07-31

## 2023-07-31 RX ORDER — OXYCODONE HYDROCHLORIDE 5 MG/1
TABLET ORAL
Qty: 12 TABLET | Refills: 0 | Status: SHIPPED | OUTPATIENT
Start: 2023-07-31 | End: 2023-08-01

## 2023-07-31 NOTE — PROGRESS NOTES
[facility] GERIATRIC SERVICES    Code Status:  FULL CODE   Visit Type:   Chief Complaint   Patient presents with    TCU Admission     [facility] 7/24/2023 - 7/31/2023     Facility:  [facility] ([facility]) [[id number]]           HPI: Jabari Haddad is a 82 year old male who I am seeing today for follow up on the TCU. Pt recently hospitalized on 7/24/23.  Patient presented with a fall.  Past medical history includes A-fib on chronic Eliquis, hypertension, depression, hyperlipidemia and GERD.  Patient fell from a ladder of 12 feet.  Patient found to have a closed head injury without ICH.  He had a displaced right superior Rami fracture, displaced right inferior Rami fracture and right sacral abel fracture with blood in the right hemipelvis.  Patient also found to have a T11 fracture with 30% height loss.  Patient admitted to trauma surgery service and was seen by Ortho and neurosurgery with recommendations for operative management with ORIF of the pelvis and nonoperative management respectively.  Patient continues on Tylenol, methocarbamol and oxycodone for pain.  Hypertension controlled with amlodipine, lisinopril, Lasix and atenolol.  Depression on Wellbutrin and Lexapro.    Transitional Care Course: Today patient lying in bed.  He continues with pain in his pelvis.  He is moving very slowly.  He continues on Tylenol, methocarbamol and oxycodone for pain.  He denies any shortness of breath or chest pain.  He reports he is eating well.  Is having regular bowel movements.  No dizziness or headache.  He is chronically on anticoagulation with Eliquis.      Assessment/Plan:     Compression fracture of T12 vertebra with routine healing, subsequent encounter  -ASPEN brace out of bed.   -Pain control with Oxycodone and tylenol.   -Follow up with Neurosurgery in 6 weeks.     Closed head injury, subsequent encounter  Ok for ST to eval and treat     Inferior pubic ramus fracture, right, with routine healing, subsequent  encounter  Closed fracture of superior ramus of left pubis with routine healing, subsequent encounter  S/P ORIF (open reduction internal fixation) fracture  -Surgical bandage to remain in place until follow up with Ortho.   -WBAT  -Pain control with Oxycodone and Tylenol.   -Follow up with Ortho in 2 weeks.   -Ok for PT, OT to eval and treat.   -Follow up CBC.     Persistent atrial fibrillation (H)  -Rate controlled with atenolol.  -Chronically on AC with Apixaban.     Primary hypertension  -Continue PTA amlodipine, atenolol, lasix and lisinopril.   -Follow up BMP.     Type 2 diabetes mellitus with hyperosmolarity without coma, without long-term current use of insulin (H)  -glimepiride 2 mg daily.   -Consistent Carb diet.   -BS checks         Active Ambulatory Problems     Diagnosis Date Noted    Gout     Hemorrhoids     Esophageal Reflux     Osteoarthritis Of The Wrist     DM2 (diabetes mellitus, type 2) (H)     Hyperlipidemia LDL goal <100     Benign Essential Hypertension     Arteriosclerotic Cardiovascular Disease (ASCVD)     Diverticulitis Of Colon     Lethargy     Itching Of The Ears     Abdominal Pain In The Right Lower Belly (RLQ)     Male Erectile Disorder     Exposed To Dusts - Asbestos     Chronic Gout     Hyperkalemia     Pharyngitis     Generalized Osteoarthritis Of Multiple Sites     Pseudogout     Symmetric Polyarticular Inflammation     Pseudogout 03/16/2016    Inflammatory arthritis 03/16/2016    Persistent atrial fibrillation (H) 09/12/2019    Aortic root enlargement (H) 01/22/2020    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    Arteriosclerotic cardiovascular disease (ASCVD) 03/08/2022    Alcohol use, unspecified, uncomplicated 07/27/2023    Compression fracture of T12 vertebra (H) 07/25/2023    Multiple stable closed lateral compression fractures of pelvis (H) 07/24/2023    High prostate specific antigen (PSA) 05/04/2022    Fall 07/25/2023    Depressive disorder 05/04/2022    Depressed mood 07/31/2023     Primary malignant neoplasm of prostate (H) 06/27/2022     Resolved Ambulatory Problems     Diagnosis Date Noted    No Resolved Ambulatory Problems     No Additional Past Medical History     No Known Allergies    All Meds and Allergies reviewed in the record at the facility and is the most up-to-date.    Post Discharge Medication Reconciliation Status: discharge medications reconciled and changed, per note/orders  Current Outpatient Medications   Medication Sig    amLODIPine (NORVASC) 10 MG tablet Take 1 tablet by mouth once daily    apixaban ANTICOAGULANT (ELIQUIS ANTICOAGULANT) 5 MG tablet Take 1 tablet (5 mg) by mouth 2 times daily    atenolol (TENORMIN) 50 MG tablet Take 1 tablet (50 mg) by mouth daily    blood glucose test (RELION PRIME TEST STRIPS) strips [BLOOD GLUCOSE TEST (RELION PRIME TEST STRIPS) STRIPS] Use 1 each As Directed daily. Dispense brand per patient's insurance at pharmacy discretion.    buPROPion (WELLBUTRIN XL) 150 MG 24 hr tablet TAKE 1 TABLET BY MOUTH IN THE MORNING    escitalopram (LEXAPRO) 20 MG tablet [ESCITALOPRAM OXALATE (LEXAPRO) 20 MG TABLET] TK 1 T PO QAM    ezetimibe (ZETIA) 10 MG tablet Take 1 tablet by mouth once daily    fluocinonide (LIDEX) 0.05 % external solution     furosemide (LASIX) 20 MG tablet Take 1 tablet (20 mg) by mouth daily    glimepiride (AMARYL) 2 MG tablet Take 1 tablet (2 mg) by mouth every morning (before breakfast)    ketoconazole (NIZORAL) 2 % external cream     lisinopril (ZESTRIL) 40 MG tablet Take 1 tablet by mouth once daily    omeprazole (PRILOSEC) 40 MG DR capsule Take 1 capsule by mouth twice daily    probenecid (BENEMID) 500 MG tablet Take 1 tablet (500 mg) by mouth 2 times daily    simvastatin (ZOCOR) 40 MG tablet Take 1 tablet by mouth once daily    traZODone (DESYREL) 50 MG tablet [TRAZODONE (DESYREL) 50 MG TABLET] TK 1 TO 2 TS PO HS    triamcinolone (KENALOG) 0.1 % external cream     ULTIMA TEST STRIPS strips [ULTIMA TEST STRIPS STRIPS] USE ONE  STRIP TO CHECK GLUCOSE ONCE DAILY DX  E11.9     No current facility-administered medications for this visit.       REVIEW OF SYSTEMS:   10 point review of systems reviewed and pertinent positives in the HPI.     PHYSICAL EXAMINATION:  Physical Exam     Vital signs: BP 99/62   Pulse 62   Temp 97.3  F (36.3  C)   Resp 18   Wt 83.6 kg (184 lb 6.4 oz)   SpO2 97%   BMI 28.88 kg/m      General: Awake, Alert, oriented x3, lying in bed, appropriately, follows simple commands, conversant  HEENT:PERRLA, Pink conjunctiva, moist oral mucosa  NECK: Supple  CVS:  S1  S2, without murmur or gallop.   LUNG: Clear to auscultation, No wheezes, rales or rhonci.  BACK: No kyphosis of the thoracic spine  ABDOMEN: Soft, nontender to palpation, with positive bowel sounds  EXTREMITIES: Moves both upper and lower extremities with diffuse weakness of the LE, Unable to SLR on the right, trace pedal edema, no calf tenderness  SKIN: Warm and dry, no rashes or erythema noted  NEUROLOGIC: Intact, pulses palpable  PSYCHIATRIC: Flat affect.       Labs:  All labs reviewed in the nursing home record and Epic   @  Lab Results   Component Value Date    WBC 5.9 09/30/2020     Lab Results   Component Value Date    RBC 4.18 09/30/2020     Lab Results   Component Value Date    HGB 13.9 09/30/2020     Lab Results   Component Value Date    HCT 41.1 09/30/2020     Lab Results   Component Value Date    MCV 98 09/30/2020     Lab Results   Component Value Date    MCH 33.2 09/30/2020     Lab Results   Component Value Date    MCHC 33.7 09/30/2020     Lab Results   Component Value Date    RDW 11.9 09/30/2020     Lab Results   Component Value Date     09/30/2020        @Last Comprehensive Metabolic Panel:  Sodium   Date Value Ref Range Status   01/12/2023 130 (L) 136 - 145 mmol/L Final     Potassium   Date Value Ref Range Status   01/12/2023 4.4 3.4 - 5.3 mmol/L Final   03/10/2022 4.9 3.5 - 5.0 mmol/L Final     Chloride   Date Value Ref Range Status    01/12/2023 95 (L) 98 - 107 mmol/L Final   03/10/2022 97 (L) 98 - 107 mmol/L Final     Carbon Dioxide (CO2)   Date Value Ref Range Status   01/12/2023 22 22 - 29 mmol/L Final   03/10/2022 27 22 - 31 mmol/L Final     Anion Gap   Date Value Ref Range Status   01/12/2023 13 7 - 15 mmol/L Final   03/10/2022 11 5 - 18 mmol/L Final     Glucose   Date Value Ref Range Status   01/12/2023 188 (H) 70 - 99 mg/dL Final   03/10/2022 133 (H) 70 - 125 mg/dL Final     Urea Nitrogen   Date Value Ref Range Status   01/12/2023 13.0 8.0 - 23.0 mg/dL Final   03/10/2022 6 (L) 8 - 28 mg/dL Final     Creatinine   Date Value Ref Range Status   01/12/2023 0.92 0.67 - 1.17 mg/dL Final     GFR Estimate   Date Value Ref Range Status   01/12/2023 84 >60 mL/min/1.73m2 Final     Comment:     Effective December 21, 2021 eGFRcr in adults is calculated using the 2021 CKD-EPI creatinine equation which includes age and gender (Smitha et al., NEJM, DOI: 10.1056/OMNZmt8165140)   09/30/2020 >60 >60 mL/min/1.73m2 Final     Calcium   Date Value Ref Range Status   01/12/2023 9.3 8.8 - 10.2 mg/dL Final     45 minutes spent for this visit which included preparing for the visit, reviewing past medical records, reviewing imaging, laboratory, medications as well as time spent with patient including the nursing staff.     At the conclusion of the encounter I discussed  the results of all of the tests and the disposition with patient.   All questions were answered.  The patient acknowledged understanding and was involved in the decision making regarding the overall care plan.        This note has been dictated using voice recognition software. Any grammatical or context distortions are unintentional and inherent to the software    Electronically signed by: Carla Carvajal CNP

## 2023-08-01 ENCOUNTER — TRANSITIONAL CARE UNIT VISIT (OUTPATIENT)
Dept: GERIATRICS | Facility: CLINIC | Age: 83
End: 2023-08-01
Payer: COMMERCIAL

## 2023-08-01 VITALS
TEMPERATURE: 97.3 F | HEART RATE: 62 BPM | RESPIRATION RATE: 18 BRPM | WEIGHT: 184.4 LBS | BODY MASS INDEX: 28.88 KG/M2 | SYSTOLIC BLOOD PRESSURE: 99 MMHG | OXYGEN SATURATION: 97 % | DIASTOLIC BLOOD PRESSURE: 62 MMHG

## 2023-08-01 DIAGNOSIS — E11.00 TYPE 2 DIABETES MELLITUS WITH HYPEROSMOLARITY WITHOUT COMA, WITHOUT LONG-TERM CURRENT USE OF INSULIN (H): ICD-10-CM

## 2023-08-01 DIAGNOSIS — I10 PRIMARY HYPERTENSION: ICD-10-CM

## 2023-08-01 DIAGNOSIS — Z87.81 S/P ORIF (OPEN REDUCTION INTERNAL FIXATION) FRACTURE: ICD-10-CM

## 2023-08-01 DIAGNOSIS — S32.512D CLOSED FRACTURE OF SUPERIOR RAMUS OF LEFT PUBIS WITH ROUTINE HEALING, SUBSEQUENT ENCOUNTER: ICD-10-CM

## 2023-08-01 DIAGNOSIS — Z98.890 S/P ORIF (OPEN REDUCTION INTERNAL FIXATION) FRACTURE: ICD-10-CM

## 2023-08-01 DIAGNOSIS — S32.591D INFERIOR PUBIC RAMUS FRACTURE, RIGHT, WITH ROUTINE HEALING, SUBSEQUENT ENCOUNTER: ICD-10-CM

## 2023-08-01 DIAGNOSIS — I48.19 PERSISTENT ATRIAL FIBRILLATION (H): ICD-10-CM

## 2023-08-01 DIAGNOSIS — S09.90XD CLOSED HEAD INJURY, SUBSEQUENT ENCOUNTER: ICD-10-CM

## 2023-08-01 DIAGNOSIS — S22.080D COMPRESSION FRACTURE OF T12 VERTEBRA WITH ROUTINE HEALING, SUBSEQUENT ENCOUNTER: Primary | ICD-10-CM

## 2023-08-01 DIAGNOSIS — R52 PAIN: ICD-10-CM

## 2023-08-01 PROCEDURE — 99310 SBSQ NF CARE HIGH MDM 45: CPT | Performed by: NURSE PRACTITIONER

## 2023-08-01 RX ORDER — OXYCODONE HYDROCHLORIDE 5 MG/1
TABLET ORAL
Qty: 30 TABLET | Refills: 0 | Status: SHIPPED | OUTPATIENT
Start: 2023-08-01 | End: 2023-08-15

## 2023-08-01 NOTE — LETTER
8/1/2023        RE: Jabari Haddad  [address] [facility] [address]        [facility]    Code Status:  FULL CODE   Visit Type:   Chief Complaint   Patient presents with     TCU Admission     [facility] 7/24/2023 - 7/31/2023     Facility:  [facility] ([facility]) [[id number]]           HPI: Jabari Haddad is a 82 year old male who I am seeing today for follow up on the TCU. Pt recently hospitalized on 7/24/23.  Patient presented with a fall.  Past medical history includes A-fib on chronic Eliquis, hypertension, depression, hyperlipidemia and GERD.  Patient fell from a ladder of 12 feet.  Patient found to have a closed head injury without ICH.  He had a displaced right superior Rami fracture, displaced right inferior Rami fracture and right sacral abel fracture with blood in the right hemipelvis.  Patient also found to have a T11 fracture with 30% height loss.  Patient admitted to trauma surgery service and was seen by Ortho and neurosurgery with recommendations for operative management with ORIF of the pelvis and nonoperative management respectively.  Patient continues on Tylenol, methocarbamol and oxycodone for pain.  Hypertension controlled with amlodipine, lisinopril, Lasix and atenolol.  Depression on Wellbutrin and Lexapro.    Transitional Care Course: Today patient lying in bed.  He continues with pain in his pelvis.  He is moving very slowly.  He continues on Tylenol, methocarbamol and oxycodone for pain.  He denies any shortness of breath or chest pain.  He reports he is eating well.  Is having regular bowel movements.  No dizziness or headache.  He is chronically on anticoagulation with Eliquis.      Assessment/Plan:           Active Ambulatory Problems     Diagnosis Date Noted     Gout      Hemorrhoids      Esophageal Reflux      Osteoarthritis Of The Wrist      DM2 (diabetes mellitus, type 2) (H)      Hyperlipidemia LDL goal <100      Benign Essential Hypertension       Arteriosclerotic Cardiovascular Disease (ASCVD)      Diverticulitis Of Colon      Lethargy      Itching Of The Ears      Abdominal Pain In The Right Lower Belly (RLQ)      Male Erectile Disorder      Exposed To Dusts - Asbestos      Chronic Gout      Hyperkalemia      Pharyngitis      Generalized Osteoarthritis Of Multiple Sites      Pseudogout      Symmetric Polyarticular Inflammation      Pseudogout 03/16/2016     Inflammatory arthritis 03/16/2016     Persistent atrial fibrillation (H) 09/12/2019     Aortic root enlargement (H) 01/22/2020     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     Arteriosclerotic cardiovascular disease (ASCVD) 03/08/2022     Alcohol use, unspecified, uncomplicated 07/27/2023     Compression fracture of T12 vertebra (H) 07/25/2023     Multiple stable closed lateral compression fractures of pelvis (H) 07/24/2023     High prostate specific antigen (PSA) 05/04/2022     Fall 07/25/2023     Depressive disorder 05/04/2022     Depressed mood 07/31/2023     Primary malignant neoplasm of prostate (H) 06/27/2022     Resolved Ambulatory Problems     Diagnosis Date Noted     No Resolved Ambulatory Problems     No Additional Past Medical History     No Known Allergies    All Meds and Allergies reviewed in the record at the facility and is the most up-to-date.    Post Discharge Medication Reconciliation Status: discharge medications reconciled and changed, per note/orders  Current Outpatient Medications   Medication Sig     amLODIPine (NORVASC) 10 MG tablet Take 1 tablet by mouth once daily     apixaban ANTICOAGULANT (ELIQUIS ANTICOAGULANT) 5 MG tablet Take 1 tablet (5 mg) by mouth 2 times daily     atenolol (TENORMIN) 50 MG tablet Take 1 tablet (50 mg) by mouth daily     blood glucose test (RELION PRIME TEST STRIPS) strips [BLOOD GLUCOSE TEST (RELION PRIME TEST STRIPS) STRIPS] Use 1 each As Directed daily. Dispense brand per patient's insurance at pharmacy discretion.     buPROPion (WELLBUTRIN XL) 150 MG 24 hr  tablet TAKE 1 TABLET BY MOUTH IN THE MORNING     escitalopram (LEXAPRO) 20 MG tablet [ESCITALOPRAM OXALATE (LEXAPRO) 20 MG TABLET] TK 1 T PO QAM     ezetimibe (ZETIA) 10 MG tablet Take 1 tablet by mouth once daily     fluocinonide (LIDEX) 0.05 % external solution      furosemide (LASIX) 20 MG tablet Take 1 tablet (20 mg) by mouth daily     glimepiride (AMARYL) 2 MG tablet Take 1 tablet (2 mg) by mouth every morning (before breakfast)     ketoconazole (NIZORAL) 2 % external cream      lisinopril (ZESTRIL) 40 MG tablet Take 1 tablet by mouth once daily     omeprazole (PRILOSEC) 40 MG DR capsule Take 1 capsule by mouth twice daily     probenecid (BENEMID) 500 MG tablet Take 1 tablet (500 mg) by mouth 2 times daily     simvastatin (ZOCOR) 40 MG tablet Take 1 tablet by mouth once daily     traZODone (DESYREL) 50 MG tablet [TRAZODONE (DESYREL) 50 MG TABLET] TK 1 TO 2 TS PO HS     triamcinolone (KENALOG) 0.1 % external cream      ULTIMA TEST STRIPS strips [ULTIMA TEST STRIPS STRIPS] USE ONE STRIP TO CHECK GLUCOSE ONCE DAILY DX  E11.9     No current facility-administered medications for this visit.       REVIEW OF SYSTEMS:   10 point review of systems reviewed and pertinent positives in the HPI.     PHYSICAL EXAMINATION:  Physical Exam     Vital signs: BP 99/62   Pulse 62   Temp 97.3  F (36.3  C)   Resp 18   Wt 83.6 kg (184 lb 6.4 oz)   SpO2 97%   BMI 28.88 kg/m      General: Awake, Alert, oriented x3, lying in bed, appropriately, follows simple commands, conversant  HEENT:PERRLA, Pink conjunctiva, moist oral mucosa  NECK: Supple  CVS:  S1  S2, without murmur or gallop.   LUNG: Clear to auscultation, No wheezes, rales or rhonci.  BACK: No kyphosis of the thoracic spine  ABDOMEN: Soft, nontender to palpation, with positive bowel sounds  EXTREMITIES: Moves both upper and lower extremities with diffuse weakness of the LE, Unable to SLR on the right, trace pedal edema, no calf tenderness  SKIN: Warm and dry, no rashes or  erythema noted  NEUROLOGIC: Intact, pulses palpable  PSYCHIATRIC: Flat affect.       Labs:  All labs reviewed in the nursing home record and Epic   @  Lab Results   Component Value Date    WBC 5.9 09/30/2020     Lab Results   Component Value Date    RBC 4.18 09/30/2020     Lab Results   Component Value Date    HGB 13.9 09/30/2020     Lab Results   Component Value Date    HCT 41.1 09/30/2020     Lab Results   Component Value Date    MCV 98 09/30/2020     Lab Results   Component Value Date    MCH 33.2 09/30/2020     Lab Results   Component Value Date    MCHC 33.7 09/30/2020     Lab Results   Component Value Date    RDW 11.9 09/30/2020     Lab Results   Component Value Date     09/30/2020        @Last Comprehensive Metabolic Panel:  Sodium   Date Value Ref Range Status   01/12/2023 130 (L) 136 - 145 mmol/L Final     Potassium   Date Value Ref Range Status   01/12/2023 4.4 3.4 - 5.3 mmol/L Final   03/10/2022 4.9 3.5 - 5.0 mmol/L Final     Chloride   Date Value Ref Range Status   01/12/2023 95 (L) 98 - 107 mmol/L Final   03/10/2022 97 (L) 98 - 107 mmol/L Final     Carbon Dioxide (CO2)   Date Value Ref Range Status   01/12/2023 22 22 - 29 mmol/L Final   03/10/2022 27 22 - 31 mmol/L Final     Anion Gap   Date Value Ref Range Status   01/12/2023 13 7 - 15 mmol/L Final   03/10/2022 11 5 - 18 mmol/L Final     Glucose   Date Value Ref Range Status   01/12/2023 188 (H) 70 - 99 mg/dL Final   03/10/2022 133 (H) 70 - 125 mg/dL Final     Urea Nitrogen   Date Value Ref Range Status   01/12/2023 13.0 8.0 - 23.0 mg/dL Final   03/10/2022 6 (L) 8 - 28 mg/dL Final     Creatinine   Date Value Ref Range Status   01/12/2023 0.92 0.67 - 1.17 mg/dL Final     GFR Estimate   Date Value Ref Range Status   01/12/2023 84 >60 mL/min/1.73m2 Final     Comment:     Effective December 21, 2021 eGFRcr in adults is calculated using the 2021 CKD-EPI creatinine equation which includes age and gender ( et al., NEJM, DOI: 10.1056/SCMEjk1574914)    09/30/2020 >60 >60 mL/min/1.73m2 Final     Calcium   Date Value Ref Range Status   01/12/2023 9.3 8.8 - 10.2 mg/dL Final     45 minutes spent for this visit which included preparing for the visit, reviewing past medical records, reviewing imaging, laboratory, medications as well as time spent with patient including the nursing staff.     At the conclusion of the encounter I discussed  the results of all of the tests and the disposition with patient.   All questions were answered.  The patient acknowledged understanding and was involved in the decision making regarding the overall care plan.        This note has been dictated using voice recognition software. Any grammatical or context distortions are unintentional and inherent to the software    Electronically signed by: Carla Carvajal CNP       Sincerely,        Carla Carvajal NP

## 2023-08-02 ENCOUNTER — LAB REQUISITION (OUTPATIENT)
Dept: LAB | Facility: CLINIC | Age: 83
End: 2023-08-02
Payer: COMMERCIAL

## 2023-08-02 DIAGNOSIS — I10 ESSENTIAL (PRIMARY) HYPERTENSION: ICD-10-CM

## 2023-08-03 ENCOUNTER — TRANSITIONAL CARE UNIT VISIT (OUTPATIENT)
Dept: GERIATRICS | Facility: CLINIC | Age: 83
End: 2023-08-03
Payer: COMMERCIAL

## 2023-08-03 VITALS
OXYGEN SATURATION: 99 % | BODY MASS INDEX: 29.82 KG/M2 | SYSTOLIC BLOOD PRESSURE: 121 MMHG | DIASTOLIC BLOOD PRESSURE: 68 MMHG | WEIGHT: 190 LBS | TEMPERATURE: 97.6 F | HEART RATE: 80 BPM | HEIGHT: 67 IN | RESPIRATION RATE: 16 BRPM

## 2023-08-03 DIAGNOSIS — S22.080D COMPRESSION FRACTURE OF T12 VERTEBRA WITH ROUTINE HEALING, SUBSEQUENT ENCOUNTER: Primary | ICD-10-CM

## 2023-08-03 DIAGNOSIS — I48.19 PERSISTENT ATRIAL FIBRILLATION (H): ICD-10-CM

## 2023-08-03 DIAGNOSIS — I10 PRIMARY HYPERTENSION: ICD-10-CM

## 2023-08-03 DIAGNOSIS — E11.00 TYPE 2 DIABETES MELLITUS WITH HYPEROSMOLARITY WITHOUT COMA, WITHOUT LONG-TERM CURRENT USE OF INSULIN (H): ICD-10-CM

## 2023-08-03 DIAGNOSIS — S32.591D INFERIOR PUBIC RAMUS FRACTURE, RIGHT, WITH ROUTINE HEALING, SUBSEQUENT ENCOUNTER: ICD-10-CM

## 2023-08-03 DIAGNOSIS — S51.011D LACERATION OF RIGHT ELBOW, SUBSEQUENT ENCOUNTER: ICD-10-CM

## 2023-08-03 DIAGNOSIS — Z87.81 S/P ORIF (OPEN REDUCTION INTERNAL FIXATION) FRACTURE: ICD-10-CM

## 2023-08-03 DIAGNOSIS — S32.512D CLOSED FRACTURE OF SUPERIOR RAMUS OF LEFT PUBIS WITH ROUTINE HEALING, SUBSEQUENT ENCOUNTER: ICD-10-CM

## 2023-08-03 DIAGNOSIS — Z98.890 S/P ORIF (OPEN REDUCTION INTERNAL FIXATION) FRACTURE: ICD-10-CM

## 2023-08-03 DIAGNOSIS — S09.90XD CLOSED HEAD INJURY, SUBSEQUENT ENCOUNTER: ICD-10-CM

## 2023-08-03 LAB
ANION GAP SERPL CALCULATED.3IONS-SCNC: 10 MMOL/L (ref 7–15)
BUN SERPL-MCNC: 13.6 MG/DL (ref 8–23)
CALCIUM SERPL-MCNC: 8.1 MG/DL (ref 8.8–10.2)
CHLORIDE SERPL-SCNC: 99 MMOL/L (ref 98–107)
CREAT SERPL-MCNC: 0.72 MG/DL (ref 0.67–1.17)
DEPRECATED HCO3 PLAS-SCNC: 25 MMOL/L (ref 22–29)
ERYTHROCYTE [DISTWIDTH] IN BLOOD BY AUTOMATED COUNT: 15.8 % (ref 10–15)
GFR SERPL CREATININE-BSD FRML MDRD: >90 ML/MIN/1.73M2
GLUCOSE SERPL-MCNC: 85 MG/DL (ref 70–99)
HCT VFR BLD AUTO: 27.5 % (ref 40–53)
HGB BLD-MCNC: 8.3 G/DL (ref 13.3–17.7)
MCH RBC QN AUTO: 32.3 PG (ref 26.5–33)
MCHC RBC AUTO-ENTMCNC: 30.2 G/DL (ref 31.5–36.5)
MCV RBC AUTO: 107 FL (ref 78–100)
PLATELET # BLD AUTO: 220 10E3/UL (ref 150–450)
POTASSIUM SERPL-SCNC: 4 MMOL/L (ref 3.4–5.3)
RBC # BLD AUTO: 2.57 10E6/UL (ref 4.4–5.9)
SODIUM SERPL-SCNC: 134 MMOL/L (ref 136–145)
WBC # BLD AUTO: 7.6 10E3/UL (ref 4–11)

## 2023-08-03 PROCEDURE — 99309 SBSQ NF CARE MODERATE MDM 30: CPT | Performed by: NURSE PRACTITIONER

## 2023-08-03 PROCEDURE — 36415 COLL VENOUS BLD VENIPUNCTURE: CPT | Mod: ORL | Performed by: NURSE PRACTITIONER

## 2023-08-03 PROCEDURE — 80048 BASIC METABOLIC PNL TOTAL CA: CPT | Mod: ORL | Performed by: NURSE PRACTITIONER

## 2023-08-03 PROCEDURE — 85027 COMPLETE CBC AUTOMATED: CPT | Mod: ORL | Performed by: NURSE PRACTITIONER

## 2023-08-03 PROCEDURE — P9604 ONE-WAY ALLOW PRORATED TRIP: HCPCS | Mod: ORL | Performed by: NURSE PRACTITIONER

## 2023-08-03 RX ORDER — MUPIROCIN CALCIUM 20 MG/G
CREAM TOPICAL DAILY
COMMUNITY

## 2023-08-03 NOTE — LETTER
8/3/2023        RE: Jabari Haddad  [address] [facility] [address]        [facility]    Code Status:  FULL CODE   Visit Type:   Chief Complaint   Patient presents with     TCU Follow Up     Facility:  [facility] ([facility]) [[id number]]           HPI: Jabari Haddad is a 82 year old male who I am seeing today for follow up on the TCU. Pt recently hospitalized on 7/24/23.  Patient presented with a fall.  Past medical history includes A-fib on chronic Eliquis, hypertension, depression, hyperlipidemia and GERD.  Patient fell from a ladder of 12 feet.  Patient found to have a closed head injury without ICH.  He had a displaced right superior Rami fracture, displaced right inferior Rami fracture and right sacral abel fracture with blood in the right hemipelvis.  Patient also found to have a T11 fracture with 30% height loss.  Patient admitted to trauma surgery service and was seen by Ortho and neurosurgery with recommendations for operative management with ORIF of the pelvis and nonoperative management respectively.  Patient continues on Tylenol, methocarbamol and oxycodone for pain.  Hypertension controlled with amlodipine, lisinopril, Lasix and atenolol.  Depression on Wellbutrin and Lexapro.    Transitional Care Course: Today patient sitting up in wheelchair. He continues in ASPEN TLSO OOB. Pt with wounds to his right elbow and RLE. He has moderate greenish color drainage from his elbow. Area edematous and slightly red.  He continues with pain in his pelvis. Pain controlled with Tylenol, methocarbamol and oxycodone for pain.  He is having regular bowel movements.  No dizziness or headache.  He is chronically on anticoagulation with Eliquis.      Assessment/Plan:     Compression fracture of T12 vertebra with routine healing, subsequent encounter  -ASPEN brace out of bed.   -Pain control with Oxycodone and tylenol.   -Follow up with Neurosurgery in 6 weeks.     Closed head injury,  subsequent encounter  -ST following.     Lacerations to Right elbow  Laceration to RLL  -Apply bactroban and foam dressing daily.    Inferior pubic ramus fracture, right, with routine healing, subsequent encounter  Closed fracture of superior ramus of left pubis with routine healing, subsequent encounter  S/P ORIF (open reduction internal fixation) fracture  -Surgical bandage to remain in place until follow up with Ortho.   -WBAT  -Pain control with Oxycodone and Tylenol.   -Follow up with Ortho in 2 weeks.   -Follow up CBC with hgb of 8.3.   -Follow up hgb in 1 week.     Persistent atrial fibrillation (H)  -Rate controlled with atenolol.  -Chronically on AC with Apixaban.     Primary hypertension  -Continue PTA amlodipine, atenolol, lasix and lisinopril.   -Follow up BMP with Na+ 134.     Type 2 diabetes mellitus with hyperosmolarity without coma, without long-term current use of insulin (H)  -glimepiride 2 mg daily.   -Consistent Carb diet.   -BS checks         Active Ambulatory Problems     Diagnosis Date Noted     Gout      Hemorrhoids      Esophageal Reflux      Osteoarthritis Of The Wrist      DM2 (diabetes mellitus, type 2) (H)      Hyperlipidemia LDL goal <100      Benign Essential Hypertension      Arteriosclerotic Cardiovascular Disease (ASCVD)      Diverticulitis Of Colon      Lethargy      Itching Of The Ears      Abdominal Pain In The Right Lower Belly (RLQ)      Male Erectile Disorder      Exposed To Dusts - Asbestos      Chronic Gout      Hyperkalemia      Pharyngitis      Generalized Osteoarthritis Of Multiple Sites      Pseudogout      Symmetric Polyarticular Inflammation      Pseudogout 03/16/2016     Inflammatory arthritis 03/16/2016     Persistent atrial fibrillation (H) 09/12/2019     Aortic root enlargement (H) 01/22/2020     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     Arteriosclerotic cardiovascular disease (ASCVD) 03/08/2022     Alcohol use, unspecified, uncomplicated 07/27/2023     Compression  fracture of T12 vertebra (H) 07/25/2023     Multiple stable closed lateral compression fractures of pelvis (H) 07/24/2023     High prostate specific antigen (PSA) 05/04/2022     Fall 07/25/2023     Depressive disorder 05/04/2022     Depressed mood 07/31/2023     Primary malignant neoplasm of prostate (H) 06/27/2022     Resolved Ambulatory Problems     Diagnosis Date Noted     No Resolved Ambulatory Problems     No Additional Past Medical History     No Known Allergies    All Meds and Allergies reviewed in the record at the facility and is the most up-to-date.    Current Outpatient Medications   Medication Sig     mupirocin (BACTROBAN) 2 % external cream Apply topically daily Apply to Right elbow and right leg wounds.     amLODIPine (NORVASC) 10 MG tablet Take 1 tablet by mouth once daily     apixaban ANTICOAGULANT (ELIQUIS ANTICOAGULANT) 5 MG tablet Take 1 tablet (5 mg) by mouth 2 times daily     atenolol (TENORMIN) 50 MG tablet Take 1 tablet (50 mg) by mouth daily     blood glucose test (RELION PRIME TEST STRIPS) strips [BLOOD GLUCOSE TEST (RELION PRIME TEST STRIPS) STRIPS] Use 1 each As Directed daily. Dispense brand per patient's insurance at pharmacy discretion.     buPROPion (WELLBUTRIN XL) 150 MG 24 hr tablet TAKE 1 TABLET BY MOUTH IN THE MORNING     escitalopram (LEXAPRO) 20 MG tablet [ESCITALOPRAM OXALATE (LEXAPRO) 20 MG TABLET] TK 1 T PO QAM     ezetimibe (ZETIA) 10 MG tablet Take 1 tablet by mouth once daily     fluocinonide (LIDEX) 0.05 % external solution      furosemide (LASIX) 20 MG tablet Take 1 tablet (20 mg) by mouth daily     glimepiride (AMARYL) 2 MG tablet Take 1 tablet (2 mg) by mouth every morning (before breakfast)     ketoconazole (NIZORAL) 2 % external cream      lisinopril (ZESTRIL) 40 MG tablet Take 1 tablet by mouth once daily     omeprazole (PRILOSEC) 40 MG DR capsule Take 1 capsule by mouth twice daily     oxyCODONE (ROXICODONE) 5 MG tablet 2.5 - 5 mg q 4 hr prn     probenecid (BENEMID)  "500 MG tablet Take 1 tablet (500 mg) by mouth 2 times daily     simvastatin (ZOCOR) 40 MG tablet Take 1 tablet by mouth once daily     traZODone (DESYREL) 50 MG tablet [TRAZODONE (DESYREL) 50 MG TABLET] TK 1 TO 2 TS PO HS     triamcinolone (KENALOG) 0.1 % external cream      ULTIMA TEST STRIPS strips [ULTIMA TEST STRIPS STRIPS] USE ONE STRIP TO CHECK GLUCOSE ONCE DAILY DX  E11.9     No current facility-administered medications for this visit.       REVIEW OF SYSTEMS:   10 point review of systems reviewed and pertinent positives in the HPI.     PHYSICAL EXAMINATION:  Physical Exam     Vital signs: /68   Pulse 80   Temp 97.6  F (36.4  C)   Resp 16   Ht 1.702 m (5' 7\")   Wt 86.2 kg (190 lb)   SpO2 99%   BMI 29.76 kg/m      General: Awake, Alert, oriented x3, lying in bed, appropriately, follows simple commands, conversant  HEENT: Pink conjunctiva, moist oral mucosa  NECK: Supple  CVS:  S1  S2, without murmur or gallop.   LUNG: Clear to auscultation, No wheezes, rales or rhonci.  BACK: No kyphosis of the thoracic spine  ABDOMEN: Soft, nontender to palpation, with positive bowel sounds  EXTREMITIES: Moves both upper and lower extremities with diffuse weakness of the LE, Unable to SLR on the right, 1+pedal edema > RLE.   SKIN: Laceration to elbow with moderate greenish drainage. Slightly red and edematous. RLE with lacerations with small amt of greenish drainage.   NEUROLOGIC: Intact, pulses palpable  PSYCHIATRIC: Pleasant affect.       Labs:  All labs reviewed in the nursing home record and Epic   @  Lab Results   Component Value Date    WBC 5.9 09/30/2020     Lab Results   Component Value Date    RBC 4.18 09/30/2020     Lab Results   Component Value Date    HGB 13.9 09/30/2020     Lab Results   Component Value Date    HCT 41.1 09/30/2020     Lab Results   Component Value Date    MCV 98 09/30/2020     Lab Results   Component Value Date    MCH 33.2 09/30/2020     Lab Results   Component Value Date    MCHC " 33.7 09/30/2020     Lab Results   Component Value Date    RDW 11.9 09/30/2020     Lab Results   Component Value Date     09/30/2020        @Last Comprehensive Metabolic Panel:  Sodium   Date Value Ref Range Status   08/03/2023 134 (L) 136 - 145 mmol/L Final     Potassium   Date Value Ref Range Status   08/03/2023 4.0 3.4 - 5.3 mmol/L Final   03/10/2022 4.9 3.5 - 5.0 mmol/L Final     Chloride   Date Value Ref Range Status   08/03/2023 99 98 - 107 mmol/L Final   03/10/2022 97 (L) 98 - 107 mmol/L Final     Carbon Dioxide (CO2)   Date Value Ref Range Status   08/03/2023 25 22 - 29 mmol/L Final   03/10/2022 27 22 - 31 mmol/L Final     Anion Gap   Date Value Ref Range Status   08/03/2023 10 7 - 15 mmol/L Final   03/10/2022 11 5 - 18 mmol/L Final     Glucose   Date Value Ref Range Status   08/03/2023 85 70 - 99 mg/dL Final   03/10/2022 133 (H) 70 - 125 mg/dL Final     Urea Nitrogen   Date Value Ref Range Status   08/03/2023 13.6 8.0 - 23.0 mg/dL Final   03/10/2022 6 (L) 8 - 28 mg/dL Final     Creatinine   Date Value Ref Range Status   08/03/2023 0.72 0.67 - 1.17 mg/dL Final     GFR Estimate   Date Value Ref Range Status   08/03/2023 >90 >60 mL/min/1.73m2 Final   09/30/2020 >60 >60 mL/min/1.73m2 Final     Calcium   Date Value Ref Range Status   08/03/2023 8.1 (L) 8.8 - 10.2 mg/dL Final          This note has been dictated using voice recognition software. Any grammatical or context distortions are unintentional and inherent to the software    Electronically signed by: Carla Carvajal CNP       Sincerely,        Carla Carvajal, NP

## 2023-08-04 NOTE — PROGRESS NOTES
[facility]    Code Status:  FULL CODE   Visit Type:   Chief Complaint   Patient presents with    TCU Follow Up     Facility:  [facility] ([facility]) [[id number]]           HPI: Jabari Haddad is a 82 year old male who I am seeing today for follow up on the TCU. Pt recently hospitalized on 7/24/23.  Patient presented with a fall.  Past medical history includes A-fib on chronic Eliquis, hypertension, depression, hyperlipidemia and GERD.  Patient fell from a ladder of 12 feet.  Patient found to have a closed head injury without ICH.  He had a displaced right superior Rami fracture, displaced right inferior Rami fracture and right sacral abel fracture with blood in the right hemipelvis.  Patient also found to have a T11 fracture with 30% height loss.  Patient admitted to trauma surgery service and was seen by Ortho and neurosurgery with recommendations for operative management with ORIF of the pelvis and nonoperative management respectively.  Patient continues on Tylenol, methocarbamol and oxycodone for pain.  Hypertension controlled with amlodipine, lisinopril, Lasix and atenolol.  Depression on Wellbutrin and Lexapro.    Transitional Care Course: Today patient sitting up in wheelchair. He continues in ASPEN TLSO OOB. Pt with wounds to his right elbow and RLE. He has moderate greenish color drainage from his elbow. Area edematous and slightly red.  He continues with pain in his pelvis. Pain controlled with Tylenol, methocarbamol and oxycodone for pain.  He is having regular bowel movements.  No dizziness or headache.  He is chronically on anticoagulation with Eliquis.      Assessment/Plan:     Compression fracture of T12 vertebra with routine healing, subsequent encounter  -ASPEN brace out of bed.   -Pain control with Oxycodone and tylenol.   -Follow up with Neurosurgery in 6 weeks.     Closed head injury, subsequent encounter  -ST following.     Lacerations to Right elbow  Laceration to  RLL  -Apply bactroban and foam dressing daily.    Inferior pubic ramus fracture, right, with routine healing, subsequent encounter  Closed fracture of superior ramus of left pubis with routine healing, subsequent encounter  S/P ORIF (open reduction internal fixation) fracture  -Surgical bandage to remain in place until follow up with Ortho.   -WBAT  -Pain control with Oxycodone and Tylenol.   -Follow up with Ortho in 2 weeks.   -Follow up CBC with hgb of 8.3.   -Follow up hgb in 1 week.     Persistent atrial fibrillation (H)  -Rate controlled with atenolol.  -Chronically on AC with Apixaban.     Primary hypertension  -Continue PTA amlodipine, atenolol, lasix and lisinopril.   -Follow up BMP with Na+ 134.     Type 2 diabetes mellitus with hyperosmolarity without coma, without long-term current use of insulin (H)  -glimepiride 2 mg daily.   -Consistent Carb diet.   -BS checks         Active Ambulatory Problems     Diagnosis Date Noted    Gout     Hemorrhoids     Esophageal Reflux     Osteoarthritis Of The Wrist     DM2 (diabetes mellitus, type 2) (H)     Hyperlipidemia LDL goal <100     Benign Essential Hypertension     Arteriosclerotic Cardiovascular Disease (ASCVD)     Diverticulitis Of Colon     Lethargy     Itching Of The Ears     Abdominal Pain In The Right Lower Belly (RLQ)     Male Erectile Disorder     Exposed To Dusts - Asbestos     Chronic Gout     Hyperkalemia     Pharyngitis     Generalized Osteoarthritis Of Multiple Sites     Pseudogout     Symmetric Polyarticular Inflammation     Pseudogout 03/16/2016    Inflammatory arthritis 03/16/2016    Persistent atrial fibrillation (H) 09/12/2019    Aortic root enlargement (H) 01/22/2020    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    Arteriosclerotic cardiovascular disease (ASCVD) 03/08/2022    Alcohol use, unspecified, uncomplicated 07/27/2023    Compression fracture of T12 vertebra (H) 07/25/2023    Multiple stable closed lateral compression fractures of pelvis (H)  07/24/2023    High prostate specific antigen (PSA) 05/04/2022    Fall 07/25/2023    Depressive disorder 05/04/2022    Depressed mood 07/31/2023    Primary malignant neoplasm of prostate (H) 06/27/2022     Resolved Ambulatory Problems     Diagnosis Date Noted    No Resolved Ambulatory Problems     No Additional Past Medical History     No Known Allergies    All Meds and Allergies reviewed in the record at the facility and is the most up-to-date.    Current Outpatient Medications   Medication Sig    mupirocin (BACTROBAN) 2 % external cream Apply topically daily Apply to Right elbow and right leg wounds.    amLODIPine (NORVASC) 10 MG tablet Take 1 tablet by mouth once daily    apixaban ANTICOAGULANT (ELIQUIS ANTICOAGULANT) 5 MG tablet Take 1 tablet (5 mg) by mouth 2 times daily    atenolol (TENORMIN) 50 MG tablet Take 1 tablet (50 mg) by mouth daily    blood glucose test (RELION PRIME TEST STRIPS) strips [BLOOD GLUCOSE TEST (RELION PRIME TEST STRIPS) STRIPS] Use 1 each As Directed daily. Dispense brand per patient's insurance at pharmacy discretion.    buPROPion (WELLBUTRIN XL) 150 MG 24 hr tablet TAKE 1 TABLET BY MOUTH IN THE MORNING    escitalopram (LEXAPRO) 20 MG tablet [ESCITALOPRAM OXALATE (LEXAPRO) 20 MG TABLET] TK 1 T PO QAM    ezetimibe (ZETIA) 10 MG tablet Take 1 tablet by mouth once daily    fluocinonide (LIDEX) 0.05 % external solution     furosemide (LASIX) 20 MG tablet Take 1 tablet (20 mg) by mouth daily    glimepiride (AMARYL) 2 MG tablet Take 1 tablet (2 mg) by mouth every morning (before breakfast)    ketoconazole (NIZORAL) 2 % external cream     lisinopril (ZESTRIL) 40 MG tablet Take 1 tablet by mouth once daily    omeprazole (PRILOSEC) 40 MG DR capsule Take 1 capsule by mouth twice daily    oxyCODONE (ROXICODONE) 5 MG tablet 2.5 - 5 mg q 4 hr prn    probenecid (BENEMID) 500 MG tablet Take 1 tablet (500 mg) by mouth 2 times daily    simvastatin (ZOCOR) 40 MG tablet Take 1 tablet by mouth once daily     "traZODone (DESYREL) 50 MG tablet [TRAZODONE (DESYREL) 50 MG TABLET] TK 1 TO 2 TS PO HS    triamcinolone (KENALOG) 0.1 % external cream     ULTIMA TEST STRIPS strips [ULTIMA TEST STRIPS STRIPS] USE ONE STRIP TO CHECK GLUCOSE ONCE DAILY DX  E11.9     No current facility-administered medications for this visit.       REVIEW OF SYSTEMS:   10 point review of systems reviewed and pertinent positives in the HPI.     PHYSICAL EXAMINATION:  Physical Exam     Vital signs: /68   Pulse 80   Temp 97.6  F (36.4  C)   Resp 16   Ht 1.702 m (5' 7\")   Wt 86.2 kg (190 lb)   SpO2 99%   BMI 29.76 kg/m      General: Awake, Alert, oriented x3, lying in bed, appropriately, follows simple commands, conversant  HEENT: Pink conjunctiva, moist oral mucosa  NECK: Supple  CVS:  S1  S2, without murmur or gallop.   LUNG: Clear to auscultation, No wheezes, rales or rhonci.  BACK: No kyphosis of the thoracic spine  ABDOMEN: Soft, nontender to palpation, with positive bowel sounds  EXTREMITIES: Moves both upper and lower extremities with diffuse weakness of the LE, Unable to SLR on the right, 1+pedal edema > RLE.   SKIN: Laceration to elbow with moderate greenish drainage. Slightly red and edematous. RLE with lacerations with small amt of greenish drainage.   NEUROLOGIC: Intact, pulses palpable  PSYCHIATRIC: Pleasant affect.       Labs:  All labs reviewed in the nursing home record and [facility]   @  Lab Results   Component Value Date    WBC 5.9 09/30/2020     Lab Results   Component Value Date    RBC 4.18 09/30/2020     Lab Results   Component Value Date    HGB 13.9 09/30/2020     Lab Results   Component Value Date    HCT 41.1 09/30/2020     Lab Results   Component Value Date    MCV 98 09/30/2020     Lab Results   Component Value Date    MCH 33.2 09/30/2020     Lab Results   Component Value Date    MCHC 33.7 09/30/2020     Lab Results   Component Value Date    RDW 11.9 09/30/2020     Lab Results   Component Value Date     09/30/2020 "        @Last Comprehensive Metabolic Panel:  Sodium   Date Value Ref Range Status   08/03/2023 134 (L) 136 - 145 mmol/L Final     Potassium   Date Value Ref Range Status   08/03/2023 4.0 3.4 - 5.3 mmol/L Final   03/10/2022 4.9 3.5 - 5.0 mmol/L Final     Chloride   Date Value Ref Range Status   08/03/2023 99 98 - 107 mmol/L Final   03/10/2022 97 (L) 98 - 107 mmol/L Final     Carbon Dioxide (CO2)   Date Value Ref Range Status   08/03/2023 25 22 - 29 mmol/L Final   03/10/2022 27 22 - 31 mmol/L Final     Anion Gap   Date Value Ref Range Status   08/03/2023 10 7 - 15 mmol/L Final   03/10/2022 11 5 - 18 mmol/L Final     Glucose   Date Value Ref Range Status   08/03/2023 85 70 - 99 mg/dL Final   03/10/2022 133 (H) 70 - 125 mg/dL Final     Urea Nitrogen   Date Value Ref Range Status   08/03/2023 13.6 8.0 - 23.0 mg/dL Final   03/10/2022 6 (L) 8 - 28 mg/dL Final     Creatinine   Date Value Ref Range Status   08/03/2023 0.72 0.67 - 1.17 mg/dL Final     GFR Estimate   Date Value Ref Range Status   08/03/2023 >90 >60 mL/min/1.73m2 Final   09/30/2020 >60 >60 mL/min/1.73m2 Final     Calcium   Date Value Ref Range Status   08/03/2023 8.1 (L) 8.8 - 10.2 mg/dL Final          This note has been dictated using voice recognition software. Any grammatical or context distortions are unintentional and inherent to the software    Electronically signed by: Carla Carvajal, CNP

## 2023-08-07 ENCOUNTER — LAB REQUISITION (OUTPATIENT)
Dept: LAB | Facility: CLINIC | Age: 83
End: 2023-08-07
Payer: COMMERCIAL

## 2023-08-07 DIAGNOSIS — D62 ACUTE POSTHEMORRHAGIC ANEMIA: ICD-10-CM

## 2023-08-07 LAB — HGB BLD-MCNC: 9.1 G/DL (ref 13.3–17.7)

## 2023-08-07 PROCEDURE — 36415 COLL VENOUS BLD VENIPUNCTURE: CPT | Mod: ORL | Performed by: NURSE PRACTITIONER

## 2023-08-07 PROCEDURE — 85018 HEMOGLOBIN: CPT | Mod: ORL | Performed by: NURSE PRACTITIONER

## 2023-08-07 PROCEDURE — P9603 ONE-WAY ALLOW PRORATED MILES: HCPCS | Mod: ORL | Performed by: NURSE PRACTITIONER

## 2023-08-08 ENCOUNTER — TRANSITIONAL CARE UNIT VISIT (OUTPATIENT)
Dept: GERIATRICS | Facility: CLINIC | Age: 83
End: 2023-08-08
Payer: COMMERCIAL

## 2023-08-08 VITALS
BODY MASS INDEX: 29.82 KG/M2 | HEIGHT: 67 IN | WEIGHT: 190 LBS | SYSTOLIC BLOOD PRESSURE: 133 MMHG | DIASTOLIC BLOOD PRESSURE: 70 MMHG | HEART RATE: 83 BPM | RESPIRATION RATE: 16 BRPM | TEMPERATURE: 98.1 F | OXYGEN SATURATION: 97 %

## 2023-08-08 DIAGNOSIS — S32.591D INFERIOR PUBIC RAMUS FRACTURE, RIGHT, WITH ROUTINE HEALING, SUBSEQUENT ENCOUNTER: ICD-10-CM

## 2023-08-08 DIAGNOSIS — I48.19 PERSISTENT ATRIAL FIBRILLATION (H): ICD-10-CM

## 2023-08-08 DIAGNOSIS — Z87.81 S/P ORIF (OPEN REDUCTION INTERNAL FIXATION) FRACTURE: ICD-10-CM

## 2023-08-08 DIAGNOSIS — S51.011D LACERATION OF RIGHT ELBOW, SUBSEQUENT ENCOUNTER: ICD-10-CM

## 2023-08-08 DIAGNOSIS — E11.00 TYPE 2 DIABETES MELLITUS WITH HYPEROSMOLARITY WITHOUT COMA, WITHOUT LONG-TERM CURRENT USE OF INSULIN (H): ICD-10-CM

## 2023-08-08 DIAGNOSIS — Z98.890 S/P ORIF (OPEN REDUCTION INTERNAL FIXATION) FRACTURE: ICD-10-CM

## 2023-08-08 DIAGNOSIS — S81.811A ISTAP TYPE 2 SKIN TEAR OF RIGHT LOWER EXTREMITY: ICD-10-CM

## 2023-08-08 DIAGNOSIS — S22.080D COMPRESSION FRACTURE OF T12 VERTEBRA WITH ROUTINE HEALING, SUBSEQUENT ENCOUNTER: Primary | ICD-10-CM

## 2023-08-08 DIAGNOSIS — S09.90XD CLOSED HEAD INJURY, SUBSEQUENT ENCOUNTER: ICD-10-CM

## 2023-08-08 PROCEDURE — 99309 SBSQ NF CARE MODERATE MDM 30: CPT | Performed by: NURSE PRACTITIONER

## 2023-08-08 NOTE — PROGRESS NOTES
[facility]    Code Status:  FULL CODE   Visit Type:   Chief Complaint   Patient presents with    TCU Follow Up     Facility:  [facility] ([facility]) [[id number]]           HPI: Jabari Haddad is a 82 year old male who I am seeing today for follow up on the TCU. Pt recently hospitalized on 7/24/23.  Patient presented with a fall.  Past medical history includes A-fib on chronic Eliquis, hypertension, depression, hyperlipidemia and GERD.  Patient fell from a ladder of 12 feet.  Patient found to have a closed head injury without ICH.  He had a displaced right superior Rami fracture, displaced right inferior Rami fracture and right sacral abel fracture with blood in the right hemipelvis.  Patient also found to have a T11 fracture with 30% height loss.  Patient admitted to trauma surgery service and was seen by Ortho and neurosurgery with recommendations for operative management with ORIF of the pelvis and nonoperative management respectively.  Patient continues on Tylenol, methocarbamol and oxycodone for pain.  Hypertension controlled with amlodipine, lisinopril, Lasix and atenolol.  Depression on Wellbutrin and Lexapro.    Transitional Care Course: Today patient lying in bed. His son is present on exam. Pt denies any pain in his back. He continues in ASPEN TLSO OOB. Pt with wounds to his right elbow and RLE improving with topical treatment.  He continues with pain in his pelvis. Pain controlled with Tylenol, methocarbamol and oxycodone for pain. He reports swelling in his LE. Trace edema noted.     Assessment/Plan:     Compression fracture of T12 vertebra with routine healing, subsequent encounter  -ASPEN brace out of bed.   -Pain control with Oxycodone and tylenol.   -Follow up with Neurosurgery in 5 weeks.   -Follow up hgb today 9.1.     Closed head injury, subsequent encounter  -ST following.     Lacerations to Right elbow  Laceration to RLL  -Continue bactroban and foam dressing  daily.    Inferior pubic ramus fracture, right, with routine healing, subsequent encounter  Closed fracture of superior ramus of left pubis with routine healing, subsequent encounter  S/P ORIF (open reduction internal fixation) fracture  -Surgical bandage to remain in place until follow up with Ortho.   -WBAT  -Pain control with Oxycodone and Tylenol.   -Follow up with Ortho in 1 week with repeat MRI.       Persistent atrial fibrillation (H)  -Rate controlled with atenolol.  -Chronically on AC with Apixaban.     Primary hypertension  -Continue PTA amlodipine, atenolol, lasix and lisinopril.   -Follow up BMP with Na+ 134.     Type 2 diabetes mellitus with hyperosmolarity without coma, without long-term current use of insulin (H)  -glimepiride 2 mg daily.   -Consistent Carb diet.   -BS checks         Active Ambulatory Problems     Diagnosis Date Noted    Gout     Hemorrhoids     Esophageal Reflux     Osteoarthritis Of The Wrist     DM2 (diabetes mellitus, type 2) (H)     Hyperlipidemia LDL goal <100     Benign Essential Hypertension     Arteriosclerotic Cardiovascular Disease (ASCVD)     Diverticulitis Of Colon     Lethargy     Itching Of The Ears     Abdominal Pain In The Right Lower Belly (RLQ)     Male Erectile Disorder     Exposed To Dusts - Asbestos     Chronic Gout     Hyperkalemia     Pharyngitis     Generalized Osteoarthritis Of Multiple Sites     Pseudogout     Symmetric Polyarticular Inflammation     Pseudogout 03/16/2016    Inflammatory arthritis 03/16/2016    Persistent atrial fibrillation (H) 09/12/2019    Aortic root enlargement (H) 01/22/2020    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    Arteriosclerotic cardiovascular disease (ASCVD) 03/08/2022    Alcohol use, unspecified, uncomplicated 07/27/2023    Compression fracture of T12 vertebra (H) 07/25/2023    Multiple stable closed lateral compression fractures of pelvis (H) 07/24/2023    High prostate specific antigen (PSA) 05/04/2022    Fall 07/25/2023     Depressive disorder 05/04/2022    Depressed mood 07/31/2023    Primary malignant neoplasm of prostate (H) 06/27/2022     Resolved Ambulatory Problems     Diagnosis Date Noted    No Resolved Ambulatory Problems     No Additional Past Medical History     No Known Allergies    All Meds and Allergies reviewed in the record at the facility and is the most up-to-date.    Current Outpatient Medications   Medication Sig    amLODIPine (NORVASC) 10 MG tablet Take 1 tablet by mouth once daily    apixaban ANTICOAGULANT (ELIQUIS ANTICOAGULANT) 5 MG tablet Take 1 tablet (5 mg) by mouth 2 times daily    atenolol (TENORMIN) 50 MG tablet Take 1 tablet (50 mg) by mouth daily    blood glucose test (RELION PRIME TEST STRIPS) strips [BLOOD GLUCOSE TEST (RELION PRIME TEST STRIPS) STRIPS] Use 1 each As Directed daily. Dispense brand per patient's insurance at pharmacy discretion.    buPROPion (WELLBUTRIN XL) 150 MG 24 hr tablet TAKE 1 TABLET BY MOUTH IN THE MORNING    escitalopram (LEXAPRO) 20 MG tablet [ESCITALOPRAM OXALATE (LEXAPRO) 20 MG TABLET] TK 1 T PO QAM    ezetimibe (ZETIA) 10 MG tablet Take 1 tablet by mouth once daily    fluocinonide (LIDEX) 0.05 % external solution     furosemide (LASIX) 20 MG tablet Take 1 tablet (20 mg) by mouth daily    glimepiride (AMARYL) 2 MG tablet Take 1 tablet (2 mg) by mouth every morning (before breakfast)    ketoconazole (NIZORAL) 2 % external cream     lisinopril (ZESTRIL) 40 MG tablet Take 1 tablet by mouth once daily    mupirocin (BACTROBAN) 2 % external cream Apply topically daily Apply to Right elbow and right leg wounds.    omeprazole (PRILOSEC) 40 MG DR capsule Take 1 capsule by mouth twice daily    oxyCODONE (ROXICODONE) 5 MG tablet 2.5 - 5 mg q 4 hr prn    probenecid (BENEMID) 500 MG tablet Take 1 tablet (500 mg) by mouth 2 times daily    simvastatin (ZOCOR) 40 MG tablet Take 1 tablet by mouth once daily    traZODone (DESYREL) 50 MG tablet [TRAZODONE (DESYREL) 50 MG TABLET] TK 1 TO 2 TS PO  "HS    triamcinolone (KENALOG) 0.1 % external cream     ULTIMA TEST STRIPS strips [ULTIMA TEST STRIPS STRIPS] USE ONE STRIP TO CHECK GLUCOSE ONCE DAILY DX  E11.9     No current facility-administered medications for this visit.       REVIEW OF SYSTEMS:   10 point review of systems reviewed and pertinent positives in the HPI.     PHYSICAL EXAMINATION:  Physical Exam     Vital signs: /70   Pulse 83   Temp 98.1  F (36.7  C)   Resp 16   Ht 1.702 m (5' 7\")   Wt 86.2 kg (190 lb)   SpO2 97%   BMI 29.76 kg/m      General: Awake, Alert, oriented x3, lying in bed, appropriately, follows simple commands, conversant  HEENT: Pink conjunctiva, moist oral mucosa  NECK: Supple  CVS:  S1  S2, without murmur or gallop.   LUNG: Clear to auscultation, No wheezes, rales or rhonci.  BACK: No kyphosis of the thoracic spine  EXTREMITIES: Moves both upper and lower extremities with weakness of the LE, trace BLE edema. Positive dorsi and plantar flexion.   SKIN: Laceration to elbow improving. RLE ST X 2 without redness or warmth. Minimal drainage.   NEUROLOGIC: Intact, pulses palpable  PSYCHIATRIC: Pleasant affect.       Labs:  All labs reviewed in the nursing home record and Epic   @  Lab Results   Component Value Date    WBC 5.9 09/30/2020     Lab Results   Component Value Date    RBC 4.18 09/30/2020     Lab Results   Component Value Date    HGB 13.9 09/30/2020     Lab Results   Component Value Date    HCT 41.1 09/30/2020     Lab Results   Component Value Date    MCV 98 09/30/2020     Lab Results   Component Value Date    MCH 33.2 09/30/2020     Lab Results   Component Value Date    MCHC 33.7 09/30/2020     Lab Results   Component Value Date    RDW 11.9 09/30/2020     Lab Results   Component Value Date     09/30/2020        @Last Comprehensive Metabolic Panel:  Sodium   Date Value Ref Range Status   08/03/2023 134 (L) 136 - 145 mmol/L Final     Potassium   Date Value Ref Range Status   08/03/2023 4.0 3.4 - 5.3 mmol/L " Final   03/10/2022 4.9 3.5 - 5.0 mmol/L Final     Chloride   Date Value Ref Range Status   08/03/2023 99 98 - 107 mmol/L Final   03/10/2022 97 (L) 98 - 107 mmol/L Final     Carbon Dioxide (CO2)   Date Value Ref Range Status   08/03/2023 25 22 - 29 mmol/L Final   03/10/2022 27 22 - 31 mmol/L Final     Anion Gap   Date Value Ref Range Status   08/03/2023 10 7 - 15 mmol/L Final   03/10/2022 11 5 - 18 mmol/L Final     Glucose   Date Value Ref Range Status   08/03/2023 85 70 - 99 mg/dL Final   03/10/2022 133 (H) 70 - 125 mg/dL Final     Urea Nitrogen   Date Value Ref Range Status   08/03/2023 13.6 8.0 - 23.0 mg/dL Final   03/10/2022 6 (L) 8 - 28 mg/dL Final     Creatinine   Date Value Ref Range Status   08/03/2023 0.72 0.67 - 1.17 mg/dL Final     GFR Estimate   Date Value Ref Range Status   08/03/2023 >90 >60 mL/min/1.73m2 Final   09/30/2020 >60 >60 mL/min/1.73m2 Final     Calcium   Date Value Ref Range Status   08/03/2023 8.1 (L) 8.8 - 10.2 mg/dL Final          This note has been dictated using voice recognition software. Any grammatical or context distortions are unintentional and inherent to the software    Electronically signed by: Carla Carvajal, CNP

## 2023-08-08 NOTE — LETTER
8/8/2023        RE: Jabari Haddad  [address] [facility] [address]        [facility]    Code Status:  FULL CODE   Visit Type:   Chief Complaint   Patient presents with     TCU Follow Up     Facility:  [facility] ([facility]) [[id number]]           HPI: Jabari Haddad is a 82 year old male who I am seeing today for follow up on the TCU. Pt recently hospitalized on 7/24/23.  Patient presented with a fall.  Past medical history includes A-fib on chronic Eliquis, hypertension, depression, hyperlipidemia and GERD.  Patient fell from a ladder of 12 feet.  Patient found to have a closed head injury without ICH.  He had a displaced right superior Rami fracture, displaced right inferior Rami fracture and right sacral abel fracture with blood in the right hemipelvis.  Patient also found to have a T11 fracture with 30% height loss.  Patient admitted to trauma surgery service and was seen by Ortho and neurosurgery with recommendations for operative management with ORIF of the pelvis and nonoperative management respectively.  Patient continues on Tylenol, methocarbamol and oxycodone for pain.  Hypertension controlled with amlodipine, lisinopril, Lasix and atenolol.  Depression on Wellbutrin and Lexapro.    Transitional Care Course: Today patient lying in bed. His son is present on exam. Pt denies any pain in his back. He continues in ASPEN TLSO OOB. Pt with wounds to his right elbow and RLE improving with topical treatment.  He continues with pain in his pelvis. Pain controlled with Tylenol, methocarbamol and oxycodone for pain. He reports swelling in his LE. Trace edema noted.     Assessment/Plan:     Compression fracture of T12 vertebra with routine healing, subsequent encounter  -ASPEN brace out of bed.   -Pain control with Oxycodone and tylenol.   -Follow up with Neurosurgery in 5 weeks.   -Follow up hgb today 9.1.     Closed head injury, subsequent encounter  -ST following.     Lacerations  to Right elbow  Laceration to RLL  -Continue bactroban and foam dressing daily.    Inferior pubic ramus fracture, right, with routine healing, subsequent encounter  Closed fracture of superior ramus of left pubis with routine healing, subsequent encounter  S/P ORIF (open reduction internal fixation) fracture  -Surgical bandage to remain in place until follow up with Ortho.   -WBAT  -Pain control with Oxycodone and Tylenol.   -Follow up with Ortho in 1 week with repeat MRI.       Persistent atrial fibrillation (H)  -Rate controlled with atenolol.  -Chronically on AC with Apixaban.     Primary hypertension  -Continue PTA amlodipine, atenolol, lasix and lisinopril.   -Follow up BMP with Na+ 134.     Type 2 diabetes mellitus with hyperosmolarity without coma, without long-term current use of insulin (H)  -glimepiride 2 mg daily.   -Consistent Carb diet.   -BS checks         Active Ambulatory Problems     Diagnosis Date Noted     Gout      Hemorrhoids      Esophageal Reflux      Osteoarthritis Of The Wrist      DM2 (diabetes mellitus, type 2) (H)      Hyperlipidemia LDL goal <100      Benign Essential Hypertension      Arteriosclerotic Cardiovascular Disease (ASCVD)      Diverticulitis Of Colon      Lethargy      Itching Of The Ears      Abdominal Pain In The Right Lower Belly (RLQ)      Male Erectile Disorder      Exposed To Dusts - Asbestos      Chronic Gout      Hyperkalemia      Pharyngitis      Generalized Osteoarthritis Of Multiple Sites      Pseudogout      Symmetric Polyarticular Inflammation      Pseudogout 03/16/2016     Inflammatory arthritis 03/16/2016     Persistent atrial fibrillation (H) 09/12/2019     Aortic root enlargement (H) 01/22/2020     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     Arteriosclerotic cardiovascular disease (ASCVD) 03/08/2022     Alcohol use, unspecified, uncomplicated 07/27/2023     Compression fracture of T12 vertebra (H) 07/25/2023     Multiple stable closed lateral compression fractures of  pelvis (H) 07/24/2023     High prostate specific antigen (PSA) 05/04/2022     Fall 07/25/2023     Depressive disorder 05/04/2022     Depressed mood 07/31/2023     Primary malignant neoplasm of prostate (H) 06/27/2022     Resolved Ambulatory Problems     Diagnosis Date Noted     No Resolved Ambulatory Problems     No Additional Past Medical History     No Known Allergies    All Meds and Allergies reviewed in the record at the facility and is the most up-to-date.    Current Outpatient Medications   Medication Sig     amLODIPine (NORVASC) 10 MG tablet Take 1 tablet by mouth once daily     apixaban ANTICOAGULANT (ELIQUIS ANTICOAGULANT) 5 MG tablet Take 1 tablet (5 mg) by mouth 2 times daily     atenolol (TENORMIN) 50 MG tablet Take 1 tablet (50 mg) by mouth daily     blood glucose test (RELION PRIME TEST STRIPS) strips [BLOOD GLUCOSE TEST (RELION PRIME TEST STRIPS) STRIPS] Use 1 each As Directed daily. Dispense brand per patient's insurance at pharmacy discretion.     buPROPion (WELLBUTRIN XL) 150 MG 24 hr tablet TAKE 1 TABLET BY MOUTH IN THE MORNING     escitalopram (LEXAPRO) 20 MG tablet [ESCITALOPRAM OXALATE (LEXAPRO) 20 MG TABLET] TK 1 T PO QAM     ezetimibe (ZETIA) 10 MG tablet Take 1 tablet by mouth once daily     fluocinonide (LIDEX) 0.05 % external solution      furosemide (LASIX) 20 MG tablet Take 1 tablet (20 mg) by mouth daily     glimepiride (AMARYL) 2 MG tablet Take 1 tablet (2 mg) by mouth every morning (before breakfast)     ketoconazole (NIZORAL) 2 % external cream      lisinopril (ZESTRIL) 40 MG tablet Take 1 tablet by mouth once daily     mupirocin (BACTROBAN) 2 % external cream Apply topically daily Apply to Right elbow and right leg wounds.     omeprazole (PRILOSEC) 40 MG DR capsule Take 1 capsule by mouth twice daily     oxyCODONE (ROXICODONE) 5 MG tablet 2.5 - 5 mg q 4 hr prn     probenecid (BENEMID) 500 MG tablet Take 1 tablet (500 mg) by mouth 2 times daily     simvastatin (ZOCOR) 40 MG tablet  "Take 1 tablet by mouth once daily     traZODone (DESYREL) 50 MG tablet [TRAZODONE (DESYREL) 50 MG TABLET] TK 1 TO 2 TS PO HS     triamcinolone (KENALOG) 0.1 % external cream      ULTIMA TEST STRIPS strips [ULTIMA TEST STRIPS STRIPS] USE ONE STRIP TO CHECK GLUCOSE ONCE DAILY DX  E11.9     No current facility-administered medications for this visit.       REVIEW OF SYSTEMS:   10 point review of systems reviewed and pertinent positives in the HPI.     PHYSICAL EXAMINATION:  Physical Exam     Vital signs: /70   Pulse 83   Temp 98.1  F (36.7  C)   Resp 16   Ht 1.702 m (5' 7\")   Wt 86.2 kg (190 lb)   SpO2 97%   BMI 29.76 kg/m      General: Awake, Alert, oriented x3, lying in bed, appropriately, follows simple commands, conversant  HEENT: Pink conjunctiva, moist oral mucosa  NECK: Supple  CVS:  S1  S2, without murmur or gallop.   LUNG: Clear to auscultation, No wheezes, rales or rhonci.  BACK: No kyphosis of the thoracic spine  EXTREMITIES: Moves both upper and lower extremities with weakness of the LE, trace BLE edema. Positive dorsi and plantar flexion.   SKIN: Laceration to elbow improving. RLE ST X 2 without redness or warmth. Minimal drainage.   NEUROLOGIC: Intact, pulses palpable  PSYCHIATRIC: Pleasant affect.       Labs:  All labs reviewed in the nursing home record and Epic   @  Lab Results   Component Value Date    WBC 5.9 09/30/2020     Lab Results   Component Value Date    RBC 4.18 09/30/2020     Lab Results   Component Value Date    HGB 13.9 09/30/2020     Lab Results   Component Value Date    HCT 41.1 09/30/2020     Lab Results   Component Value Date    MCV 98 09/30/2020     Lab Results   Component Value Date    MCH 33.2 09/30/2020     Lab Results   Component Value Date    MCHC 33.7 09/30/2020     Lab Results   Component Value Date    RDW 11.9 09/30/2020     Lab Results   Component Value Date     09/30/2020        @Last Comprehensive Metabolic Panel:  Sodium   Date Value Ref Range Status "   08/03/2023 134 (L) 136 - 145 mmol/L Final     Potassium   Date Value Ref Range Status   08/03/2023 4.0 3.4 - 5.3 mmol/L Final   03/10/2022 4.9 3.5 - 5.0 mmol/L Final     Chloride   Date Value Ref Range Status   08/03/2023 99 98 - 107 mmol/L Final   03/10/2022 97 (L) 98 - 107 mmol/L Final     Carbon Dioxide (CO2)   Date Value Ref Range Status   08/03/2023 25 22 - 29 mmol/L Final   03/10/2022 27 22 - 31 mmol/L Final     Anion Gap   Date Value Ref Range Status   08/03/2023 10 7 - 15 mmol/L Final   03/10/2022 11 5 - 18 mmol/L Final     Glucose   Date Value Ref Range Status   08/03/2023 85 70 - 99 mg/dL Final   03/10/2022 133 (H) 70 - 125 mg/dL Final     Urea Nitrogen   Date Value Ref Range Status   08/03/2023 13.6 8.0 - 23.0 mg/dL Final   03/10/2022 6 (L) 8 - 28 mg/dL Final     Creatinine   Date Value Ref Range Status   08/03/2023 0.72 0.67 - 1.17 mg/dL Final     GFR Estimate   Date Value Ref Range Status   08/03/2023 >90 >60 mL/min/1.73m2 Final   09/30/2020 >60 >60 mL/min/1.73m2 Final     Calcium   Date Value Ref Range Status   08/03/2023 8.1 (L) 8.8 - 10.2 mg/dL Final          This note has been dictated using voice recognition software. Any grammatical or context distortions are unintentional and inherent to the software    Electronically signed by: Carla Carvajal, TIGIST       Sincerely,        Carla Carvajal, NP

## 2023-08-10 ENCOUNTER — TRANSITIONAL CARE UNIT VISIT (OUTPATIENT)
Dept: GERIATRICS | Facility: CLINIC | Age: 83
End: 2023-08-10
Payer: COMMERCIAL

## 2023-08-10 VITALS
HEART RATE: 89 BPM | BODY MASS INDEX: 27.09 KG/M2 | RESPIRATION RATE: 16 BRPM | SYSTOLIC BLOOD PRESSURE: 150 MMHG | DIASTOLIC BLOOD PRESSURE: 79 MMHG | TEMPERATURE: 98 F | OXYGEN SATURATION: 97 % | HEIGHT: 67 IN | WEIGHT: 172.6 LBS

## 2023-08-10 DIAGNOSIS — S09.90XD CLOSED HEAD INJURY, SUBSEQUENT ENCOUNTER: ICD-10-CM

## 2023-08-10 DIAGNOSIS — Z87.81 S/P ORIF (OPEN REDUCTION INTERNAL FIXATION) FRACTURE: ICD-10-CM

## 2023-08-10 DIAGNOSIS — S22.080D COMPRESSION FRACTURE OF T12 VERTEBRA WITH ROUTINE HEALING, SUBSEQUENT ENCOUNTER: Primary | ICD-10-CM

## 2023-08-10 DIAGNOSIS — S32.591D INFERIOR PUBIC RAMUS FRACTURE, RIGHT, WITH ROUTINE HEALING, SUBSEQUENT ENCOUNTER: ICD-10-CM

## 2023-08-10 DIAGNOSIS — Z98.890 S/P ORIF (OPEN REDUCTION INTERNAL FIXATION) FRACTURE: ICD-10-CM

## 2023-08-10 DIAGNOSIS — I48.19 PERSISTENT ATRIAL FIBRILLATION (H): ICD-10-CM

## 2023-08-10 DIAGNOSIS — S51.011D LACERATION OF RIGHT ELBOW, SUBSEQUENT ENCOUNTER: ICD-10-CM

## 2023-08-10 PROCEDURE — 99309 SBSQ NF CARE MODERATE MDM 30: CPT | Performed by: NURSE PRACTITIONER

## 2023-08-10 NOTE — LETTER
8/10/2023        RE: Jabari Haddad  [address] [facility] [address]        [facility]    Code Status:  FULL CODE   Visit Type:   Chief Complaint   Patient presents with     TCU Follow Up     Facility:  [facility] ([facility]) [[id number]]           HPI: Jabari Haddad is a 82 year old male who I am seeing today for follow up on the TCU. Pt recently hospitalized on 7/24/23.  Patient presented with a fall.  Past medical history includes A-fib on chronic Eliquis, hypertension, depression, hyperlipidemia and GERD.  Patient fell from a ladder of 12 feet.  Patient found to have a closed head injury without ICH.  He had a displaced right superior Rami fracture, displaced right inferior Rami fracture and right sacral abel fracture with blood in the right hemipelvis.  Patient also found to have a T11 fracture with 30% height loss.  Patient admitted to trauma surgery service and was seen by Ortho and neurosurgery with recommendations for operative management with ORIF of the pelvis and nonoperative management respectively.  Patient continues on Tylenol, methocarbamol and oxycodone for pain.  Hypertension controlled with amlodipine, lisinopril, Lasix and atenolol.  Depression on Wellbutrin and Lexapro.    Transitional Care Course: Today patient lying in bed.  Pt denies any pain in his back. He continues in ASPEN TLSO.  Pain control with Tylenol, methocarbamol and oxycodone.  He had a follow-up with Ortho this a.m.  Right inferior rami fracture.  Sutures removed.  Steri-Strips intact.  Continues with swelling to his right lower extremity.  Positive dorsi and plantarflexion.  Laceration to the right elbow.  Area improving with topical treatment.  Skin tears to right lower extremity improving with time treatment.    Assessment/Plan:     Compression fracture of T12 vertebra with routine healing, subsequent encounter  -ASPEN brace out of bed.   -Pain control with Oxycodone, methocarbamol and  tylenol.   -Follow up with Neurosurgery in 4 weeks.   -Follow up hgb today 9.1.     Closed head injury, subsequent encounter  -ST following.     Lacerations to Right elbow  Laceration to RLL  -Improving with bactroban and foam dressing daily.    Inferior pubic ramus fracture, right, with routine healing, subsequent encounter  Closed fracture of superior ramus of left pubis with routine healing, subsequent encounter  S/P ORIF (open reduction internal fixation) fracture  -Follow-up with Ortho today.  Steri-Strips in place.  -WBAT  -Pain control with Oxycodone and Tylenol.   -Swelling to right lower extremity.  Tubigrip on in a.m. off at night.   -Encourage ankle pumps.    Persistent atrial fibrillation (H)  -Rate controlled with atenolol.  -Chronically on AC with Apixaban.     Primary hypertension  -Continue PTA amlodipine, atenolol, lasix and lisinopril.   -Follow up BMP with Na+ 134.     Type 2 diabetes mellitus with hyperosmolarity without coma, without long-term current use of insulin (H)  -glimepiride 2 mg daily.   -Consistent Carb diet.   -BS checks         Active Ambulatory Problems     Diagnosis Date Noted     Gout      Hemorrhoids      Esophageal Reflux      Osteoarthritis Of The Wrist      DM2 (diabetes mellitus, type 2) (H)      Hyperlipidemia LDL goal <100      Benign Essential Hypertension      Arteriosclerotic Cardiovascular Disease (ASCVD)      Diverticulitis Of Colon      Lethargy      Itching Of The Ears      Abdominal Pain In The Right Lower Belly (RLQ)      Male Erectile Disorder      Exposed To Dusts - Asbestos      Chronic Gout      Hyperkalemia      Pharyngitis      Generalized Osteoarthritis Of Multiple Sites      Pseudogout      Symmetric Polyarticular Inflammation      Pseudogout 03/16/2016     Inflammatory arthritis 03/16/2016     Persistent atrial fibrillation (H) 09/12/2019     Aortic root enlargement (H) 01/22/2020     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     Arteriosclerotic cardiovascular  disease (ASCVD) 03/08/2022     Alcohol use, unspecified, uncomplicated 07/27/2023     Compression fracture of T12 vertebra (H) 07/25/2023     Multiple stable closed lateral compression fractures of pelvis (H) 07/24/2023     High prostate specific antigen (PSA) 05/04/2022     Fall 07/25/2023     Depressive disorder 05/04/2022     Depressed mood 07/31/2023     Primary malignant neoplasm of prostate (H) 06/27/2022     Resolved Ambulatory Problems     Diagnosis Date Noted     No Resolved Ambulatory Problems     No Additional Past Medical History     No Known Allergies    All Meds and Allergies reviewed in the record at the facility and is the most up-to-date.    Current Outpatient Medications   Medication Sig     amLODIPine (NORVASC) 10 MG tablet Take 1 tablet by mouth once daily     apixaban ANTICOAGULANT (ELIQUIS ANTICOAGULANT) 5 MG tablet Take 1 tablet (5 mg) by mouth 2 times daily     atenolol (TENORMIN) 50 MG tablet Take 1 tablet (50 mg) by mouth daily     blood glucose test (RELION PRIME TEST STRIPS) strips [BLOOD GLUCOSE TEST (RELION PRIME TEST STRIPS) STRIPS] Use 1 each As Directed daily. Dispense brand per patient's insurance at pharmacy discretion.     buPROPion (WELLBUTRIN XL) 150 MG 24 hr tablet TAKE 1 TABLET BY MOUTH IN THE MORNING     escitalopram (LEXAPRO) 20 MG tablet [ESCITALOPRAM OXALATE (LEXAPRO) 20 MG TABLET] TK 1 T PO QAM     ezetimibe (ZETIA) 10 MG tablet Take 1 tablet by mouth once daily     fluocinonide (LIDEX) 0.05 % external solution      furosemide (LASIX) 20 MG tablet Take 1 tablet (20 mg) by mouth daily     glimepiride (AMARYL) 2 MG tablet Take 1 tablet (2 mg) by mouth every morning (before breakfast)     ketoconazole (NIZORAL) 2 % external cream      lisinopril (ZESTRIL) 40 MG tablet Take 1 tablet by mouth once daily     mupirocin (BACTROBAN) 2 % external cream Apply topically daily Apply to Right elbow and right leg wounds.     omeprazole (PRILOSEC) 40 MG DR capsule Take 1 capsule by mouth  "twice daily     oxyCODONE (ROXICODONE) 5 MG tablet 2.5 - 5 mg q 4 hr prn     probenecid (BENEMID) 500 MG tablet Take 1 tablet (500 mg) by mouth 2 times daily     simvastatin (ZOCOR) 40 MG tablet Take 1 tablet by mouth once daily     traZODone (DESYREL) 50 MG tablet [TRAZODONE (DESYREL) 50 MG TABLET] TK 1 TO 2 TS PO HS     triamcinolone (KENALOG) 0.1 % external cream      ULTIMA TEST STRIPS strips [ULTIMA TEST STRIPS STRIPS] USE ONE STRIP TO CHECK GLUCOSE ONCE DAILY DX  E11.9     No current facility-administered medications for this visit.       REVIEW OF SYSTEMS:   10 point review of systems reviewed and pertinent positives in the HPI.     PHYSICAL EXAMINATION:  Physical Exam     Vital signs: BP (!) 150/79   Pulse 89   Temp 98  F (36.7  C)   Resp 16   Ht 1.702 m (5' 7\")   Wt 78.3 kg (172 lb 9.6 oz)   SpO2 97%   BMI 27.03 kg/m      General: Awake, Alert, oriented x3, lying in bed, Conversant  HEENT: Pink conjunctiva, moist oral mucosa  NECK: Supple  BACK: No kyphosis of the thoracic spine  EXTREMITIES: Moves both upper and lower extremities with weakness of the RLE, 1+ RLE edema. Positive dorsi and plantar flexion.   SKIN: Laceration to elbow improving. Swelling resolved. RLE ST X 2 without redness or warmth. Minimal drainage.   NEUROLOGIC: Intact  PSYCHIATRIC: Pleasant affect.       Labs:  All labs reviewed in the nursing home record and Epic   @  Lab Results   Component Value Date    WBC 5.9 09/30/2020     Lab Results   Component Value Date    RBC 4.18 09/30/2020     Lab Results   Component Value Date    HGB 13.9 09/30/2020     Lab Results   Component Value Date    HCT 41.1 09/30/2020     Lab Results   Component Value Date    MCV 98 09/30/2020     Lab Results   Component Value Date    MCH 33.2 09/30/2020     Lab Results   Component Value Date    MCHC 33.7 09/30/2020     Lab Results   Component Value Date    RDW 11.9 09/30/2020     Lab Results   Component Value Date     09/30/2020        @Last " Comprehensive Metabolic Panel:  Sodium   Date Value Ref Range Status   08/03/2023 134 (L) 136 - 145 mmol/L Final     Potassium   Date Value Ref Range Status   08/03/2023 4.0 3.4 - 5.3 mmol/L Final   03/10/2022 4.9 3.5 - 5.0 mmol/L Final     Chloride   Date Value Ref Range Status   08/03/2023 99 98 - 107 mmol/L Final   03/10/2022 97 (L) 98 - 107 mmol/L Final     Carbon Dioxide (CO2)   Date Value Ref Range Status   08/03/2023 25 22 - 29 mmol/L Final   03/10/2022 27 22 - 31 mmol/L Final     Anion Gap   Date Value Ref Range Status   08/03/2023 10 7 - 15 mmol/L Final   03/10/2022 11 5 - 18 mmol/L Final     Glucose   Date Value Ref Range Status   08/03/2023 85 70 - 99 mg/dL Final   03/10/2022 133 (H) 70 - 125 mg/dL Final     Urea Nitrogen   Date Value Ref Range Status   08/03/2023 13.6 8.0 - 23.0 mg/dL Final   03/10/2022 6 (L) 8 - 28 mg/dL Final     Creatinine   Date Value Ref Range Status   08/03/2023 0.72 0.67 - 1.17 mg/dL Final     GFR Estimate   Date Value Ref Range Status   08/03/2023 >90 >60 mL/min/1.73m2 Final   09/30/2020 >60 >60 mL/min/1.73m2 Final     Calcium   Date Value Ref Range Status   08/03/2023 8.1 (L) 8.8 - 10.2 mg/dL Final          This note has been dictated using voice recognition software. Any grammatical or context distortions are unintentional and inherent to the software    Electronically signed by: Carla Carvajal, TIGIST       Sincerely,        Carla Carvajal, NP

## 2023-08-11 NOTE — PROGRESS NOTES
[facility]    Code Status:  FULL CODE   Visit Type:   Chief Complaint   Patient presents with    TCU Follow Up     Facility:  [facility] ([facility]) [[id number]]           HPI: Jabari Haddad is a 82 year old male who I am seeing today for follow up on the TCU. Pt recently hospitalized on 7/24/23.  Patient presented with a fall.  Past medical history includes A-fib on chronic Eliquis, hypertension, depression, hyperlipidemia and GERD.  Patient fell from a ladder of 12 feet.  Patient found to have a closed head injury without ICH.  He had a displaced right superior Rami fracture, displaced right inferior Rami fracture and right sacral abel fracture with blood in the right hemipelvis.  Patient also found to have a T11 fracture with 30% height loss.  Patient admitted to trauma surgery service and was seen by Ortho and neurosurgery with recommendations for operative management with ORIF of the pelvis and nonoperative management respectively.  Patient continues on Tylenol, methocarbamol and oxycodone for pain.  Hypertension controlled with amlodipine, lisinopril, Lasix and atenolol.  Depression on Wellbutrin and Lexapro.    Transitional Care Course: Today patient lying in bed.  Pt denies any pain in his back. He continues in ASPEN TLSO.  Pain control with Tylenol, methocarbamol and oxycodone.  He had a follow-up with Ortho this a.m.  Right inferior rami fracture.  Sutures removed.  Steri-Strips intact.  Continues with swelling to his right lower extremity.  Positive dorsi and plantarflexion.  Laceration to the right elbow.  Area improving with topical treatment.  Skin tears to right lower extremity improving with time treatment.    Assessment/Plan:     Compression fracture of T12 vertebra with routine healing, subsequent encounter  -ASPEN brace out of bed.   -Pain control with Oxycodone, methocarbamol and tylenol.   -Follow up with Neurosurgery in 4 weeks.   -Follow up hgb today 9.1.     Closed head  injury, subsequent encounter  -ST following.     Lacerations to Right elbow  Laceration to RLL  -Improving with bactroban and foam dressing daily.    Inferior pubic ramus fracture, right, with routine healing, subsequent encounter  Closed fracture of superior ramus of left pubis with routine healing, subsequent encounter  S/P ORIF (open reduction internal fixation) fracture  -Follow-up with Ortho today.  Steri-Strips in place.  -WBAT  -Pain control with Oxycodone and Tylenol.   -Swelling to right lower extremity.  Tubigrip on in a.m. off at night.   -Encourage ankle pumps.    Persistent atrial fibrillation (H)  -Rate controlled with atenolol.  -Chronically on AC with Apixaban.     Primary hypertension  -Continue PTA amlodipine, atenolol, lasix and lisinopril.   -Follow up BMP with Na+ 134.     Type 2 diabetes mellitus with hyperosmolarity without coma, without long-term current use of insulin (H)  -glimepiride 2 mg daily.   -Consistent Carb diet.   -BS checks         Active Ambulatory Problems     Diagnosis Date Noted    Gout     Hemorrhoids     Esophageal Reflux     Osteoarthritis Of The Wrist     DM2 (diabetes mellitus, type 2) (H)     Hyperlipidemia LDL goal <100     Benign Essential Hypertension     Arteriosclerotic Cardiovascular Disease (ASCVD)     Diverticulitis Of Colon     Lethargy     Itching Of The Ears     Abdominal Pain In The Right Lower Belly (RLQ)     Male Erectile Disorder     Exposed To Dusts - Asbestos     Chronic Gout     Hyperkalemia     Pharyngitis     Generalized Osteoarthritis Of Multiple Sites     Pseudogout     Symmetric Polyarticular Inflammation     Pseudogout 03/16/2016    Inflammatory arthritis 03/16/2016    Persistent atrial fibrillation (H) 09/12/2019    Aortic root enlargement (H) 01/22/2020    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    Arteriosclerotic cardiovascular disease (ASCVD) 03/08/2022    Alcohol use, unspecified, uncomplicated 07/27/2023    Compression fracture of T12 vertebra (H)  07/25/2023    Multiple stable closed lateral compression fractures of pelvis (H) 07/24/2023    High prostate specific antigen (PSA) 05/04/2022    Fall 07/25/2023    Depressive disorder 05/04/2022    Depressed mood 07/31/2023    Primary malignant neoplasm of prostate (H) 06/27/2022     Resolved Ambulatory Problems     Diagnosis Date Noted    No Resolved Ambulatory Problems     No Additional Past Medical History     No Known Allergies    All Meds and Allergies reviewed in the record at the facility and is the most up-to-date.    Current Outpatient Medications   Medication Sig    amLODIPine (NORVASC) 10 MG tablet Take 1 tablet by mouth once daily    apixaban ANTICOAGULANT (ELIQUIS ANTICOAGULANT) 5 MG tablet Take 1 tablet (5 mg) by mouth 2 times daily    atenolol (TENORMIN) 50 MG tablet Take 1 tablet (50 mg) by mouth daily    blood glucose test (RELION PRIME TEST STRIPS) strips [BLOOD GLUCOSE TEST (RELION PRIME TEST STRIPS) STRIPS] Use 1 each As Directed daily. Dispense brand per patient's insurance at pharmacy discretion.    buPROPion (WELLBUTRIN XL) 150 MG 24 hr tablet TAKE 1 TABLET BY MOUTH IN THE MORNING    escitalopram (LEXAPRO) 20 MG tablet [ESCITALOPRAM OXALATE (LEXAPRO) 20 MG TABLET] TK 1 T PO QAM    ezetimibe (ZETIA) 10 MG tablet Take 1 tablet by mouth once daily    fluocinonide (LIDEX) 0.05 % external solution     furosemide (LASIX) 20 MG tablet Take 1 tablet (20 mg) by mouth daily    glimepiride (AMARYL) 2 MG tablet Take 1 tablet (2 mg) by mouth every morning (before breakfast)    ketoconazole (NIZORAL) 2 % external cream     lisinopril (ZESTRIL) 40 MG tablet Take 1 tablet by mouth once daily    mupirocin (BACTROBAN) 2 % external cream Apply topically daily Apply to Right elbow and right leg wounds.    omeprazole (PRILOSEC) 40 MG DR capsule Take 1 capsule by mouth twice daily    oxyCODONE (ROXICODONE) 5 MG tablet 2.5 - 5 mg q 4 hr prn    probenecid (BENEMID) 500 MG tablet Take 1 tablet (500 mg) by mouth 2  "times daily    simvastatin (ZOCOR) 40 MG tablet Take 1 tablet by mouth once daily    traZODone (DESYREL) 50 MG tablet [TRAZODONE (DESYREL) 50 MG TABLET] TK 1 TO 2 TS PO HS    triamcinolone (KENALOG) 0.1 % external cream     ULTIMA TEST STRIPS strips [ULTIMA TEST STRIPS STRIPS] USE ONE STRIP TO CHECK GLUCOSE ONCE DAILY DX  E11.9     No current facility-administered medications for this visit.       REVIEW OF SYSTEMS:   10 point review of systems reviewed and pertinent positives in the HPI.     PHYSICAL EXAMINATION:  Physical Exam     Vital signs: BP (!) 150/79   Pulse 89   Temp 98  F (36.7  C)   Resp 16   Ht 1.702 m (5' 7\")   Wt 78.3 kg (172 lb 9.6 oz)   SpO2 97%   BMI 27.03 kg/m      General: Awake, Alert, oriented x3, lying in bed, Conversant  HEENT: Pink conjunctiva, moist oral mucosa  NECK: Supple  BACK: No kyphosis of the thoracic spine  EXTREMITIES: Moves both upper and lower extremities with weakness of the RLE, 1+ RLE edema. Positive dorsi and plantar flexion.   SKIN: Laceration to elbow improving. Swelling resolved. RLE ST X 2 without redness or warmth. Minimal drainage.   NEUROLOGIC: Intact  PSYCHIATRIC: Pleasant affect.       Labs:  All labs reviewed in the nursing home record and Epic   @  Lab Results   Component Value Date    WBC 5.9 09/30/2020     Lab Results   Component Value Date    RBC 4.18 09/30/2020     Lab Results   Component Value Date    HGB 13.9 09/30/2020     Lab Results   Component Value Date    HCT 41.1 09/30/2020     Lab Results   Component Value Date    MCV 98 09/30/2020     Lab Results   Component Value Date    MCH 33.2 09/30/2020     Lab Results   Component Value Date    MCHC 33.7 09/30/2020     Lab Results   Component Value Date    RDW 11.9 09/30/2020     Lab Results   Component Value Date     09/30/2020        @Last Comprehensive Metabolic Panel:  Sodium   Date Value Ref Range Status   08/03/2023 134 (L) 136 - 145 mmol/L Final     Potassium   Date Value Ref Range Status "   08/03/2023 4.0 3.4 - 5.3 mmol/L Final   03/10/2022 4.9 3.5 - 5.0 mmol/L Final     Chloride   Date Value Ref Range Status   08/03/2023 99 98 - 107 mmol/L Final   03/10/2022 97 (L) 98 - 107 mmol/L Final     Carbon Dioxide (CO2)   Date Value Ref Range Status   08/03/2023 25 22 - 29 mmol/L Final   03/10/2022 27 22 - 31 mmol/L Final     Anion Gap   Date Value Ref Range Status   08/03/2023 10 7 - 15 mmol/L Final   03/10/2022 11 5 - 18 mmol/L Final     Glucose   Date Value Ref Range Status   08/03/2023 85 70 - 99 mg/dL Final   03/10/2022 133 (H) 70 - 125 mg/dL Final     Urea Nitrogen   Date Value Ref Range Status   08/03/2023 13.6 8.0 - 23.0 mg/dL Final   03/10/2022 6 (L) 8 - 28 mg/dL Final     Creatinine   Date Value Ref Range Status   08/03/2023 0.72 0.67 - 1.17 mg/dL Final     GFR Estimate   Date Value Ref Range Status   08/03/2023 >90 >60 mL/min/1.73m2 Final   09/30/2020 >60 >60 mL/min/1.73m2 Final     Calcium   Date Value Ref Range Status   08/03/2023 8.1 (L) 8.8 - 10.2 mg/dL Final          This note has been dictated using voice recognition software. Any grammatical or context distortions are unintentional and inherent to the software    Electronically signed by: Carla Carvajal, CNP

## 2023-08-15 ENCOUNTER — TRANSITIONAL CARE UNIT VISIT (OUTPATIENT)
Dept: GERIATRICS | Facility: CLINIC | Age: 83
End: 2023-08-15
Payer: COMMERCIAL

## 2023-08-15 VITALS
OXYGEN SATURATION: 98 % | TEMPERATURE: 97.6 F | DIASTOLIC BLOOD PRESSURE: 72 MMHG | HEART RATE: 80 BPM | WEIGHT: 179 LBS | RESPIRATION RATE: 16 BRPM | HEIGHT: 67 IN | SYSTOLIC BLOOD PRESSURE: 126 MMHG | BODY MASS INDEX: 28.09 KG/M2

## 2023-08-15 DIAGNOSIS — S22.080D COMPRESSION FRACTURE OF T12 VERTEBRA WITH ROUTINE HEALING, SUBSEQUENT ENCOUNTER: Primary | ICD-10-CM

## 2023-08-15 DIAGNOSIS — K64.4 EXTERNAL HEMORRHOIDS: ICD-10-CM

## 2023-08-15 DIAGNOSIS — S51.011D LACERATION OF RIGHT ELBOW, SUBSEQUENT ENCOUNTER: ICD-10-CM

## 2023-08-15 DIAGNOSIS — S09.90XD CLOSED HEAD INJURY, SUBSEQUENT ENCOUNTER: ICD-10-CM

## 2023-08-15 DIAGNOSIS — K59.01 SLOW TRANSIT CONSTIPATION: ICD-10-CM

## 2023-08-15 DIAGNOSIS — I48.19 PERSISTENT ATRIAL FIBRILLATION (H): ICD-10-CM

## 2023-08-15 DIAGNOSIS — S32.591D INFERIOR PUBIC RAMUS FRACTURE, RIGHT, WITH ROUTINE HEALING, SUBSEQUENT ENCOUNTER: ICD-10-CM

## 2023-08-15 DIAGNOSIS — Z98.890 S/P ORIF (OPEN REDUCTION INTERNAL FIXATION) FRACTURE: ICD-10-CM

## 2023-08-15 DIAGNOSIS — Z87.81 S/P ORIF (OPEN REDUCTION INTERNAL FIXATION) FRACTURE: ICD-10-CM

## 2023-08-15 DIAGNOSIS — E11.00 TYPE 2 DIABETES MELLITUS WITH HYPEROSMOLARITY WITHOUT COMA, WITHOUT LONG-TERM CURRENT USE OF INSULIN (H): ICD-10-CM

## 2023-08-15 DIAGNOSIS — S81.811A ISTAP TYPE 2 SKIN TEAR OF RIGHT LOWER EXTREMITY: ICD-10-CM

## 2023-08-15 PROCEDURE — 99316 NF DSCHRG MGMT 30 MIN+: CPT | Performed by: NURSE PRACTITIONER

## 2023-08-15 RX ORDER — POLYETHYLENE GLYCOL 3350 17 G/17G
1 POWDER, FOR SOLUTION ORAL DAILY
COMMUNITY

## 2023-08-15 RX ORDER — ACETAMINOPHEN 500 MG
1000 TABLET ORAL 3 TIMES DAILY
COMMUNITY

## 2023-08-15 NOTE — LETTER
8/15/2023        RE: Jabari Haddad  [address] [facility] [address]        [facility]    Code Status:  FULL CODE   Visit Type:   Chief Complaint   Patient presents with     discharge from TCU     Facility:  [facility] ([facility]) [[id number]]           HPI: Jabari Haddad is a 82 year old male who I am seeing today for discharge from the TCU. Pt recently hospitalized on 7/24/23.  Patient presented with a fall.  Past medical history includes A-fib on chronic Eliquis, hypertension, depression, hyperlipidemia and GERD.  Patient fell from a ladder of 12 feet.  Patient found to have a closed head injury without ICH.  He had a displaced right superior Rami fracture, displaced right inferior Rami fracture and right sacral abel fracture with blood in the right hemipelvis.  Patient also found to have a T11 fracture with 30% height loss.  Patient admitted to trauma surgery service and was seen by Ortho and neurosurgery with recommendations for operative management with ORIF of the pelvis and nonoperative management respectively.  Patient continues on Tylenol, methocarbamol and oxycodone for pain.  Hypertension controlled with amlodipine, lisinopril, Lasix and atenolol.  Depression on Wellbutrin and Lexapro.    Transitional Care Course: Today patient sitting up in wheelchair.  Pt denies any pain in his back. He continues in ASPEN TLSO.  Pain control with oxycodone once daily. We will trial tylenol. Right inferior rami fracture.  Sutures removed.  Steri-Strips intact.  Continues with swelling to his right lower extremity.  Positive dorsi and plantarflexion.  Laceration to the right elbow improving with topical treatment.  Skin tears to right lower extremity improving with topical treatment. Constipation with hemorrhoids. Pt started on Miralax and Prep H.     Assessment/Plan:     Compression fracture of T12 vertebra with routine healing, subsequent encounter  -ASPEN brace out of bed.    -Discontinue Oxycodone.   -Tylenol 1000 mg TID.   -Follow up with Neurosurgery in 3 weeks.   -Follow up hgb  9.1.     Closed head injury, subsequent encounter  -ST following.     Lacerations to Right elbow  Laceration to RLL  -Improving with bactroban and foam dressing daily.    Inferior pubic ramus fracture, right, with routine healing, subsequent encounter  Closed fracture of superior ramus of left pubis with routine healing, subsequent encounter  S/P ORIF (open reduction internal fixation) fracture  -Follow-up with Ortho today.  Steri-Strips in place.  -WBAT  -Discontinue Oxycodone.   -Tylenol 1000 mg TID.   -Swelling to right lower extremity.  Tubigrip on in a.m. off at night.   -Encourage ankle pumps.  -Follow up with Ortho on 9/3/23.     Persistent atrial fibrillation (H)  -Rate controlled with atenolol.  -Chronically on AC with Apixaban.     Primary hypertension  -Continue PTA amlodipine, atenolol, lasix and lisinopril.   -Follow up BMP with Na+ 134.     Type 2 diabetes mellitus with hyperosmolarity without coma, without long-term current use of insulin (H)  -glimepiride 2 mg daily.   -Consistent Carb diet.   -BS checks     Constipation  -Miralax 17 gm every day.   -Oxycodone discontinued.     Hemorrhoids   -Prep H topically     -Ok to discharge home with current meds and treatments.   -Follow up with PCP in 1-2 weeks.   -Home PT, OT, HHA and RN for medication management.         Active Ambulatory Problems     Diagnosis Date Noted     Gout      Hemorrhoids      Esophageal Reflux      Osteoarthritis Of The Wrist      DM2 (diabetes mellitus, type 2) (H)      Hyperlipidemia LDL goal <100      Benign Essential Hypertension      Arteriosclerotic Cardiovascular Disease (ASCVD)      Diverticulitis Of Colon      Lethargy      Itching Of The Ears      Abdominal Pain In The Right Lower Belly (RLQ)      Male Erectile Disorder      Exposed To Dusts - Asbestos      Chronic Gout      Hyperkalemia      Pharyngitis       Generalized Osteoarthritis Of Multiple Sites      Pseudogout      Symmetric Polyarticular Inflammation      Pseudogout 03/16/2016     Inflammatory arthritis 03/16/2016     Persistent atrial fibrillation (H) 09/12/2019     Aortic root enlargement (H) 01/22/2020     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     Arteriosclerotic cardiovascular disease (ASCVD) 03/08/2022     Alcohol use, unspecified, uncomplicated 07/27/2023     Compression fracture of T12 vertebra (H) 07/25/2023     Multiple stable closed lateral compression fractures of pelvis (H) 07/24/2023     High prostate specific antigen (PSA) 05/04/2022     Fall 07/25/2023     Depressive disorder 05/04/2022     Depressed mood 07/31/2023     Primary malignant neoplasm of prostate (H) 06/27/2022     Resolved Ambulatory Problems     Diagnosis Date Noted     No Resolved Ambulatory Problems     No Additional Past Medical History     No Known Allergies    All Meds and Allergies reviewed in the record at the facility and is the most up-to-date.    Current Outpatient Medications   Medication Sig     acetaminophen (TYLENOL) 500 MG tablet Take 1,000 mg by mouth 3 times daily     polyethylene glycol (MIRALAX) 17 g packet Take 1 packet by mouth daily     pramox-pe-glycerin-petrolatum (PREPARATION H) 1-0.25-14.4-15 % CREA cream Place rectally 4 times daily     amLODIPine (NORVASC) 10 MG tablet Take 1 tablet by mouth once daily     apixaban ANTICOAGULANT (ELIQUIS ANTICOAGULANT) 5 MG tablet Take 1 tablet (5 mg) by mouth 2 times daily     atenolol (TENORMIN) 50 MG tablet Take 1 tablet (50 mg) by mouth daily     blood glucose test (RELION PRIME TEST STRIPS) strips [BLOOD GLUCOSE TEST (RELION PRIME TEST STRIPS) STRIPS] Use 1 each As Directed daily. Dispense brand per patient's insurance at pharmacy discretion.     buPROPion (WELLBUTRIN XL) 150 MG 24 hr tablet TAKE 1 TABLET BY MOUTH IN THE MORNING     escitalopram (LEXAPRO) 20 MG tablet [ESCITALOPRAM OXALATE (LEXAPRO) 20 MG TABLET] TK 1 T PO  "QAM     ezetimibe (ZETIA) 10 MG tablet Take 1 tablet by mouth once daily     fluocinonide (LIDEX) 0.05 % external solution      furosemide (LASIX) 20 MG tablet Take 1 tablet (20 mg) by mouth daily     glimepiride (AMARYL) 2 MG tablet Take 1 tablet (2 mg) by mouth every morning (before breakfast)     ketoconazole (NIZORAL) 2 % external cream      lisinopril (ZESTRIL) 40 MG tablet Take 1 tablet by mouth once daily     mupirocin (BACTROBAN) 2 % external cream Apply topically daily Apply to Right elbow and right leg wounds.     omeprazole (PRILOSEC) 40 MG DR capsule Take 1 capsule by mouth twice daily     probenecid (BENEMID) 500 MG tablet Take 1 tablet (500 mg) by mouth 2 times daily     simvastatin (ZOCOR) 40 MG tablet Take 1 tablet by mouth once daily     traZODone (DESYREL) 50 MG tablet [TRAZODONE (DESYREL) 50 MG TABLET] TK 1 TO 2 TS PO HS     triamcinolone (KENALOG) 0.1 % external cream      ULTIMA TEST STRIPS strips [ULTIMA TEST STRIPS STRIPS] USE ONE STRIP TO CHECK GLUCOSE ONCE DAILY DX  E11.9     No current facility-administered medications for this visit.       REVIEW OF SYSTEMS:   10 point review of systems reviewed and pertinent positives in the HPI.     PHYSICAL EXAMINATION:  Physical Exam     Vital signs: /72   Pulse 80   Temp 97.6  F (36.4  C)   Resp 16   Ht 1.702 m (5' 7\")   Wt 81.2 kg (179 lb)   SpO2 98%   BMI 28.04 kg/m      General: Awake, Alert, oriented x3, lying in bed, Conversant  HEENT: Pink conjunctiva, moist oral mucosa  NECK: Supple  BACK: No kyphosis of the thoracic spine  EXTREMITIES: Moves both upper and lower extremities with weakness of the RLE, trace RLE edema. Positive dorsi and plantar flexion.   SKIN: Laceration to elbow improving. Swelling resolved. RLE ST X 2 without redness or warmth. Minimal drainage.   NEUROLOGIC: Intact  PSYCHIATRIC: Pleasant affect.       Labs:  All labs reviewed in the nursing home record and [facility]   @  Lab Results   Component Value Date    WBC 5.9 " 09/30/2020     Lab Results   Component Value Date    RBC 4.18 09/30/2020     Lab Results   Component Value Date    HGB 13.9 09/30/2020     Lab Results   Component Value Date    HCT 41.1 09/30/2020     Lab Results   Component Value Date    MCV 98 09/30/2020     Lab Results   Component Value Date    MCH 33.2 09/30/2020     Lab Results   Component Value Date    MCHC 33.7 09/30/2020     Lab Results   Component Value Date    RDW 11.9 09/30/2020     Lab Results   Component Value Date     09/30/2020        @Last Comprehensive Metabolic Panel:  Sodium   Date Value Ref Range Status   08/03/2023 134 (L) 136 - 145 mmol/L Final     Potassium   Date Value Ref Range Status   08/03/2023 4.0 3.4 - 5.3 mmol/L Final   03/10/2022 4.9 3.5 - 5.0 mmol/L Final     Chloride   Date Value Ref Range Status   08/03/2023 99 98 - 107 mmol/L Final   03/10/2022 97 (L) 98 - 107 mmol/L Final     Carbon Dioxide (CO2)   Date Value Ref Range Status   08/03/2023 25 22 - 29 mmol/L Final   03/10/2022 27 22 - 31 mmol/L Final     Anion Gap   Date Value Ref Range Status   08/03/2023 10 7 - 15 mmol/L Final   03/10/2022 11 5 - 18 mmol/L Final     Glucose   Date Value Ref Range Status   08/03/2023 85 70 - 99 mg/dL Final   03/10/2022 133 (H) 70 - 125 mg/dL Final     Urea Nitrogen   Date Value Ref Range Status   08/03/2023 13.6 8.0 - 23.0 mg/dL Final   03/10/2022 6 (L) 8 - 28 mg/dL Final     Creatinine   Date Value Ref Range Status   08/03/2023 0.72 0.67 - 1.17 mg/dL Final     GFR Estimate   Date Value Ref Range Status   08/03/2023 >90 >60 mL/min/1.73m2 Final   09/30/2020 >60 >60 mL/min/1.73m2 Final     Calcium   Date Value Ref Range Status   08/03/2023 8.1 (L) 8.8 - 10.2 mg/dL Final      35 minutes spent for this visit reviewing discharge medications, home care services and follow ups as well as collaborating with nursing and SW.     This note has been dictated using voice recognition software. Any grammatical or context distortions are unintentional and  inherent to the software    Electronically signed by: Carla Carvajal CNP       Sincerely,        Carla Carvajal NP

## 2023-08-15 NOTE — PROGRESS NOTES
[facility] GERIATRIC SERVICES    Code Status:  FULL CODE   Visit Type:   Chief Complaint   Patient presents with    discharge from TCU     Facility:  [facility] ([facility]) [[id number]]           HPI: Jabari Haddad is a 82 year old male who I am seeing today for discharge from the TCU. Pt recently hospitalized on 7/24/23.  Patient presented with a fall.  Past medical history includes A-fib on chronic Eliquis, hypertension, depression, hyperlipidemia and GERD.  Patient fell from a ladder of 12 feet.  Patient found to have a closed head injury without ICH.  He had a displaced right superior Rami fracture, displaced right inferior Rami fracture and right sacral abel fracture with blood in the right hemipelvis.  Patient also found to have a T11 fracture with 30% height loss.  Patient admitted to trauma surgery service and was seen by Ortho and neurosurgery with recommendations for operative management with ORIF of the pelvis and nonoperative management respectively.  Patient continues on Tylenol, methocarbamol and oxycodone for pain.  Hypertension controlled with amlodipine, lisinopril, Lasix and atenolol.  Depression on Wellbutrin and Lexapro.    Transitional Care Course: Today patient sitting up in wheelchair.  Pt denies any pain in his back. He continues in ASPEN TLSO.  Pain control with oxycodone once daily. We will trial tylenol. Right inferior rami fracture.  Sutures removed.  Steri-Strips intact.  Continues with swelling to his right lower extremity.  Positive dorsi and plantarflexion.  Laceration to the right elbow improving with topical treatment.  Skin tears to right lower extremity improving with topical treatment. Constipation with hemorrhoids. Pt started on Miralax and Prep H.     Assessment/Plan:     Compression fracture of T12 vertebra with routine healing, subsequent encounter  -ASPEN brace out of bed.   -Discontinue Oxycodone.   -Tylenol 1000 mg TID.   -Follow up with Neurosurgery in 3 weeks.    -Follow up hgb  9.1.     Closed head injury, subsequent encounter  -ST following.     Lacerations to Right elbow  Laceration to RLL  -Improving with bactroban and foam dressing daily.    Inferior pubic ramus fracture, right, with routine healing, subsequent encounter  Closed fracture of superior ramus of left pubis with routine healing, subsequent encounter  S/P ORIF (open reduction internal fixation) fracture  -Follow-up with Ortho today.  Steri-Strips in place.  -WBAT  -Discontinue Oxycodone.   -Tylenol 1000 mg TID.   -Swelling to right lower extremity.  Tubigrip on in a.m. off at night.   -Encourage ankle pumps.  -Follow up with Ortho on 9/3/23.     Persistent atrial fibrillation (H)  -Rate controlled with atenolol.  -Chronically on AC with Apixaban.     Primary hypertension  -Continue PTA amlodipine, atenolol, lasix and lisinopril.   -Follow up BMP with Na+ 134.     Type 2 diabetes mellitus with hyperosmolarity without coma, without long-term current use of insulin (H)  -glimepiride 2 mg daily.   -Consistent Carb diet.   -BS checks     Constipation  -Miralax 17 gm every day.   -Oxycodone discontinued.     Hemorrhoids   -Prep H topically     -Ok to discharge home with current meds and treatments.   -Follow up with PCP in 1-2 weeks.   -Home PT, OT, HHA and RN for medication management.         Active Ambulatory Problems     Diagnosis Date Noted    Gout     Hemorrhoids     Esophageal Reflux     Osteoarthritis Of The Wrist     DM2 (diabetes mellitus, type 2) (H)     Hyperlipidemia LDL goal <100     Benign Essential Hypertension     Arteriosclerotic Cardiovascular Disease (ASCVD)     Diverticulitis Of Colon     Lethargy     Itching Of The Ears     Abdominal Pain In The Right Lower Belly (RLQ)     Male Erectile Disorder     Exposed To Dusts - Asbestos     Chronic Gout     Hyperkalemia     Pharyngitis     Generalized Osteoarthritis Of Multiple Sites     Pseudogout     Symmetric Polyarticular Inflammation      Pseudogout 03/16/2016    Inflammatory arthritis 03/16/2016    Persistent atrial fibrillation (H) 09/12/2019    Aortic root enlargement (H) 01/22/2020    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    Arteriosclerotic cardiovascular disease (ASCVD) 03/08/2022    Alcohol use, unspecified, uncomplicated 07/27/2023    Compression fracture of T12 vertebra (H) 07/25/2023    Multiple stable closed lateral compression fractures of pelvis (H) 07/24/2023    High prostate specific antigen (PSA) 05/04/2022    Fall 07/25/2023    Depressive disorder 05/04/2022    Depressed mood 07/31/2023    Primary malignant neoplasm of prostate (H) 06/27/2022     Resolved Ambulatory Problems     Diagnosis Date Noted    No Resolved Ambulatory Problems     No Additional Past Medical History     No Known Allergies    All Meds and Allergies reviewed in the record at the facility and is the most up-to-date.    Current Outpatient Medications   Medication Sig    acetaminophen (TYLENOL) 500 MG tablet Take 1,000 mg by mouth 3 times daily    polyethylene glycol (MIRALAX) 17 g packet Take 1 packet by mouth daily    pramox-pe-glycerin-petrolatum (PREPARATION H) 1-0.25-14.4-15 % CREA cream Place rectally 4 times daily    amLODIPine (NORVASC) 10 MG tablet Take 1 tablet by mouth once daily    apixaban ANTICOAGULANT (ELIQUIS ANTICOAGULANT) 5 MG tablet Take 1 tablet (5 mg) by mouth 2 times daily    atenolol (TENORMIN) 50 MG tablet Take 1 tablet (50 mg) by mouth daily    blood glucose test (RELION PRIME TEST STRIPS) strips [BLOOD GLUCOSE TEST (RELION PRIME TEST STRIPS) STRIPS] Use 1 each As Directed daily. Dispense brand per patient's insurance at pharmacy discretion.    buPROPion (WELLBUTRIN XL) 150 MG 24 hr tablet TAKE 1 TABLET BY MOUTH IN THE MORNING    escitalopram (LEXAPRO) 20 MG tablet [ESCITALOPRAM OXALATE (LEXAPRO) 20 MG TABLET] TK 1 T PO QAM    ezetimibe (ZETIA) 10 MG tablet Take 1 tablet by mouth once daily    fluocinonide (LIDEX) 0.05 % external solution      "furosemide (LASIX) 20 MG tablet Take 1 tablet (20 mg) by mouth daily    glimepiride (AMARYL) 2 MG tablet Take 1 tablet (2 mg) by mouth every morning (before breakfast)    ketoconazole (NIZORAL) 2 % external cream     lisinopril (ZESTRIL) 40 MG tablet Take 1 tablet by mouth once daily    mupirocin (BACTROBAN) 2 % external cream Apply topically daily Apply to Right elbow and right leg wounds.    omeprazole (PRILOSEC) 40 MG DR capsule Take 1 capsule by mouth twice daily    probenecid (BENEMID) 500 MG tablet Take 1 tablet (500 mg) by mouth 2 times daily    simvastatin (ZOCOR) 40 MG tablet Take 1 tablet by mouth once daily    traZODone (DESYREL) 50 MG tablet [TRAZODONE (DESYREL) 50 MG TABLET] TK 1 TO 2 TS PO HS    triamcinolone (KENALOG) 0.1 % external cream     ULTIMA TEST STRIPS strips [ULTIMA TEST STRIPS STRIPS] USE ONE STRIP TO CHECK GLUCOSE ONCE DAILY DX  E11.9     No current facility-administered medications for this visit.       REVIEW OF SYSTEMS:   10 point review of systems reviewed and pertinent positives in the HPI.     PHYSICAL EXAMINATION:  Physical Exam     Vital signs: /72   Pulse 80   Temp 97.6  F (36.4  C)   Resp 16   Ht 1.702 m (5' 7\")   Wt 81.2 kg (179 lb)   SpO2 98%   BMI 28.04 kg/m      General: Awake, Alert, oriented x3, lying in bed, Conversant  HEENT: Pink conjunctiva, moist oral mucosa  NECK: Supple  BACK: No kyphosis of the thoracic spine  EXTREMITIES: Moves both upper and lower extremities with weakness of the RLE, trace RLE edema. Positive dorsi and plantar flexion.   SKIN: Laceration to elbow improving. Swelling resolved. RLE ST X 2 without redness or warmth. Minimal drainage.   NEUROLOGIC: Intact  PSYCHIATRIC: Pleasant affect.       Labs:  All labs reviewed in the nursing home record and [facility]   @  Lab Results   Component Value Date    WBC 5.9 09/30/2020     Lab Results   Component Value Date    RBC 4.18 09/30/2020     Lab Results   Component Value Date    HGB 13.9 09/30/2020 "     Lab Results   Component Value Date    HCT 41.1 09/30/2020     Lab Results   Component Value Date    MCV 98 09/30/2020     Lab Results   Component Value Date    MCH 33.2 09/30/2020     Lab Results   Component Value Date    MCHC 33.7 09/30/2020     Lab Results   Component Value Date    RDW 11.9 09/30/2020     Lab Results   Component Value Date     09/30/2020        @Last Comprehensive Metabolic Panel:  Sodium   Date Value Ref Range Status   08/03/2023 134 (L) 136 - 145 mmol/L Final     Potassium   Date Value Ref Range Status   08/03/2023 4.0 3.4 - 5.3 mmol/L Final   03/10/2022 4.9 3.5 - 5.0 mmol/L Final     Chloride   Date Value Ref Range Status   08/03/2023 99 98 - 107 mmol/L Final   03/10/2022 97 (L) 98 - 107 mmol/L Final     Carbon Dioxide (CO2)   Date Value Ref Range Status   08/03/2023 25 22 - 29 mmol/L Final   03/10/2022 27 22 - 31 mmol/L Final     Anion Gap   Date Value Ref Range Status   08/03/2023 10 7 - 15 mmol/L Final   03/10/2022 11 5 - 18 mmol/L Final     Glucose   Date Value Ref Range Status   08/03/2023 85 70 - 99 mg/dL Final   03/10/2022 133 (H) 70 - 125 mg/dL Final     Urea Nitrogen   Date Value Ref Range Status   08/03/2023 13.6 8.0 - 23.0 mg/dL Final   03/10/2022 6 (L) 8 - 28 mg/dL Final     Creatinine   Date Value Ref Range Status   08/03/2023 0.72 0.67 - 1.17 mg/dL Final     GFR Estimate   Date Value Ref Range Status   08/03/2023 >90 >60 mL/min/1.73m2 Final   09/30/2020 >60 >60 mL/min/1.73m2 Final     Calcium   Date Value Ref Range Status   08/03/2023 8.1 (L) 8.8 - 10.2 mg/dL Final     DISCHARGE PLAN/FACE TO FACE:  I certify that this patient is under my care and that I, or a nurse practitioner or physician's assistant working with me, had a face-to-face encounter that meets the physician face-to-face encounter requirements with this patient.       I certify that, based on my findings, the following services are medically necessary home health services.    My clinical findings support  the need for the above skilled services.    This patient is homebound because: Recent hospitalization due to fall. Patient found to have a closed head injury without ICH.  He had a displaced right superior Rami fracture, displaced right inferior Rami fracture and right sacral abel fracture with blood in the right hemipelvis.  Patient also found to have a T11 fracture with 30% height loss.    The patient is, or has been, under my care and I have initiated the establishment of the plan of care. This patient will be followed by a physician who will periodically review the plan of care.       35 minutes spent for this visit reviewing discharge medications, home care services and follow ups as well as collaborating with nursing and .     This note has been dictated using voice recognition software. Any grammatical or context distortions are unintentional and inherent to the software    Electronically signed by: Carla Carvajal CNP

## 2023-08-17 ENCOUNTER — TELEPHONE (OUTPATIENT)
Dept: FAMILY MEDICINE | Facility: CLINIC | Age: 83
End: 2023-08-17
Payer: COMMERCIAL

## 2023-08-17 NOTE — TELEPHONE ENCOUNTER
Home Care is calling regarding an established patient with [facility].       Requesting orders from: Ludwin Macdonald  Provider is following patient: Yes  Is this a 60-day recertification request?  No    Orders Requested    Skilled Nursing  Request for initial evaluation and treatment (one time)     Physical Therapy  Request for initial evaluation and treatment (one time)     Occupational Therapy  Request for initial evaluation and treatment (one time)     HHA (Home Health Aide)  Request for initial evaluation and treatment (one time)       Verbal orders given.  Home Care will send orders for provider to sign.    Marleen Elliott RN  SN, PT, OT and HHA eval treat.

## 2023-08-18 ENCOUNTER — MEDICAL CORRESPONDENCE (OUTPATIENT)
Dept: HEALTH INFORMATION MANAGEMENT | Facility: CLINIC | Age: 83
End: 2023-08-18

## 2023-08-21 ENCOUNTER — MYC MEDICAL ADVICE (OUTPATIENT)
Dept: FAMILY MEDICINE | Facility: CLINIC | Age: 83
End: 2023-08-21
Payer: COMMERCIAL

## 2023-08-21 ENCOUNTER — TELEPHONE (OUTPATIENT)
Dept: FAMILY MEDICINE | Facility: CLINIC | Age: 83
End: 2023-08-21
Payer: COMMERCIAL

## 2023-08-21 NOTE — TELEPHONE ENCOUNTER
Order/Referral Request    Who is requesting: Ros    Orders being requested: Physical therapy 2x a week for 2 weeks 1 x a week for 2 weeks     Reason service is needed/diagnosis: pelvic fracture and compression fracture    When are orders needed by: Some time this week    Has this been discussed with Provider: Yes    Does patient have a preference on a Group/Provider/Facility? [facility]     Does patient have an appointment scheduled?: No    Where to send orders:  Verbal    Could we send this information to you in [facility] or would you prefer to receive a phone call?:   Patient would prefer a phone call   Okay to leave a detailed message?: Yes at Other phone number:  [phone]*

## 2023-08-21 NOTE — TELEPHONE ENCOUNTER
"S-(situation):   See An[employer] message from the patient into the clinic on 8/21/23:    \"Hi Doctor Naresh -- home health care nurse was here Sat. and said he would request a sleep aid for me - did he ? is there any medication you can prescribe to help heal the bleeding problem ? i am taking Meralax every morning but bleeding still happens at about three movements ? Thank-You Jabari\"    B-(background):   See discharge provider notes from 8/15/23 [facility] [place]:    \"Constipation with hemorrhoids. Pt started on Miralax and Prep H.\"    \"Constipation  -Miralax 17 gm every day.   -Oxycodone discontinued.      Hemorrhoids   -Prep H topically      -Ok to discharge home with current meds and treatments.   -Follow up with PCP in 1-2 weeks.   -Home PT, OT, HHA and RN for medication management.\"        Hemoglobin   Date Value Ref Range Status   08/07/2023 9.1 (L) 13.3 - 17.7 g/dL Final   08/03/2023 8.3 (L) 13.3 - 17.7 g/dL Final     No results found for: INR      See ED notes from 7/24/23:    Recent Labs   07/24/23  1207   INR 1.4*   PROTIME 16.9*   PTT 32.0     A-(assessment):   Writer called patient and left message to return call regarding bleeding hemorrhoids and need for sleep medication per patient MyChart message on 8/21/23-see information above    Writer also sent the patient a [employer] message on 8/21/23 requesting him to call the clinic to gather more information.    R-(recommendations):     Please route to the RN queue when the patient calls back for nurse triage.    Cheri Sugn, RN, BSN  [facility]                                   "

## 2023-08-22 ENCOUNTER — MYC MEDICAL ADVICE (OUTPATIENT)
Dept: FAMILY MEDICINE | Facility: CLINIC | Age: 83
End: 2023-08-22
Payer: COMMERCIAL

## 2023-08-22 ENCOUNTER — CARE PLANS (OUTPATIENT)
Dept: FAMILY MEDICINE | Facility: CLINIC | Age: 83
End: 2023-08-22
Payer: COMMERCIAL

## 2023-08-22 DIAGNOSIS — G47.00 INSOMNIA, UNSPECIFIED TYPE: ICD-10-CM

## 2023-08-22 DIAGNOSIS — G47.00 INSOMNIA, UNSPECIFIED TYPE: Primary | ICD-10-CM

## 2023-08-22 RX ORDER — HYDROXYZINE PAMOATE 25 MG/1
CAPSULE ORAL
Qty: 30 CAPSULE | Refills: 1 | Status: SHIPPED | OUTPATIENT
Start: 2023-08-22 | End: 2023-08-23

## 2023-08-22 NOTE — TELEPHONE ENCOUNTER
"Dr. Mcdowell responded to the message sent by the writer to him on 8/22/23 and Dr. Mcdowell prescribed hydroxyzine for the patient to help him with sleep.     huddled with Dr. Mcdowell and discussed the patient's concern about healing his hemorrhoids prior to calling the patient back about a new prescription for hydroxyzine ordered by Dr. Mcdowell.    Dr. Mcdowell stated that the hemorrhoids are ongoing and that the Preparation H that the patient is using is a good product for treating hemorrhoids.    Dr. Mcdowell also recommended to increase fluids to increase the softness of the bowel movements.     and Dr. Mcdowell discussed the use of \"squatty potties\" as an alternative to a average toilet to reduce straining during bowel movements, but Dr. Mcdowell declined to recommend them as the potties may make a higher fall risk for the patient in his bathroom.    Writer called and relayed to the patient that the hydroxyzine prescription was sent to the pharmacy off [facility] in [place].    Writer also relayed to the patient to continue his Miralax, increase fluids, refrain from straining when having a bowel movement when possible, and continue to use the Preparation H for his hemorrhoids.     Patient verbalized understanding and agrees with the plan.     Denies any other questions or concerns at this time.    Cheri Sung RN, BSN  [facility]    "

## 2023-08-22 NOTE — TELEPHONE ENCOUNTER
"Writer called and spoke to the patient regarding his sleep and  hemorrhoid concerns as per patient's Vandas Grouphart message into the clinic on 8/21/23.    Patient states he can only lay on his back at night to sleep due to pelvic injuries    Normally sleeps on his left side or stomach, but can't because of the pelvic injuries and can't sleep on his right side because of GERD    Reports that when he was in the TCU for rehab after his orthopedic surgeries on 7/25/23 at [facility], he was given oxycodone for pain, which helped him sleep    He was also given melatonin for sleep in the TCU, but he is not sure which seemed to help him sleep better, although he thinks it was the oxycodone    Pain is rated as 3 out of ten today and the patient states he is moving around well    Patient has been taking APAP for pain, which seems to be effective    Reports he has not tried other medication for sleep, although he has a history of trazodone being prescribed for depression in 2019-patient unsure if the trazodone helped his sleep    Has taken melatonin in the past, as recommended by Dr. Mcdowell per patient, but the melatonin did not seem to help his sleep much     Felt hot and cold last night, tossed and turned, got up for a couple of hours then finally fell asleep for three hours    Reports he is healing well from orthopedic surgery    Patient also states he has hemorrhoids that have been dripping blood, with the exception of the last 4 bowel movements.     Denies bleeding from hemorrhoids today, 8/22/23    Usually uses a paper towel to soak up blood after bowel movements by sitting on a paper towel until the bleeding stops and waits for his hemorrhoids to \"go back in\"-states he has ruined several pairs of underwear due to hemorrhoid bleeding    States he has increased his daily oral intake of Miralax, which seems to help the bleeding from the hemorrhoids-Less constipation and more soft stools after using Miralax    Also states he " is using Preparation H and it is helping some with the stinging, but doesn't help the bleeding hemorrhoids-blood is bright red    Patient is looking for a medication to help him sleep and is also wondering if there is a medication to help heal his hemorrhoids faster    Writer will route to provider to review and advise next steps.    Cheri Sung RN, BSN  [facility]

## 2023-08-23 RX ORDER — HYDROXYZINE PAMOATE 25 MG/1
CAPSULE ORAL
Qty: 30 CAPSULE | Refills: 1 | Status: SHIPPED | OUTPATIENT
Start: 2023-08-23

## 2023-08-23 NOTE — TELEPHONE ENCOUNTER
OK per Dr Mcdowell to send in rx for Hydroxyzine 25 mg-Take 1 capsule by mouth at bedtime for sleep with qty #30 and 1 refill.    Rx approved to [facility] pharmacy in [place]:      Angelina Cooper, CAREYN, RN  [facility]

## 2023-08-23 NOTE — TELEPHONE ENCOUNTER
I called Ros and FABRICIO regarding reviewed orders. I let her know that orders have been reviewed and okayed.

## 2023-08-23 NOTE — TELEPHONE ENCOUNTER
"Per Medication list, rx for Hydroxyzine 25 mg-1 capsule at bedtime for sleep with qty #30 and 1 refill was sent to [facility] in [place] on 8/22/23, but do not see \"electronically confirmed\" by pharmacy with date and time.    Will huddle with Dr Mcdowell for VO and send rx to pharmacy.    CAREY OlsonN, RN  [facility]    "

## 2023-08-24 NOTE — TELEPHONE ENCOUNTER
April from [facility] calling regarding new rx for hydroxyzine.     There is an interaction warning with his lexapro 20 mg for high risk due to QT prolonging risk.     April wondering if provider is still okay with patient taking this medication even with drug warning.     Debi Young, CAREYN, RN  [facility]

## 2023-08-25 NOTE — TELEPHONE ENCOUNTER
"Per Dr. Mcgovern,  \"Yes ok to take together    Johnathan Mcgovern MD\"       Writer called April and left message on secure line with message and gave clinic number to call with any further questions or concerns.    CAREY DiazN, RN  [facility]    "

## 2023-08-28 ENCOUNTER — MYC MEDICAL ADVICE (OUTPATIENT)
Dept: FAMILY MEDICINE | Facility: CLINIC | Age: 83
End: 2023-08-28
Payer: COMMERCIAL

## 2023-08-28 DIAGNOSIS — I48.91 NEW ONSET ATRIAL FIBRILLATION (H): ICD-10-CM

## 2023-08-28 NOTE — TELEPHONE ENCOUNTER
Routing refill request to provider for review/approval because:  Patient age protocol failed    Last Written Prescription Date:  4/4/2023  Last Fill Quantity: 60,  # refills: 3       Requested Prescriptions   Pending Prescriptions Disp Refills    apixaban ANTICOAGULANT (ELIQUIS ANTICOAGULANT) 5 MG tablet 60 tablet 3     Sig: Take 1 tablet (5 mg) by mouth 2 times daily       Direct Oral Anticoagulant Agents Failed - 8/28/2023  8:32 AM        Failed - Patient is 18-79 years of age        Passed - Normal Platelets on file in past 12 months     Recent Labs   Lab Test 08/03/23  0810                  Passed - Medication is active on med list        Passed - Serum creatinine less than or equal to 1.4 on file in past 12 mos     Recent Labs   Lab Test 08/03/23  0810   CR 0.72       Ok to refill medication if creatinine is low          Passed - Weight is greater than 60 kg for the past year     Wt Readings from Last 3 Encounters:   08/15/23 81.2 kg (179 lb)   08/10/23 78.3 kg (172 lb 9.6 oz)   08/08/23 86.2 kg (190 lb)             Passed - Recent (6 mo) or future (30 days) visit within the authorizing provider's specialty             Noris Boone RN 08/28/23 8:45 AM

## 2023-08-31 ENCOUNTER — OFFICE VISIT (OUTPATIENT)
Dept: FAMILY MEDICINE | Facility: CLINIC | Age: 83
End: 2023-08-31
Payer: COMMERCIAL

## 2023-08-31 VITALS
OXYGEN SATURATION: 98 % | SYSTOLIC BLOOD PRESSURE: 128 MMHG | BODY MASS INDEX: 26.06 KG/M2 | TEMPERATURE: 98.1 F | HEART RATE: 79 BPM | DIASTOLIC BLOOD PRESSURE: 76 MMHG | HEIGHT: 67 IN | WEIGHT: 166 LBS | RESPIRATION RATE: 18 BRPM

## 2023-08-31 DIAGNOSIS — I48.20 CHRONIC ATRIAL FIBRILLATION (H): ICD-10-CM

## 2023-08-31 DIAGNOSIS — S22.080D COMPRESSION FRACTURE OF T12 VERTEBRA WITH ROUTINE HEALING, SUBSEQUENT ENCOUNTER: ICD-10-CM

## 2023-08-31 DIAGNOSIS — E11.65 TYPE 2 DIABETES MELLITUS WITH HYPERGLYCEMIA, WITHOUT LONG-TERM CURRENT USE OF INSULIN (H): ICD-10-CM

## 2023-08-31 DIAGNOSIS — C61 PROSTATE CANCER (H): Primary | ICD-10-CM

## 2023-08-31 LAB
ALBUMIN SERPL BCG-MCNC: 4.1 G/DL (ref 3.5–5.2)
ALP SERPL-CCNC: 204 U/L (ref 40–129)
ALT SERPL W P-5'-P-CCNC: 20 U/L (ref 0–70)
ANION GAP SERPL CALCULATED.3IONS-SCNC: 11 MMOL/L (ref 7–15)
AST SERPL W P-5'-P-CCNC: 35 U/L (ref 0–45)
BILIRUB SERPL-MCNC: 0.4 MG/DL
BUN SERPL-MCNC: 9.3 MG/DL (ref 8–23)
CALCIUM SERPL-MCNC: 9.9 MG/DL (ref 8.8–10.2)
CHLORIDE SERPL-SCNC: 99 MMOL/L (ref 98–107)
CREAT SERPL-MCNC: 0.79 MG/DL (ref 0.67–1.17)
DEPRECATED HCO3 PLAS-SCNC: 26 MMOL/L (ref 22–29)
ERYTHROCYTE [DISTWIDTH] IN BLOOD BY AUTOMATED COUNT: 14.1 % (ref 10–15)
GFR SERPL CREATININE-BSD FRML MDRD: 89 ML/MIN/1.73M2
GLUCOSE SERPL-MCNC: 139 MG/DL (ref 70–99)
HBA1C MFR BLD: 5.1 % (ref 0–5.6)
HCT VFR BLD AUTO: 37.4 % (ref 40–53)
HGB BLD-MCNC: 12.2 G/DL (ref 13.3–17.7)
MCH RBC QN AUTO: 32.4 PG (ref 26.5–33)
MCHC RBC AUTO-ENTMCNC: 32.6 G/DL (ref 31.5–36.5)
MCV RBC AUTO: 100 FL (ref 78–100)
PLATELET # BLD AUTO: 249 10E3/UL (ref 150–450)
POTASSIUM SERPL-SCNC: 5.4 MMOL/L (ref 3.4–5.3)
PROT SERPL-MCNC: 7.7 G/DL (ref 6.4–8.3)
PSA SERPL DL<=0.01 NG/ML-MCNC: 0.94 NG/ML
RBC # BLD AUTO: 3.76 10E6/UL (ref 4.4–5.9)
SODIUM SERPL-SCNC: 136 MMOL/L (ref 136–145)
WBC # BLD AUTO: 7.7 10E3/UL (ref 4–11)

## 2023-08-31 PROCEDURE — 83036 HEMOGLOBIN GLYCOSYLATED A1C: CPT | Performed by: FAMILY MEDICINE

## 2023-08-31 PROCEDURE — 99214 OFFICE O/P EST MOD 30 MIN: CPT | Performed by: FAMILY MEDICINE

## 2023-08-31 PROCEDURE — G0103 PSA SCREENING: HCPCS | Performed by: FAMILY MEDICINE

## 2023-08-31 PROCEDURE — 80053 COMPREHEN METABOLIC PANEL: CPT | Performed by: FAMILY MEDICINE

## 2023-08-31 PROCEDURE — 36415 COLL VENOUS BLD VENIPUNCTURE: CPT | Performed by: FAMILY MEDICINE

## 2023-08-31 PROCEDURE — 85027 COMPLETE CBC AUTOMATED: CPT | Performed by: FAMILY MEDICINE

## 2023-08-31 ASSESSMENT — PAIN SCALES - GENERAL: PAINLEVEL: MODERATE PAIN (4)

## 2023-08-31 NOTE — PROGRESS NOTES
Hospital Follow-up Visit:    Hospital/Nursing Home/IP Rehab Facility:  [facility]; TCU  Date of Admission: 7/24/2023  Date of Discharge: 7/31/2023; TCU discharge 3 weeks later  Reason(s) for Admission: Hip fracture; thoracic vertebrae fracture subsequent to fall from ladder    Was your hospitalization related to COVID-19?  No  Problems taking medications regularly: Yes  Medication changes since discharge: None  Problems adhering to non-medication therapy:      Summary of hospitalization:  See outside records, reviewed and scanned  Diagnostic Tests/Treatments reviewed.  Follow up needed: none  Other Healthcare Providers Involved in Patient s Care:         None  Update since discharge: improved.         Plan of care communicated with patient           History plan and assessment: Jabari is being seen here in primary care following 3 weeks of transit care in [place] subsequent to a hospital admission for right hip fracture with hardware placement multiple fractures and hip multiple thoracic vertebrae fractures.  Patient spent 7 overnights in 8 days in the [facility].  Comorbid factors include diabetes mellitus type 2 requiring insulin during hospitalization history of prostate cancer currently being treated with Lupron atrial fibrillation benign essential hypertension pseudogout.  He is done very well he is now ambulating he is at home living with his son walking with a cane.  His gait was observed here in primary care.  Heart and lung examination was unremarkable.  He is really doing quite well and medications were reviewed all medical questions asked were answered time spent reviewing charts going over medications 38 minutes.  Follow-up 3 months.  We will do a PSA here to help with decision as to whether to continue with Lupron therapy or proceed with radiation chemotherapy for prostate cancer

## 2023-09-08 ENCOUNTER — MYC MEDICAL ADVICE (OUTPATIENT)
Dept: FAMILY MEDICINE | Facility: CLINIC | Age: 83
End: 2023-09-08
Payer: COMMERCIAL

## 2023-09-08 DIAGNOSIS — I10 PRIMARY HYPERTENSION: ICD-10-CM

## 2023-09-08 RX ORDER — LISINOPRIL 40 MG/1
40 TABLET ORAL DAILY
Qty: 90 TABLET | Refills: 0 | Status: SHIPPED | OUTPATIENT
Start: 2023-09-08 | End: 2023-12-04

## 2023-09-15 ENCOUNTER — LAB (OUTPATIENT)
Dept: LAB | Facility: CLINIC | Age: 83
End: 2023-09-15
Payer: COMMERCIAL

## 2023-09-15 DIAGNOSIS — C61 MALIGNANT NEOPLASM OF PROSTATE (H): Primary | ICD-10-CM

## 2023-09-15 LAB — PSA SERPL DL<=0.01 NG/ML-MCNC: 0.9 NG/ML

## 2023-09-15 PROCEDURE — 84403 ASSAY OF TOTAL TESTOSTERONE: CPT

## 2023-09-15 PROCEDURE — 36415 COLL VENOUS BLD VENIPUNCTURE: CPT

## 2023-09-15 PROCEDURE — 84153 ASSAY OF PSA TOTAL: CPT

## 2023-09-19 LAB — TESTOST SERPL-MCNC: 7 NG/DL (ref 240–950)

## 2023-09-24 ENCOUNTER — MYC MEDICAL ADVICE (OUTPATIENT)
Dept: FAMILY MEDICINE | Facility: CLINIC | Age: 83
End: 2023-09-24
Payer: COMMERCIAL

## 2023-09-24 DIAGNOSIS — E78.2 MIXED HYPERLIPIDEMIA: ICD-10-CM

## 2023-09-25 NOTE — TELEPHONE ENCOUNTER
Routing refill request to provider for review/approval because:  Labs not current:    LDL Cholesterol Calculated   Date Value Ref Range Status   03/10/2022 58 <=129 mg/dL Final     Pending Prescriptions:                       Disp   Refills    simvastatin (ZOCOR) 40 MG tablet           90 tab*1        Sig: Take 1 tablet (40 mg) by mouth daily    Last Written Prescription Date:  5/30/23  Last Fill Quantity: 90,  # refills: 0   Last office visitwith prescribing provider: 8/31/2023   Future Office Visit: None scheduled    CAREY OlsonN, RN  [facility]

## 2023-09-26 RX ORDER — SIMVASTATIN 40 MG
40 TABLET ORAL DAILY
Qty: 90 TABLET | Refills: 1 | Status: SHIPPED | OUTPATIENT
Start: 2023-09-26

## 2023-09-28 ENCOUNTER — TRANSFERRED RECORDS (OUTPATIENT)
Dept: HEALTH INFORMATION MANAGEMENT | Facility: CLINIC | Age: 83
End: 2023-09-28
Payer: COMMERCIAL

## 2023-10-04 ENCOUNTER — TRANSFERRED RECORDS (OUTPATIENT)
Dept: HEALTH INFORMATION MANAGEMENT | Facility: CLINIC | Age: 83
End: 2023-10-04
Payer: COMMERCIAL

## 2023-10-18 ENCOUNTER — OFFICE VISIT (OUTPATIENT)
Dept: FAMILY MEDICINE | Facility: CLINIC | Age: 83
End: 2023-10-18
Payer: COMMERCIAL

## 2023-10-18 VITALS
RESPIRATION RATE: 13 BRPM | OXYGEN SATURATION: 97 % | BODY MASS INDEX: 28.49 KG/M2 | TEMPERATURE: 97.9 F | HEART RATE: 78 BPM | SYSTOLIC BLOOD PRESSURE: 154 MMHG | DIASTOLIC BLOOD PRESSURE: 87 MMHG | WEIGHT: 171 LBS | HEIGHT: 65 IN

## 2023-10-18 DIAGNOSIS — M80.08XA AGE-RELATED OSTEOPOROSIS WITH CURRENT PATHOLOGICAL FRACTURE, VERTEBRA(E), INITIAL ENCOUNTER FOR FRACTURE (H): ICD-10-CM

## 2023-10-18 DIAGNOSIS — E11.65 TYPE 2 DIABETES MELLITUS WITH HYPERGLYCEMIA, WITHOUT LONG-TERM CURRENT USE OF INSULIN (H): ICD-10-CM

## 2023-10-18 DIAGNOSIS — M80.00XA AGE-RELATED OSTEOPOROSIS WITH CURRENT PATHOLOGICAL FRACTURE, INITIAL ENCOUNTER: Primary | ICD-10-CM

## 2023-10-18 PROCEDURE — 99214 OFFICE O/P EST MOD 30 MIN: CPT | Performed by: FAMILY MEDICINE

## 2023-10-18 PROCEDURE — 91320 SARSCV2 VAC 30MCG TRS-SUC IM: CPT | Performed by: FAMILY MEDICINE

## 2023-10-18 PROCEDURE — 90480 ADMN SARSCOV2 VAC 1/ONLY CMP: CPT | Performed by: FAMILY MEDICINE

## 2023-10-18 RX ORDER — RESPIRATORY SYNCYTIAL VIRUS VACCINE 120MCG/0.5
0.5 KIT INTRAMUSCULAR ONCE
Qty: 1 EACH | Refills: 0 | Status: CANCELLED | OUTPATIENT
Start: 2023-10-18 | End: 2023-10-18

## 2023-10-18 ASSESSMENT — PAIN SCALES - GENERAL: PAINLEVEL: MILD PAIN (3)

## 2023-10-18 NOTE — PROGRESS NOTES
"  Assessment & Plan     DM2 (diabetes mellitus, type 2) (H)  No change in medication    Age-related osteoporosis with current pathological fracture, initial encounter  We will pursue bone density test and then decide whether we want to refer to endocrinology to treat osteoporosis based on vertebral fracture  - DX Hip/Pelvis/Spine; Future    Age-related osteoporosis with current pathological fracture, vertebra(e), initial encounter for fracture (H)  See above note  - DX Hip/Pelvis/Spine; Future             BMI:   Estimated body mass index is 28.18 kg/m  as calculated from the following:    Height as of this encounter: 1.659 m (5' 5.32\").    Weight as of this encounter: 77.6 kg (171 lb).           Mike Mcdowell MD  [facility]za is a 82 year old, presenting for the following health issues:  Recheck Medication (bone density medications)      10/18/2023    10:47 AM   Additional Questions   Roomed by Shefali MENDOZA Jabari is interested in discussing treatment for his osteoporosis which was discovered during his treatment for his vertebral fracture.  We discussed the pros and cons of oral medications versus injections adding to the overall stress of he has a pill load at his age.  Pros and cons will be thought over.  First thing to do is to get a bone density test which we will order.  If he has severe osteoporosis we will discuss this further and perhaps refer him to endocrinology.  Here he agrees with our plan in the meantime we asked him to take some Caltrate and to try to exercise 30 minutes/day if possible.  Follow-up 1 month              Review of Systems   Constitutional, HEENT, cardiovascular, pulmonary, gi and gu systems are negative, except as otherwise noted.      Objective    BP (!) 154/87 (BP Location: Right arm, Patient Position: Sitting, Cuff Size: Adult Regular)   Pulse 78   Temp 97.9  F (36.6  C) (Oral)   Resp 13   Ht 1.659 m (5' 5.32\")   Wt 77.6 kg (171 lb) "   SpO2 97%   BMI 28.18 kg/m    Body mass index is 28.18 kg/m .  Physical Exam   GENERAL: healthy, alert and no distress  NECK: no adenopathy, no asymmetry, masses, or scars and thyroid normal to palpation  RESP: lungs clear to auscultation - no rales, rhonchi or wheezes  CV: regular rate and rhythm, normal S1 S2, no S3 or S4, no murmur, click or rub, no peripheral edema and peripheral pulses strong  ABDOMEN: soft, nontender, no hepatosplenomegaly, no masses and bowel sounds normal  MS: no gross musculoskeletal defects noted, no edema

## 2023-10-18 NOTE — PROGRESS NOTES
Answers submitted by the patient for this visit:  Back Pain Visit Questionnaire (Submitted on 10/18/2023)  Your back pain is: new  New Back Pain Visit Questionnaire  (Submitted on 10/18/2023)  What do you think is the original cause of your back pain?: a fall  When did you first notice your back pain? : more than 1 month ago  How would you describe your back pain? : other  How often do you feel your back pain? : constantly  Where is your back pain located? : other  Where does your back pain spread? : nowhere  Since you noticed your back pain, how has it changed? : gradually improving  Does your back pain interfere with your job?: No  On a scale of 1-10 (10 being the worst), how strong is your back pain?: 3  What makes your back pain worse? : other  Acupuncture:: not tried  Acetaminophen: helpful  Activity or Exercise: not helpful  Chiropractor: not tried  Cold: not tried  Heat: not tried  Massage: not tried  Muscle relaxants : not tried  NSAIDS (Ibuprofen, Naproxen) : helpful  Opioids: not tried  Physical Therapy: not tried  Rest: helpful  Steroid Injection: not tried  Stretching : not helpful  Surgery: not tried  TENS Unit: not tried  Topical pain relievers : not tried  Do you see any other healthcare providers for your back pain? : None  General Questionnaire (Submitted on 10/18/2023)  Chief Complaint: Chronic problems general questions HPI Form  How many servings of fruits and vegetables do you eat daily?: 0-1  On average, how many sweetened beverages do you drink each day (Examples: soda, juice, sweet tea, etc.  Do NOT count diet or artificially sweetened beverages)?: 6  How many minutes a day do you exercise enough to make your heart beat faster?: 10 to 19  How many days a week do you exercise enough to make your heart beat faster?: 3 or less  How many days per week do you miss taking your medication?: 0

## 2023-10-19 DIAGNOSIS — I10 BENIGN ESSENTIAL HYPERTENSION: ICD-10-CM

## 2023-10-19 RX ORDER — FUROSEMIDE 20 MG
20 TABLET ORAL DAILY
Qty: 90 TABLET | Refills: 0 | Status: SHIPPED | OUTPATIENT
Start: 2023-10-19 | End: 2024-01-15

## 2023-10-25 ENCOUNTER — TRANSFERRED RECORDS (OUTPATIENT)
Dept: HEALTH INFORMATION MANAGEMENT | Facility: CLINIC | Age: 83
End: 2023-10-25
Payer: COMMERCIAL

## 2023-11-01 DIAGNOSIS — I10 ESSENTIAL HYPERTENSION, BENIGN: ICD-10-CM

## 2023-11-01 DIAGNOSIS — I25.10 CARDIOVASCULAR DISEASE: ICD-10-CM

## 2023-11-02 RX ORDER — AMLODIPINE BESYLATE 10 MG/1
TABLET ORAL
Qty: 90 TABLET | Refills: 0 | Status: SHIPPED | OUTPATIENT
Start: 2023-11-02 | End: 2024-01-29

## 2023-11-03 DIAGNOSIS — I10 ESSENTIAL HYPERTENSION, BENIGN: ICD-10-CM

## 2023-11-03 DIAGNOSIS — I25.10 CARDIOVASCULAR DISEASE: ICD-10-CM

## 2023-11-03 RX ORDER — AMLODIPINE BESYLATE 10 MG/1
TABLET ORAL
Qty: 90 TABLET | Refills: 0 | OUTPATIENT
Start: 2023-11-03

## 2023-11-08 ENCOUNTER — TRANSFERRED RECORDS (OUTPATIENT)
Dept: HEALTH INFORMATION MANAGEMENT | Facility: CLINIC | Age: 83
End: 2023-11-08
Payer: COMMERCIAL

## 2023-11-08 DIAGNOSIS — E78.00 HYPERCHOLESTEREMIA: ICD-10-CM

## 2023-11-08 DIAGNOSIS — E11.69 TYPE 2 DIABETES MELLITUS WITH OTHER SPECIFIED COMPLICATION, WITHOUT LONG-TERM CURRENT USE OF INSULIN (H): ICD-10-CM

## 2023-11-08 RX ORDER — EZETIMIBE 10 MG/1
TABLET ORAL
Qty: 90 TABLET | Refills: 0 | OUTPATIENT
Start: 2023-11-08

## 2023-11-08 RX ORDER — GLIMEPIRIDE 2 MG/1
2 TABLET ORAL
Qty: 90 TABLET | Refills: 0 | Status: SHIPPED | OUTPATIENT
Start: 2023-11-08 | End: 2024-02-07

## 2023-11-11 DIAGNOSIS — I10 ESSENTIAL HYPERTENSION, BENIGN: ICD-10-CM

## 2023-11-12 ENCOUNTER — MYC REFILL (OUTPATIENT)
Dept: FAMILY MEDICINE | Facility: CLINIC | Age: 83
End: 2023-11-12
Payer: COMMERCIAL

## 2023-11-12 DIAGNOSIS — I10 ESSENTIAL HYPERTENSION, BENIGN: ICD-10-CM

## 2023-11-13 ENCOUNTER — MYC MEDICAL ADVICE (OUTPATIENT)
Dept: FAMILY MEDICINE | Facility: CLINIC | Age: 83
End: 2023-11-13
Payer: COMMERCIAL

## 2023-11-13 DIAGNOSIS — I10 ESSENTIAL HYPERTENSION, BENIGN: ICD-10-CM

## 2023-11-13 RX ORDER — ATENOLOL 50 MG/1
50 TABLET ORAL DAILY
Qty: 90 TABLET | Refills: 0 | OUTPATIENT
Start: 2023-11-13

## 2023-11-13 RX ORDER — ATENOLOL 50 MG/1
50 TABLET ORAL DAILY
Qty: 90 TABLET | Refills: 1 | OUTPATIENT
Start: 2023-11-13

## 2023-11-13 RX ORDER — ATENOLOL 50 MG/1
50 TABLET ORAL DAILY
Qty: 90 TABLET | Refills: 0 | Status: SHIPPED | OUTPATIENT
Start: 2023-11-13 | End: 2024-02-05

## 2023-11-28 DIAGNOSIS — K21.9 GASTROESOPHAGEAL REFLUX DISEASE, UNSPECIFIED WHETHER ESOPHAGITIS PRESENT: ICD-10-CM

## 2023-11-28 RX ORDER — OMEPRAZOLE 40 MG/1
CAPSULE, DELAYED RELEASE ORAL
Qty: 180 CAPSULE | Refills: 0 | Status: SHIPPED | OUTPATIENT
Start: 2023-11-28 | End: 2024-05-27

## 2023-12-04 DIAGNOSIS — I10 PRIMARY HYPERTENSION: ICD-10-CM

## 2023-12-04 RX ORDER — LISINOPRIL 40 MG/1
40 TABLET ORAL DAILY
Qty: 90 TABLET | Refills: 0 | Status: SHIPPED | OUTPATIENT
Start: 2023-12-04 | End: 2024-02-26

## 2023-12-06 ENCOUNTER — TRANSFERRED RECORDS (OUTPATIENT)
Dept: HEALTH INFORMATION MANAGEMENT | Facility: CLINIC | Age: 83
End: 2023-12-06
Payer: COMMERCIAL

## 2023-12-22 ENCOUNTER — TRANSFERRED RECORDS (OUTPATIENT)
Dept: HEALTH INFORMATION MANAGEMENT | Facility: CLINIC | Age: 83
End: 2023-12-22
Payer: COMMERCIAL

## 2023-12-25 DIAGNOSIS — I48.91 NEW ONSET ATRIAL FIBRILLATION (H): ICD-10-CM

## 2023-12-26 RX ORDER — APIXABAN 5 MG/1
5 TABLET, FILM COATED ORAL 2 TIMES DAILY
Qty: 60 TABLET | Refills: 0 | Status: SHIPPED | OUTPATIENT
Start: 2023-12-26 | End: 2024-01-23

## 2024-01-03 ENCOUNTER — TRANSFERRED RECORDS (OUTPATIENT)
Dept: HEALTH INFORMATION MANAGEMENT | Facility: CLINIC | Age: 84
End: 2024-01-03
Payer: COMMERCIAL

## 2024-01-05 ENCOUNTER — HOSPITAL ENCOUNTER (OUTPATIENT)
Dept: BONE DENSITY | Facility: HOSPITAL | Age: 84
Discharge: HOME | End: 2024-01-05
Attending: FAMILY MEDICINE | Admitting: FAMILY MEDICINE
Payer: COMMERCIAL

## 2024-01-05 DIAGNOSIS — M80.08XA AGE-RELATED OSTEOPOROSIS WITH CURRENT PATHOLOGICAL FRACTURE, VERTEBRA(E), INITIAL ENCOUNTER FOR FRACTURE (H): ICD-10-CM

## 2024-01-05 DIAGNOSIS — M80.00XA AGE-RELATED OSTEOPOROSIS WITH CURRENT PATHOLOGICAL FRACTURE, INITIAL ENCOUNTER: ICD-10-CM

## 2024-01-05 PROCEDURE — 77080 DXA BONE DENSITY AXIAL: CPT

## 2024-01-08 DIAGNOSIS — M80.00XD OSTEOPOROSIS WITH CURRENT PATHOLOGICAL FRACTURE WITH ROUTINE HEALING, UNSPECIFIED OSTEOPOROSIS TYPE, SUBSEQUENT ENCOUNTER: Primary | ICD-10-CM

## 2024-01-15 ENCOUNTER — TRANSFERRED RECORDS (OUTPATIENT)
Dept: HEALTH INFORMATION MANAGEMENT | Facility: CLINIC | Age: 84
End: 2024-01-15
Payer: COMMERCIAL

## 2024-01-15 DIAGNOSIS — I10 BENIGN ESSENTIAL HYPERTENSION: ICD-10-CM

## 2024-01-15 LAB — RETINOPATHY: NEGATIVE

## 2024-01-15 RX ORDER — FUROSEMIDE 20 MG
20 TABLET ORAL DAILY
Qty: 90 TABLET | Refills: 0 | Status: SHIPPED | OUTPATIENT
Start: 2024-01-15 | End: 2024-04-15

## 2024-01-23 DIAGNOSIS — I48.91 NEW ONSET ATRIAL FIBRILLATION (H): ICD-10-CM

## 2024-01-23 RX ORDER — APIXABAN 5 MG/1
5 TABLET, FILM COATED ORAL 2 TIMES DAILY
Qty: 60 TABLET | Refills: 1 | Status: SHIPPED | OUTPATIENT
Start: 2024-01-23 | End: 2024-03-26

## 2024-01-24 ENCOUNTER — TRANSFERRED RECORDS (OUTPATIENT)
Dept: HEALTH INFORMATION MANAGEMENT | Facility: CLINIC | Age: 84
End: 2024-01-24
Payer: COMMERCIAL

## 2024-01-29 DIAGNOSIS — I10 ESSENTIAL HYPERTENSION, BENIGN: ICD-10-CM

## 2024-01-29 DIAGNOSIS — I25.10 CARDIOVASCULAR DISEASE: ICD-10-CM

## 2024-01-29 RX ORDER — AMLODIPINE BESYLATE 10 MG/1
TABLET ORAL
Qty: 90 TABLET | Refills: 0 | Status: SHIPPED | OUTPATIENT
Start: 2024-01-29 | End: 2024-04-26

## 2024-01-30 DIAGNOSIS — M1A.00X0 IDIOPATHIC CHRONIC GOUT WITHOUT TOPHUS, UNSPECIFIED SITE: ICD-10-CM

## 2024-01-30 RX ORDER — PROBENECID 500 MG/1
500 TABLET, FILM COATED ORAL 2 TIMES DAILY
Qty: 60 TABLET | Refills: 0 | Status: SHIPPED | OUTPATIENT
Start: 2024-01-30 | End: 2024-04-03

## 2024-02-03 DIAGNOSIS — E78.00 HYPERCHOLESTEREMIA: ICD-10-CM

## 2024-02-03 DIAGNOSIS — I10 ESSENTIAL HYPERTENSION, BENIGN: ICD-10-CM

## 2024-02-05 RX ORDER — EZETIMIBE 10 MG/1
TABLET ORAL
Qty: 90 TABLET | Refills: 0 | Status: SHIPPED | OUTPATIENT
Start: 2024-02-05 | End: 2024-05-06

## 2024-02-05 RX ORDER — ATENOLOL 50 MG/1
50 TABLET ORAL DAILY
Qty: 90 TABLET | Refills: 0 | Status: SHIPPED | OUTPATIENT
Start: 2024-02-05 | End: 2024-05-02

## 2024-02-06 DIAGNOSIS — E11.69 TYPE 2 DIABETES MELLITUS WITH OTHER SPECIFIED COMPLICATION, WITHOUT LONG-TERM CURRENT USE OF INSULIN (H): ICD-10-CM

## 2024-02-07 RX ORDER — GLIMEPIRIDE 2 MG/1
2 TABLET ORAL
Qty: 90 TABLET | Refills: 0 | Status: SHIPPED | OUTPATIENT
Start: 2024-02-07 | End: 2024-05-06

## 2024-02-14 ENCOUNTER — TRANSFERRED RECORDS (OUTPATIENT)
Dept: HEALTH INFORMATION MANAGEMENT | Facility: CLINIC | Age: 84
End: 2024-02-14

## 2024-02-14 PROBLEM — Z19.2: Status: ACTIVE | Noted: 2024-02-14

## 2024-02-14 PROBLEM — R53.83 FATIGUE: Status: ACTIVE | Noted: 2024-02-14

## 2024-02-15 ENCOUNTER — OFFICE VISIT (OUTPATIENT)
Dept: FAMILY MEDICINE | Facility: CLINIC | Age: 84
End: 2024-02-15
Payer: COMMERCIAL

## 2024-02-15 VITALS
HEART RATE: 77 BPM | TEMPERATURE: 97.5 F | OXYGEN SATURATION: 95 % | HEIGHT: 65 IN | WEIGHT: 170 LBS | DIASTOLIC BLOOD PRESSURE: 68 MMHG | BODY MASS INDEX: 28.32 KG/M2 | RESPIRATION RATE: 20 BRPM | SYSTOLIC BLOOD PRESSURE: 112 MMHG

## 2024-02-15 DIAGNOSIS — Z00.00 ENCOUNTER FOR SUBSEQUENT ANNUAL WELLNESS VISIT IN MEDICARE PATIENT: Primary | ICD-10-CM

## 2024-02-15 DIAGNOSIS — C61 MALIGNANT NEOPLASM OF PROSTATE (H): ICD-10-CM

## 2024-02-15 DIAGNOSIS — E11.649 TYPE 2 DIABETES MELLITUS WITH HYPOGLYCEMIA WITHOUT COMA, WITHOUT LONG-TERM CURRENT USE OF INSULIN (H): ICD-10-CM

## 2024-02-15 DIAGNOSIS — M81.0 AGE-RELATED OSTEOPOROSIS WITHOUT CURRENT PATHOLOGICAL FRACTURE: ICD-10-CM

## 2024-02-15 LAB
ALBUMIN SERPL BCG-MCNC: 3.9 G/DL (ref 3.5–5.2)
ALP SERPL-CCNC: 90 U/L (ref 40–150)
ALT SERPL W P-5'-P-CCNC: 15 U/L (ref 0–70)
ANION GAP SERPL CALCULATED.3IONS-SCNC: 11 MMOL/L (ref 7–15)
AST SERPL W P-5'-P-CCNC: 26 U/L (ref 0–45)
BILIRUB SERPL-MCNC: 0.3 MG/DL
BUN SERPL-MCNC: 11.9 MG/DL (ref 8–23)
CALCIUM SERPL-MCNC: 9.2 MG/DL (ref 8.8–10.2)
CHLORIDE SERPL-SCNC: 98 MMOL/L (ref 98–107)
CHOLEST SERPL-MCNC: 205 MG/DL
CREAT SERPL-MCNC: 0.81 MG/DL (ref 0.67–1.17)
DEPRECATED HCO3 PLAS-SCNC: 27 MMOL/L (ref 22–29)
EGFRCR SERPLBLD CKD-EPI 2021: 88 ML/MIN/1.73M2
ERYTHROCYTE [DISTWIDTH] IN BLOOD BY AUTOMATED COUNT: 12.2 % (ref 10–15)
FASTING STATUS PATIENT QL REPORTED: YES
GLUCOSE SERPL-MCNC: 142 MG/DL (ref 70–99)
HBA1C MFR BLD: 5.9 % (ref 0–5.6)
HCT VFR BLD AUTO: 39.8 % (ref 40–53)
HDLC SERPL-MCNC: 49 MG/DL
HGB BLD-MCNC: 13.5 G/DL (ref 13.3–17.7)
LDLC SERPL CALC-MCNC: 119 MG/DL
MCH RBC QN AUTO: 33.4 PG (ref 26.5–33)
MCHC RBC AUTO-ENTMCNC: 33.9 G/DL (ref 31.5–36.5)
MCV RBC AUTO: 99 FL (ref 78–100)
NONHDLC SERPL-MCNC: 156 MG/DL
PLATELET # BLD AUTO: 227 10E3/UL (ref 150–450)
POTASSIUM SERPL-SCNC: 4.4 MMOL/L (ref 3.4–5.3)
PROT SERPL-MCNC: 7.2 G/DL (ref 6.4–8.3)
PSA SERPL DL<=0.01 NG/ML-MCNC: 0.08 NG/ML
RBC # BLD AUTO: 4.04 10E6/UL (ref 4.4–5.9)
SODIUM SERPL-SCNC: 136 MMOL/L (ref 135–145)
TRIGL SERPL-MCNC: 183 MG/DL
VIT D+METAB SERPL-MCNC: 32 NG/ML (ref 20–50)
WBC # BLD AUTO: 6.2 10E3/UL (ref 4–11)

## 2024-02-15 PROCEDURE — 80053 COMPREHEN METABOLIC PANEL: CPT | Performed by: FAMILY MEDICINE

## 2024-02-15 PROCEDURE — 84403 ASSAY OF TOTAL TESTOSTERONE: CPT | Performed by: FAMILY MEDICINE

## 2024-02-15 PROCEDURE — 36415 COLL VENOUS BLD VENIPUNCTURE: CPT | Performed by: FAMILY MEDICINE

## 2024-02-15 PROCEDURE — 82306 VITAMIN D 25 HYDROXY: CPT | Performed by: FAMILY MEDICINE

## 2024-02-15 PROCEDURE — 84153 ASSAY OF PSA TOTAL: CPT | Performed by: FAMILY MEDICINE

## 2024-02-15 PROCEDURE — 85027 COMPLETE CBC AUTOMATED: CPT | Performed by: FAMILY MEDICINE

## 2024-02-15 PROCEDURE — G0439 PPPS, SUBSEQ VISIT: HCPCS | Performed by: FAMILY MEDICINE

## 2024-02-15 PROCEDURE — 83036 HEMOGLOBIN GLYCOSYLATED A1C: CPT | Performed by: FAMILY MEDICINE

## 2024-02-15 PROCEDURE — 80061 LIPID PANEL: CPT | Performed by: FAMILY MEDICINE

## 2024-02-15 RX ORDER — RESPIRATORY SYNCYTIAL VIRUS VACCINE 120MCG/0.5
0.5 KIT INTRAMUSCULAR ONCE
Qty: 1 EACH | Refills: 0 | Status: CANCELLED | OUTPATIENT
Start: 2024-02-15 | End: 2024-02-15

## 2024-02-15 SDOH — HEALTH STABILITY: PHYSICAL HEALTH: ON AVERAGE, HOW MANY DAYS PER WEEK DO YOU ENGAGE IN MODERATE TO STRENUOUS EXERCISE (LIKE A BRISK WALK)?: 0 DAYS

## 2024-02-15 ASSESSMENT — PAIN SCALES - GENERAL: PAINLEVEL: NO PAIN (0)

## 2024-02-15 ASSESSMENT — SOCIAL DETERMINANTS OF HEALTH (SDOH): HOW OFTEN DO YOU GET TOGETHER WITH FRIENDS OR RELATIVES?: ONCE A WEEK

## 2024-02-15 NOTE — PROGRESS NOTES
Preventive Care Visit  [facility]  Mike Mcdowell MD, Family Medicine  Feb 15, 2024        Subjective   Jabari is a 83 year old, presenting for the following:  Physical (No concern)  Chief complaint and assessment and plan: Jabari presents for his annual wellness exam he is an 83-year-old patient has been under my care for many years problems include benign essential hypertension pseudogout prostate cancer currently getting radiation therapy persistent atrial fib history of compression fractures history of depression borderline A1c's esophageal reflux diverticulosis of his colon.  He is now on his seventh cycle of a 1 month radiation therapy plan for his prostate cancer.  He has previously been on hormone suppression therapy.  He was getting hot and cold flashes and mood swings on that medication fortunately he did finish the therapy.  Recent body CT scan bone scan negative for any metastases.  He is encouraged by that he lives at home he is managing well he is avoiding falls no recent trips.  No shortness of breath dyspnea chest pain history of asbestosis exposure.  His breathing has been fine.  Depression under good control medication review done today all medical questions asked were answered no current changes and plans for medication he is tolerating all of his therapies well we wish him well we will see him for follow-up 6 months appropriate laboratory drawn last A1c was 5.1 we will see what we get today in addition to his other indices      2/15/2024    10:26 AM   Additional Questions   Roomed by [facility] Care Directive  Patient does not have a Health Care Directive or Living Will: Advance Directive received and scanned. Click on Code in the patient header to view.    HPI  History of present illness as outlined in chief complaint assessment and plan            2/15/2024   General Health   How would you rate your overall physical health? Good   Feel stress (tense, anxious, or  unable to sleep) Not at all         2/15/2024   Nutrition   Diet: Regular (no restrictions)         2/15/2024   Exercise   Days per week of moderate/strenous exercise 0 days   (!) EXERCISE CONCERN      2/15/2024   Social Factors   Frequency of gathering with friends or relatives Once a week   Worry food won't last until get money to buy more No   Food not last or not have enough money for food? No   Do you have housing?  Yes   Are you worried about losing your housing? No   Lack of transportation? No   Unable to get utilities (heat,electricity)? No         2/15/2024   Fall Risk   Fallen 2 or more times in the past year? No   Trouble with walking or balance? No          2/15/2024   Activities of Daily Living- Home Safety   Needs help with the following daily activites None of the above   Safety concerns in the home None of the above         2/15/2024   Dental   Dentist two times every year? (!) NO         2/15/2024   Hearing Screening   Hearing concerns? None of the above         2/15/2024   Driving Risk Screening   Patient/family members have concerns about driving No         2/15/2024   General Alertness/Fatigue Screening   Have you been more tired than usual lately? No         2/15/2024   Urinary Incontinence Screening   Bothered by leaking urine in past 6 months No          No data to display                  Today's PHQ-2 Score:       2/15/2024    10:10 AM   PHQ-2 ( 1999 [employer])   Q1: Little interest or pleasure in doing things 0   Q2: Feeling down, depressed or hopeless 0   PHQ-2 Score 0   Q1: Little interest or pleasure in doing things Not at all   Q2: Feeling down, depressed or hopeless Not at all   PHQ-2 Score 0           2/15/2024   Substance Use   Alcohol more than 3/day or more than 7/wk No   Do you have a current opioid prescription? No   How severe/bad is pain from 1 to 10? 0/10 (No Pain)   Do you use any other substances recreationally? No     Social History     Tobacco Use    Smoking status: Former     " Types: Cigarettes     Quit date: 1967     Years since quittin.1    Smokeless tobacco: Never   Substance Use Topics    Alcohol use: Yes     Alcohol/week: 8.3 - 10.0 standard drinks of alcohol                 Reviewed and updated as needed this visit by Provider                    Lab work is in process  Current providers sharing in care for this patient include:  Patient Care Team:  Ludwin Macdonald MD as PCP - General (Radiation Oncology)  Mike Mcdowell MD as Assigned PCP  Ludwin Macdonald MD as MD (Radiation Oncology)    The following health maintenance items are reviewed in Epic and correct as of today:  Health Maintenance   Topic Date Due    DIABETIC FOOT EXAM  Never done    ANNUAL REVIEW OF  ORDERS  Never done    COVID-19 Vaccine (1) Never done    ZOSTER IMMUNIZATION (1 of 2) Never done    RSV VACCINE (Pregnancy & 60+) (1 - 1-dose 60+ series) Never done    MEDICARE ANNUAL WELLNESS VISIT  03/10/2023    LIPID  03/10/2023    MICROALBUMIN  03/10/2023    INFLUENZA VACCINE (1) 2023    A1C  2024    BMP  2024    EYE EXAM  01/15/2025    FALL RISK ASSESSMENT  02/15/2025    ADVANCE CARE PLANNING  03/10/2027    DTAP/TDAP/TD IMMUNIZATION (2 - Td or Tdap) 2033    PHQ-2 (once per calendar year)  Completed    Pneumococcal Vaccine: 65+ Years  Completed    IPV IMMUNIZATION  Aged Out    HPV IMMUNIZATION  Aged Out    MENINGITIS IMMUNIZATION  Aged Out    RSV MONOCLONAL ANTIBODY  Aged Out            Objective    Exam  /68 (BP Location: Left arm, Patient Position: Sitting, Cuff Size: Adult Large)   Pulse 77   Temp 97.5  F (36.4  C) (Oral)   Resp 20   Ht 1.655 m (5' 5.16\")   Wt 77.1 kg (170 lb)   SpO2 95%   BMI 28.15 kg/m     Estimated body mass index is 28.15 kg/m  as calculated from the following:    Height as of this encounter: 1.655 m (5' 5.16\").    Weight as of this encounter: 77.1 kg (170 lb).    Physical Exam  GENERAL: alert and no distress  EYES: Eyes grossly normal to inspection, " PERRL and conjunctivae and sclerae normal  HENT: ear canals and TM's normal, nose and mouth without ulcers or lesions  NECK: no adenopathy, no asymmetry, masses, or scars  RESP: lungs clear to auscultation - no rales, rhonchi or wheezes  CV: regular rate and rhythm, normal S1 S2, no S3 or S4, no murmur, click or rub, no peripheral edema  ABDOMEN: soft, nontender, no hepatosplenomegaly, no masses and bowel sounds normal  MS: no gross musculoskeletal defects noted, no edema  SKIN: no suspicious lesions or rashes  NEURO: Normal strength and tone, mentation intact and speech normal  PSYCH: mentation appears normal, affect normal/bright  Prostate-was not examined he was recently examined by radiation therapist on a daily basis      2/15/2024   Mini Cog   Clock Draw Score 2 Normal   3 Item Recall 3 objects recalled   Mini Cog Total Score 5            Signed Electronically by: Mike Mcdowell MD

## 2024-02-15 NOTE — COMMUNITY RESOURCES LIST (ENGLISH)
02/15/2024    [employer]  N/A  For questions about this resource list or additional care needs, please contact your primary care clinic or care manager.  Phone: [phone]   Email: N/A   Address: [address] [place] [id number]   Hours: N/A        Exercise and Recreation       Gym or workout facility  1  [employer] - [place] - [address] Distance: 0.74 miles      In-Person   [address] [place] [address]  Language: English  Hours: Mon - Sun Open 24 Hours  Fees: Insurance, Self Pay, Sliding Fee   Phone: [phone]) [phone] Email: kam@[email or web address] Website: [email or web address]     2  [facility] Distance: 1.96 miles      In-Person   [address] [place] [address]  Language: English  Hours: Mon - Fri 5:00 AM - 9:00 PM , Sat - Sun 7:00 AM - 5:00 PM  Fees: Free, Self Pay, Sliding Fee   Phone: (793) 294-1198 Email: info@[employer].org Website: [email or web address]/locations/[email or web address]          Important Numbers & Websites       Emergency Services   911  [facility]   311  Poison Control   (955) 573-4029  Suicide Prevention Lifeline   (616) 850-1007 (TALK)  Child Abuse Hotline   (127) 320-3878 (4-A-Child)  Sexual Assault Hotline   (316) 607-2730 (HOPE)  National Runaway Safeline   (312) 951-1669 (RUNAWAY)  All-Options Talkline   (298) 818-8438  Substance Abuse Referral   (450) 995-4503 (HELP)

## 2024-02-17 LAB — TESTOST SERPL-MCNC: 5 NG/DL (ref 240–950)

## 2024-02-26 DIAGNOSIS — I10 PRIMARY HYPERTENSION: ICD-10-CM

## 2024-02-26 DIAGNOSIS — F41.1 GENERALIZED ANXIETY DISORDER: ICD-10-CM

## 2024-02-26 DIAGNOSIS — I25.10 CARDIOVASCULAR DISEASE: ICD-10-CM

## 2024-02-26 DIAGNOSIS — I10 ESSENTIAL HYPERTENSION, BENIGN: ICD-10-CM

## 2024-02-26 RX ORDER — AMLODIPINE BESYLATE 10 MG/1
TABLET ORAL
Qty: 90 TABLET | Refills: 0 | OUTPATIENT
Start: 2024-02-26

## 2024-02-26 RX ORDER — LISINOPRIL 40 MG/1
40 TABLET ORAL DAILY
Qty: 90 TABLET | Refills: 2 | Status: SHIPPED | OUTPATIENT
Start: 2024-02-26

## 2024-02-26 RX ORDER — ESCITALOPRAM OXALATE 20 MG/1
TABLET ORAL
Qty: 30 TABLET | Refills: 0 | Status: SHIPPED | OUTPATIENT
Start: 2024-02-26 | End: 2024-03-27

## 2024-03-20 ENCOUNTER — TRANSFERRED RECORDS (OUTPATIENT)
Dept: HEALTH INFORMATION MANAGEMENT | Facility: CLINIC | Age: 84
End: 2024-03-20
Payer: COMMERCIAL

## 2024-03-25 DIAGNOSIS — I48.91 NEW ONSET ATRIAL FIBRILLATION (H): ICD-10-CM

## 2024-03-26 DIAGNOSIS — I48.91 NEW ONSET ATRIAL FIBRILLATION (H): ICD-10-CM

## 2024-03-26 RX ORDER — APIXABAN 5 MG/1
5 TABLET, FILM COATED ORAL 2 TIMES DAILY
Qty: 60 TABLET | Refills: 0 | Status: SHIPPED | OUTPATIENT
Start: 2024-03-26 | End: 2024-04-24

## 2024-03-26 RX ORDER — APIXABAN 5 MG/1
5 TABLET, FILM COATED ORAL 2 TIMES DAILY
Qty: 60 TABLET | Refills: 0 | OUTPATIENT
Start: 2024-03-26

## 2024-03-27 DIAGNOSIS — F41.1 GENERALIZED ANXIETY DISORDER: ICD-10-CM

## 2024-03-27 RX ORDER — ESCITALOPRAM OXALATE 20 MG/1
TABLET ORAL
Qty: 30 TABLET | Refills: 0 | Status: SHIPPED | OUTPATIENT
Start: 2024-03-27 | End: 2024-04-26

## 2024-03-29 DIAGNOSIS — F41.1 GENERALIZED ANXIETY DISORDER: ICD-10-CM

## 2024-03-29 RX ORDER — ESCITALOPRAM OXALATE 20 MG/1
TABLET ORAL
Qty: 30 TABLET | Refills: 0 | OUTPATIENT
Start: 2024-03-29

## 2024-03-30 DIAGNOSIS — F41.1 GENERALIZED ANXIETY DISORDER: ICD-10-CM

## 2024-04-01 RX ORDER — ESCITALOPRAM OXALATE 20 MG/1
TABLET ORAL
Qty: 30 TABLET | Refills: 0 | OUTPATIENT
Start: 2024-04-01

## 2024-04-02 DIAGNOSIS — M1A.00X0 IDIOPATHIC CHRONIC GOUT WITHOUT TOPHUS, UNSPECIFIED SITE: ICD-10-CM

## 2024-04-03 RX ORDER — PROBENECID 500 MG/1
500 TABLET, FILM COATED ORAL 2 TIMES DAILY
Qty: 60 TABLET | Refills: 0 | Status: SHIPPED | OUTPATIENT
Start: 2024-04-03 | End: 2024-05-31

## 2024-04-13 DIAGNOSIS — I10 BENIGN ESSENTIAL HYPERTENSION: ICD-10-CM

## 2024-04-15 RX ORDER — FUROSEMIDE 20 MG
20 TABLET ORAL DAILY
Qty: 90 TABLET | Refills: 2 | Status: SHIPPED | OUTPATIENT
Start: 2024-04-15

## 2024-04-23 DIAGNOSIS — I48.91 NEW ONSET ATRIAL FIBRILLATION (H): ICD-10-CM

## 2024-04-24 RX ORDER — APIXABAN 5 MG/1
5 TABLET, FILM COATED ORAL 2 TIMES DAILY
Qty: 60 TABLET | Refills: 0 | Status: SHIPPED | OUTPATIENT
Start: 2024-04-24 | End: 2024-05-27

## 2024-04-26 DIAGNOSIS — I25.10 CARDIOVASCULAR DISEASE: ICD-10-CM

## 2024-04-26 DIAGNOSIS — I10 ESSENTIAL HYPERTENSION, BENIGN: ICD-10-CM

## 2024-04-26 DIAGNOSIS — F41.1 GENERALIZED ANXIETY DISORDER: ICD-10-CM

## 2024-04-26 RX ORDER — ESCITALOPRAM OXALATE 20 MG/1
TABLET ORAL
Qty: 90 TABLET | Refills: 1 | Status: SHIPPED | OUTPATIENT
Start: 2024-04-26

## 2024-04-26 RX ORDER — AMLODIPINE BESYLATE 10 MG/1
TABLET ORAL
Qty: 90 TABLET | Refills: 2 | Status: SHIPPED | OUTPATIENT
Start: 2024-04-26

## 2024-05-02 DIAGNOSIS — I10 ESSENTIAL HYPERTENSION, BENIGN: ICD-10-CM

## 2024-05-02 RX ORDER — ATENOLOL 50 MG/1
50 TABLET ORAL DAILY
Qty: 90 TABLET | Refills: 0 | Status: SHIPPED | OUTPATIENT
Start: 2024-05-02 | End: 2024-07-30

## 2024-05-06 DIAGNOSIS — E78.00 HYPERCHOLESTEREMIA: ICD-10-CM

## 2024-05-06 DIAGNOSIS — E11.69 TYPE 2 DIABETES MELLITUS WITH OTHER SPECIFIED COMPLICATION, WITHOUT LONG-TERM CURRENT USE OF INSULIN (H): ICD-10-CM

## 2024-05-06 RX ORDER — EZETIMIBE 10 MG/1
10 TABLET ORAL DAILY
Qty: 90 TABLET | Refills: 2 | Status: SHIPPED | OUTPATIENT
Start: 2024-05-06

## 2024-05-06 RX ORDER — GLIMEPIRIDE 2 MG/1
2 TABLET ORAL
Qty: 90 TABLET | Refills: 0 | Status: SHIPPED | OUTPATIENT
Start: 2024-05-06 | End: 2024-08-02

## 2024-05-25 DIAGNOSIS — I48.91 NEW ONSET ATRIAL FIBRILLATION (H): ICD-10-CM

## 2024-05-27 DIAGNOSIS — K21.9 GASTROESOPHAGEAL REFLUX DISEASE, UNSPECIFIED WHETHER ESOPHAGITIS PRESENT: ICD-10-CM

## 2024-05-27 RX ORDER — APIXABAN 5 MG/1
5 TABLET, FILM COATED ORAL 2 TIMES DAILY
Qty: 60 TABLET | Refills: 0 | Status: SHIPPED | OUTPATIENT
Start: 2024-05-27 | End: 2024-06-26

## 2024-05-27 RX ORDER — OMEPRAZOLE 40 MG/1
CAPSULE, DELAYED RELEASE ORAL
Qty: 180 CAPSULE | Refills: 1 | Status: SHIPPED | OUTPATIENT
Start: 2024-05-27

## 2024-05-31 DIAGNOSIS — M1A.00X0 IDIOPATHIC CHRONIC GOUT WITHOUT TOPHUS, UNSPECIFIED SITE: ICD-10-CM

## 2024-05-31 RX ORDER — PROBENECID 500 MG/1
500 TABLET, FILM COATED ORAL 2 TIMES DAILY
Qty: 60 TABLET | Refills: 0 | Status: SHIPPED | OUTPATIENT
Start: 2024-05-31 | End: 2024-07-30

## 2024-06-18 ENCOUNTER — TRANSFERRED RECORDS (OUTPATIENT)
Dept: HEALTH INFORMATION MANAGEMENT | Facility: CLINIC | Age: 84
End: 2024-06-18
Payer: COMMERCIAL

## 2024-06-21 DIAGNOSIS — E78.2 MIXED HYPERLIPIDEMIA: ICD-10-CM

## 2024-06-24 ENCOUNTER — MYC REFILL (OUTPATIENT)
Dept: FAMILY MEDICINE | Facility: CLINIC | Age: 84
End: 2024-06-24
Payer: COMMERCIAL

## 2024-06-24 DIAGNOSIS — E78.2 MIXED HYPERLIPIDEMIA: ICD-10-CM

## 2024-06-24 RX ORDER — SIMVASTATIN 40 MG
40 TABLET ORAL DAILY
Qty: 90 TABLET | Refills: 0 | OUTPATIENT
Start: 2024-06-24

## 2024-06-25 RX ORDER — SIMVASTATIN 40 MG
40 TABLET ORAL DAILY
Qty: 90 TABLET | Refills: 0 | Status: SHIPPED | OUTPATIENT
Start: 2024-06-25 | End: 2024-09-24

## 2024-06-26 ENCOUNTER — MYC REFILL (OUTPATIENT)
Dept: FAMILY MEDICINE | Facility: CLINIC | Age: 84
End: 2024-06-26
Payer: COMMERCIAL

## 2024-06-26 DIAGNOSIS — I48.91 NEW ONSET ATRIAL FIBRILLATION (H): ICD-10-CM

## 2024-06-26 DIAGNOSIS — E78.2 MIXED HYPERLIPIDEMIA: ICD-10-CM

## 2024-06-26 RX ORDER — SIMVASTATIN 40 MG
40 TABLET ORAL DAILY
Qty: 90 TABLET | Refills: 0 | OUTPATIENT
Start: 2024-06-26

## 2024-06-26 RX ORDER — APIXABAN 5 MG/1
5 TABLET, FILM COATED ORAL 2 TIMES DAILY
Qty: 60 TABLET | Refills: 0 | Status: SHIPPED | OUTPATIENT
Start: 2024-06-26 | End: 2024-07-26

## 2024-06-28 ENCOUNTER — TRANSFERRED RECORDS (OUTPATIENT)
Dept: HEALTH INFORMATION MANAGEMENT | Facility: CLINIC | Age: 84
End: 2024-06-28
Payer: COMMERCIAL

## 2024-07-03 ENCOUNTER — TRANSFERRED RECORDS (OUTPATIENT)
Dept: HEALTH INFORMATION MANAGEMENT | Facility: CLINIC | Age: 84
End: 2024-07-03
Payer: COMMERCIAL

## 2024-07-08 PROBLEM — M81.8 OTHER OSTEOPOROSIS WITHOUT CURRENT PATHOLOGICAL FRACTURE: Status: ACTIVE | Noted: 2024-07-08

## 2024-07-09 ENCOUNTER — ANCILLARY PROCEDURE (OUTPATIENT)
Dept: GENERAL RADIOLOGY | Facility: CLINIC | Age: 84
End: 2024-07-09
Attending: FAMILY MEDICINE
Payer: COMMERCIAL

## 2024-07-09 ENCOUNTER — OFFICE VISIT (OUTPATIENT)
Dept: FAMILY MEDICINE | Facility: CLINIC | Age: 84
End: 2024-07-09
Payer: COMMERCIAL

## 2024-07-09 VITALS
BODY MASS INDEX: 28.59 KG/M2 | WEIGHT: 171.6 LBS | HEIGHT: 65 IN | OXYGEN SATURATION: 97 % | HEART RATE: 82 BPM | DIASTOLIC BLOOD PRESSURE: 68 MMHG | TEMPERATURE: 97.7 F | SYSTOLIC BLOOD PRESSURE: 124 MMHG | RESPIRATION RATE: 18 BRPM

## 2024-07-09 DIAGNOSIS — J61 PULMONARY ASBESTOSIS (H): ICD-10-CM

## 2024-07-09 DIAGNOSIS — S20.95XA: ICD-10-CM

## 2024-07-09 DIAGNOSIS — S20.95XA: Primary | ICD-10-CM

## 2024-07-09 PROCEDURE — 70360 X-RAY EXAM OF NECK: CPT | Mod: TC | Performed by: STUDENT IN AN ORGANIZED HEALTH CARE EDUCATION/TRAINING PROGRAM

## 2024-07-09 PROCEDURE — 99214 OFFICE O/P EST MOD 30 MIN: CPT | Performed by: FAMILY MEDICINE

## 2024-07-09 PROCEDURE — 71046 X-RAY EXAM CHEST 2 VIEWS: CPT | Mod: TC | Performed by: RADIOLOGY

## 2024-07-09 ASSESSMENT — PAIN SCALES - GENERAL: PAINLEVEL: NO PAIN (0)

## 2024-07-09 NOTE — PROGRESS NOTES
"  Assessment & Plan     Metal foreign body in chest  I have not seen any evidence on x-ray of aspiration of a upper plate with maxillary hooks to the molars on the following views.  Patient is having an intermittent dry cough but is not in respiratory distress or extremities O2 sat at 97 suspect exacerbation of chronic asbestosis  - XR Chest 2 Views; Future  - XR Neck Soft Tissue; Future    Pulmonary asbestosis (H)  Past medical history moderate to moderately severe pulmonary as best as disease secondary to occupation many years ago          BMI  Estimated body mass index is 28.42 kg/m  as calculated from the following:    Height as of this encounter: 1.655 m (5' 5.16\").    Weight as of this encounter: 77.8 kg (171 lb 9.6 oz).     History of present illness Jabari has a past medical history of pulmonary asbestosis which has caused some breathing issues in the past for him.  He currently is not on any inhalers but has been complaining of a cough and some chest congestion for about 11 days now.  Coincidently he lost his upper bridge containing his incisors and canines and premolars and was concerned there is concerned that he may have swallowed this prosthesis.  He is not experiencing or nor has he experienced any chest congestion respiratory distress substernal chest pain heartburn abdominal pain bowel changes but has googled swallowing a type of this device and is worried about it.  His exam includes a chest x-ray and also soft tissue x-ray of his neck and I do not see such a device.  He does have pulmonary asbestosis bilaterally.  Will get this all super read by x-ray but he is not in acute distress.  I reassured him but he will keep in close touch with me regarding the persistence of this cough.        Subjective   Jabari is a 83 year old, presenting for the following health issues:  Breathing Problem (Patient states that he has been having some trouble taking a deep breath. Patient states that this started on " "6/30/24. Patient swallowed some food wrong and started coughing a lot. Patient is concerned he could have also swallowed his partial denture. )      7/9/2024    12:06 PM   Additional Questions   Roomed by Halina DAN CMA     History of Present Illness       Reason for visit:  Cough  Symptom onset:  3-7 days ago    He eats 0-1 servings of fruits and vegetables daily.He consumes 5 sweetened beverage(s) daily.He exercises with enough effort to increase his heart rate 9 or less minutes per day.  He exercises with enough effort to increase his heart rate 3 or less days per week.   He is taking medications regularly.                 Review of Systems  Constitutional, HEENT, cardiovascular, pulmonary, GI, , musculoskeletal, neuro, skin, endocrine and psych systems are negative, except as otherwise noted.      Objective    /68 (BP Location: Left arm, Patient Position: Sitting, Cuff Size: Adult Regular)   Pulse 82   Temp 97.7  F (36.5  C) (Temporal)   Resp 18   Ht 1.655 m (5' 5.16\")   Wt 77.8 kg (171 lb 9.6 oz)   SpO2 97%   BMI 28.42 kg/m    Body mass index is 28.42 kg/m .  Physical Exam   GENERAL: alert and no distress  NECK: no adenopathy, no asymmetry, masses, or scars  RESP: Patient does have chronic respiratory disease secondary to asbestosis and I do hear some rhonchi which are loose bilaterally but this is essentially unchanged from his baseline chest exam no evidence of acute respiratory distress CV: regular rate and rhythm, normal S1 S2, no S3 or S4, no murmur, click or rub, no peripheral edema  ABDOMEN: soft, nontender, no hepatosplenomegaly, no masses and bowel sounds normal  MS: no gross musculoskeletal defects noted, no edema            Signed Electronically by: Mike Mcdowell MD    "

## 2024-07-26 ENCOUNTER — MYC MEDICAL ADVICE (OUTPATIENT)
Dept: FAMILY MEDICINE | Facility: CLINIC | Age: 84
End: 2024-07-26
Payer: COMMERCIAL

## 2024-07-26 ENCOUNTER — MYC REFILL (OUTPATIENT)
Dept: FAMILY MEDICINE | Facility: CLINIC | Age: 84
End: 2024-07-26
Payer: COMMERCIAL

## 2024-07-26 DIAGNOSIS — I48.91 NEW ONSET ATRIAL FIBRILLATION (H): ICD-10-CM

## 2024-07-26 RX ORDER — APIXABAN 5 MG/1
5 TABLET, FILM COATED ORAL 2 TIMES DAILY
Qty: 60 TABLET | Refills: 0 | Status: SHIPPED | OUTPATIENT
Start: 2024-07-26 | End: 2024-08-07

## 2024-07-26 NOTE — TELEPHONE ENCOUNTER
This refill is a duplicate request, previously received or sent.   Sent denial notification to pharmacy.    Maeve Desai RN, BSN, PHN  [facility]

## 2024-07-30 DIAGNOSIS — I10 ESSENTIAL HYPERTENSION, BENIGN: ICD-10-CM

## 2024-07-30 DIAGNOSIS — M1A.00X0 IDIOPATHIC CHRONIC GOUT WITHOUT TOPHUS, UNSPECIFIED SITE: ICD-10-CM

## 2024-07-30 RX ORDER — ATENOLOL 50 MG/1
50 TABLET ORAL DAILY
Qty: 90 TABLET | Refills: 2 | Status: SHIPPED | OUTPATIENT
Start: 2024-07-30

## 2024-07-30 RX ORDER — PROBENECID 500 MG/1
500 TABLET, FILM COATED ORAL 2 TIMES DAILY
Qty: 60 TABLET | Refills: 0 | Status: SHIPPED | OUTPATIENT
Start: 2024-07-30 | End: 2024-09-30

## 2024-08-02 DIAGNOSIS — E11.69 TYPE 2 DIABETES MELLITUS WITH OTHER SPECIFIED COMPLICATION, WITHOUT LONG-TERM CURRENT USE OF INSULIN (H): ICD-10-CM

## 2024-08-02 RX ORDER — GLIMEPIRIDE 2 MG/1
2 TABLET ORAL
Qty: 90 TABLET | Refills: 0 | Status: SHIPPED | OUTPATIENT
Start: 2024-08-02

## 2024-08-07 ENCOUNTER — DOCUMENTATION ONLY (OUTPATIENT)
Dept: ANTICOAGULATION | Facility: CLINIC | Age: 84
End: 2024-08-07

## 2024-08-07 NOTE — PROGRESS NOTES
Anticoagulant Therapeutic Duplication    Duplicate orders identified: identical order(s)    The duplicate anticoagulant order(s) has been discontinued    Active anticoagulant: apixaban (Eliquis)    Plan made per [facility] anticoagulation protocol.    Jadiel Shaver RN  8/7/2024

## 2024-08-18 ENCOUNTER — HEALTH MAINTENANCE LETTER (OUTPATIENT)
Age: 84
End: 2024-08-18

## 2024-09-05 ENCOUNTER — TRANSFERRED RECORDS (OUTPATIENT)
Dept: HEALTH INFORMATION MANAGEMENT | Facility: CLINIC | Age: 84
End: 2024-09-05
Payer: COMMERCIAL

## 2024-09-22 DIAGNOSIS — E78.2 MIXED HYPERLIPIDEMIA: ICD-10-CM

## 2024-09-22 DIAGNOSIS — I48.91 NEW ONSET ATRIAL FIBRILLATION (H): ICD-10-CM

## 2024-09-24 ENCOUNTER — MYC MEDICAL ADVICE (OUTPATIENT)
Dept: FAMILY MEDICINE | Facility: CLINIC | Age: 84
End: 2024-09-24
Payer: COMMERCIAL

## 2024-09-24 RX ORDER — APIXABAN 5 MG/1
5 TABLET, FILM COATED ORAL 2 TIMES DAILY
Qty: 60 TABLET | Refills: 0 | Status: SHIPPED | OUTPATIENT
Start: 2024-09-24

## 2024-09-24 RX ORDER — SIMVASTATIN 40 MG
40 TABLET ORAL DAILY
Qty: 90 TABLET | Refills: 0 | Status: SHIPPED | OUTPATIENT
Start: 2024-09-24

## 2024-09-24 NOTE — TELEPHONE ENCOUNTER
Dr. Rao ordered both Eliquis and simvastatin and sent to patient's preferred pharmacy.    MyChart sent to patient and notified that medication has been sent to pharmacy.    Kiah Horne RN  [facility]

## 2024-09-24 NOTE — TELEPHONE ENCOUNTER
See patient request for refills of Eliquis and Simvastatin on 9/24/24.    Writer sent above request to the provider to review and advise next steps.    Cheri Sung RN, BSN  [facility]

## 2024-09-30 DIAGNOSIS — M1A.00X0 IDIOPATHIC CHRONIC GOUT WITHOUT TOPHUS, UNSPECIFIED SITE: ICD-10-CM

## 2024-09-30 NOTE — TELEPHONE ENCOUNTER
Medication Question or Refill    Contacts       Contact Date/Time Type Contact Phone/Fax    09/30/2024 12:07 PM CDT Phone (Incoming) Jabari Haddad (Self) [phone] (H)            What medication are you calling about (include dose and sig)?: probenecid (BENEMID) 500 MG tablet     Preferred Pharmacy:    [facility] [address] [place], [place] - [address] [place] [address] [address]  [address]  [facility] [address]  Phone: [phone] Fax: [phone]      Controlled Substance Agreement on file:   CSA -- Patient Level:    CSA: None found at the patient level.       Who prescribed the medication?: Jeremias    Do you need a refill? Yes    When did you use the medication last? N/A

## 2024-10-01 RX ORDER — PROBENECID 500 MG/1
500 TABLET, FILM COATED ORAL 2 TIMES DAILY
Qty: 60 TABLET | Refills: 0 | Status: SHIPPED | OUTPATIENT
Start: 2024-10-01

## 2024-10-22 DIAGNOSIS — I48.91 NEW ONSET ATRIAL FIBRILLATION (H): ICD-10-CM

## 2024-10-22 RX ORDER — APIXABAN 5 MG/1
5 TABLET, FILM COATED ORAL 2 TIMES DAILY
Qty: 60 TABLET | Refills: 0 | Status: SHIPPED | OUTPATIENT
Start: 2024-10-22

## 2024-11-04 DIAGNOSIS — E11.69 TYPE 2 DIABETES MELLITUS WITH OTHER SPECIFIED COMPLICATION, WITHOUT LONG-TERM CURRENT USE OF INSULIN (H): ICD-10-CM

## 2024-11-04 RX ORDER — GLIMEPIRIDE 2 MG/1
2 TABLET ORAL
Qty: 90 TABLET | Refills: 0 | Status: SHIPPED | OUTPATIENT
Start: 2024-11-04

## 2024-11-12 ENCOUNTER — TRANSFERRED RECORDS (OUTPATIENT)
Dept: HEALTH INFORMATION MANAGEMENT | Facility: CLINIC | Age: 84
End: 2024-11-12
Payer: COMMERCIAL

## 2024-11-12 ENCOUNTER — TELEPHONE (OUTPATIENT)
Dept: FAMILY MEDICINE | Facility: CLINIC | Age: 84
End: 2024-11-12
Payer: COMMERCIAL

## 2024-11-12 NOTE — TELEPHONE ENCOUNTER
"Patient called into the clinic, on 11/12/24, to schedule an appointment with Dr. Mcdowell to check current symptoms of feeling \"run down\" and tired.    Patient relayed to the writer that he has been treated for prostate cancer for the last 2 years and his hemoglobin was reported as 7.6 at his last lab draw.    Patient stated he is scheduled for a blood transfusion tomorrow, 11/13/24, but the patient stated that the oncologist recommended that he be evaluated further by the PCP, , for his ongoing fatigue.    Writer offered to triage the patient for any acute symptoms, but the patient declined triage at this time.    Writer assisted the patient with scheduling an appointment with Dr. Mcdowell, on Tuesday, 11/19/24, at the [facility], at 10:00 am, to discuss current symptoms.    Denies other questions or concerns at this time.    Cheri Sung RN, BSN  [facility]    "

## 2024-11-13 ENCOUNTER — INFUSION THERAPY VISIT (OUTPATIENT)
Dept: INFUSION THERAPY | Facility: HOSPITAL | Age: 84
End: 2024-11-13
Payer: COMMERCIAL

## 2024-11-13 ENCOUNTER — LAB (OUTPATIENT)
Dept: INFUSION THERAPY | Facility: HOSPITAL | Age: 84
End: 2024-11-13
Payer: COMMERCIAL

## 2024-11-13 VITALS
OXYGEN SATURATION: 97 % | SYSTOLIC BLOOD PRESSURE: 130 MMHG | HEART RATE: 73 BPM | TEMPERATURE: 97.9 F | DIASTOLIC BLOOD PRESSURE: 61 MMHG | RESPIRATION RATE: 18 BRPM

## 2024-11-13 DIAGNOSIS — C61 PRIMARY MALIGNANT NEOPLASM OF PROSTATE (H): ICD-10-CM

## 2024-11-13 DIAGNOSIS — D63.0 ANEMIA IN NEOPLASTIC DISEASE: Primary | ICD-10-CM

## 2024-11-13 LAB
ABO/RH(D): NORMAL
ANTIBODY SCREEN: NEGATIVE
BLD PROD TYP BPU: NORMAL
BLOOD COMPONENT TYPE: NORMAL
CODING SYSTEM: NORMAL
CROSSMATCH: NORMAL
ISSUE DATE AND TIME: NORMAL
SPECIMEN EXPIRATION DATE: NORMAL
UNIT ABO/RH: NORMAL
UNIT NUMBER: NORMAL
UNIT STATUS: NORMAL
UNIT TYPE ISBT: 6200

## 2024-11-13 PROCEDURE — 86923 COMPATIBILITY TEST ELECTRIC: CPT | Performed by: PHYSICIAN ASSISTANT

## 2024-11-13 PROCEDURE — 86901 BLOOD TYPING SEROLOGIC RH(D): CPT | Performed by: INTERNAL MEDICINE

## 2024-11-13 PROCEDURE — 86900 BLOOD TYPING SEROLOGIC ABO: CPT | Performed by: INTERNAL MEDICINE

## 2024-11-13 PROCEDURE — P9016 RBC LEUKOCYTES REDUCED: HCPCS | Performed by: PHYSICIAN ASSISTANT

## 2024-11-13 PROCEDURE — 36415 COLL VENOUS BLD VENIPUNCTURE: CPT | Performed by: INTERNAL MEDICINE

## 2024-11-13 PROCEDURE — 36430 TRANSFUSION BLD/BLD COMPNT: CPT

## 2024-11-13 RX ORDER — DIPHENHYDRAMINE HYDROCHLORIDE 50 MG/ML
50 INJECTION INTRAMUSCULAR; INTRAVENOUS
Start: 2024-11-13

## 2024-11-13 RX ORDER — HEPARIN SODIUM,PORCINE 10 UNIT/ML
5-20 VIAL (ML) INTRAVENOUS DAILY PRN
Status: CANCELLED | OUTPATIENT
Start: 2024-11-13

## 2024-11-13 RX ORDER — HEPARIN SODIUM (PORCINE) LOCK FLUSH IV SOLN 100 UNIT/ML 100 UNIT/ML
5 SOLUTION INTRAVENOUS
Status: CANCELLED | OUTPATIENT
Start: 2024-11-13

## 2024-11-13 RX ORDER — EPINEPHRINE 1 MG/ML
0.3 INJECTION, SOLUTION INTRAMUSCULAR; SUBCUTANEOUS EVERY 5 MIN PRN
OUTPATIENT
Start: 2024-11-13

## 2024-11-13 RX ORDER — HEPARIN SODIUM (PORCINE) LOCK FLUSH IV SOLN 100 UNIT/ML 100 UNIT/ML
5 SOLUTION INTRAVENOUS
OUTPATIENT
Start: 2024-11-13

## 2024-11-13 RX ORDER — DIPHENHYDRAMINE HYDROCHLORIDE 50 MG/ML
50 INJECTION INTRAMUSCULAR; INTRAVENOUS
Status: CANCELLED
Start: 2024-11-13

## 2024-11-13 RX ORDER — HEPARIN SODIUM,PORCINE 10 UNIT/ML
5-20 VIAL (ML) INTRAVENOUS DAILY PRN
OUTPATIENT
Start: 2024-11-13

## 2024-11-13 RX ORDER — EPINEPHRINE 1 MG/ML
0.3 INJECTION, SOLUTION INTRAMUSCULAR; SUBCUTANEOUS EVERY 5 MIN PRN
Status: DISCONTINUED | OUTPATIENT
Start: 2024-11-13 | End: 2024-11-13 | Stop reason: HOSPADM

## 2024-11-13 RX ORDER — EPINEPHRINE 1 MG/ML
0.3 INJECTION, SOLUTION INTRAMUSCULAR; SUBCUTANEOUS EVERY 5 MIN PRN
Status: CANCELLED | OUTPATIENT
Start: 2024-11-13

## 2024-11-13 RX ORDER — HYDROXYZINE HYDROCHLORIDE 10 MG/5ML
4 SYRUP ORAL EVERY 6 HOURS PRN
COMMUNITY

## 2024-11-13 RX ORDER — DIPHENHYDRAMINE HYDROCHLORIDE 50 MG/ML
50 INJECTION INTRAMUSCULAR; INTRAVENOUS
Status: DISCONTINUED | OUTPATIENT
Start: 2024-11-13 | End: 2024-11-13 | Stop reason: HOSPADM

## 2024-11-13 NOTE — PROGRESS NOTES
Infusion Nursing Note:  Jabari Haddad presents today for type and screen and 1 unit PRBC's.    Patient seen by provider today: No   present during visit today: Not Applicable.    Note: pt arrived to  Outpatient infusion for labs and one unit PRBC's, Hgb 11/12 at [facility] was 7.6, consent was verified,  PIV was placed and blood transfused per policy without incident.      Intravenous Access:  Peripheral IV placed.    Treatment Conditions:  Results reviewed, labs MET treatment parameters, ok to proceed with treatment.  Consent signed 11/12/2024      Post Infusion Assessment:  Patient tolerated infusion without incident.  No evidence of extravasations.  Access discontinued per protocol.       Discharge Plan:   Discharge instructions reviewed with: Patient.  Patient and/or family verbalized understanding of discharge instructions and all questions answered.  Patient discharged in stable condition accompanied by: self.  Departure Mode: Ambulatory.      Isidra Manriquez RN

## 2024-11-13 NOTE — PROGRESS NOTES
Infusion Nursing Note:  Jabari Haddad presents today for type and screen and 1 unit PRBC's.    Patient seen by provider today: No   present during visit today: Not Applicable.    Note:       Intravenous Access:  Peripheral IV placed.    Treatment Conditions:  Results reviewed, labs MET treatment parameters, ok to proceed with treatment.  Consent signed 11/12/2024      Post Infusion Assessment:  {UMHPOSTINFUSION:773060}       Discharge Plan:   {UMHDISCHARGE:845796}      Isidra Manriquez RN

## 2024-11-19 ENCOUNTER — APPOINTMENT (OUTPATIENT)
Dept: CARDIOLOGY | Facility: HOSPITAL | Age: 84
DRG: 394 | End: 2024-11-19
Attending: HOSPITALIST
Payer: COMMERCIAL

## 2024-11-19 ENCOUNTER — APPOINTMENT (OUTPATIENT)
Dept: CT IMAGING | Facility: HOSPITAL | Age: 84
DRG: 394 | End: 2024-11-19
Attending: EMERGENCY MEDICINE
Payer: COMMERCIAL

## 2024-11-19 ENCOUNTER — HOSPITAL ENCOUNTER (INPATIENT)
Facility: HOSPITAL | Age: 84
DRG: 394 | End: 2024-11-19
Attending: EMERGENCY MEDICINE
Payer: COMMERCIAL

## 2024-11-19 DIAGNOSIS — D64.9 ANEMIA, UNSPECIFIED TYPE: ICD-10-CM

## 2024-11-19 DIAGNOSIS — K62.5 BRBPR (BRIGHT RED BLOOD PER RECTUM): ICD-10-CM

## 2024-11-19 DIAGNOSIS — I48.91 NEW ONSET ATRIAL FIBRILLATION (H): ICD-10-CM

## 2024-11-19 DIAGNOSIS — R06.02 SOB (SHORTNESS OF BREATH): ICD-10-CM

## 2024-11-19 DIAGNOSIS — R07.9 ACUTE CHEST PAIN: ICD-10-CM

## 2024-11-19 LAB
ABO/RH(D): NORMAL
ANION GAP SERPL CALCULATED.3IONS-SCNC: 12 MMOL/L (ref 7–15)
ANTIBODY SCREEN: NEGATIVE
BASOPHILS # BLD AUTO: 0 10E3/UL (ref 0–0.2)
BASOPHILS NFR BLD AUTO: 0 %
BLD PROD TYP BPU: NORMAL
BLOOD COMPONENT TYPE: NORMAL
BUN SERPL-MCNC: 11.9 MG/DL (ref 8–23)
CALCIUM SERPL-MCNC: 8.2 MG/DL (ref 8.8–10.4)
CHLORIDE SERPL-SCNC: 102 MMOL/L (ref 98–107)
CODING SYSTEM: NORMAL
CREAT SERPL-MCNC: 0.91 MG/DL (ref 0.67–1.17)
CROSSMATCH: NORMAL
EGFRCR SERPLBLD CKD-EPI 2021: 84 ML/MIN/1.73M2
EOSINOPHIL # BLD AUTO: 0.2 10E3/UL (ref 0–0.7)
EOSINOPHIL NFR BLD AUTO: 3 %
ERYTHROCYTE [DISTWIDTH] IN BLOOD BY AUTOMATED COUNT: 14.7 % (ref 10–15)
ERYTHROCYTE [DISTWIDTH] IN BLOOD BY AUTOMATED COUNT: 15.3 % (ref 10–15)
ERYTHROCYTE [DISTWIDTH] IN BLOOD BY AUTOMATED COUNT: 15.8 % (ref 10–15)
GLUCOSE BLDC GLUCOMTR-MCNC: 116 MG/DL (ref 70–99)
GLUCOSE BLDC GLUCOMTR-MCNC: 81 MG/DL (ref 70–99)
GLUCOSE BLDC GLUCOMTR-MCNC: 94 MG/DL (ref 70–99)
GLUCOSE SERPL-MCNC: 161 MG/DL (ref 70–99)
HCO3 SERPL-SCNC: 23 MMOL/L (ref 22–29)
HCT VFR BLD AUTO: 24.4 % (ref 40–53)
HCT VFR BLD AUTO: 25.2 % (ref 40–53)
HCT VFR BLD AUTO: 28.1 % (ref 40–53)
HGB BLD-MCNC: 7.6 G/DL (ref 13.3–17.7)
HGB BLD-MCNC: 8.2 G/DL (ref 13.3–17.7)
HGB BLD-MCNC: 8.7 G/DL (ref 13.3–17.7)
HOLD SPECIMEN: NORMAL
IMM GRANULOCYTES # BLD: 0.1 10E3/UL
IMM GRANULOCYTES NFR BLD: 1 %
ISSUE DATE AND TIME: NORMAL
LVEF ECHO: NORMAL
LYMPHOCYTES # BLD AUTO: 0.6 10E3/UL (ref 0.8–5.3)
LYMPHOCYTES NFR BLD AUTO: 9 %
MCH RBC QN AUTO: 31.3 PG (ref 26.5–33)
MCH RBC QN AUTO: 31.4 PG (ref 26.5–33)
MCH RBC QN AUTO: 32.4 PG (ref 26.5–33)
MCHC RBC AUTO-ENTMCNC: 31 G/DL (ref 31.5–36.5)
MCHC RBC AUTO-ENTMCNC: 31.1 G/DL (ref 31.5–36.5)
MCHC RBC AUTO-ENTMCNC: 32.5 G/DL (ref 31.5–36.5)
MCV RBC AUTO: 100 FL (ref 78–100)
MCV RBC AUTO: 101 FL (ref 78–100)
MCV RBC AUTO: 101 FL (ref 78–100)
MONOCYTES # BLD AUTO: 0.6 10E3/UL (ref 0–1.3)
MONOCYTES NFR BLD AUTO: 9 %
NEUTROPHILS # BLD AUTO: 5.4 10E3/UL (ref 1.6–8.3)
NEUTROPHILS NFR BLD AUTO: 78 %
NRBC # BLD AUTO: 0 10E3/UL
NRBC BLD AUTO-RTO: 0 /100
NT-PROBNP SERPL-MCNC: 1329 PG/ML (ref 0–1800)
PLATELET # BLD AUTO: 206 10E3/UL (ref 150–450)
PLATELET # BLD AUTO: 232 10E3/UL (ref 150–450)
PLATELET # BLD AUTO: 240 10E3/UL (ref 150–450)
POTASSIUM SERPL-SCNC: 3.9 MMOL/L (ref 3.4–5.3)
RBC # BLD AUTO: 2.42 10E6/UL (ref 4.4–5.9)
RBC # BLD AUTO: 2.53 10E6/UL (ref 4.4–5.9)
RBC # BLD AUTO: 2.78 10E6/UL (ref 4.4–5.9)
SODIUM SERPL-SCNC: 137 MMOL/L (ref 135–145)
SPECIMEN EXPIRATION DATE: NORMAL
TROPONIN T SERPL HS-MCNC: 16 NG/L
TROPONIN T SERPL HS-MCNC: 18 NG/L
UNIT ABO/RH: NORMAL
UNIT NUMBER: NORMAL
UNIT STATUS: NORMAL
UNIT TYPE ISBT: 6200
WBC # BLD AUTO: 10.1 10E3/UL (ref 4–11)
WBC # BLD AUTO: 6.9 10E3/UL (ref 4–11)
WBC # BLD AUTO: 7.1 10E3/UL (ref 4–11)

## 2024-11-19 PROCEDURE — 36415 COLL VENOUS BLD VENIPUNCTURE: CPT | Performed by: HOSPITALIST

## 2024-11-19 PROCEDURE — 99223 1ST HOSP IP/OBS HIGH 75: CPT | Performed by: HOSPITALIST

## 2024-11-19 PROCEDURE — 250N000009 HC RX 250: Performed by: EMERGENCY MEDICINE

## 2024-11-19 PROCEDURE — 83880 ASSAY OF NATRIURETIC PEPTIDE: CPT | Performed by: EMERGENCY MEDICINE

## 2024-11-19 PROCEDURE — 93005 ELECTROCARDIOGRAM TRACING: CPT | Performed by: HOSPITALIST

## 2024-11-19 PROCEDURE — 93306 TTE W/DOPPLER COMPLETE: CPT | Mod: 26 | Performed by: GENERAL ACUTE CARE HOSPITAL

## 2024-11-19 PROCEDURE — 84484 ASSAY OF TROPONIN QUANT: CPT | Performed by: EMERGENCY MEDICINE

## 2024-11-19 PROCEDURE — 36430 TRANSFUSION BLD/BLD COMPNT: CPT

## 2024-11-19 PROCEDURE — P9016 RBC LEUKOCYTES REDUCED: HCPCS | Performed by: EMERGENCY MEDICINE

## 2024-11-19 PROCEDURE — 36415 COLL VENOUS BLD VENIPUNCTURE: CPT | Performed by: EMERGENCY MEDICINE

## 2024-11-19 PROCEDURE — 258N000003 HC RX IP 258 OP 636: Performed by: EMERGENCY MEDICINE

## 2024-11-19 PROCEDURE — 85014 HEMATOCRIT: CPT | Performed by: HOSPITALIST

## 2024-11-19 PROCEDURE — 96360 HYDRATION IV INFUSION INIT: CPT | Mod: 59

## 2024-11-19 PROCEDURE — 93306 TTE W/DOPPLER COMPLETE: CPT

## 2024-11-19 PROCEDURE — 86900 BLOOD TYPING SEROLOGIC ABO: CPT | Performed by: EMERGENCY MEDICINE

## 2024-11-19 PROCEDURE — 80048 BASIC METABOLIC PNL TOTAL CA: CPT | Performed by: EMERGENCY MEDICINE

## 2024-11-19 PROCEDURE — 82310 ASSAY OF CALCIUM: CPT | Performed by: EMERGENCY MEDICINE

## 2024-11-19 PROCEDURE — 74174 CTA ABD&PLVS W/CONTRAST: CPT

## 2024-11-19 PROCEDURE — 86923 COMPATIBILITY TEST ELECTRIC: CPT | Performed by: EMERGENCY MEDICINE

## 2024-11-19 PROCEDURE — 85025 COMPLETE CBC W/AUTO DIFF WBC: CPT | Performed by: EMERGENCY MEDICINE

## 2024-11-19 PROCEDURE — 250N000013 HC RX MED GY IP 250 OP 250 PS 637: Performed by: HOSPITALIST

## 2024-11-19 PROCEDURE — 250N000011 HC RX IP 250 OP 636: Performed by: EMERGENCY MEDICINE

## 2024-11-19 PROCEDURE — 99291 CRITICAL CARE FIRST HOUR: CPT | Mod: 25

## 2024-11-19 PROCEDURE — 120N000004 HC R&B MS OVERFLOW

## 2024-11-19 PROCEDURE — 82962 GLUCOSE BLOOD TEST: CPT

## 2024-11-19 PROCEDURE — 85049 AUTOMATED PLATELET COUNT: CPT | Performed by: HOSPITALIST

## 2024-11-19 PROCEDURE — 82565 ASSAY OF CREATININE: CPT | Performed by: EMERGENCY MEDICINE

## 2024-11-19 PROCEDURE — 93005 ELECTROCARDIOGRAM TRACING: CPT | Performed by: STUDENT IN AN ORGANIZED HEALTH CARE EDUCATION/TRAINING PROGRAM

## 2024-11-19 RX ORDER — CYANOCOBALAMIN (VITAMIN B-12) 500 MCG
400 LOZENGE ORAL DAILY
COMMUNITY

## 2024-11-19 RX ORDER — FUROSEMIDE 20 MG/1
20 TABLET ORAL DAILY
Status: DISCONTINUED | OUTPATIENT
Start: 2024-11-20 | End: 2024-11-22 | Stop reason: HOSPADM

## 2024-11-19 RX ORDER — IOPAMIDOL 755 MG/ML
90 INJECTION, SOLUTION INTRAVASCULAR ONCE
Status: COMPLETED | OUTPATIENT
Start: 2024-11-19 | End: 2024-11-19

## 2024-11-19 RX ORDER — VITAMIN B COMPLEX
1 TABLET ORAL DAILY
COMMUNITY

## 2024-11-19 RX ORDER — CALCIUM CARBONATE 500 MG/1
1000 TABLET, CHEWABLE ORAL 4 TIMES DAILY PRN
Status: DISCONTINUED | OUTPATIENT
Start: 2024-11-19 | End: 2024-11-22 | Stop reason: HOSPADM

## 2024-11-19 RX ORDER — OMEPRAZOLE 40 MG/1
40 CAPSULE, DELAYED RELEASE ORAL DAILY
Status: ON HOLD | COMMUNITY
End: 2024-11-22

## 2024-11-19 RX ORDER — ONDANSETRON 4 MG/1
4 TABLET, ORALLY DISINTEGRATING ORAL EVERY 6 HOURS PRN
Status: DISCONTINUED | OUTPATIENT
Start: 2024-11-19 | End: 2024-11-22 | Stop reason: HOSPADM

## 2024-11-19 RX ORDER — ASCORBIC ACID 250 MG
250 TABLET,CHEWABLE ORAL DAILY
COMMUNITY

## 2024-11-19 RX ORDER — EZETIMIBE 10 MG/1
10 TABLET ORAL DAILY
Status: DISCONTINUED | OUTPATIENT
Start: 2024-11-19 | End: 2024-11-22 | Stop reason: HOSPADM

## 2024-11-19 RX ORDER — ACETAMINOPHEN 650 MG/1
650 SUPPOSITORY RECTAL EVERY 4 HOURS PRN
Status: DISCONTINUED | OUTPATIENT
Start: 2024-11-19 | End: 2024-11-22 | Stop reason: HOSPADM

## 2024-11-19 RX ORDER — PLANT STANOL ESTER 450 MG
2.5 TABLET ORAL DAILY
COMMUNITY

## 2024-11-19 RX ORDER — PROCHLORPERAZINE MALEATE 5 MG/1
5 TABLET ORAL EVERY 6 HOURS PRN
Status: DISCONTINUED | OUTPATIENT
Start: 2024-11-19 | End: 2024-11-22 | Stop reason: HOSPADM

## 2024-11-19 RX ORDER — LISINOPRIL 20 MG/1
40 TABLET ORAL DAILY
Status: DISCONTINUED | OUTPATIENT
Start: 2024-11-20 | End: 2024-11-22 | Stop reason: HOSPADM

## 2024-11-19 RX ORDER — AMOXICILLIN 250 MG
2 CAPSULE ORAL 2 TIMES DAILY PRN
Status: DISCONTINUED | OUTPATIENT
Start: 2024-11-19 | End: 2024-11-22 | Stop reason: HOSPADM

## 2024-11-19 RX ORDER — PROBENECID 500 MG/1
500 TABLET, FILM COATED ORAL 2 TIMES DAILY
Status: DISCONTINUED | OUTPATIENT
Start: 2024-11-19 | End: 2024-11-22 | Stop reason: HOSPADM

## 2024-11-19 RX ORDER — ESCITALOPRAM OXALATE 10 MG/1
20 TABLET ORAL EVERY EVENING
Status: DISCONTINUED | OUTPATIENT
Start: 2024-11-19 | End: 2024-11-22 | Stop reason: HOSPADM

## 2024-11-19 RX ORDER — POTASSIUM CHLORIDE 750 MG/1
10 TABLET, EXTENDED RELEASE ORAL DAILY
Status: DISCONTINUED | OUTPATIENT
Start: 2024-11-20 | End: 2024-11-22 | Stop reason: HOSPADM

## 2024-11-19 RX ORDER — LEUPROLIDE ACETATE 22.5 MG
22.5 KIT SUBCUTANEOUS
COMMUNITY
Start: 2024-11-28

## 2024-11-19 RX ORDER — FENTANYL CITRATE 50 UG/ML
25 INJECTION, SOLUTION INTRAMUSCULAR; INTRAVENOUS ONCE
Status: COMPLETED | OUTPATIENT
Start: 2024-11-19 | End: 2024-11-19

## 2024-11-19 RX ORDER — DEXTROSE MONOHYDRATE 25 G/50ML
25-50 INJECTION, SOLUTION INTRAVENOUS
Status: DISCONTINUED | OUTPATIENT
Start: 2024-11-19 | End: 2024-11-22 | Stop reason: HOSPADM

## 2024-11-19 RX ORDER — ACETAMINOPHEN 325 MG/1
650 TABLET ORAL EVERY 4 HOURS PRN
Status: DISCONTINUED | OUTPATIENT
Start: 2024-11-19 | End: 2024-11-22 | Stop reason: HOSPADM

## 2024-11-19 RX ORDER — SIMVASTATIN 10 MG
40 TABLET ORAL EVERY EVENING
Status: DISCONTINUED | OUTPATIENT
Start: 2024-11-19 | End: 2024-11-22 | Stop reason: HOSPADM

## 2024-11-19 RX ORDER — MULTIVIT WITH MINERALS/LUTEIN
250 TABLET ORAL DAILY
Status: DISCONTINUED | OUTPATIENT
Start: 2024-11-20 | End: 2024-11-22 | Stop reason: HOSPADM

## 2024-11-19 RX ORDER — ONDANSETRON 2 MG/ML
4 INJECTION INTRAMUSCULAR; INTRAVENOUS EVERY 6 HOURS PRN
Status: DISCONTINUED | OUTPATIENT
Start: 2024-11-19 | End: 2024-11-22 | Stop reason: HOSPADM

## 2024-11-19 RX ORDER — AMLODIPINE BESYLATE 5 MG/1
10 TABLET ORAL DAILY
Status: DISCONTINUED | OUTPATIENT
Start: 2024-11-20 | End: 2024-11-22 | Stop reason: HOSPADM

## 2024-11-19 RX ORDER — ESCITALOPRAM OXALATE 20 MG/1
20 TABLET ORAL EVERY EVENING
COMMUNITY

## 2024-11-19 RX ORDER — LIDOCAINE 40 MG/G
CREAM TOPICAL
Status: DISCONTINUED | OUTPATIENT
Start: 2024-11-19 | End: 2024-11-22 | Stop reason: HOSPADM

## 2024-11-19 RX ORDER — AMOXICILLIN 250 MG
1 CAPSULE ORAL 2 TIMES DAILY PRN
Status: DISCONTINUED | OUTPATIENT
Start: 2024-11-19 | End: 2024-11-22 | Stop reason: HOSPADM

## 2024-11-19 RX ORDER — NICOTINE POLACRILEX 4 MG
15-30 LOZENGE BUCCAL
Status: DISCONTINUED | OUTPATIENT
Start: 2024-11-19 | End: 2024-11-22 | Stop reason: HOSPADM

## 2024-11-19 RX ORDER — ATENOLOL 25 MG/1
50 TABLET ORAL DAILY
Status: DISCONTINUED | OUTPATIENT
Start: 2024-11-20 | End: 2024-11-22 | Stop reason: HOSPADM

## 2024-11-19 RX ADMIN — PANTOPRAZOLE SODIUM 80 MG: 40 INJECTION, POWDER, FOR SOLUTION INTRAVENOUS at 11:29

## 2024-11-19 RX ADMIN — SODIUM CHLORIDE 250 ML: 9 INJECTION, SOLUTION INTRAVENOUS at 07:01

## 2024-11-19 RX ADMIN — SODIUM CHLORIDE 250 ML: 9 INJECTION, SOLUTION INTRAVENOUS at 09:20

## 2024-11-19 RX ADMIN — EZETIMIBE 10 MG: 10 TABLET ORAL at 11:28

## 2024-11-19 RX ADMIN — IOPAMIDOL 90 ML: 755 INJECTION, SOLUTION INTRAVENOUS at 08:17

## 2024-11-19 ASSESSMENT — ENCOUNTER SYMPTOMS
NAUSEA: 0
COUGH: 0
FEVER: 0
DIARRHEA: 0
CHEST TIGHTNESS: 1
SHORTNESS OF BREATH: 1
ABDOMINAL PAIN: 1
VOMITING: 0

## 2024-11-19 ASSESSMENT — ACTIVITIES OF DAILY LIVING (ADL)
ADLS_ACUITY_SCORE: 0

## 2024-11-19 ASSESSMENT — COLUMBIA-SUICIDE SEVERITY RATING SCALE - C-SSRS
1. IN THE PAST MONTH, HAVE YOU WISHED YOU WERE DEAD OR WISHED YOU COULD GO TO SLEEP AND NOT WAKE UP?: NO
6. HAVE YOU EVER DONE ANYTHING, STARTED TO DO ANYTHING, OR PREPARED TO DO ANYTHING TO END YOUR LIFE?: NO
2. HAVE YOU ACTUALLY HAD ANY THOUGHTS OF KILLING YOURSELF IN THE PAST MONTH?: NO

## 2024-11-19 NOTE — PLAN OF CARE
Had one unit of PRBC's prior to coming to ED23. Tolerated well. Currently no bleeding from rectum. States he is feeling better. Son here starting 2:15pm supportive. He will bring Jabari a cell phone . He might bring his clothing and jacket home- have not decided yet. States he is eating okay. Afib with BBB- controled rate (chronic) hopes to get a bed upstairs soon. Declines teaching on EGD or Colonoscopy likely planned for Thursday. States he has had them in the past. Falls and pressure ulcer prevention plans in place (low risk)  Sarah Patterson RN    Problem: Anemia  Goal: Anemia Symptom Improvement  Outcome: Progressing     Problem: Gastrointestinal Bleeding  Goal: Optimal Coping with Acute Illness  Outcome: Progressing  Goal: Hemostasis  Outcome: Progressing     Problem: Comorbidity Management  Goal: Blood Glucose Levels Within Targeted Range  Outcome: Progressing  Goal: Blood Pressure in Desired Range  Outcome: Progressing     Problem: Dysrhythmia  Goal: Normalized Cardiac Rhythm  Outcome: Progressing

## 2024-11-19 NOTE — ED TRIAGE NOTES
Pt having bright red rectal bleeding for 3 weeks.  Is being treated for prostate cancer, received 1 unit PRBC at [facility] last week.  Pt continues to have rectal bleeding and feeling sob.  Pt very pale, extremely sob with activity.  Denies dizziness     Triage Assessment (Adult)       Row Name 11/19/24 0640          Triage Assessment    Airway WDL WDL        Respiratory WDL    Respiratory WDL WDL        Skin Circulation/Temperature WDL    Skin Circulation/Temperature WDL WDL        Cardiac WDL    Cardiac WDL WDL        Peripheral/Neurovascular WDL    Peripheral Neurovascular WDL WDL        Cognitive/Neuro/Behavioral WDL    Cognitive/Neuro/Behavioral WDL WDL

## 2024-11-19 NOTE — H&P
[facility]    History and Physical - Hospitalist Service       Date of Admission:  11/19/2024    Assessment & Plan      Jabari Haddad is a 83 year old male admitted on 11/19/2024 for acute blood loss anemia secondary to possible GI source.     #Acute blood loss anemia  #Melena and BRBPR x 3 weeks   DDX hemorroids vs radiation proctatis vs diverticular bleed  -Symptomatic with dyspnea and chest pain  -Received 1 unit PRBC from [facility] a week ago.    -No workup for anemia per patient being done at [facility]  -CTA A/P with internal hemorrhoid with perirectal stranding suggesting proctitis, no active bleeding, colonic diverticulosis.   -IV PPI BID  -Hgb 7.6 from 13 in Jan. Will receive 1 unit today.   -CBC Q8H  -GI consulted. Plan for EGD and Colonoscopy  -Hold anticoagulation for 2 days for colonoscopy.   -Will need cardio clearance per GI given chest pain symptoms.     #Chest pain-resolved  -Trop 18---16.   -EKG with Afib, RBBB T wave inversion in V1-V5.    -BNP 1329  -Echo with reduced RV systolic function, moderate RV enlargement, severe biatrial enlargement with moderate pulmonary hypertension.   -Met of approximately 4. Patient is medically optimized for colonoscopy/EGD procedure.     #Atrial Fibrillation   -Continue atenolol   -Eliquis on hold     #HLD  -Continue Simvastatin and Zetia     #DM  -PTA Glimepiride on hold. Will place on sliding scale insulin       #HTN  -Continue Amlodipine and Lisinopril     #Prostate Cancer s/p radiation therapy and hormone therapy.   -Currently following with [facility].      #Depression   -Continue Lexapro           Diet: Combination Diet Moderate Consistent Carb (60 g CHO per Meal) Diet; Low Saturated Fat Na <2400mg Diet, No Caffeine Diet  DVT Prophylaxis: Pneumatic Compression Devices  Laguerre Catheter: Not present  Lines: None     Cardiac Monitoring: ACTIVE order. Indication: Chest pain/ ACS rule out (24 hours)  Code Status: Full  Code    Clinically Significant Risk Factors Present on Admission                # Drug Induced Coagulation Defect: home medication list includes an anticoagulant medication    # Hypertension: Noted on problem list      # Anemia: based on hgb <11  # Anemia: based on hgb <11                  Disposition Plan     Medically Ready for Discharge: Anticipated in 2-4 Days           Meseret Kraus MD  Hospitalist Service  [facility]  Securely message with [employer] (more info)  Text page via [employer] Paging/Directory     ______________________________________________________________________    Chief Complaint   Dyspnea, chest pain, BRBPR    History is obtained from the patient    History of Present Illness   Jabari Haddad is a 83 year old male with PMH of prostate cancer, HTN, Afib, HLD who presented with 3 weeks of BRBPR and melena associated with dyspnea. Patient reports that he has been having multiple episodes with loose bowel movements and once he soaked his beddings from blood. He also noticed he is unable to climb his stairs without getting out of breath or walk 40 ft. Patient also reports this morning had left sided chest pain that resolved once he got to the ED. He states last week spoke to [facility] about his symptoms and they checked his Hgb which was 7.6 and he was transfused one unit. Patient reports he was not under treatment or having anemia workup being done. He also reports RLQ intermittent abdominal pain reminiscent of his diverticular pain. His last colonoscopy was more than 10 years ago and he reports EGD also remote. No history of ulcers and uses aleve prn for his neck pain. His last done of Eliquis was this morning.       Past Medical History    History reviewed. No pertinent past medical history.    Past Surgical History   History reviewed. No pertinent surgical history.    Prior to Admission Medications   Prior to Admission Medications   Prescriptions Last Dose Informant  Patient Reported? Taking?   ELIQUIS ANTICOAGULANT 5 MG tablet 11/19/2024 Morning  No Yes   Sig: Take 1 tablet by mouth twice daily   Vitamin D3 (CHOLECALCIFEROL) 25 mcg (1000 units) tablet 11/19/2024 Morning  Yes Yes   Sig: Take 1 tablet by mouth daily.   acetaminophen (TYLENOL) 500 MG tablet 11/18/2024  Yes Yes   Sig: Take 1,000 mg by mouth every 8 hours as needed for mild pain.   amLODIPine (NORVASC) 10 MG tablet 11/19/2024 Morning  No Yes   Sig: Take 1 tablet by mouth once daily   ascorbic acid (VITAMIN C) 250 MG CHEW chewable tablet 11/19/2024 Morning  Yes Yes   Sig: Take 250 mg by mouth daily.   atenolol (TENORMIN) 50 MG tablet 11/19/2024 Morning  No Yes   Sig: Take 1 tablet (50 mg) by mouth daily   chlorpheniramine (CHLOR-TRIMETON) 4 MG tablet Past Month  Yes Yes   Sig: Take 4 mg by mouth every 6 hours as needed for allergies or rhinitis.   escitalopram (LEXAPRO) 20 MG tablet 11/18/2024 Evening  Yes Yes   Sig: Take 20 mg by mouth every evening.   ezetimibe (ZETIA) 10 MG tablet 11/18/2024 Noon  No Yes   Sig: Take 1 tablet (10 mg) by mouth daily   furosemide (LASIX) 20 MG tablet 11/19/2024 Morning  No Yes   Sig: Take 1 tablet by mouth once daily   glimepiride (AMARYL) 2 MG tablet 11/18/2024 Noon  No Yes   Sig: TAKE 1 TABLET BY MOUTH IN THE MORNING BEFORE BREAKFAST   leuprolide, 3 Month, (ELIGARD) 22.5 MG   Yes No   Sig: Inject 22.5 mg subcutaneously every 3 months.   lisinopril (ZESTRIL) 40 MG tablet 11/19/2024 Morning  No Yes   Sig: Take 1 tablet by mouth once daily   omeprazole (PRILOSEC) 40 MG DR capsule 11/18/2024 Noon  Yes Yes   Sig: Take 40 mg by mouth daily.   potassium gluconate 2.5 MEQ tablet 11/19/2024 Morning  Yes Yes   Sig: Take 2.5 mEq by mouth daily.   pramox-pe-glycerin-petrolatum (PREPARATION H) 1-0.25-14.4-15 % CREA cream Past Month  Yes Yes   Sig: Place rectally 4 times daily as needed for hemorrhoids.   probenecid (BENEMID) 500 MG tablet 11/19/2024 Morning  No Yes   Sig: Take 1 tablet (500 mg)  by mouth 2 times daily.   simvastatin (ZOCOR) 40 MG tablet 11/18/2024 Evening  No Yes   Sig: Take 1 tablet by mouth once daily   vitamin E 400 units TABS 11/19/2024 Morning  Yes Yes   Sig: Take 400 Units by mouth daily.      Facility-Administered Medications: None        Review of Systems    The 10 point Review of Systems is negative other than noted in the HPI      Physical Exam   Vital Signs: Temp: 98  F (36.7  C) Temp src: Oral BP: 115/58 Pulse: 55   Resp: 14 SpO2: 97 % O2 Device: Nasal cannula Oxygen Delivery: 1 LPM  Weight: 171 lbs 0 oz    Constitutional: awake, alert, cooperative, no apparent distress, and appears stated age  Eyes: pupils equal, round and reactive to light, extra-ocular muscles intact, sclera clear, and pale  ENT: atraumatic, oral pharynx with moist mucus membranes  Respiratory: No increased work of breathing, good air exchange, clear to auscultation bilaterally, no crackles or wheezing  Cardiovascular: bradycardiac with irregular rhythm and normal S1 and S2  GI: normal bowel sounds, soft, non-distended, non-tender, and Sphincter tone normal and external hemorrhoids non bleeding  Musculoskeletal: +1 edema in bilateral lower extremity, full range of motion noted    Medical Decision Making       75 MINUTES SPENT BY ME on the date of service doing chart review, history, exam, documentation & further activities per the note.      Data     I have personally reviewed the following data over the past 24 hrs:    6.9  \   7.6 (L)   / 232     137 102 11.9 /  161 (H)   3.9 23 0.91 \     Trop: 18 BNP: 1,329       Imaging results reviewed over the past 24 hrs:   Recent Results (from the past 24 hours)   CTA Abdomen Pelvis with Contrast    Narrative    EXAM: CTA ABDOMEN PELVIS WITH CONTRAST  LOCATION: [facility]  DATE: 11/19/2024    INDICATION: GI bleeding protocol, black stools and now BRBPR  COMPARISON: CT chest, abdomen and pelvis 2/7/2024  TECHNIQUE: CT angiogram abdomen pelvis  during arterial phase of injection of IV contrast. 2D and 3D MIP reconstructions were performed by the CT technologist. Dose reduction techniques were used.  CONTRAST: IsoVue 370 90ml    FINDINGS:  ANGIOGRAM ABDOMEN/PELVIS: The abdominal aorta is nonaneurysmal with moderate atheromatous disease but no evidence of dissection or other acute abnormality. The celiac, superior mesenteric, renal, and inferior mesenteric arteries are patent without   flow-limiting stenosis. The bilateral common, internal, and external iliac arteries are patent without flow-limiting stenosis.    Portal, hepatic, and superior mesenteric veins are patent.    LOWER CHEST: Unchanged bilateral pleural plaques and pericardial calcifications. Similar trace amount of pleural fluid versus pleural thickening.    HEPATOBILIARY: Normal liver morphology and enhancements. No worrisome liver lesions. Normal gallbladder. No biliary ductal dilation.    PANCREAS: Normal.    SPLEEN: Normal.    ADRENAL GLANDS: Normal.    KIDNEYS/BLADDER: Kidneys enhance symmetrically. Benign right renal sinus and bilateral cortically based cysts, which sarai follow-up. No urolithiasis or hydronephrosis. Urinary bladder is unremarkable.    BOWEL: There are arterially enhancing vessels within the rectum with surrounding perirectal stranding, consistent with internal hemorrhoids. No findings of active bleeding at this time. Scattered colonic diverticulosis without evidence of diverticulitis.   The appendix is normal. Small hiatal hernia. No free fluid or free air.    LYMPH NODES: No lymphadenopathy.    PELVIC ORGANS: Normal contours.    MUSCULOSKELETAL: Unchanged mild T12 wedge compression deformity. Degenerative changes throughout the spine. Sacral fixation hardware and healed right pelvic fractures.      Impression    IMPRESSION:  1.  Prominent internal hemorrhoids with perirectal stranding, suggesting proctitis, however no findings of active bleeding.  2.  Colonic  diverticulosis without evidence of diverticulitis.  3.  Additional chronic noncritical details in the findings.

## 2024-11-19 NOTE — PHARMACY-ADMISSION MEDICATION HISTORY
Pharmacist Admission Medication History    Admission medication history is complete. The information provided in this note is only as accurate as the sources available at the time of the update.    Information Source(s): Patient and CareEverywhere/SureScripts via in-person    Pertinent Information: N/A    Changes made to PTA medication list:  Added: Vitamin D, vitamin C, vitamin E, potassium  Deleted: Bupropion, fluocinonide solution, hydroxyzine, ketoconazole cream, mupirocin cream, Miralax, trazodone, triamcinoloe cream  Changed: APAP from TID scheduled to PRN, omeprazole from BID to daily, preparation H from QID to PRN    Allergies reviewed with patient and updates made in EHR: yes    Medication History Completed By: ANTOINETTE DEGROOT [facility] 11/19/2024 8:49 AM    PTA Med List   Medication Sig Last Dose/Taking    acetaminophen (TYLENOL) 500 MG tablet Take 1,000 mg by mouth every 8 hours as needed for mild pain. 11/18/2024    amLODIPine (NORVASC) 10 MG tablet Take 1 tablet by mouth once daily 11/19/2024 Morning    atenolol (TENORMIN) 50 MG tablet Take 1 tablet (50 mg) by mouth daily 11/19/2024 Morning    chlorpheniramine (CHLOR-TRIMETON) 4 MG tablet Take 4 mg by mouth every 6 hours as needed for allergies or rhinitis. Past Month    ELIQUIS ANTICOAGULANT 5 MG tablet Take 1 tablet by mouth twice daily 11/19/2024 Morning    escitalopram (LEXAPRO) 20 MG tablet Take 20 mg by mouth every evening. 11/18/2024 Evening    ezetimibe (ZETIA) 10 MG tablet Take 1 tablet (10 mg) by mouth daily 11/18/2024 Noon    furosemide (LASIX) 20 MG tablet Take 1 tablet by mouth once daily 11/19/2024 Morning    glimepiride (AMARYL) 2 MG tablet TAKE 1 TABLET BY MOUTH IN THE MORNING BEFORE BREAKFAST 11/18/2024 Noon    lisinopril (ZESTRIL) 40 MG tablet Take 1 tablet by mouth once daily 11/19/2024 Morning    omeprazole (PRILOSEC) 40 MG DR capsule Take 40 mg by mouth daily. 11/18/2024 Noon    pramox-pe-glycerin-petrolatum (PREPARATION H)  1-0.25-14.4-15 % CREA cream Place rectally 4 times daily as needed for hemorrhoids. Past Month    probenecid (BENEMID) 500 MG tablet Take 1 tablet (500 mg) by mouth 2 times daily. 11/19/2024 Morning    simvastatin (ZOCOR) 40 MG tablet Take 1 tablet by mouth once daily 11/18/2024 Evening

## 2024-11-19 NOTE — PLAN OF CARE
Transferring to room 330 via cart, monitored. Son aware. All belongings sent including cell phone. Report called to Nancy Patterson, RN  Med sent: insulin pen.

## 2024-11-19 NOTE — ED PROVIDER NOTES
EMERGENCY DEPARTMENT ENCOUNTER      NAME: Jabari Haddad  AGE: 83 year old male  YOB: 1940  MRN: [id number]  EVALUATION DATE & TIME: 11/19/2024  6:33 AM    PCP: Ludwin Macdonald    ED PROVIDER: Isidra Chang M.D.        Chief Complaint   Patient presents with    Rectal Bleeding         FINAL IMPRESSION:    1. BRBPR (bright red blood per rectum)    2. Anemia, unspecified type    3. Acute chest pain    4. SOB (shortness of breath)            MEDICAL DECISION MAKING:    Jabari Haddad is a 83 year old male with history of prostate cancer status postradiation, GERD, diabetes, hypertension, hyperlipidemia, diverticulitis, atrial fibrillation on Eliquis, who presents to the ER with complaints of rectal bleeding.    He reports that he has been having black stools and bright red blood per rectum now for about 3 weeks.  He received an outpatient blood transfusion recently through [facility] for this and was supposed to follow-up with primary care today to initiate a workup to find out why he is having this bleeding.    Imaging shows that he does have hemorrhoids internally, but no active signs for bleeding.  Hemoglobin was 7.6 and he was complaining of chest discomfort with possible ischemic changes on EKG compared to 1 from 2020.  Initial troponin unremarkable.  Blood transfusion ordered and patient is feeling much better.  Chest discomfort has resolved.  Plan at this time is admission to the hospitalist service for evaluation by [facility] for endoscopy and colonoscopy.  Patient aware of this plan and agrees and all of his questions have been answered.    Since chest discomfort has improved with blood transfusion and initial troponin unremarkable I do not feel he requires emergent cardiology evaluation unless his repeat troponins become significantly elevated, chest pain returns, or any other concerns.  If his troponins are slightly abnormal this may be due to more of a demand ischemia due  to the anemia and initial treatment would be the blood transfusion is ordered.  I do not think he requires emergent reversal of his Eliquis.        ED COURSE:  6:46 AM  I met with the patient to gather history and perform my exam. ED course and treatment discussed.    6:58 AM  EKG does show changes compared to one from 2020.  No ST elevation.  At this time high suspicion for anemia, could be leading to cardiac demand ischemia.  Awaiting CBC results.    7:11 AM   Latest Reference Range & Units 08/03/23 08:10 08/07/23 07:21 08/31/23 10:56 02/15/24 11:29 11/19/24 06:48   Hemoglobin 13.3 - 17.7 g/dL 8.3 (L) 9.1 (L) 12.2 (L) 13.5 7.6 (L)     7:17 AM  Hemoglobin is down to 7.6.  One from February was 13.5.  He states he had a blood transfusion recently and chart review suggest that his hemoglobin was 7.6 though I cannot verify this.  Given his cardiac history, chest discomfort, and possible EKG changes will go ahead and give a 1 unit blood transfusion at this time.    9:14 AM  I spoke with GI about his hemoglobin and CT findings of possible hemorrhoids and proctitis.  They agree with bringing him into the hospital but he will need cardiac clearance before they can do colonoscopy and endoscopy.  They agree with going ahead with Protonix.  It is possible that colorectal surgery may have to get involved that this is more of a hemorrhoidal bleed but sometimes that is done as an outpatient if he otherwise can be stabilized.    9:19 AM  Patient is acceptedly getting the blood transfusion at this time.  Remains hemodynamically stable.  He states he feels significantly better.  No longer having any chest discomfort and feels like his breathing is better.  Updated him on results, CT scan findings, and my discussion with [facility].  He states that he has been dealing with hemorrhoids for quite some time, they worsened recently after he had a pelvic fracture and was on opiates for pain control.  He understands that he is being  admitted for colonoscopy and probable endoscopy.  He is aware that if no other findings are found on colonoscopy or endoscopy for his bleeding that he may need follow-up as an outpatient with colorectal surgery to deal with the hemorrhoids.  He agrees with this plan and all of his questions have been answered.  Initial troponin looked good.  Delta troponin has been sent but results still pending.  Given his symptoms and anemia though I do suspect that his chest discomfort was a degree of some demand ischemia going on and glad that he is feeling better now that he is receiving the blood transfusion.    I do not think that this represents a primary ACS, PE, ruptured AAA, aortic dissection, bowel obstruction, bowel ischemia, cholecystitis, pancreatitis, appendicitis, diverticulitis, kidney stone, pyelonephritis, incarcerated or strangulated hernia, testicular torsion, viscus perforation, perforated GI ulcer, or other such etiologies at this time.      At the conclusion of the encounter I discussed the results of all of the tests and the disposition. Their questions were answered. The patient (and any family present) acknowledged understanding and were agreeable with the care plan.      CRITICAL CARE TIME   Total critical care time: 45 minutes  Critical care time was exclusive of separately billable procedures and treating other patients.  Critical care was necessary to treat or prevent imminent or life-threatening deterioration of the following conditions: anemia  Critical care was time spent personally by me on the following activities: development of treatment plan with patient or surrogate, discussions with consultants, examination of patient, evaluation of patient's response to treatment, obtaining history from patient or surrogate, ordering and performing treatments and interventions, ordering and review of laboratory studies, ordering and review of radiographic studies and re-evaluation of patient's condition,  this excludes any separately billable procedures.      CONSULTANTS:  Hospitalist - Dr. Philomena CHARLES GI - Dr. Richmond         MEDICATIONS GIVEN IN THE EMERGENCY:  Medications   sodium chloride 0.9% BOLUS 1-250 mL (250 mLs Intravenous $New Bag 11/19/24 8625)   pantoprazole (PROTONIX) IV push injection 80 mg (has no administration in time range)   amLODIPine (NORVASC) tablet 10 mg (has no administration in time range)   ascorbic acid (vitamin C) chewable tablet 250 mg (has no administration in time range)   atenolol (TENORMIN) tablet 50 mg (has no administration in time range)   apixaban ANTICOAGULANT (ELIQUIS) tablet 5 mg ( Oral Automatically Held 11/22/24 2000)   escitalopram (LEXAPRO) tablet 20 mg (has no administration in time range)   ezetimibe (ZETIA) tablet 10 mg (has no administration in time range)   furosemide (LASIX) tablet 20 mg (has no administration in time range)   lisinopril (ZESTRIL) tablet 40 mg (has no administration in time range)   probenecid (BENEMID) tablet 500 mg (has no administration in time range)   simvastatin (ZOCOR) tablet 40 mg (has no administration in time range)   potassium gluconate tablet 2.5 mEq (has no administration in time range)   lidocaine 1 % 0.1-1 mL (has no administration in time range)   lidocaine (LMX4) cream (has no administration in time range)   sodium chloride (PF) 0.9% PF flush 3 mL (has no administration in time range)   sodium chloride (PF) 0.9% PF flush 3 mL (has no administration in time range)   senna-docusate (SENOKOT-S/PERICOLACE) 8.6-50 MG per tablet 1 tablet (has no administration in time range)     Or   senna-docusate (SENOKOT-S/PERICOLACE) 8.6-50 MG per tablet 2 tablet (has no administration in time range)   calcium carbonate (TUMS) chewable tablet 1,000 mg (has no administration in time range)   glucose gel 15-30 g (has no administration in time range)     Or   dextrose 50 % injection 25-50 mL (has no administration in time range)     Or   glucagon injection  1 mg (has no administration in time range)   acetaminophen (TYLENOL) tablet 650 mg (has no administration in time range)     Or   acetaminophen (TYLENOL) Suppository 650 mg (has no administration in time range)   ondansetron (ZOFRAN ODT) ODT tab 4 mg (has no administration in time range)     Or   ondansetron (ZOFRAN) injection 4 mg (has no administration in time range)   prochlorperazine (COMPAZINE) injection 5 mg (has no administration in time range)     Or   prochlorperazine (COMPAZINE) tablet 5 mg (has no administration in time range)   insulin aspart (NovoLOG) injection (RAPID ACTING) (has no administration in time range)   insulin aspart (NovoLOG) injection (RAPID ACTING) (has no administration in time range)   pantoprazole (PROTONIX) IV push injection 40 mg (has no administration in time range)   sodium chloride 0.9% BOLUS 250 mL (0 mLs Intravenous Stopped 11/19/24 0758)   fentaNYL (PF) (SUBLIMAZE) injection 25 mcg (25 mcg Intravenous Not Given 11/19/24 0717)   iopamidol (ISOVUE-370) solution 90 mL (90 mLs Intravenous $Given 11/19/24 0817)           NEW PRESCRIPTIONS STARTED AT TODAY'S ER VISIT  none      CONDITION:  stable        DISPOSITION:  Card tele as accepted by Dr. Quach, hospitalist         =================================================================  =================================================================  TRIAGE ASSESSMENT:  Pt having bright red rectal bleeding for 3 weeks.  Is being treated for prostate cancer, received 1 unit PRBC at [facility] last week.  Pt continues to have rectal bleeding and feeling sob.  Pt very pale, extremely sob with activity.  Denies dizziness    ED Triage Vitals [11/19/24 0638]   Encounter Vitals Group      /63      Systolic BP Percentile       Diastolic BP Percentile       Pulse 70      Resp 20      Temp 98.1  F (36.7  C)      Temp src Oral      SpO2 93 %      Weight 77.6 kg (171 lb)      ================================================================  ================================================================    HPI    Patient information was obtained from: patient    Use of Intrepreter: N/A      Jabari Haddad is a 83 year old male with history of prostate cancer status postradiation, GERD, diabetes, hypertension, hyperlipidemia, diverticulitis, atrial fibrillation on Eliquis, who presents to the ER with complaints of rectal bleeding.    He reports that he has been having black stools and bright red blood per rectum now for about 3 weeks.  He received an outpatient blood transfusion recently through [facility] and was supposed to follow-up with primary care today to initiate a workup to find out why he is having this bleeding.    He otherwise denies fevers, cough, vomiting.  He denies true diarrhea but states his stools have been softer.  He does report now feeling some chest heaviness and shortness of breath.  He was intermittent over the weekend, had been gone the last 2 days but is now back this morning.    He does not use oxygen at home.    CHART REVIEW:  Chart review shows that patient was seen at the [facility] center on November 13, 2024 and it looks like he had a hemoglobin of 7.6 at that time.      REVIEW OF SYSTEMS  Review of Systems   Constitutional:  Negative for fever.   Respiratory:  Positive for chest tightness and shortness of breath. Negative for cough.    Cardiovascular:  Positive for chest pain.   Gastrointestinal:  Positive for abdominal pain. Negative for diarrhea, nausea and vomiting.   All other systems reviewed and are negative.        PAST MEDICAL HISTORY:  History reviewed. No pertinent past medical history.      PAST SURGICAL HISTORY:  History reviewed. No pertinent surgical history.      CURRENT MEDICATIONS:    Prior to Admission medications    Medication Sig Start Date End Date Taking? Authorizing Provider   acetaminophen (TYLENOL) 500 MG tablet Take  1,000 mg by mouth 3 times daily    Reported, Patient   amLODIPine (NORVASC) 10 MG tablet Take 1 tablet by mouth once daily 4/26/24   Rajesh Rao MD   atenolol (TENORMIN) 50 MG tablet Take 1 tablet (50 mg) by mouth daily 7/30/24   Karen Dempsey APRN CNP   blood glucose test (RELION PRIME TEST STRIPS) strips [BLOOD GLUCOSE TEST (RELION PRIME TEST STRIPS) STRIPS] Use 1 each As Directed daily. Dispense brand per patient's insurance at pharmacy discretion. 3/12/19   Mike Mcdowell MD   buPROPion (WELLBUTRIN XL) 150 MG 24 hr tablet TAKE 1 TABLET BY MOUTH IN THE MORNING 2/4/22   Reported, Patient   chlorpheniramine (CHLOR-TRIMETON) 4 MG tablet Take 4 mg by mouth every 6 hours as needed for allergies or rhinitis.    Reported, Patient   ELIQUIS ANTICOAGULANT 5 MG tablet Take 1 tablet by mouth twice daily 10/22/24   Mike Mcdowell MD   escitalopram (LEXAPRO) 20 MG tablet TAKE 1 TABLET BY MOUTH ONCE DAILY IN THE MORNING 4/26/24   Mike Mcdowell MD   ezetimibe (ZETIA) 10 MG tablet Take 1 tablet (10 mg) by mouth daily 5/6/24   Karen Dempsey APRN CNP   fluocinonide (LIDEX) 0.05 % external solution  3/16/21   Reported, Patient   furosemide (LASIX) 20 MG tablet Take 1 tablet by mouth once daily 4/15/24   Karen Dempsey APRN CNP   glimepiride (AMARYL) 2 MG tablet TAKE 1 TABLET BY MOUTH IN THE MORNING BEFORE BREAKFAST 11/4/24   Mike Mcdowell MD   hydrOXYzine (VISTARIL) 25 MG capsule Take 1 capsule by mouth at bedtime for sleep  Patient not taking: Reported on 2/15/2024 8/23/23   Mike Mcdowell MD   ketoconazole (NIZORAL) 2 % external cream  3/16/21   Reported, Patient   lisinopril (ZESTRIL) 40 MG tablet Take 1 tablet by mouth once daily 2/26/24   Mike Mcdowell MD   mupirocin (BACTROBAN) 2 % external cream Apply topically daily Apply to Right elbow and right leg wounds.    Reported, Patient   omeprazole (PRILOSEC) 40 MG DR capsule Take 1 capsule by mouth twice daily 5/27/24   Mike Mcdowell MD   polyethylene glycol  (MIRALAX) 17 g packet Take 1 packet by mouth daily  Patient not taking: Reported on 10/18/2023    Reported, Patient   pramox-pe-glycerin-petrolatum (PREPARATION H) 1-0.25-14.4-15 % CREA cream Place rectally 4 times daily  Patient not taking: Reported on 2024    Reported, Patient   probenecid (BENEMID) 500 MG tablet Take 1 tablet (500 mg) by mouth 2 times daily. 10/1/24   Mike Mcdowell MD   simvastatin (ZOCOR) 40 MG tablet Take 1 tablet by mouth once daily 24   Rajesh Rao MD   simvastatin (ZOCOR) 40 MG tablet Take 1 tablet (40 mg) by mouth daily 23   Mike Mcdowell MD   traZODone (DESYREL) 50 MG tablet [TRAZODONE (DESYREL) 50 MG TABLET] TK 1 TO 2 TS PO HS  Patient not taking: Reported on 10/18/2023 3/20/19   Mike Mcdowell MD   triamcinolone (KENALOG) 0.1 % external cream  3/30/21   Reported, Patient   ULTIMA TEST STRIPS strips [ULTIMA TEST STRIPS STRIPS] USE ONE STRIP TO CHECK GLUCOSE ONCE DAILY DX  E11.9 19   Mike Mcdowell MD         ALLERGIES:  No Known Allergies      FAMILY HISTORY:  Family History   Problem Relation Age of Onset    Arthritis Other     Rheumatoid Arthritis Other     Lupus Other     Osteoporosis Other     Cerebrovascular Disease Other     Hypertension Other          SOCIAL HISTORY:  Social History     Socioeconomic History    Marital status:    Tobacco Use    Smoking status: Former     Current packs/day: 0.00     Types: Cigarettes     Quit date: 1967     Years since quittin.9    Smokeless tobacco: Never   Vaping Use    Vaping status: Never Used   Substance and Sexual Activity    Alcohol use: Yes     Alcohol/week: 8.3 - 10.0 standard drinks of alcohol   Social History Narrative    1/13/15- The patient lives alone.      Social Drivers of Health     Financial Resource Strain: Low Risk  (2/15/2024)    Financial Resource Strain     Within the past 12 months, have you or your family members you live with been unable to get utilities (heat, electricity)  when it was really needed?: No   Food Insecurity: Low Risk  (2/15/2024)    Food Insecurity     Within the past 12 months, did you worry that your food would run out before you got money to buy more?: No     Within the past 12 months, did the food you bought just not last and you didn t have money to get more?: No   Transportation Needs: Low Risk  (2/15/2024)    Transportation Needs     Within the past 12 months, has lack of transportation kept you from medical appointments, getting your medicines, non-medical meetings or appointments, work, or from getting things that you need?: No   Physical Activity: Unknown (2/15/2024)    Exercise Vital Sign     Days of Exercise per Week: 0 days   Stress: No Stress Concern Present (2/15/2024)    [employer] - Occupational Stress Questionnaire     Feeling of Stress : Not at all   Social Connections: Unknown (2/15/2024)    Social Connection and Isolation Panel [NHANES]     Frequency of Social Gatherings with Friends and Family: Once a week   Interpersonal Safety: Low Risk  (7/9/2024)    Interpersonal Safety     Do you feel physically and emotionally safe where you currently live?: Yes     Within the past 12 months, have you been hit, slapped, kicked or otherwise physically hurt by someone?: No     Within the past 12 months, have you been humiliated or emotionally abused in other ways by your partner or ex-partner?: No   Housing Stability: Low Risk  (2/15/2024)    Housing Stability     Do you have housing? : Yes     Are you worried about losing your housing?: No         VITALS:  Patient Vitals for the past 24 hrs:   BP Temp Temp src Pulse Resp SpO2 Weight   11/19/24 0923 -- -- -- -- -- 96 % --   11/19/24 0915 119/68 98.1  F (36.7  C) Oral 78 26 98 % --   11/19/24 0900 127/65 -- -- 116 (!) 40 98 % --   11/19/24 0845 135/66 97.8  F (36.6  C) Oral 105 30 94 % --   11/19/24 0827 -- -- -- 60 19 100 % --   11/19/24 0826 -- -- -- 63 22 (!) 85 % --   11/19/24  0815 114/56 -- -- 61 15 94 % --   11/19/24 0745 131/62 -- -- 70 19 93 % --   11/19/24 0731 119/57 97.8  F (36.6  C) Oral 70 (!) 31 96 % --   11/19/24 0704 132/61 -- -- 64 27 98 % --   11/19/24 0638 134/63 98.1  F (36.7  C) Oral 70 20 93 % 77.6 kg (171 lb)       Wt Readings from Last 3 Encounters:   11/19/24 77.6 kg (171 lb)   07/09/24 77.8 kg (171 lb 9.6 oz)   02/15/24 77.1 kg (170 lb)       Estimated Creatinine Clearance: 60.4 mL/min (based on SCr of 0.91 mg/dL).    PHYSICAL EXAM    Constitutional:  Well developed, Well nourished, NAD  HENT:  Normocephalic, Atraumatic, Bilateral external ears normal, Nose normal. Neck- Supple, No stridor.   Eyes:  PERRL, EOMI, Conjunctiva normal, No discharge.  Respiratory:  Normal breath sounds, No respiratory distress, No wheezing, Speaks full sentences easily. No cough.   Cardiovascular:  Normal heart rate, Regular rhythm, No murmurs, No rubs, No gallops. Chest wall nontender.   GI:  No excessive obesity.  Bowel sounds normal, Soft, +mild lower abd tenderness, No masses, No flank tenderness. No rebound or guarding.   : deferred  Musculoskeletal:  No cyanosis, No clubbing. Good range of motion in all major joints. No major deformities noted.   Integument:  Warm, Dry, No erythema, No rash.  No petechiae.   Neurologic:  Alert & oriented x 3  Psychiatric:  Affect normal, Cooperative         LAB:  All pertinent labs reviewed and interpreted.  Recent Results (from the past 24 hours)   Basic metabolic panel    Collection Time: 11/19/24  6:48 AM   Result Value Ref Range    Sodium 137 135 - 145 mmol/L    Potassium 3.9 3.4 - 5.3 mmol/L    Chloride 102 98 - 107 mmol/L    Carbon Dioxide (CO2) 23 22 - 29 mmol/L    Anion Gap 12 7 - 15 mmol/L    Urea Nitrogen 11.9 8.0 - 23.0 mg/dL    Creatinine 0.91 0.67 - 1.17 mg/dL    GFR Estimate 84 >60 mL/min/1.73m2    Calcium 8.2 (L) 8.8 - 10.4 mg/dL    Glucose 161 (H) 70 - 99 mg/dL   Troponin T, High Sensitivity    Collection Time: 11/19/24  6:48 AM    Result Value Ref Range    Troponin T, High Sensitivity 18 <=22 ng/L   Nt probnp inpatient    Collection Time: 11/19/24  6:48 AM   Result Value Ref Range    N terminal Pro BNP Inpatient 1,329 0 - 1,800 pg/mL   Adult Type and Screen    Collection Time: 11/19/24  6:48 AM   Result Value Ref Range    ABO/RH(D) A POS     Antibody Screen Negative Negative    SPECIMEN EXPIRATION DATE [id number]    CBC with platelets and differential    Collection Time: 11/19/24  6:48 AM   Result Value Ref Range    WBC Count 6.9 4.0 - 11.0 10e3/uL    RBC Count 2.42 (L) 4.40 - 5.90 10e6/uL    Hemoglobin 7.6 (L) 13.3 - 17.7 g/dL    Hematocrit 24.4 (L) 40.0 - 53.0 %     (H) 78 - 100 fL    MCH 31.4 26.5 - 33.0 pg    MCHC 31.1 (L) 31.5 - 36.5 g/dL    RDW 14.7 10.0 - 15.0 %    Platelet Count 232 150 - 450 10e3/uL    % Neutrophils 78 %    % Lymphocytes 9 %    % Monocytes 9 %    % Eosinophils 3 %    % Basophils 0 %    % Immature Granulocytes 1 %    NRBCs per 100 WBC 0 <1 /100    Absolute Neutrophils 5.4 1.6 - 8.3 10e3/uL    Absolute Lymphocytes 0.6 (L) 0.8 - 5.3 10e3/uL    Absolute Monocytes 0.6 0.0 - 1.3 10e3/uL    Absolute Eosinophils 0.2 0.0 - 0.7 10e3/uL    Absolute Basophils 0.0 0.0 - 0.2 10e3/uL    Absolute Immature Granulocytes 0.1 <=0.4 10e3/uL    Absolute NRBCs 0.0 10e3/uL   Extra Blue Top Tube    Collection Time: 11/19/24  6:49 AM   Result Value Ref Range    Hold Specimen JIC    Prepare red blood cells (unit)    Collection Time: 11/19/24  7:17 AM   Result Value Ref Range    Blood Component Type Red Blood Cells     Product Code G8435E24     Unit Status Transfused     Unit Number [id number]     CROSSMATCH Compatible     CODING SYSTEM UTUN041     ISSUE DATE AND TIME [id number]     UNIT ABO/RH A+     UNIT TYPE ISBT 6200        Lab Results   Component Value Date    ABORH A POS 11/19/2024           RADIOLOGY:  Reviewed all pertinent imaging. Please see official radiology report.    CTA Abdomen Pelvis with Contrast   Final  Result   IMPRESSION:   1.  Prominent internal hemorrhoids with perirectal stranding, suggesting proctitis, however no findings of active bleeding.   2.  Colonic diverticulosis without evidence of diverticulitis.   3.  Additional chronic noncritical details in the findings.            EKG:    Indication: Chest pain     Performed at: 06:42a  Impression: Atrial fibrillation at 64 bpm.  ST depressions in lead II, 3, V2-V5.  Flipped T waves in aVR and V1.  Right bundle branch block.   ms, QTc 478 ms.  These represent changes compared to January 22, 2020.      I have independently reviewed and interpreted the EKG(s) documented above.        PROCEDURES:  PRBC transfusion in the ER    Medical Decision Making  Obtained supplemental history:Supplemental history obtained?: No  Reviewed external records: External records reviewed?: Documented in chart and Outpatient Record: see HPI  Care impacted by chronic illness:Anticoagulated State, Heart Disease, and Hypertension  Did you consider but not order tests?: Work up considered but not performed and documented in chart, if applicable  Did you interpret images independently?: Independent interpretation of ECG and images noted in documentation, when applicable.  Consultation discussion with other provider:Did you involve another provider (consultant, , pharmacy, etc.)?: I discussed the care with another health care provider, see documentation for details.  Admit.    MIPS: CT Pulmonary Angiogram:CT PA was ordered for reason(s) other than PE.      Isidra Chang M.D. [facility]  Emergency Medicine and Medical Toxicology  [facility] EMERGENCY DEPARTMENT  [address] [place] [id number]  [phone]  Dept: [phone]           Isidra Chang MD  11/19/24 0949

## 2024-11-19 NOTE — CONSULTS
[facility] DIGESTIVE HEALTH CONSULTATION      NAME: Jabari Haddad    MEDICAL RECORD #: [id number]    DATE / TIME: 11/19/2024/9:23 AM    PRIMARY CARE PROVIDER: Ludwin Macdonald    REQUESTING PROVIDER: Isidra Chang,*          REASON FOR THE CONSULT:  rectal bleeding/anemia     HPI:     This is an 83-year-old male with a history of prostate cancer status post radiation, GERD, diabetes, hypertension, hyperlipidemia, A-fib on Eliquis who is here with rectal bleeding.    He reports approximately 3 weeks of bright red blood per rectum as well as some intermittent black stools as well (with some brown stool mixed in).  He also has had some chest heaviness and shortness of breath.  He reportedly received a blood transfusion through [place] [facility].  Denies any rectal pain or abdominal pain.  He does report that he has struggled with hemorrhoids for a couple of years now.  He reports that his prostate radiation was done earlier this year but at least a few months ago, he cannot remember exactly when.    Initial hemoglobin here is 7.6.  The last hemoglobin I am able to see is from February 2024 and that was normal at 13.5.  BUN was not elevated.  Platelets were normal at 232.  He was ordered 1 unit of packed red blood cells.  CT angiogram showed suspected proctitis as well as prominent internal hemorrhoids but no active bleeding.  He also had diverticulosis. Initial troponin negative. GFR 84.     He does report that he takes Advil rather regularly but takes omeprazole on a daily basis as well.  His last dose of Eliquis was 6 AM this morning and he usually takes it twice daily and he thinks he might have taken both his morning and evening pill at 6 AM this morning.    He had a colonoscopy done in 2015 where he had a 3 mm tubular adenoma.  He had diverticulosis as well as a prominent ileocecal valve with biopsy showing nonspecific ileitis with no chronic ileitis.  He had an EGD that was done in 2011 that was  normal.    REVIEW OF SYSTEMS: 13 point review of systems is negative except that noted above.    PAST MEDICAL HISTORY: History reviewed. No pertinent past medical history.    PAST SURGICAL HISTORY: History reviewed. No pertinent surgical history.    FAMILY HISTORY:   Family History   Problem Relation Age of Onset    Arthritis Other     Rheumatoid Arthritis Other     Lupus Other     Osteoporosis Other     Cerebrovascular Disease Other     Hypertension Other        SOCIAL HISTORY:   Social History     Tobacco Use    Smoking status: Former     Current packs/day: 0.00     Types: Cigarettes     Quit date: 1967     Years since quittin.9    Smokeless tobacco: Never   Substance Use Topics    Alcohol use: Yes     Alcohol/week: 8.3 - 10.0 standard drinks of alcohol        MEDS:  (Not in a hospital admission)      ALLERGIES/SENSITIVITIES: Patient has no known allergies.    MEDICATIONS:  Current Outpatient Medications   Medication Sig Dispense Refill    acetaminophen (TYLENOL) 500 MG tablet Take 1,000 mg by mouth every 8 hours as needed for mild pain.      amLODIPine (NORVASC) 10 MG tablet Take 1 tablet by mouth once daily 90 tablet 2    ascorbic acid (VITAMIN C) 250 MG CHEW chewable tablet Take 250 mg by mouth daily.      atenolol (TENORMIN) 50 MG tablet Take 1 tablet (50 mg) by mouth daily 90 tablet 2    chlorpheniramine (CHLOR-TRIMETON) 4 MG tablet Take 4 mg by mouth every 6 hours as needed for allergies or rhinitis.      ELIQUIS ANTICOAGULANT 5 MG tablet Take 1 tablet by mouth twice daily 60 tablet 0    escitalopram (LEXAPRO) 20 MG tablet Take 20 mg by mouth every evening.      ezetimibe (ZETIA) 10 MG tablet Take 1 tablet (10 mg) by mouth daily 90 tablet 2    furosemide (LASIX) 20 MG tablet Take 1 tablet by mouth once daily 90 tablet 2    glimepiride (AMARYL) 2 MG tablet TAKE 1 TABLET BY MOUTH IN THE MORNING BEFORE BREAKFAST 90 tablet 0    lisinopril (ZESTRIL) 40 MG tablet Take 1 tablet by mouth once daily 90  tablet 2    omeprazole (PRILOSEC) 40 MG DR capsule Take 40 mg by mouth daily.      potassium gluconate 2.5 MEQ tablet Take 2.5 mEq by mouth daily.      pramox-pe-glycerin-petrolatum (PREPARATION H) 1-0.25-14.4-15 % CREA cream Place rectally 4 times daily as needed for hemorrhoids.      probenecid (BENEMID) 500 MG tablet Take 1 tablet (500 mg) by mouth 2 times daily. 60 tablet 0    simvastatin (ZOCOR) 40 MG tablet Take 1 tablet by mouth once daily 90 tablet 0    Vitamin D3 (CHOLECALCIFEROL) 25 mcg (1000 units) tablet Take 1 tablet by mouth daily.      vitamin E 400 units TABS Take 400 Units by mouth daily.      [START ON 11/28/2024] leuprolide, 3 Month, (ELIGARD) 22.5 MG Inject 22.5 mg subcutaneously every 3 months.         PHYSICAL EXAM:  Temp:  [97.8  F (36.6  C)-98.1  F (36.7  C)] 98.1  F (36.7  C)  Pulse:  [] 78  Resp:  [15-40] 26  BP: (114-135)/(56-68) 119/68  SpO2:  [85 %-100 %] 98 %  Body mass index is 28.32 kg/m .  GEN: Well developed, well nourished 83 year old in no acute distress.  HEENT: sclera anicteric, moist mucous membranes.   LYMPH: No cervical lymphadenopathy  PULM: Nonlabored breathing. Breath sounds equal.   CARDIO: Regular rate  GI: Non-distended. Soft.  Non-tender to palpation.  No guarding. No rebound tenderness.  EXT: warm, no lower extremity edema  NEURO: Alert. No focal defects.   PSYCH: Mental status appropriate, mood and affect normal.    SKIN: No rashes  MSK: No joint abnormalities    LABORATORY DATA:  CBC RESULTS:   Recent Labs   Lab Test 11/19/24  0648 02/15/24  1129 08/31/23  1056   WBC 6.9 6.2 7.7   RBC 2.42* 4.04* 3.76*   HGB 7.6* 13.5 12.2*   HCT 24.4* 39.8* 37.4*   * 99 100   MCH 31.4 33.4* 32.4   MCHC 31.1* 33.9 32.6   RDW 14.7 12.2 14.1    227 249        CMP Results:   Recent Labs   Lab Test 11/19/24  0648 02/15/24  1129 08/31/23  1056 08/03/23  0810 01/12/23  1301    136 136   < > 130*   POTASSIUM 3.9 4.4 5.4*   < > 4.4   CHLORIDE 102 98 99   < > 95*  "  CO2 23 27 26   < > 22   ANIONGAP 12 11 11   < > 13   * 142* 139*   < > 188*   BUN 11.9 11.9 9.3   < > 13.0   CR 0.91 0.81 0.79   < > 0.92   BILITOTAL  --  0.3 0.4  --  0.5   ALKPHOS  --  90 204*  --  81   ALT  --  15 20  --  45   AST  --  26 35  --  45    < > = values in this interval not displayed.        INR Results: No results for input(s): \"INR\" in the last 99890 hours.       IMAGING:    CTA 11/19  IMPRESSION:  1.  Prominent internal hemorrhoids with perirectal stranding, suggesting proctitis, however no findings of active bleeding.  2.  Colonic diverticulosis without evidence of diverticulitis.  3.  Additional chronic noncritical details in the findings.    IMPRESSION:   This is an 83-year-old male with a history of prostate cancer status post radiation earlier this year GERD, diabetes, hypertension, hyperlipidemia, A-fib on Eliquis who is here with 3 weeks of  BRBPR and some intermittent dark stool as well.      GIB-3 weeks of painless bright red blood per rectum and some intermittent dark stool as well.  Initial hemoglobin 7.6 and getting 1 unit of PRBCs.  Differential at this time includes bleeding due to radiation proctitis given imaging findings suggestive of proctitis on CT, hemorrhoidal bleed given that he had prominent internal hemorrhoids seen on CT as well, other proctitis (ie ulcerative proctitis, infectious), diverticular, AVM, etc.  Given that his last colonoscopy was almost 10 years ago, would also need to rule out colonic neoplasm/malignancy.  Unclear if the intermittent dark stools represent melena.  BUN is not elevated.  He does take NSAIDs but also takes concomitant PPI therapy.  No associated abdominal pain. CTA negative for active bleed.      Chest heaviness and shortness of breath -may be secondary to demand ischemia from anemia but will need to be ruled out for ACS.    RECOMMENDATIONS:   - cardiac eval  - supportive care  - BID PPI  - serial CBC, active T&S, transfusion as " needed  - no NSAIDs  - will plan to do EGD and colonoscopy for further evaluation, but will need to be off Eliquis for 2 days prior to any APC treatment for radiation proctitis, thus the earliest we could do colonoscopy with APC would be Thursday morning, so will plan for both procedures then unless he clinically decompensates  - continue to hold Eliquis  - GI will follow    Total time spent today in the management of this patient (which may include  chart review, review of imaging, discussion of case with additional specialists and/or family members, face to face consultation/exam with patient, documentation, and coordination of care): 60 minutes                 Roma Richmond MD  Thank you for the opportunity to participate in the care of this patient.   Please feel free to call me with any questions or concerns.  Phone number [phone].            CC: [facility], Ludwin Macdonald

## 2024-11-20 LAB
ALBUMIN SERPL BCG-MCNC: 3.4 G/DL (ref 3.5–5.2)
ALP SERPL-CCNC: 78 U/L (ref 40–150)
ALT SERPL W P-5'-P-CCNC: 14 U/L (ref 0–70)
ANION GAP SERPL CALCULATED.3IONS-SCNC: 8 MMOL/L (ref 7–15)
AST SERPL W P-5'-P-CCNC: 20 U/L (ref 0–45)
BILIRUB SERPL-MCNC: 0.5 MG/DL
BUN SERPL-MCNC: 10.5 MG/DL (ref 8–23)
CALCIUM SERPL-MCNC: 8.3 MG/DL (ref 8.8–10.4)
CHLORIDE SERPL-SCNC: 105 MMOL/L (ref 98–107)
CREAT SERPL-MCNC: 0.92 MG/DL (ref 0.67–1.17)
EGFRCR SERPLBLD CKD-EPI 2021: 83 ML/MIN/1.73M2
ERYTHROCYTE [DISTWIDTH] IN BLOOD BY AUTOMATED COUNT: 15.5 % (ref 10–15)
ERYTHROCYTE [DISTWIDTH] IN BLOOD BY AUTOMATED COUNT: 15.6 % (ref 10–15)
GLUCOSE BLDC GLUCOMTR-MCNC: 118 MG/DL (ref 70–99)
GLUCOSE BLDC GLUCOMTR-MCNC: 123 MG/DL (ref 70–99)
GLUCOSE BLDC GLUCOMTR-MCNC: 131 MG/DL (ref 70–99)
GLUCOSE BLDC GLUCOMTR-MCNC: 142 MG/DL (ref 70–99)
GLUCOSE SERPL-MCNC: 105 MG/DL (ref 70–99)
HCO3 SERPL-SCNC: 25 MMOL/L (ref 22–29)
HCT VFR BLD AUTO: 25.3 % (ref 40–53)
HCT VFR BLD AUTO: 28.5 % (ref 40–53)
HGB BLD-MCNC: 7.8 G/DL (ref 13.3–17.7)
HGB BLD-MCNC: 8.5 G/DL (ref 13.3–17.7)
HGB BLD-MCNC: 8.8 G/DL (ref 13.3–17.7)
MCH RBC QN AUTO: 31.1 PG (ref 26.5–33)
MCH RBC QN AUTO: 31.2 PG (ref 26.5–33)
MCHC RBC AUTO-ENTMCNC: 30.8 G/DL (ref 31.5–36.5)
MCHC RBC AUTO-ENTMCNC: 30.9 G/DL (ref 31.5–36.5)
MCV RBC AUTO: 101 FL (ref 78–100)
MCV RBC AUTO: 101 FL (ref 78–100)
PLATELET # BLD AUTO: 182 10E3/UL (ref 150–450)
PLATELET # BLD AUTO: 237 10E3/UL (ref 150–450)
POTASSIUM SERPL-SCNC: 4.1 MMOL/L (ref 3.4–5.3)
PROT SERPL-MCNC: 5.9 G/DL (ref 6.4–8.3)
RBC # BLD AUTO: 2.51 10E6/UL (ref 4.4–5.9)
RBC # BLD AUTO: 2.82 10E6/UL (ref 4.4–5.9)
SODIUM SERPL-SCNC: 138 MMOL/L (ref 135–145)
WBC # BLD AUTO: 6.1 10E3/UL (ref 4–11)
WBC # BLD AUTO: 8.2 10E3/UL (ref 4–11)

## 2024-11-20 PROCEDURE — 250N000013 HC RX MED GY IP 250 OP 250 PS 637: Performed by: HOSPITALIST

## 2024-11-20 PROCEDURE — 85048 AUTOMATED LEUKOCYTE COUNT: CPT | Performed by: HOSPITALIST

## 2024-11-20 PROCEDURE — 36415 COLL VENOUS BLD VENIPUNCTURE: CPT | Performed by: HOSPITALIST

## 2024-11-20 PROCEDURE — 80053 COMPREHEN METABOLIC PANEL: CPT | Performed by: HOSPITALIST

## 2024-11-20 PROCEDURE — 120N000004 HC R&B MS OVERFLOW

## 2024-11-20 PROCEDURE — 36415 COLL VENOUS BLD VENIPUNCTURE: CPT | Performed by: INTERNAL MEDICINE

## 2024-11-20 PROCEDURE — 85018 HEMOGLOBIN: CPT | Performed by: HOSPITALIST

## 2024-11-20 PROCEDURE — 250N000013 HC RX MED GY IP 250 OP 250 PS 637: Performed by: INTERNAL MEDICINE

## 2024-11-20 PROCEDURE — 99233 SBSQ HOSP IP/OBS HIGH 50: CPT | Performed by: INTERNAL MEDICINE

## 2024-11-20 PROCEDURE — 85018 HEMOGLOBIN: CPT | Performed by: INTERNAL MEDICINE

## 2024-11-20 PROCEDURE — 250N000009 HC RX 250: Performed by: HOSPITALIST

## 2024-11-20 PROCEDURE — 84075 ASSAY ALKALINE PHOSPHATASE: CPT | Performed by: HOSPITALIST

## 2024-11-20 PROCEDURE — 80051 ELECTROLYTE PANEL: CPT | Performed by: HOSPITALIST

## 2024-11-20 RX ADMIN — PROBENECID 500 MG: 500 TABLET, FILM COATED ORAL at 07:43

## 2024-11-20 RX ADMIN — POLYETHYLENE GLYCOL 3350, SODIUM SULFATE ANHYDROUS, SODIUM BICARBONATE, SODIUM CHLORIDE, POTASSIUM CHLORIDE 4000 ML: 236; 22.74; 6.74; 5.86; 2.97 POWDER, FOR SOLUTION ORAL at 18:06

## 2024-11-20 RX ADMIN — ESCITALOPRAM OXALATE 20 MG: 10 TABLET ORAL at 20:13

## 2024-11-20 RX ADMIN — PANTOPRAZOLE SODIUM 40 MG: 40 INJECTION, POWDER, FOR SOLUTION INTRAVENOUS at 07:44

## 2024-11-20 RX ADMIN — SENNOSIDES AND DOCUSATE SODIUM 1 TABLET: 8.6; 5 TABLET ORAL at 07:44

## 2024-11-20 RX ADMIN — PROBENECID 500 MG: 500 TABLET, FILM COATED ORAL at 20:13

## 2024-11-20 RX ADMIN — Medication 250 MG: at 07:43

## 2024-11-20 RX ADMIN — POTASSIUM CHLORIDE 10 MEQ: 750 TABLET, EXTENDED RELEASE ORAL at 07:43

## 2024-11-20 RX ADMIN — FUROSEMIDE 20 MG: 20 TABLET ORAL at 07:43

## 2024-11-20 RX ADMIN — ATENOLOL 50 MG: 25 TABLET ORAL at 07:43

## 2024-11-20 RX ADMIN — LISINOPRIL 40 MG: 20 TABLET ORAL at 07:43

## 2024-11-20 RX ADMIN — AMLODIPINE BESYLATE 10 MG: 5 TABLET ORAL at 07:43

## 2024-11-20 RX ADMIN — EZETIMIBE 10 MG: 10 TABLET ORAL at 11:04

## 2024-11-20 RX ADMIN — ACETAMINOPHEN 650 MG: 325 TABLET ORAL at 11:04

## 2024-11-20 RX ADMIN — PANTOPRAZOLE SODIUM 40 MG: 40 INJECTION, POWDER, FOR SOLUTION INTRAVENOUS at 20:14

## 2024-11-20 RX ADMIN — SIMVASTATIN 40 MG: 10 TABLET, FILM COATED ORAL at 20:13

## 2024-11-20 NOTE — PLAN OF CARE
Problem: Anemia  Goal: Anemia Symptom Improvement  Outcome: Progressing     Problem: Gastrointestinal Bleeding  Goal: Optimal Coping with Acute Illness  Outcome: Progressing  Goal: Hemostasis  Outcome: Progressing          A&Ox4, calm and pleasant. Tele, Afib, Hr 50's-60's. RA. VSS. Denies pain, dizziness, or headache. Denies chest pain. Uses bedside urinal. Indep in room during day. Plan to have EGD on 11/21. 2 BM's overnight, stool was dark brown, bright red blood was present on toilet paper.

## 2024-11-20 NOTE — PLAN OF CARE
Problem: Anemia  Goal: Anemia Symptom Improvement  Outcome: Progressing     Problem: Gastrointestinal Bleeding  Goal: Optimal Coping with Acute Illness  Outcome: Progressing  Goal: Hemostasis  Outcome: Progressing          A&Ox4, calm and pleasant. Tele, Afib, Hr 50's-60's. RA. VSS. Denies pain, dizziness, or headache. SOB w/movement. Denies chest pain. Uses bedside urinal. Indep in room during day. Plan to have EGD on 11/21. 2 BM's overnight, stool was dark brown, bright red blood was present on toilet paper.

## 2024-11-20 NOTE — PLAN OF CARE
Problem: Adult Inpatient Plan of Care  Goal: Plan of Care Review  Description: The Plan of Care Review/Shift note should be completed every shift.  The Outcome Evaluation is a brief statement about your assessment that the patient is improving, declining, or no change.  This information will be displayed automatically on your shift  note.  Outcome: Progressing   Goal Outcome Evaluation:         Admitted from ER. Ambulated into room independent, skin intact 2 RN check done with Charli Baig, heart rhythm A-fib

## 2024-11-20 NOTE — PROGRESS NOTES
[facility]    Medicine Progress Note - Hospitalist Service    Date of Admission:  11/19/2024    Assessment & Plan      Jabari Haddad is a 83 year old male admitted on 11/19/2024 for acute blood loss anemia secondary to probable GI source.     #Acute blood loss anemia  #Melena and BRBPR x 3 weeks   DDX hemorroids vs radiation proctatis vs diverticular bleed  -Symptomatic with dyspnea and chest pain  -Received 1 unit PRBC from [place] [facility] a week ago.    -No workup for anemia per patient being done at [facility]  -CTA A/P with internal hemorrhoid with perirectal stranding suggesting proctitis, no active bleeding, colonic diverticulosis.   -IV PPI BID  -Hgb 7.6 on admission from 13 in Jan.  S/p 1 unit PRBC  -CBC Q8H  -GI consulted. Plan for EGD and Colonoscopy tomorrow when off Eliquis for 48 hours  -Continue to hold anticoagulation     #Chest pain-resolved  -Trop 18---16.   -EKG with Afib, RBBB T wave inversion in V1-V5.    -BNP 1329  -Echo with reduced RV systolic function, moderate RV enlargement, severe biatrial enlargement with moderate pulmonary hypertension.   -MET of approximately 4. Patient is medically optimized for colonoscopy/EGD procedure.     #Atrial Fibrillation   -Continue atenolol for rate control   -Eliquis on hold     #HLD  -Continue Simvastatin and Zetia     #DM  -PTA Glimepiride on hold. Will place on sliding scale insulin       #HTN  -Continue Amlodipine and Lisinopril     #Prostate Cancer s/p radiation therapy and hormone therapy.   -Currently following with [place] [facility].      #Depression   -Continue Lexapro             Diet: Clear Liquid Diet  NPO per Anesthesia Guidelines for Procedure/Surgery Except for: Meds    DVT Prophylaxis: Pneumatic Compression Devices  Laguerre Catheter: Not present  Lines: None     Cardiac Monitoring: ACTIVE order. Indication: Chest pain/ ACS rule out (24 hours)  Code Status: Full Code      Clinically Significant Risk Factors           #  Hypocalcemia: Lowest Ca = 8.2 mg/dL in last 2 days, will monitor and replace as appropriate     # Hypoalbuminemia: Lowest albumin = 3.4 g/dL at 11/20/2024  5:09 AM, will monitor as appropriate     # Hypertension: Noted on problem list                       Social Drivers of Health    Tobacco Use: Medium Risk (11/19/2024)    Patient History     Smoking Tobacco Use: Former     Smokeless Tobacco Use: Never   Physical Activity: Unknown (2/15/2024)    Exercise Vital Sign     Days of Exercise per Week: 0 days   Social Connections: Unknown (2/15/2024)    Social Connection and Isolation Panel [NHANES]     Frequency of Social Gatherings with Friends and Family: Once a week          Disposition Plan     Medically Ready for Discharge: Anticipated in 2-4 Days             Lyric Robert MD  Hospitalist Service  [facility]  Securely message with [employer] (more info)  Text page via [employer] Paging/Directory   ______________________________________________________________________    Interval History   Patient reports significant dyspnea on exertion.  No chest pain.  Had several bowel movements without bright red blood and no black stool.  No abdominal pain today.  Prior to admission was having some intermittent pain on the right side of the abdomen    Physical Exam   Vital Signs: Temp: 98.4  F (36.9  C) Temp src: Oral BP: (!) 148/68 Pulse: 54   Resp: 18 SpO2: 97 % O2 Device: None (Room air)    Weight: 171 lbs 4.8 oz    General Appearance: No acute distress  Respiratory: Respirations unlabored at rest, lungs are clear bilaterally  Cardiovascular: Irregularly irregular rate and rhythm.  Normal S1-S2  GI: Abdomen soft and nontender  Skin: Pale  Other: Alert, nonfocal    Medical Decision Making       50 MINUTES SPENT BY ME on the date of service doing chart review, history, exam, documentation & further activities per the note.  MANAGEMENT DISCUSSED with the following over the past 24 hours: Patient and nursing  staff       Data     I have personally reviewed the following data over the past 24 hrs:    8.2  \   8.8 (L)   / 237     138 105 10.5 /  123 (H)   4.1 25 0.92 \     ALT: 14 AST: 20 AP: 78 TBILI: 0.5   ALB: 3.4 (L) TOT PROTEIN: 5.9 (L) LIPASE: N/A       Imaging results reviewed over the past 24 hrs:   No results found for this or any previous visit (from the past 24 hours).

## 2024-11-20 NOTE — PROGRESS NOTES
[facility] DIGESTIVE HEALTH PROGRESS NOTE      Subjective:                -Hemoglobin bumped from 7.6-8.7 and repeat check was 7.8   -Had 1 small episode of bright red blood with bowel movement last night mostly on the toilet tissue, but has had multiple other bowel movements without blood or black stool in them.  He reports this is much improved and what he has been having leading up to this over the past few weeks.  Denies any abdominal pain.  - troponin neg, cleared for procedures from cardiac standpoing    Objective:                  Vital signs in last 24 hrs;  Temp:  [97.6  F (36.4  C)-98.5  F (36.9  C)] 97.9  F (36.6  C)  Pulse:  [51-84] 67  Resp:  [12-24] 18  BP: (112-143)/(58-71) 112/64  SpO2:  [93 %-100 %] 97 %    Physical Exam:   General: Comfortable appearing. Awake.   Cardiovascular: Regular rate. No edema  Chest: Non-labored breathing. Symmetric chest rise.   Abdomen: soft, non-tender, non-distended  Neurologic: Alert, no focal defects.     Current Labs:  CBC RESULTS:   Recent Labs   Lab Test 11/20/24  0509 11/19/24  2202 11/19/24  1341   WBC 6.1 10.1 7.1   RBC 2.51* 2.78* 2.53*   HGB 7.8* 8.7* 8.2*   HCT 25.3* 28.1* 25.2*   * 101* 100   MCH 31.1 31.3 32.4   MCHC 30.8* 31.0* 32.5   RDW 15.5* 15.8* 15.3*    240 206        CMP Results:   Recent Labs   Lab Test 11/20/24  0714 11/20/24  0509 11/19/24  2153 11/19/24  1125 11/19/24  0648 02/15/24  1129 08/31/23  1056   NA  --  138  --   --  137 136 136   POTASSIUM  --  4.1  --   --  3.9 4.4 5.4*   CHLORIDE  --  105  --   --  102 98 99   CO2  --  25  --   --  23 27 26   ANIONGAP  --  8  --   --  12 11 11   * 105* 94   < > 161* 142* 139*   BUN  --  10.5  --   --  11.9 11.9 9.3   CR  --  0.92  --   --  0.91 0.81 0.79   BILITOTAL  --  0.5  --   --   --  0.3 0.4   ALKPHOS  --  78  --   --   --  90 204*   ALT  --  14  --   --   --  15 20   AST  --  20  --   --   --  26 35    < > = values in this interval not displayed.        INR Results: No results  "for input(s): \"INR\" in the last 59440 hours.       Imaging:  [unfilled]    Assessment:       This is an 83-year-old male with a history of prostate cancer status post radiation earlier this year GERD, diabetes, hypertension, hyperlipidemia, A-fib on Eliquis who is here with 3 weeks of  BRBPR and some intermittent dark stool as well.       GIB-3 weeks of painless bright red blood per rectum and some intermittent dark stool as well.  Initial hemoglobin 7.6 and getting 1 unit of PRBCs.  Differential at this time includes bleeding due to radiation proctitis given imaging findings suggestive of proctitis on CT, hemorrhoidal bleed given that he had prominent internal hemorrhoids seen on CT as well, other proctitis (ie ulcerative proctitis, infectious), diverticular, AVM, etc.  Given that his last colonoscopy was almost 10 years ago, would also need to rule out colonic neoplasm/malignancy.  Unclear if the intermittent dark stools represent melena.  BUN is not elevated.  He does take NSAIDs but also takes concomitant PPI therapy.  No associated abdominal pain. CTA negative for active bleed.       Chest heaviness and shortness of breath -may be secondary to demand ischemia from anemia but will need to be ruled out for ACS.        Plan:     - clear liquid diet today, Golytely this evening, npo after midnight  - EGD and colonoscopy with possible APC scheduled for tomorrow  - continue BID PPI  - serial CBC, active T&S, transfusion as needed  - no NSAIDs  - continue to hold Eliquis  - GI will follow    Total time spent today in the management of this patient (which may include  chart review, review of imaging, discussion of case with additional specialists and/or family members, face to face consultation/exam with patient, documentation, and coordination of care): 35 minutes          Roma Richmond MD  Thank you for the opportunity to participate in the care of this patient.   Please feel free to call me with any " questions or concerns.  Phone number [phone].          11/20/2024 9:53 AM

## 2024-11-20 NOTE — PLAN OF CARE
Problem: Anemia  Goal: Anemia Symptom Improvement  Outcome: Progressing     Problem: Gastrointestinal Bleeding  Goal: Hemostasis  Outcome: Progressing   Goal Outcome Evaluation:      Plan of Care Reviewed With: patient    Overall Patient Progress: improvingOverall Patient Progress: improving     Pt A/Ox4, on RA, independent in room, afib controlled HR. ACHS BG checks. Pt reports back pain, PRN tylenol given x1.     Pt currently on clear liquids currently, and is NPO at midnight for EGD.      Masha Dozier RN

## 2024-11-21 ENCOUNTER — ANESTHESIA EVENT (OUTPATIENT)
Dept: SURGERY | Facility: HOSPITAL | Age: 84
End: 2024-11-21
Payer: COMMERCIAL

## 2024-11-21 ENCOUNTER — ANESTHESIA (OUTPATIENT)
Dept: SURGERY | Facility: HOSPITAL | Age: 84
End: 2024-11-21
Payer: COMMERCIAL

## 2024-11-21 VITALS
RESPIRATION RATE: 16 BRPM | SYSTOLIC BLOOD PRESSURE: 132 MMHG | DIASTOLIC BLOOD PRESSURE: 66 MMHG | HEART RATE: 56 BPM | TEMPERATURE: 98.7 F | BODY MASS INDEX: 26.56 KG/M2 | WEIGHT: 169.2 LBS | OXYGEN SATURATION: 96 % | HEIGHT: 67 IN

## 2024-11-21 LAB
COLONOSCOPY: NORMAL
GLUCOSE BLDC GLUCOMTR-MCNC: 110 MG/DL (ref 70–99)
GLUCOSE BLDC GLUCOMTR-MCNC: 119 MG/DL (ref 70–99)
GLUCOSE BLDC GLUCOMTR-MCNC: 124 MG/DL (ref 70–99)
GLUCOSE BLDC GLUCOMTR-MCNC: 156 MG/DL (ref 70–99)
HGB BLD-MCNC: 8.1 G/DL (ref 13.3–17.7)
HGB BLD-MCNC: 8.7 G/DL (ref 13.3–17.7)
UPPER GI ENDOSCOPY: NORMAL

## 2024-11-21 PROCEDURE — 85018 HEMOGLOBIN: CPT | Performed by: INTERNAL MEDICINE

## 2024-11-21 PROCEDURE — 0W3P8ZZ CONTROL BLEEDING IN GASTROINTESTINAL TRACT, VIA NATURAL OR ARTIFICIAL OPENING ENDOSCOPIC: ICD-10-PCS | Performed by: INTERNAL MEDICINE

## 2024-11-21 PROCEDURE — 250N000013 HC RX MED GY IP 250 OP 250 PS 637: Performed by: INTERNAL MEDICINE

## 2024-11-21 PROCEDURE — 36415 COLL VENOUS BLD VENIPUNCTURE: CPT | Performed by: INTERNAL MEDICINE

## 2024-11-21 PROCEDURE — 0DB68ZX EXCISION OF STOMACH, VIA NATURAL OR ARTIFICIAL OPENING ENDOSCOPIC, DIAGNOSTIC: ICD-10-PCS | Performed by: INTERNAL MEDICINE

## 2024-11-21 PROCEDURE — 360N000075 HC SURGERY LEVEL 2, PER MIN: Performed by: INTERNAL MEDICINE

## 2024-11-21 PROCEDURE — 250N000009 HC RX 250: Performed by: HOSPITALIST

## 2024-11-21 PROCEDURE — 250N000013 HC RX MED GY IP 250 OP 250 PS 637: Performed by: HOSPITALIST

## 2024-11-21 PROCEDURE — 120N000004 HC R&B MS OVERFLOW

## 2024-11-21 PROCEDURE — 999N000141 HC STATISTIC PRE-PROCEDURE NURSING ASSESSMENT: Performed by: INTERNAL MEDICINE

## 2024-11-21 PROCEDURE — 99233 SBSQ HOSP IP/OBS HIGH 50: CPT | Performed by: INTERNAL MEDICINE

## 2024-11-21 PROCEDURE — 370N000017 HC ANESTHESIA TECHNICAL FEE, PER MIN: Performed by: INTERNAL MEDICINE

## 2024-11-21 PROCEDURE — 88305 TISSUE EXAM BY PATHOLOGIST: CPT | Mod: TC | Performed by: INTERNAL MEDICINE

## 2024-11-21 PROCEDURE — 272N000001 HC OR GENERAL SUPPLY STERILE: Performed by: INTERNAL MEDICINE

## 2024-11-21 PROCEDURE — 250N000011 HC RX IP 250 OP 636: Performed by: NURSE ANESTHETIST, CERTIFIED REGISTERED

## 2024-11-21 RX ORDER — SUCRALFATE ORAL 1 G/10ML
2 SUSPENSION ORAL 2 TIMES DAILY
Status: DISCONTINUED | OUTPATIENT
Start: 2024-11-21 | End: 2024-11-22 | Stop reason: HOSPADM

## 2024-11-21 RX ORDER — PROPOFOL 10 MG/ML
INJECTION, EMULSION INTRAVENOUS PRN
Status: DISCONTINUED | OUTPATIENT
Start: 2024-11-21 | End: 2024-11-21

## 2024-11-21 RX ORDER — PROPOFOL 10 MG/ML
INJECTION, EMULSION INTRAVENOUS CONTINUOUS PRN
Status: DISCONTINUED | OUTPATIENT
Start: 2024-11-21 | End: 2024-11-21

## 2024-11-21 RX ORDER — DOCUSATE SODIUM 100 MG/1
100 CAPSULE, LIQUID FILLED ORAL 2 TIMES DAILY
Status: DISCONTINUED | OUTPATIENT
Start: 2024-11-21 | End: 2024-11-22 | Stop reason: HOSPADM

## 2024-11-21 RX ORDER — NALOXONE HYDROCHLORIDE 1 MG/ML
0.1 INJECTION INTRAMUSCULAR; INTRAVENOUS; SUBCUTANEOUS
Status: DISCONTINUED | OUTPATIENT
Start: 2024-11-21 | End: 2024-11-21 | Stop reason: HOSPADM

## 2024-11-21 RX ORDER — SODIUM CHLORIDE, SODIUM LACTATE, POTASSIUM CHLORIDE, CALCIUM CHLORIDE 600; 310; 30; 20 MG/100ML; MG/100ML; MG/100ML; MG/100ML
INJECTION, SOLUTION INTRAVENOUS CONTINUOUS
Status: DISCONTINUED | OUTPATIENT
Start: 2024-11-21 | End: 2024-11-21 | Stop reason: HOSPADM

## 2024-11-21 RX ORDER — ACETAMINOPHEN 325 MG/1
975 TABLET ORAL ONCE
Status: DISCONTINUED | OUTPATIENT
Start: 2024-11-21 | End: 2024-11-21 | Stop reason: HOSPADM

## 2024-11-21 RX ORDER — LIDOCAINE 40 MG/G
CREAM TOPICAL
Status: DISCONTINUED | OUTPATIENT
Start: 2024-11-21 | End: 2024-11-21 | Stop reason: HOSPADM

## 2024-11-21 RX ORDER — PANTOPRAZOLE SODIUM 40 MG/1
40 TABLET, DELAYED RELEASE ORAL
Status: DISCONTINUED | OUTPATIENT
Start: 2024-11-22 | End: 2024-11-22 | Stop reason: HOSPADM

## 2024-11-21 RX ORDER — ONDANSETRON 2 MG/ML
4 INJECTION INTRAMUSCULAR; INTRAVENOUS EVERY 30 MIN PRN
Status: DISCONTINUED | OUTPATIENT
Start: 2024-11-21 | End: 2024-11-21 | Stop reason: HOSPADM

## 2024-11-21 RX ORDER — ONDANSETRON 4 MG/1
4 TABLET, ORALLY DISINTEGRATING ORAL EVERY 30 MIN PRN
Status: DISCONTINUED | OUTPATIENT
Start: 2024-11-21 | End: 2024-11-21 | Stop reason: HOSPADM

## 2024-11-21 RX ORDER — DEXAMETHASONE SODIUM PHOSPHATE 4 MG/ML
4 INJECTION, SOLUTION INTRA-ARTICULAR; INTRALESIONAL; INTRAMUSCULAR; INTRAVENOUS; SOFT TISSUE
Status: DISCONTINUED | OUTPATIENT
Start: 2024-11-21 | End: 2024-11-21 | Stop reason: HOSPADM

## 2024-11-21 RX ADMIN — SUCRALFATE 2 G: 1 SUSPENSION ORAL at 20:29

## 2024-11-21 RX ADMIN — PROPOFOL 30 MG: 10 INJECTION, EMULSION INTRAVENOUS at 09:15

## 2024-11-21 RX ADMIN — EZETIMIBE 10 MG: 10 TABLET ORAL at 11:00

## 2024-11-21 RX ADMIN — POTASSIUM CHLORIDE 10 MEQ: 750 TABLET, EXTENDED RELEASE ORAL at 11:06

## 2024-11-21 RX ADMIN — ATENOLOL 50 MG: 25 TABLET ORAL at 08:09

## 2024-11-21 RX ADMIN — AMLODIPINE BESYLATE 10 MG: 5 TABLET ORAL at 11:00

## 2024-11-21 RX ADMIN — SIMVASTATIN 40 MG: 10 TABLET, FILM COATED ORAL at 20:26

## 2024-11-21 RX ADMIN — SUCRALFATE 2 G: 1 SUSPENSION ORAL at 11:06

## 2024-11-21 RX ADMIN — FUROSEMIDE 20 MG: 20 TABLET ORAL at 11:00

## 2024-11-21 RX ADMIN — Medication 250 MG: at 11:00

## 2024-11-21 RX ADMIN — PROBENECID 500 MG: 500 TABLET, FILM COATED ORAL at 20:26

## 2024-11-21 RX ADMIN — PROPOFOL 150 MCG/KG/MIN: 10 INJECTION, EMULSION INTRAVENOUS at 09:14

## 2024-11-21 RX ADMIN — ESCITALOPRAM OXALATE 20 MG: 10 TABLET ORAL at 20:26

## 2024-11-21 RX ADMIN — PANTOPRAZOLE SODIUM 40 MG: 40 INJECTION, POWDER, FOR SOLUTION INTRAVENOUS at 10:59

## 2024-11-21 RX ADMIN — PROBENECID 500 MG: 500 TABLET, FILM COATED ORAL at 11:04

## 2024-11-21 RX ADMIN — LISINOPRIL 40 MG: 20 TABLET ORAL at 11:04

## 2024-11-21 RX ADMIN — DOCUSATE SODIUM 100 MG: 100 CAPSULE, LIQUID FILLED ORAL at 20:26

## 2024-11-21 ASSESSMENT — ACTIVITIES OF DAILY LIVING (ADL)
ADLS_ACUITY_SCORE: 0
DEPENDENT_IADLS:: INDEPENDENT
ADLS_ACUITY_SCORE: 0

## 2024-11-21 ASSESSMENT — ENCOUNTER SYMPTOMS: DYSRHYTHMIAS: 1

## 2024-11-21 NOTE — PLAN OF CARE
Problem: Adult Inpatient Plan of Care  Goal: Plan of Care Review  Description: The Plan of Care Review/Shift note should be completed every shift.  The Outcome Evaluation is a brief statement about your assessment that the patient is improving, declining, or no change.  This information will be displayed automatically on your shift  note.  Outcome: Progressing   Goal Outcome Evaluation:       Pt is alert and oriented x 4, denies any chest, back pain, or any discomfort.Pt completed the coloscopy prep/GED and now NPO for the procedure. Anticoagulation medication on hold.  Pt reported blood smear on toilet tissue after use and RN asked pt not to flush the next jim he noticed that. Non reported since 1022 pm. RA, independent in the room but looked a little weak from so many loose BM.Continues to be on Atrial Fibrillation with low HR in the 50's.

## 2024-11-21 NOTE — ANESTHESIA CARE TRANSFER NOTE
Patient: Jabari Haddad    Procedure: Procedure(s):  ESOPHAGOGASTRODUODENOSCOPY  COLONOSCOPY       Diagnosis: BRBPR (bright red blood per rectum) [K62.5]  Anemia, unspecified type [D64.9]  Diagnosis Additional Information: No value filed.    Anesthesia Type:   MAC     Note:    Oropharynx: oropharynx clear of all foreign objects and spontaneously breathing  Level of Consciousness: awake  Oxygen Supplementation: room air    Independent Airway: airway patency satisfactory and stable  Dentition: dentition unchanged  Vital Signs Stable: post-procedure vital signs reviewed and stable  Report to RN Given: handoff report given  Patient transferred to: PACU    Handoff Report: Identifed the Patient, Identified the Reponsible Provider, Reviewed the pertinent medical history, Discussed the surgical course, Reviewed Intra-OP anesthesia mangement and issues during anesthesia, Set expectations for post-procedure period and Allowed opportunity for questions and acknowledgement of understanding      Vitals:  Vitals Value Taken Time   BP     Temp     Pulse     Resp     SpO2         Electronically Signed By: VELASQUEZ Donahue CRNA  November 21, 2024  10:16 AM

## 2024-11-21 NOTE — PLAN OF CARE
Problem: Anemia  Goal: Anemia Symptom Improvement  Outcome: Progressing     Problem: Gastrointestinal Bleeding  Goal: Optimal Coping with Acute Illness  Outcome: Progressing   Goal Outcome Evaluation:      VS stable.  He denies any abdominal pain and nausea, no bloody stool noted. Hgb has been stable last Hgb  was 8.8. GED /colonoscopy prep started this evening.  Continue hold anticoagulation medication.clear Liquid diet and NPO after midnight.

## 2024-11-21 NOTE — ANESTHESIA PREPROCEDURE EVALUATION
Anesthesia Pre-Procedure Evaluation    Patient: Jabari Haddad   MRN: [id number] : 1940        Procedure : Procedure(s):  ESOPHAGOGASTRODUODENOSCOPY  COLONOSCOPY          Past Medical History:   Diagnosis Date    Anemia     Atrial fibrillation (H)     Depression     Diabetes mellitus (H)     History of blood transfusion     HLD (hyperlipidemia)     HTN (hypertension)     Prostate cancer (H)       Past Surgical History:   Procedure Laterality Date    BONY PELVIS SURGERY        No Known Allergies   Social History     Tobacco Use    Smoking status: Former     Current packs/day: 0.00     Types: Cigarettes     Quit date: 1967     Years since quittin.9    Smokeless tobacco: Never   Substance Use Topics    Alcohol use: Not Currently     Alcohol/week: 8.3 - 10.0 standard drinks of alcohol     Types: 8 - 10 Standard drinks or equivalent per week      Wt Readings from Last 1 Encounters:   24 76.7 kg (169 lb 3.2 oz)        Anesthesia Evaluation            ROS/MED HX  ENT/Pulmonary: Comment: No known pulmonary history but has been experiencing SOB for the last few months      Neurologic:  - neg neurologic ROS     Cardiovascular:     (+)  hypertension- -   -  - -                        dysrhythmias (on eliquis stopped 2 days ago, on atenolol), a-fib,             METS/Exercise Tolerance:     Hematologic:  - neg hematologic  ROS     Musculoskeletal:  - neg musculoskeletal ROS (+)     fracture (thoracic spine fracture),          GI/Hepatic:     (+) GERD, Asymptomatic on medication,                  Renal/Genitourinary: Comment: Prostate cancer, currently on treatment      Endo:     (+) type I DM,                     Psychiatric/Substance Use:     (+)   alcohol abuse (quit 2 years ago)      Infectious Disease:       Malignancy:   (+) Malignancy, History of Prostate.Prostate CA Active status post Radiation and Chemo.      Other:            Physical Exam    Airway        Mallampati: III   TM distance: > 3 FB  "  Neck ROM: full   Mouth opening: > 3 cm    Respiratory Devices and Support         Dental     Comment: Upper dentures        Cardiovascular             Pulmonary                   OUTSIDE LABS:  CBC:   Lab Results   Component Value Date    WBC 8.2 11/20/2024    WBC 6.1 11/20/2024    HGB 8.1 (L) 11/21/2024    HGB 8.5 (L) 11/20/2024    HCT 28.5 (L) 11/20/2024    HCT 25.3 (L) 11/20/2024     11/20/2024     11/20/2024     BMP:   Lab Results   Component Value Date     11/20/2024     11/19/2024    POTASSIUM 4.1 11/20/2024    POTASSIUM 3.9 11/19/2024    CHLORIDE 105 11/20/2024    CHLORIDE 102 11/19/2024    CO2 25 11/20/2024    CO2 23 11/19/2024    BUN 10.5 11/20/2024    BUN 11.9 11/19/2024    CR 0.92 11/20/2024    CR 0.91 11/19/2024     (H) 11/21/2024     (H) 11/20/2024     COAGS: No results found for: \"PTT\", \"INR\", \"FIBR\"  POC: No results found for: \"BGM\", \"HCG\", \"HCGS\"  HEPATIC:   Lab Results   Component Value Date    ALBUMIN 3.4 (L) 11/20/2024    PROTTOTAL 5.9 (L) 11/20/2024    ALT 14 11/20/2024    AST 20 11/20/2024    ALKPHOS 78 11/20/2024    BILITOTAL 0.5 11/20/2024     OTHER:   Lab Results   Component Value Date    A1C 5.9 (H) 02/15/2024    SHANTE 8.3 (L) 11/20/2024       Anesthesia Plan    ASA Status:  3    NPO Status:  NPO Appropriate    Anesthesia Type: MAC.   Induction: Intravenous.   Maintenance: TIVA.        Consents    Anesthesia Plan(s) and associated risks, benefits, and realistic alternatives discussed. Questions answered and patient/representative(s) expressed understanding.     - Discussed: Risks, Benefits and Alternatives for BOTH SEDATION and the PROCEDURE were discussed     - Discussed with:  Patient            Postoperative Care    Pain management: IV analgesics.        Comments:               Elias Bonilla MD    I have reviewed the pertinent notes and labs in the chart from the past 30 days and (re)examined the patient.  Any updates or changes from those notes are " "reflected in this note.       # Hypocalcemia: Lowest Ca = 8.3 mg/dL in last 2 days, will monitor and replace as appropriate     # Hypoalbuminemia: Lowest albumin = 3.4 g/dL at 11/20/2024  5:09 AM, will monitor as appropriate     # Hypertension: Noted on problem list            # Overweight: Estimated body mass index is 26.5 kg/m  as calculated from the following:    Height as of this encounter: 1.702 m (5' 7\").    Weight as of this encounter: 76.7 kg (169 lb 3.2 oz)., PRESENT ON ADMISSION           "

## 2024-11-21 NOTE — CONSULTS
Care Management Initial Consult    General Information  Assessment completed with: Patient,    Type of CM/SW Visit: Initial Assessment    Primary Care Provider verified and updated as needed:     Readmission within the last 30 days: no previous admission in last 30 days      Reason for Consult: discharge planning  Advance Care Planning:            Communication Assessment  Patient's communication style: spoken language (English or Bilingual)    Hearing Difficulty or Deaf: yes   Wear Glasses or Blind: yes    Cognitive  Cognitive/Neuro/Behavioral: WDL                      Living Environment:   People in home: alone     Current living Arrangements: house      Able to return to prior arrangements: yes       Family/Social Support:  Care provided by: self  Provides care for: no one  Marital Status:   Support system: Children          Description of Support System: Supportive, Involved    Support Assessment: Adequate family and caregiver support, Adequate social supports    Current Resources:   Patient receiving home care services: No        Community Resources: None  Equipment currently used at home: none  Supplies currently used at home: None    Employment/Financial:  Employment Status: retired        Financial Concerns: none   Referral to Financial Worker: No       Does the patient's insurance plan have a 3 day qualifying hospital stay waiver?  Yes     Which insurance plan 3 day waiver is available? Alternative insurance waiver    Will the waiver be used for post-acute placement? No    Lifestyle & Psychosocial Needs:  Social Drivers of Health     Food Insecurity: Low Risk  (11/19/2024)    Food Insecurity     Within the past 12 months, did you worry that your food would run out before you got money to buy more?: No     Within the past 12 months, did the food you bought just not last and you didn t have money to get more?: No   Depression: Not at risk (2/15/2024)    PHQ-2     PHQ-2 Score: 0   Housing Stability: Low  Risk  (11/19/2024)    Housing Stability     Do you have housing? : Yes     Are you worried about losing your housing?: No   Tobacco Use: Medium Risk (11/21/2024)    Patient History     Smoking Tobacco Use: Former     Smokeless Tobacco Use: Never     Passive Exposure: Not on file   Financial Resource Strain: Low Risk  (11/19/2024)    Financial Resource Strain     Within the past 12 months, have you or your family members you live with been unable to get utilities (heat, electricity) when it was really needed?: No   Alcohol Use: Not on file   Transportation Needs: Low Risk  (11/19/2024)    Transportation Needs     Within the past 12 months, has lack of transportation kept you from medical appointments, getting your medicines, non-medical meetings or appointments, work, or from getting things that you need?: No   Physical Activity: Unknown (2/15/2024)    Exercise Vital Sign     Days of Exercise per Week: 0 days     Minutes of Exercise per Session: Not on file   Interpersonal Safety: Low Risk  (11/19/2024)    Interpersonal Safety     Do you feel physically and emotionally safe where you currently live?: Yes     Within the past 12 months, have you been hit, slapped, kicked or otherwise physically hurt by someone?: No     Within the past 12 months, have you been humiliated or emotionally abused in other ways by your partner or ex-partner?: No   Stress: No Stress Concern Present (2/15/2024)    [employer] - Occupational Stress Questionnaire     Feeling of Stress : Not at all   Social Connections: Unknown (2/15/2024)    Social Connection and Isolation Panel [NHANES]     Frequency of Communication with Friends and Family: Not on file     Frequency of Social Gatherings with Friends and Family: Once a week     Attends [employer] Services: Not on file     Active Member of Clubs or Organizations: Not on file     Attends Club or Organization Meetings: Not on file     Marital Status: Not on file    Health Literacy: Not on file       Functional Status:  Prior to admission patient needed assistance:   Dependent ADLs:: Independent  Dependent IADLs:: Independent          Discussed  Partnership in Safe Discharge Planning  document with patient/family: No    Additional Information:  SW met with pt for initial assessment. Pt reports that he is retired, lives alone in a house and is independent with all I/ADLs. He reports that his three kids live somewhat close and can assist as needed. He denies any financial concerns or any need for community resources at this time.     Next Steps: No further care management intervention anticipated at this time.  Please re-consult if further needs arise.  Care management signing off.        SONIA NievesSW

## 2024-11-21 NOTE — ANESTHESIA POSTPROCEDURE EVALUATION
Patient: Jabari Haddad    Procedure: Procedure(s):  ESOPHAGOGASTRODUODENOSCOPY  COLONOSCOPY       Anesthesia Type:  MAC    Note:  Disposition: Inpatient   Postop Pain Control: Uneventful            Sign Out: Well controlled pain   PONV: No   Neuro/Psych: Uneventful            Sign Out: Acceptable/Baseline neuro status   Airway/Respiratory: Uneventful            Sign Out: Acceptable/Baseline resp. status   CV/Hemodynamics: Uneventful            Sign Out: Acceptable CV status; No obvious hypovolemia; No obvious fluid overload   Other NRE: NONE   DID A NON-ROUTINE EVENT OCCUR? No           Last vitals:  Vitals:    11/21/24 1100 11/21/24 1200 11/21/24 1505   BP: (!) 150/96 132/70 99/55   Pulse: 52 67 55   Resp:  18 18   Temp:  36.7  C (98.1  F) 36.7  C (98  F)   SpO2: 97%  98%       Electronically Signed By: Elias Bonilla MD  November 21, 2024  4:30 PM

## 2024-11-21 NOTE — PROGRESS NOTES
[facility]    Medicine Progress Note - Hospitalist Service    Date of Admission:  11/19/2024    Assessment & Plan   Jabari Haddad is a 83 year old male with PMH of A-fib on Eliquis, dyslipidemia, hypertension, DM2, prostate cancer s/p radiation, on hormone therapy who presented for evaluation of rectal bleeding.  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  He has acute blood loss anemia and received 1 unit PRBC.      GI bleed secondary to bleeding radiation proctopathy.  Treated with APC.  Also noted to have superficial large nonbleeding ulcerations in the distal rectum, diverticulosis in the sigmoid colon and nonbleeding small internal hemorrhoids.  Per GI recommendations, ordered sucralfate enemas twice daily.  Daily stool softeners to avoid constipation.  Continue to hold anticoagulation  Acute blood loss anemia.  Hemoglobin 7.6 on admission which is ~ 6 unit drop since 2/2024.  S/p 1 unit PRBC.  Remained stable.  Recheck in a.m.  Chronic A-fib.  Has been on atenolol for ventricular rate control.  [id number] is 3.  Hold Eliquis for now.  Consult cardiology for recommendations regarding anticoagulation, possibly considering Watchman device  DM2 without long-term insulin use.  Hold PTA glimepiride.  Sliding scale if needed  Dyslipidemia.  Continue simvastatin and Zetia  Essential hypertension.  Continue PTA amlodipine, lisinopril and atenolol with holding parameters.  BP has been soft  Prostate cancer s/p radiation and hormone therapy.  Follows with [place] [facility]  Depression.  Continue Lexapro            Diet: Regular Diet Adult    DVT Prophylaxis: Ambulate every shift  Laguerre Catheter: Not present  Lines: None     Cardiac Monitoring: None  Code Status: Full Code      Clinically Significant Risk Factors           # Hypocalcemia: Lowest Ca = 8.3 mg/dL in last 2 days, will monitor and replace as appropriate     # Hypoalbuminemia:  "Lowest albumin = 3.4 g/dL at 11/20/2024  5:09 AM, will monitor as appropriate     # Hypertension: Noted on problem list            # Overweight: Estimated body mass index is 26.5 kg/m  as calculated from the following:    Height as of this encounter: 1.702 m (5' 7\").    Weight as of this encounter: 76.7 kg (169 lb 3.2 oz)., PRESENT ON ADMISSION     # Financial/Environmental Concerns: none         Social Drivers of Health    Tobacco Use: Medium Risk (11/21/2024)    Patient History     Smoking Tobacco Use: Former     Smokeless Tobacco Use: Never   Physical Activity: Unknown (2/15/2024)    Exercise Vital Sign     Days of Exercise per Week: 0 days   Social Connections: Unknown (2/15/2024)    Social Connection and Isolation Panel [NHANES]     Frequency of Social Gatherings with Friends and Family: Once a week          Disposition Plan     Medically Ready for Discharge: Anticipated Tomorrow             Lyric Robert MD  Hospitalist Service  [facility]  Securely message with GetYourGuide (more info)  Text page via [email or web address] Paging/Directory   ______________________________________________________________________    Interval History   Underwent EGD and colonoscopy today.  Discussed results with the patient.  He denies having any abdominal pain, nausea.  No bloody stool noted.  No dizziness or lightheadedness.  Has been moving around fairly well    Physical Exam   Vital Signs: Temp: 98  F (36.7  C) Temp src: Oral BP: 99/55 Pulse: 55   Resp: 18 SpO2: 98 % O2 Device: None (Room air)    Weight: 169 lbs 3.2 oz    General Appearance: No acute distress  Respiratory: Lungs are clear  Cardiovascular: Irregular.  Normal S1-S2.  No edema  GI: Abdomen soft and nontender  Other: Alert, nonfocal    Medical Decision Making       50 MINUTES SPENT BY ME on the date of service doing chart review, history, exam, documentation & further activities per the note.  MANAGEMENT DISCUSSED with the following over the past 24 " hours: Patient, GI, nursing staff, pharmacy       Data     I have personally reviewed the following data over the past 24 hrs:    N/A  \   8.7 (L)   / N/A     N/A N/A N/A /  110 (H)   N/A N/A N/A \       Imaging results reviewed over the past 24 hrs:   No results found for this or any previous visit (from the past 24 hours).

## 2024-11-22 VITALS
SYSTOLIC BLOOD PRESSURE: 142 MMHG | TEMPERATURE: 97.8 F | HEART RATE: 75 BPM | WEIGHT: 169.2 LBS | BODY MASS INDEX: 26.56 KG/M2 | HEIGHT: 67 IN | OXYGEN SATURATION: 95 % | DIASTOLIC BLOOD PRESSURE: 73 MMHG | RESPIRATION RATE: 16 BRPM

## 2024-11-22 LAB
GLUCOSE BLDC GLUCOMTR-MCNC: 145 MG/DL (ref 70–99)
HGB BLD-MCNC: 8.3 G/DL (ref 13.3–17.7)
PATH REPORT.COMMENTS IMP SPEC: NORMAL
PATH REPORT.COMMENTS IMP SPEC: NORMAL
PATH REPORT.FINAL DX SPEC: NORMAL
PATH REPORT.GROSS SPEC: NORMAL
PATH REPORT.MICROSCOPIC SPEC OTHER STN: NORMAL
PATH REPORT.RELEVANT HX SPEC: NORMAL
PHOTO IMAGE: NORMAL

## 2024-11-22 PROCEDURE — 250N000013 HC RX MED GY IP 250 OP 250 PS 637: Performed by: HOSPITALIST

## 2024-11-22 PROCEDURE — 250N000013 HC RX MED GY IP 250 OP 250 PS 637: Performed by: INTERNAL MEDICINE

## 2024-11-22 PROCEDURE — 36415 COLL VENOUS BLD VENIPUNCTURE: CPT | Performed by: INTERNAL MEDICINE

## 2024-11-22 PROCEDURE — 99239 HOSP IP/OBS DSCHRG MGMT >30: CPT | Performed by: INTERNAL MEDICINE

## 2024-11-22 PROCEDURE — 99222 1ST HOSP IP/OBS MODERATE 55: CPT | Performed by: INTERNAL MEDICINE

## 2024-11-22 PROCEDURE — 88305 TISSUE EXAM BY PATHOLOGIST: CPT | Mod: 26 | Performed by: PATHOLOGY

## 2024-11-22 PROCEDURE — 85018 HEMOGLOBIN: CPT | Performed by: INTERNAL MEDICINE

## 2024-11-22 RX ORDER — OMEPRAZOLE 40 MG/1
40 CAPSULE, DELAYED RELEASE ORAL 2 TIMES DAILY
Qty: 30 CAPSULE | Refills: 0 | Status: SHIPPED | OUTPATIENT
Start: 2024-11-22

## 2024-11-22 RX ORDER — SUCRALFATE ORAL 1 G/10ML
2 SUSPENSION ORAL 2 TIMES DAILY
Status: SHIPPED
Start: 2024-11-22

## 2024-11-22 RX ADMIN — PROBENECID 500 MG: 500 TABLET, FILM COATED ORAL at 08:34

## 2024-11-22 RX ADMIN — FUROSEMIDE 20 MG: 20 TABLET ORAL at 08:36

## 2024-11-22 RX ADMIN — EZETIMIBE 10 MG: 10 TABLET ORAL at 10:58

## 2024-11-22 RX ADMIN — Medication 250 MG: at 08:35

## 2024-11-22 RX ADMIN — AMLODIPINE BESYLATE 10 MG: 5 TABLET ORAL at 08:36

## 2024-11-22 RX ADMIN — PANTOPRAZOLE SODIUM 40 MG: 40 TABLET, DELAYED RELEASE ORAL at 08:36

## 2024-11-22 RX ADMIN — ATENOLOL 50 MG: 25 TABLET ORAL at 08:34

## 2024-11-22 RX ADMIN — POTASSIUM CHLORIDE 10 MEQ: 750 TABLET, EXTENDED RELEASE ORAL at 08:36

## 2024-11-22 RX ADMIN — LISINOPRIL 40 MG: 20 TABLET ORAL at 08:35

## 2024-11-22 ASSESSMENT — ACTIVITIES OF DAILY LIVING (ADL)
ADLS_ACUITY_SCORE: 0

## 2024-11-22 NOTE — PLAN OF CARE
Goal Outcome Evaluation:    Problem: Dysrhythmia  Goal: Normalized Cardiac Rhythm  Outcome: Progressing     Problem: Gastrointestinal Bleeding  Goal: Hemostasis  Outcome: Progressing    No acute events overnight.  Denies pain.   No episodes of bloody stool overnight. Denies nausea. Endorses some shortness of breath with activity.  Plan to discharge home today.       Karoline Wilson RN

## 2024-11-22 NOTE — CONSULTS
"  Thank you,  No ref. provider found, for asking the [facility] Care team to see Jabari Haddad to evaluate CVA prophylaxis.      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  I will arrange for it  Currently watchman implantation without anticoagulation is considered off label and needs to be discussed directly by implanting physician.    Cardiology to sign off    Clinically Significant Risk Factors               # Hypoalbuminemia: Lowest albumin = 3.4 g/dL at 11/20/2024  5:09 AM, will monitor as appropriate         # Hypertension: Noted on problem list                  # Overweight: Estimated body mass index is 26.5 kg/m  as calculated from the following:    Height as of this encounter: 1.702 m (5' 7\").    Weight as of this encounter: 76.7 kg (169 lb 3.2 oz).  , PRESENT ON ADMISSION       # Financial/Environmental Concerns: none                History of Present Illness/Subjective    Mr. Jabari Haddad is a 83 year old male who was admitted due to lower GI bleeding and anemia.  He has permanent atrial fibrillation and he has been taking Eliquis.  He has been losing blood for several weeks now.  He was found to have radiation injury from treatment for prostate cancer.  We are asked to address the possibility of nonpharmacological  CVA prophylaxis.  Currently he denies any chest pain or shortness of breath.  He is unaware of irregular heart rhythm.  He has been taking Eliquis for a number of years.  He has never had bleeding before.  He is not known to " "have obstructive coronary artery disease or significant valvular heart disease  ECG: Personally reviewed.  A-fib right bundle branch block.    ECHO (personnaly Reviewed):   1. Left ventricular chamber size, wall thickness and systolic function are  normal. The visually estimated left ventricular ejection fraction is 55-60%.  2. Right ventricular chamber size is moderately enlarged. Systolic function is  mildly reduced.  3. Severe biatrial enlargement.  4. No hemodynamically significant valvular abnormalities.  5. Moderate pulmonary hypertension is present with an estimated pulmonary  artery systolic pressure of 56 mmHg.  6. A prior study was performed 8/16/2019. Images were unavailable for  comparison.       Physical Examination Review of Systems   BP (!) 142/73 (BP Location: Right arm)   Pulse 75   Temp 97.8  F (36.6  C) (Oral)   Resp 16   Ht 1.702 m (5' 7\")   Wt 76.7 kg (169 lb 3.2 oz)   SpO2 95%   BMI 26.50 kg/m    Body mass index is 26.5 kg/m .  Wt Readings from Last 3 Encounters:   11/21/24 76.7 kg (169 lb 3.2 oz)   07/09/24 77.8 kg (171 lb 9.6 oz)   02/15/24 77.1 kg (170 lb)       Intake/Output Summary (Last 24 hours) at 11/22/2024 0833  Last data filed at 11/22/2024 0555  Gross per 24 hour   Intake 900 ml   Output 1 ml   Net 899 ml     General Appearance:   Alert, cooperative, no distress, appears stated age   Head/ENT: Normocephalic, without obvious abnormality. Membranes moist.      EYES:  No scleral icterus   Neck: Supple, symmetrical, trachea midline, no adenopathy, thyroid: not enlarged, symmetric, no carotid bruit or JVD   Chest/Lungs:   lungs are clear to auscultation, respirations unlabored. No tenderness or deformity   Cardiovascular:   irregular rhythm, S1, S2 normal, no murmur, rub or gallop.   Abdomen:  Soft, non-tender, bowel sounds active all four quadrants,  no masses, no organomegaly   Extremities: no cyanosis or clubbing. No edema   Skin: Skin color, texture, turgor normal, no rashes " "or lesions.             10 system review of systems completed see history of present illness and inpatient H&P (reviewed) for further details.          Lab Results    Chemistry/lipid CBC Cardiac Enzymes/BNP/TSH/INR   Recent Labs   Lab Test 02/15/24  1129   CHOL 205*   HDL 49   *   TRIG 183*     Recent Labs   Lab Test 02/15/24  1129 03/10/22  0949 01/22/20  1028   * 58 46     Recent Labs   Lab Test 11/22/24  0735 11/20/24  0714 11/20/24  0509   NA  --   --  138   POTASSIUM  --   --  4.1   CHLORIDE  --   --  105   CO2  --   --  25   *   < > 105*   BUN  --   --  10.5   CR  --   --  0.92   GFRESTIMATED  --   --  83   SHANTE  --   --  8.3*    < > = values in this interval not displayed.     Recent Labs   Lab Test 11/20/24  0509 11/19/24  0648 02/15/24  1129   CR 0.92 0.91 0.81     Recent Labs   Lab Test 02/15/24  1129 08/31/23  1056 01/12/23  1248   A1C 5.9* 5.1 5.6          Recent Labs   Lab Test 11/22/24  0506 11/20/24  2157 11/20/24  1407   WBC  --   --  8.2   HGB 8.3*   < > 8.8*   HCT  --   --  28.5*   MCV  --   --  101*   PLT  --   --  237    < > = values in this interval not displayed.     Recent Labs   Lab Test 11/22/24  0506 11/21/24  1355 11/21/24  0626   HGB 8.3* 8.7* 8.1*    No results for input(s): \"TROPONINI\" in the last 92931 hours.  Recent Labs   Lab Test 11/19/24  0648   NTBNPI 1,329     No results for input(s): \"TSH\" in the last 51476 hours.  No results for input(s): \"INR\" in the last 13272 hours.     Medical History  Surgical History Family History Social History   Past Medical History:   Diagnosis Date    Anemia     Atrial fibrillation (H)     Depression     Diabetes mellitus (H)     History of blood transfusion     HLD (hyperlipidemia)     HTN (hypertension)     Prostate cancer (H)      Past Surgical History:   Procedure Laterality Date    BONY PELVIS SURGERY       No premature CAD, SCD,cardiomyopathy   Social History     Socioeconomic History    Marital status:      Spouse " name: Not on file    Number of children: Not on file    Years of education: Not on file    Highest education level: Not on file   Occupational History    Not on file   Tobacco Use    Smoking status: Former     Current packs/day: 0.00     Types: Cigarettes     Quit date: 1967     Years since quittin.9    Smokeless tobacco: Never   Vaping Use    Vaping status: Never Used   Substance and Sexual Activity    Alcohol use: Not Currently     Alcohol/week: 8.3 - 10.0 standard drinks of alcohol     Types: 8 - 10 Standard drinks or equivalent per week    Drug use: Never    Sexual activity: Not on file   Other Topics Concern    Not on file   Social History Narrative    1/13/15- The patient lives alone.      Social Drivers of Health     Financial Resource Strain: Low Risk  (2024)    Financial Resource Strain     Within the past 12 months, have you or your family members you live with been unable to get utilities (heat, electricity) when it was really needed?: No   Food Insecurity: Low Risk  (2024)    Food Insecurity     Within the past 12 months, did you worry that your food would run out before you got money to buy more?: No     Within the past 12 months, did the food you bought just not last and you didn t have money to get more?: No   Transportation Needs: Low Risk  (2024)    Transportation Needs     Within the past 12 months, has lack of transportation kept you from medical appointments, getting your medicines, non-medical meetings or appointments, work, or from getting things that you need?: No   Physical Activity: Unknown (2/15/2024)    Exercise Vital Sign     Days of Exercise per Week: 0 days     Minutes of Exercise per Session: Not on file   Stress: No Stress Concern Present (2/15/2024)    [employer] - Occupational Stress Questionnaire     Feeling of Stress : Not at all   Social Connections: Unknown (2/15/2024)    Social Connection and Isolation Panel [NHANES]      Frequency of Communication with Friends and Family: Not on file     Frequency of Social Gatherings with Friends and Family: Once a week     Attends Church Services: Not on file     Active Member of Clubs or Organizations: Not on file     Attends Club or Organization Meetings: Not on file     Marital Status: Not on file   Interpersonal Safety: Low Risk  (11/19/2024)    Interpersonal Safety     Do you feel physically and emotionally safe where you currently live?: Yes     Within the past 12 months, have you been hit, slapped, kicked or otherwise physically hurt by someone?: No     Within the past 12 months, have you been humiliated or emotionally abused in other ways by your partner or ex-partner?: No   Housing Stability: Low Risk  (11/19/2024)    Housing Stability     Do you have housing? : Yes     Are you worried about losing your housing?: No         Medications  Allergies   Current Facility-Administered Medications Ordered in Epic   Medication Dose Route Frequency Provider Last Rate Last Admin    acetaminophen (TYLENOL) tablet 650 mg  650 mg Oral Q4H PRN Meseret Kraus MD   650 mg at 11/20/24 1104    Or    acetaminophen (TYLENOL) Suppository 650 mg  650 mg Rectal Q4H PRN Meseret Kraus MD        amLODIPine (NORVASC) tablet 10 mg  10 mg Oral Daily Lyric Robert MD   10 mg at 11/21/24 1100    [Held by provider] apixaban ANTICOAGULANT (ELIQUIS) tablet 5 mg  5 mg Oral BID Meseret Kraus MD        atenolol (TENORMIN) tablet 50 mg  50 mg Oral Daily Lyric Robert MD   50 mg at 11/21/24 0809    calcium carbonate (TUMS) chewable tablet 1,000 mg  1,000 mg Oral 4x Daily PRN Meseret Kraus MD        glucose gel 15-30 g  15-30 g Oral Q15 Min PRN Meseret Kraus MD        Or    dextrose 50 % injection 25-50 mL  25-50 mL Intravenous Q15 Min PRMeseret José MD        Or    glucagon injection 1 mg  1 mg Subcutaneous Q15 Min PRN Meseret Kraus MD        docusate sodium (COLACE) capsule 100 mg  100 mg Oral  BID Roma Richmond MD   100 mg at 11/21/24 2026    escitalopram (LEXAPRO) tablet 20 mg  20 mg Oral QPM Meseret Kraus MD   20 mg at 11/21/24 2026    ezetimibe (ZETIA) tablet 10 mg  10 mg Oral Daily Meseret Kraus MD   10 mg at 11/21/24 1100    furosemide (LASIX) tablet 20 mg  20 mg Oral Daily Meseret Kraus MD   20 mg at 11/21/24 1100    insulin aspart (NovoLOG) injection (RAPID ACTING)  1-7 Units Subcutaneous TID AC Meseret Kraus MD        insulin aspart (NovoLOG) injection (RAPID ACTING)  1-5 Units Subcutaneous At Bedtime Meseret Kraus MD        lidocaine (LMX4) cream   Topical Q1H PRN Meseret Kraus MD        lidocaine 1 % 0.1-1 mL  0.1-1 mL Other Q1H PRN Meseret Kraus MD        lisinopril (ZESTRIL) tablet 40 mg  40 mg Oral Daily Lyric Robert MD   40 mg at 11/21/24 1104    ondansetron (ZOFRAN ODT) ODT tab 4 mg  4 mg Oral Q6H PRN Meseret Kraus MD        Or    ondansetron (ZOFRAN) injection 4 mg  4 mg Intravenous Q6H PRN Meseret Kraus MD        pantoprazole (PROTONIX) EC tablet 40 mg  40 mg Oral QAM AC Lyric Robert MD        potassium chloride juan ER (KLOR-CON M10) CR tablet 10 mEq  10 mEq Oral Daily Meseret Kraus MD   10 mEq at 11/21/24 1106    probenecid (BENEMID) tablet 500 mg  500 mg Oral BID Meseret Kraus MD   500 mg at 11/21/24 2026    prochlorperazine (COMPAZINE) injection 5 mg  5 mg Intravenous Q6H PRN Meseret Kraus MD        Or    prochlorperazine (COMPAZINE) tablet 5 mg  5 mg Oral Q6H PRN Meseret Kraus MD        senna-docusate (SENOKOT-S/PERICOLACE) 8.6-50 MG per tablet 1 tablet  1 tablet Oral BID PRN Meseret Kraus MD   1 tablet at 11/20/24 0744    Or    senna-docusate (SENOKOT-S/PERICOLACE) 8.6-50 MG per tablet 2 tablet  2 tablet Oral BID PRN Meseret Kraus MD        simvastatin (ZOCOR) tablet 40 mg  40 mg Oral QPM Meseret Kraus MD   40 mg at 11/21/24 2026    sodium chloride (PF) 0.9% PF flush 3 mL  3 mL Intracatheter Q8H Meseret Kraus MD   3 mL at 11/21/24  2210    sodium chloride (PF) 0.9% PF flush 3 mL  3 mL Intracatheter q1 min prn Meseret Kraus MD        sodium chloride 0.9% BOLUS 1-250 mL  1-250 mL Intravenous Q1H PRN Isidra Chang MD   Stopped at 11/19/24 1208    sucralfate (CARAFATE) suspension 2 g  2 g Rectal BID Lyric Robert MD   2 g at 11/21/24 2029    vitamin C (ASCORBIC ACID) tablet 250 mg  250 mg Oral Daily Meseret Kraus MD   250 mg at 11/21/24 1100     No current Epic-ordered outpatient medications on file.     No Known Allergies

## 2024-11-22 NOTE — PLAN OF CARE
Problem: Anemia  Goal: Anemia Symptom Improvement  Outcome: Progressing     Problem: Dysrhythmia  Goal: Normalized Cardiac Rhythm  Outcome: Progressing   Goal Outcome Evaluation:  VS stable. He denies any Sob  and chest pain. Pt denies any abdominal discomfort and nausea. EGD and colonoscopy done this mornings and no active bleeding noted. Hgb stable and no blood stool noted.

## 2024-11-22 NOTE — PROGRESS NOTES
[facility] DIGESTIVE HEALTH PROGRESS NOTE      Subjective:                -Colonoscopy yesterday with actively bleeding radiation proctitis treated with APC as well as adjacent large nonbleeding distal rectal ulcerations  -EGD with 2 erythematous gastric cardia polyps with surface ulcerations that were biopsied  -He reports no further episodes of GI bleeding  -Seen by cardiology who wants to reduce the dose of his Eliquis to 2.5 mg twice daily when cleared from a GI standpoint and discuss watchman  device as an outpatient.    Objective:                  Vital signs in last 24 hrs;  Temp:  [97.8  F (36.6  C)-98.7  F (37.1  C)] 97.8  F (36.6  C)  Pulse:  [52-75] 75  Resp:  [16-18] 16  BP: ()/(55-96) 142/73  SpO2:  [95 %-99 %] 95 %    Physical Exam:   General: Comfortable appearing. Awake.   Cardiovascular: Regular rate. No edema  Chest: Non-labored breathing. Symmetric chest rise.   Abdomen: soft, non-tender, non-distended  Neurologic: Alert, no focal defects.     Current Labs:  CBC RESULTS:   Recent Labs   Lab Test 11/22/24  0506 11/21/24  1355 11/21/24  0626 11/20/24  2157 11/20/24  1407 11/20/24  0509 11/19/24  2202   WBC  --   --   --   --  8.2 6.1 10.1   RBC  --   --   --   --  2.82* 2.51* 2.78*   HGB 8.3* 8.7* 8.1*   < > 8.8* 7.8* 8.7*   HCT  --   --   --   --  28.5* 25.3* 28.1*   MCV  --   --   --   --  101* 101* 101*   MCH  --   --   --   --  31.2 31.1 31.3   MCHC  --   --   --   --  30.9* 30.8* 31.0*   RDW  --   --   --   --  15.6* 15.5* 15.8*   PLT  --   --   --   --  237 182 240    < > = values in this interval not displayed.        CMP Results:   Recent Labs   Lab Test 11/22/24  0735 11/21/24 2118 11/21/24  1652 11/20/24  0714 11/20/24  0509 11/19/24  1125 11/19/24  0648 02/15/24  1129 08/31/23  1056   NA  --   --   --   --  138  --  137 136 136   POTASSIUM  --   --   --   --  4.1  --  3.9 4.4 5.4*   CHLORIDE  --   --   --   --  105  --  102 98 99   CO2  --   --   --   --  25  --  23 27 26   ANIONGAP  --   " --   --   --  8  --  12 11 11   * 156* 124*   < > 105*   < > 161* 142* 139*   BUN  --   --   --   --  10.5  --  11.9 11.9 9.3   CR  --   --   --   --  0.92  --  0.91 0.81 0.79   BILITOTAL  --   --   --   --  0.5  --   --  0.3 0.4   ALKPHOS  --   --   --   --  78  --   --  90 204*   ALT  --   --   --   --  14  --   --  15 20   AST  --   --   --   --  20  --   --  26 35    < > = values in this interval not displayed.        INR Results: No results for input(s): \"INR\" in the last 78160 hours.       Imaging:    Scopes:  EGD 11/21:  Impression:            - No fresh or old blood                          - 2 friable erythematous polyps in the gastric cardia                          with surface eosion with no active bleeding or signs                          of recent bleeding but significant oozing with small                          biopsies that eventually stopped on its own.                          - Small hiatal hernia. Otherwise normal stomach.                          - Normal esophagus                          - Normal examined duodenum.   Recommendation:        - Await pathology results.                          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                          - Superficial large non-bleeding ulcerations in the                          distal rectum within the area of radiation                          proctopathy, likely related to radiation vs solitary                          rectal ulcer syndrome. Less likely malignancy given                          appearance and clinical history. No biopsies obtained                          given concern for adverse effects (ie fistula) with                          biopsy of radiation proctopathy.                          - Diverticulosis in the sigmoid colon, in the             "              descending colon and in the ascending colon.                          - Non-bleeding small internal hemorrhoids.                          - No specimens collected.   Recommendation:        - Return patient to hospital [facility] for ongoing care.                          - Continue to hold anticoagulation and would have                          cardiology reassess need for ongoing anticoagulation                          for Afib in light of above findings                          - Consider sucralfate enemas                          - Agressively treat any constipation                          -daily stool softener       Assessment:       This is an 83-year-old male with a history of prostate cancer status post radiation earlier this year GERD, diabetes, hypertension, hyperlipidemia, A-fib on Eliquis who is here with 3 weeks of  BRBPR and some intermittent dark stool as well.  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  Less likely malignancy given appearance and clinical history.  EGD with no active bleeding but did have 2 friable erythematous polyps in the gastric cardia with surface ulcerations that did ooze significantly with biopsy.  Hemoglobin has since been stable with no further episodes of GI bleeding.       Plan:     -Sucralfate enemas 2 g twice daily.  Rx printed and given to him along with a list of compounding pharmacies.  -Continue twice daily PPI as an outpatient given erythematous friable polyps in the gastric cardia.  -Await pathology results  -Would recommend holding Eliquis for a total of 5 days, then can restart at a lower dose as per cardiology recommendations with outpatient cardiology follow-up to discuss Watchman device.  - GI will sign  off and arrange outpatient clinic follow up     Total time spent today in the management of this patient (which may include  chart review, review of imaging, discussion of case with additional specialists and/or family members, face to face consultation/exam with patient, documentation, and coordination of care): 35 minutes          Roma Richmond MD  Thank you for the opportunity to participate in the care of this patient.   Please feel free to call me with any questions or concerns.  Phone number [phone].          11/22/2024 9:40 AM

## 2024-11-22 NOTE — DISCHARGE SUMMARY
"[facility]  Hospitalist Discharge Summary      Date of Admission:  11/19/2024  Date of Discharge:  11/22/2024 11:46 AM  Discharging Provider: Reese Philip MD  Discharge Service: Hospitalist Service    Jabari Haddad is a 83 year old male with PMH of A-fib on Eliquis, dyslipidemia, hypertension, DM2, prostate cancer s/p radiation, on hormone therapy who presented for evaluation of rectal bleeding.  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  He has acute blood loss anemia and received 1 unit PRBC.     Discharge Diagnoses   GI bleed from radiation proctoscopy apathy, treated with APC  Acute blood loss anemia, 1 times PRBC given  Chronic A-fib rate controlled, hold Eliquis for 5 days  DM continue home meds  Hyperlipidemia continue statin  Hypertension stable continue home meds  Prostate cancer postradiation follows with medical oncology    Clinically Significant Risk Factors     # Overweight: Estimated body mass index is 26.5 kg/m  as calculated from the following:    Height as of this encounter: 1.702 m (5' 7\").    Weight as of this encounter: 76.7 kg (169 lb 3.2 oz).       Follow-ups Needed After Discharge   Follow-up Appointments       Follow Up      Follow up with cardiology , within 2-4 wks . for hospital follow- up.  No follow up labs or test are needed.  Fup with GI clinic 2-4 wks        Hospital Follow-up with Existing Primary Care Provider (PCP)      Please see details below         Schedule Primary Care visit within: 7 Days   Recommended labs and Imaging (to be ordered by Primary Care Provider): cbc/bmp           PCP    Unresulted Labs Ordered in the Past 30 Days of this Admission       No orders found from 10/20/2024 to 11/20/2024.        These results will be followed up by pcp    Discharge Disposition   Discharged to home  Condition at discharge: Stable    Hospital Course   Patient admitted for rectal bleeding, history of " prostate cancer postradiation, mild anemia on admission, subsequently needed 1 PRBC transfusion prior to discharge.  He was seen by GI and colonoscopy showing active bleeding radiation proctitis treated with APC, also EGD with gastric polyps postbiopsy.  He was started on twice daily PPI and anticoagulation was held temporarily.  He was seen by cardiology who recommended reduction of Eliquis to 2.5 mg twice daily and follow-up in clinic for possible watchman evaluation.  He will resume Eliquis after holding for 5 days at discharge.  Hemoglobin is stable at discharge she is asymptomatic and has been discharged to follow-up with PCP, cardiology and GI clinics    GI bleed secondary to bleeding radiation proctopathy.  Treated with APC.  Also noted to have superficial large nonbleeding ulcerations in the distal rectum, diverticulosis in the sigmoid colon and nonbleeding small internal hemorrhoids.  Per GI recommendations, ordered sucralfate enemas twice daily.  Daily stool softeners to avoid constipation.  Continue to hold anticoagulation  Acute blood loss anemia.  Hemoglobin 7.6 on admission which is ~ 6 unit drop since 2/2024.  S/p 1 unit PRBC.  Remained stable.  Recheck in a.m.  Chronic A-fib.  Has been on atenolol for ventricular rate control.  [id number] is 3.  Hold Eliquis for now.  Consult cardiology for recommendations regarding anticoagulation, possibly considering Watchman device  DM2 without long-term insulin use.  Hold PTA glimepiride.  Sliding scale if needed  Dyslipidemia.  Continue simvastatin and Zetia  Essential hypertension.  Continue PTA amlodipine, lisinopril and atenolol with holding parameters.  BP has been soft  Prostate cancer s/p radiation and hormone therapy.  Follows with [place] [facility]  Depression.  Continue Lexapro    Consultations This Hospital Stay   CARE MANAGEMENT / SOCIAL WORK IP CONSULT  CARDIOLOGY IP CONSULT  GASTROENTEROLOGY IP CONSULT    Code Status   Full Code    Time Spent on  this Encounter   I, Reese Philip MD, personally saw the patient today and spent greater than 30 minutes discharging this patient.       Reese Philip MD  [facility]  [address] [place] [id number]  Phone: [phone]  Fax: [phone]  ______________________________________________________________________    Physical Exam   Vital Signs: Temp: 97.8  F (36.6  C) Temp src: Oral BP: (!) 142/73 Pulse: 75   Resp: 16 SpO2: 95 % O2 Device: None (Room air)    Weight: 169 lbs 3.2 oz  General Appearance: AAOx3  Respiratory: Clear anteriorly  Cardiovascular: S1-S2 normal  GI: Soft nontender bowel sounds heard  Skin: No erythema  Other: No edema       Primary Care Physician   Ludwin Macdonald    Discharge Orders      Reason for your hospital stay    Gi bleed resolved, s/p 1 unit or red cell transfusion     Activity    Your activity upon discharge: activity as tolerated     Follow Up    Follow up with cardiology , within 2-4 wks . for hospital follow- up.  No follow up labs or test are needed.  Fup with GI clinic 2-4 wks     Discharge Instructions    INFORM PT THAT DOAC DOSE AND START DATE CHANGED   PPI DOSE ALSO CHANGED     Diet    Follow this diet upon discharge: Current Diet:Orders Placed This Encounter      Regular Diet Adult     Hospital Follow-up with Existing Primary Care Provider (PCP)    Please see details below            Significant Results and Procedures   Results for orders placed or performed during the hospital encounter of 11/19/24   CTA Abdomen Pelvis with Contrast    Narrative    EXAM: CTA ABDOMEN PELVIS WITH CONTRAST  LOCATION: [facility]  DATE: 11/19/2024    INDICATION: GI bleeding protocol, black stools and now BRBPR  COMPARISON: CT chest, abdomen and pelvis 2/7/2024  TECHNIQUE: CT angiogram abdomen pelvis during arterial phase of injection of IV contrast. 2D and 3D MIP reconstructions were performed by the CT technologist. Dose  reduction techniques were used.  CONTRAST: IsoVue 370 90ml    FINDINGS:  ANGIOGRAM ABDOMEN/PELVIS: The abdominal aorta is nonaneurysmal with moderate atheromatous disease but no evidence of dissection or other acute abnormality. The celiac, superior mesenteric, renal, and inferior mesenteric arteries are patent without   flow-limiting stenosis. The bilateral common, internal, and external iliac arteries are patent without flow-limiting stenosis.    Portal, hepatic, and superior mesenteric veins are patent.    LOWER CHEST: Unchanged bilateral pleural plaques and pericardial calcifications. Similar trace amount of pleural fluid versus pleural thickening.    HEPATOBILIARY: Normal liver morphology and enhancements. No worrisome liver lesions. Normal gallbladder. No biliary ductal dilation.    PANCREAS: Normal.    SPLEEN: Normal.    ADRENAL GLANDS: Normal.    KIDNEYS/BLADDER: Kidneys enhance symmetrically. Benign right renal sinus and bilateral cortically based cysts, which sarai follow-up. No urolithiasis or hydronephrosis. Urinary bladder is unremarkable.    BOWEL: There are arterially enhancing vessels within the rectum with surrounding perirectal stranding, consistent with internal hemorrhoids. No findings of active bleeding at this time. Scattered colonic diverticulosis without evidence of diverticulitis.   The appendix is normal. Small hiatal hernia. No free fluid or free air.    LYMPH NODES: No lymphadenopathy.    PELVIC ORGANS: Normal contours.    MUSCULOSKELETAL: Unchanged mild T12 wedge compression deformity. Degenerative changes throughout the spine. Sacral fixation hardware and healed right pelvic fractures.      Impression    IMPRESSION:  1.  Prominent internal hemorrhoids with perirectal stranding, suggesting proctitis, however no findings of active bleeding.  2.  Colonic diverticulosis without evidence of diverticulitis.  3.  Additional chronic noncritical details in the findings.   Echocardiogram  Complete     Value    LVEF  55-60%    [facility]    [id number]  [id number]  [id number]  [id number]^[address]TH^[place] [address]     Name: GO CONTRERAS  MRN: [id number]  : 1940  Study Date: 2024 01:32 PM  Age: 83 yrs  Gender: Male  Patient Location: [facility]  Reason For Study: Dyspnea  Ordering Physician: KELSEY KAT  Performed By: AD     BSA: 1.9 m2  Height: 66 in  Weight: 171 lb  HR: 61  ______________________________________________________________________________  Procedure  Complete Echo Adult. Good quality two-dimensional was performed and  interpreted. Good quality color and spectral Doppler were performed and  interpreted. A prior study was performed 2019. Images were unavailable  for comparison.  ______________________________________________________________________________  Interpretation Summary     1. Left ventricular chamber size, wall thickness and systolic function are  normal. The visually estimated left ventricular ejection fraction is 55-60%.  2. Right ventricular chamber size is moderately enlarged. Systolic function is  mildly reduced.  3. Severe biatrial enlargement.  4. No hemodynamically significant valvular abnormalities.  5. Moderate pulmonary hypertension is present with an estimated pulmonary  artery systolic pressure of 56 mmHg.  6. A prior study was performed 2019. Images were unavailable for  comparison.  ______________________________________________________________________________  I      WMSI = 1.00     % Normal = 100     X - Cannot   0 -                      (2) - Mildly 2 -          Segments  Size  Interpret    Hyperkinetic 1 - Normal  Hypokinetic  Hypokinetic  1-2     small                                                     7 -          3-5      moderate  3 - Akinetic 4 -          5 -         6 - Akinetic Dyskinetic   6-14    large               Dyskinetic   Aneurysmal  w/scar       w/scar       15-16    diffuse     Left Ventricle  The left ventricle is normal in size. There is normal left ventricular wall  thickness. Left ventricular systolic function is normal. The visual ejection  fraction is 55-60%. Diastolic function not assessed due to atrial  fibrillation. No regional wall motion abnormalities noted. Flattened septum is  consistent with RV pressure overload.     Right Ventricle  The right ventricle is moderately dilated. Mildly decreased right ventricular  systolic function.     Atria  The left atrium is severely dilated. The right atrium is severely dilated.     Mitral Valve  Mitral valve leaflets appear normal. There is trace mitral regurgitation.  There is no mitral valve stenosis.     Tricuspid Valve  Tricuspid valve leaflets appear normal. There is mild (1+) tricuspid  regurgitation. The right ventricular systolic pressure is elevated at 48.4  mmHg. Moderate (46-55mmHg) pulmonary hypertension is present. There is no  tricuspid stenosis.     Aortic Valve  Aortic valve leaflets appear normal. The aortic valve is trileaflet. No aortic  regurgitation is present. No aortic stenosis is present.     Pulmonic Valve  The pulmonic valve is not well seen, but is grossly normal. There is trace  pulmonic valvular regurgitation. There is no pulmonic valvular stenosis.     Vessels  The aorta root is normal. The thoracic aorta is normal. IVC diameter and  respiratory changes fall into an intermediate range suggesting an RA pressure  of 8 mmHg.     Pericardium  There is no pericardial effusion.     Rhythm  The rhythm was atrial fibrillation.     ______________________________________________________________________________  MMode/2D Measurements & Calculations  IVSd: 1.0 cm  LVIDd: 5.4 cm  LVIDs: 4.2 cm  LVPWd: 1.0 cm     FS: 22.0 %  LV mass(C)d: 204.8 grams  LV mass(C)dI: 109.5 grams/m2  Ao root diam: 3.4 cm  LA dimension: 4.4 cm  asc Aorta Diam: 3.6 cm  LA/Ao: 1.3  LVOT diam: 2.0 cm  LVOT area: 3.1 cm2  Ao root diam  index Ht(cm/m): 2.0  Ao root diam index BSA (cm/m2): 1.8  Asc Ao diam index BSA (cm/m2): 1.9  Asc Ao diam index Ht(cm/m): 2.1  EF Biplane: 61.8 %     LA Volume Indexed (AL/bp): 69.5 ml/m2  RV Base: 5.7 cm  RWT: 0.37  TAPSE: 1.4 cm     Time Measurements  MM HR: 69.0 BPM     Doppler Measurements & Calculations  MV E max silvino: 95.5 cm/sec  Ao V2 max: 108.0 cm/sec  Ao max P.0 mmHg  Ao V2 mean: 77.2 cm/sec  Ao mean PG: 3.0 mmHg  Ao V2 VTI: 25.5 cm  SUSIE(I,D): 2.6 cm2  SUSIE(V,D): 2.9 cm2  LV V1 max PG: 3.9 mmHg  LV V1 max: 98.8 cm/sec  LV V1 VTI: 21.1 cm  SV(LVOT): 66.3 ml  SI(LVOT): 35.4 ml/m2  PA acc time: 0.12 sec  TR max silvino: 348.0 cm/sec  TR max P.4 mmHg  AV Silvino Ratio (DI): 0.91     SUSIE Index (cm2/m2): 1.4  E/E': 13.6  Medial E/e': 13.7  Peak E' Silvino: 7.0 cm/sec  RV S Silvino: 9.5 cm/sec     ______________________________________________________________________________  Report approved by: Caprice Dao 2024 02:25 PM             Discharge Medications   Discharge Medication List as of 2024 11:05 AM        START taking these medications    Details   sucralfate (CARAFATE) 1 GM/10ML suspension Place 20 mLs (2 g) rectally 2 times daily., No Print Out           CONTINUE these medications which have CHANGED    Details   apixaban ANTICOAGULANT (ELIQUIS ANTICOAGULANT) 2.5 MG tablet Take 1 tablet (2.5 mg) by mouth 2 times daily., Disp-30 tablet, R-0, E-Prescribe      omeprazole (PRILOSEC) 40 MG DR capsule Take 1 capsule (40 mg) by mouth 2 times daily., Disp-30 capsule, R-0, E-Prescribe           CONTINUE these medications which have NOT CHANGED    Details   acetaminophen (TYLENOL) 500 MG tablet Take 1,000 mg by mouth every 8 hours as needed for mild pain., Historical      amLODIPine (NORVASC) 10 MG tablet Take 1 tablet by mouth once daily, Disp-90 tablet, R-2, E-Prescribe      ascorbic acid (VITAMIN C) 250 MG CHEW chewable tablet Take 250 mg by mouth daily., Historical      atenolol (TENORMIN) 50 MG tablet  Take 1 tablet (50 mg) by mouth daily, Disp-90 tablet, R-2, E-Prescribe      chlorpheniramine (CHLOR-TRIMETON) 4 MG tablet Take 4 mg by mouth every 6 hours as needed for allergies or rhinitis., Historical      escitalopram (LEXAPRO) 20 MG tablet Take 20 mg by mouth every evening., Historical      ezetimibe (ZETIA) 10 MG tablet Take 1 tablet (10 mg) by mouth daily, Disp-90 tablet, R-2, E-Prescribe      furosemide (LASIX) 20 MG tablet Take 1 tablet by mouth once daily, Disp-90 tablet, R-2, E-Prescribe      glimepiride (AMARYL) 2 MG tablet TAKE 1 TABLET BY MOUTH IN THE MORNING BEFORE BREAKFAST, Disp-90 tablet, R-0, E-Prescribe      lisinopril (ZESTRIL) 40 MG tablet Take 1 tablet by mouth once daily, Disp-90 tablet, R-2, E-Prescribe      potassium gluconate 2.5 MEQ tablet Take 2.5 mEq by mouth daily., Historical      pramox-pe-glycerin-petrolatum (PREPARATION H) 1-0.25-14.4-15 % CREA cream Place rectally 4 times daily as needed for hemorrhoids.Historical      probenecid (BENEMID) 500 MG tablet Take 1 tablet (500 mg) by mouth 2 times daily., Disp-60 tablet, R-0, E-Prescribe      simvastatin (ZOCOR) 40 MG tablet Take 1 tablet by mouth once daily, Disp-90 tablet, R-0, E-Prescribe      Vitamin D3 (CHOLECALCIFEROL) 25 mcg (1000 units) tablet Take 1 tablet by mouth daily., Historical      vitamin E 400 units TABS Take 400 Units by mouth daily., Historical      leuprolide, 3 Month, (ELIGARD) 22.5 MG Inject 22.5 mg subcutaneously every 3 months., Historical           Allergies   No Known Allergies

## 2024-11-22 NOTE — PLAN OF CARE
"  Problem: Adult Inpatient Plan of Care  Goal: Plan of Care Review  Description: The Plan of Care Review/Shift note should be completed every shift.  The Outcome Evaluation is a brief statement about your assessment that the patient is improving, declining, or no change.  This information will be displayed automatically on your shift  note.  Outcome: Met  Goal: Patient-Specific Goal (Individualized)  Description: You can add care plan individualizations to a care plan. Examples of Individualization might be:  \"Parent requests to be called daily at 9am for status\", \"I have a hard time hearing out of my right ear\", or \"Do not touch me to wake me up as it startles  me\".  Outcome: Met  Goal: Absence of Hospital-Acquired Illness or Injury  Outcome: Met  Intervention: Identify and Manage Fall Risk  Recent Flowsheet Documentation  Taken 11/22/2024 0841 by Marleen Pena RN  Safety Promotion/Fall Prevention: clutter free environment maintained  Intervention: Prevent Skin Injury  Recent Flowsheet Documentation  Taken 11/22/2024 0841 by Marleen Pena RN  Body Position: position changed independently  Intervention: Prevent Infection  Recent Flowsheet Documentation  Taken 11/22/2024 0841 by Marleen Pena RN  Infection Prevention: hand hygiene promoted  Goal: Optimal Comfort and Wellbeing  Outcome: Met  Goal: Readiness for Transition of Care  Outcome: Met     Problem: Skin Injury Risk Increased  Goal: Skin Health and Integrity  Outcome: Met  Intervention: Plan: Nurse Driven Intervention: Moisture Management  Recent Flowsheet Documentation  Taken 11/22/2024 0841 by Marleen Pena RN  Moisture Interventions: Encourage regular toileting  Intervention: Plan: Nurse Driven Intervention: Friction and Shear  Recent Flowsheet Documentation  Taken 11/22/2024 0841 by Marleen Pena RN  Friction/Shear Interventions: HOB 30 degrees or less  Intervention: Optimize Skin Protection  Recent Flowsheet Documentation  Taken 11/22/2024 0841 by Becky" "Marleen POOL RN  Activity Management: activity adjusted per tolerance  Head of Bed (HOB) Positioning: HOB at 20-30 degrees     Problem: Anemia  Goal: Anemia Symptom Improvement  Outcome: Met  Intervention: Monitor and Manage Anemia  Recent Flowsheet Documentation  Taken 11/22/2024 0841 by Marleen Pena RN  Safety Promotion/Fall Prevention: clutter free environment maintained     Problem: Gastrointestinal Bleeding  Goal: Optimal Coping with Acute Illness  Outcome: Met  Intervention: Optimize Psychosocial Response  Recent Flowsheet Documentation  Taken 11/22/2024 0841 by Marleen Pena RN  Supportive Measures: active listening utilized  Goal: Hemostasis  Outcome: Met     Problem: Comorbidity Management  Goal: Blood Glucose Levels Within Targeted Range  Outcome: Met  Goal: Blood Pressure in Desired Range  Outcome: Met     Problem: Fall Injury Risk  Goal: Absence of Fall and Fall-Related Injury  Outcome: Met  Intervention: Promote Injury-Free Environment  Recent Flowsheet Documentation  Taken 11/22/2024 0841 by Marleen Pena RN  Safety Promotion/Fall Prevention: clutter free environment maintained     Problem: Dysrhythmia  Goal: Normalized Cardiac Rhythm  Outcome: Met   Goal Outcome Evaluation:       Pt verbalized understanding of new Rx as his [facility] & [facility], change medications and continued medications. This was reviewed multiple times and writer had pt do a teach back with medications, follow-up appointments with PMD, [facility] & Cardiology and labs to be drawn at PMD. Pt was successful with teach back.  Writer offered to reviewed discharge orders with his son who was picking up, pt stated, \"No you did a good job.\" Pt has his belongings.  Pt stated that he is going to ask his PMD of a cardiologist to follow-up with, pt stated where he has gone before is across [facility]. Writer wrote this cardiology reminder on pt's discharge paper work and instructed pt to bring his discharge paperwork to his follow-up appointments. Pt verbalized " understanding of bringing discharge paperwork to follow-up appointments. Also pt verbalized that paper RX & supplies with [facility] is at a [place] location.

## 2024-11-26 DIAGNOSIS — I10 PRIMARY HYPERTENSION: ICD-10-CM

## 2024-11-26 RX ORDER — LISINOPRIL 40 MG/1
40 TABLET ORAL DAILY
Qty: 30 TABLET | Refills: 0 | Status: SHIPPED | OUTPATIENT
Start: 2024-11-26

## 2024-11-27 ENCOUNTER — OFFICE VISIT (OUTPATIENT)
Dept: FAMILY MEDICINE | Facility: CLINIC | Age: 84
End: 2024-11-27
Payer: COMMERCIAL

## 2024-11-27 VITALS
SYSTOLIC BLOOD PRESSURE: 126 MMHG | WEIGHT: 171.5 LBS | DIASTOLIC BLOOD PRESSURE: 68 MMHG | RESPIRATION RATE: 14 BRPM | OXYGEN SATURATION: 95 % | BODY MASS INDEX: 26.86 KG/M2 | TEMPERATURE: 97.1 F | HEART RATE: 62 BPM

## 2024-11-27 DIAGNOSIS — K62.5 GASTROINTESTINAL HEMORRHAGE ASSOCIATED WITH ANORECTAL SOURCE: ICD-10-CM

## 2024-11-27 DIAGNOSIS — I48.20 CHRONIC ATRIAL FIBRILLATION (H): Primary | ICD-10-CM

## 2024-11-27 DIAGNOSIS — C61 MALIGNANT NEOPLASM OF PROSTATE (H): ICD-10-CM

## 2024-11-27 LAB
ALBUMIN SERPL BCG-MCNC: 3.8 G/DL (ref 3.5–5.2)
ALP SERPL-CCNC: 93 U/L (ref 40–150)
ALT SERPL W P-5'-P-CCNC: 16 U/L (ref 0–70)
ANION GAP SERPL CALCULATED.3IONS-SCNC: 9 MMOL/L (ref 7–15)
AST SERPL W P-5'-P-CCNC: 28 U/L (ref 0–45)
ATRIAL RATE - MUSE: 63 BPM
ATRIAL RATE - MUSE: 74 BPM
BILIRUB SERPL-MCNC: 0.4 MG/DL
BUN SERPL-MCNC: 9.6 MG/DL (ref 8–23)
CALCIUM SERPL-MCNC: 9 MG/DL (ref 8.8–10.4)
CHLORIDE SERPL-SCNC: 102 MMOL/L (ref 98–107)
CREAT SERPL-MCNC: 0.84 MG/DL (ref 0.67–1.17)
DIASTOLIC BLOOD PRESSURE - MUSE: 63 MMHG
DIASTOLIC BLOOD PRESSURE - MUSE: 71 MMHG
EGFRCR SERPLBLD CKD-EPI 2021: 87 ML/MIN/1.73M2
ERYTHROCYTE [DISTWIDTH] IN BLOOD BY AUTOMATED COUNT: 14.2 % (ref 10–15)
GLUCOSE SERPL-MCNC: 142 MG/DL (ref 70–99)
HCO3 SERPL-SCNC: 28 MMOL/L (ref 22–29)
HCT VFR BLD AUTO: 28.9 % (ref 40–53)
HGB BLD-MCNC: 9 G/DL (ref 13.3–17.7)
INTERPRETATION ECG - MUSE: NORMAL
INTERPRETATION ECG - MUSE: NORMAL
MCH RBC QN AUTO: 31.3 PG (ref 26.5–33)
MCHC RBC AUTO-ENTMCNC: 31.1 G/DL (ref 31.5–36.5)
MCV RBC AUTO: 100 FL (ref 78–100)
P AXIS - MUSE: NORMAL DEGREES
P AXIS - MUSE: NORMAL DEGREES
PLATELET # BLD AUTO: 228 10E3/UL (ref 150–450)
POTASSIUM SERPL-SCNC: 4.7 MMOL/L (ref 3.4–5.3)
PR INTERVAL - MUSE: NORMAL MS
PR INTERVAL - MUSE: NORMAL MS
PROT SERPL-MCNC: 6.9 G/DL (ref 6.4–8.3)
PSA SERPL DL<=0.01 NG/ML-MCNC: 0.03 NG/ML
QRS DURATION - MUSE: 122 MS
QRS DURATION - MUSE: 126 MS
QT - MUSE: 464 MS
QT - MUSE: 506 MS
QTC - MUSE: 478 MS
QTC - MUSE: 479 MS
R AXIS - MUSE: 52 DEGREES
R AXIS - MUSE: 74 DEGREES
RBC # BLD AUTO: 2.88 10E6/UL (ref 4.4–5.9)
SODIUM SERPL-SCNC: 139 MMOL/L (ref 135–145)
SYSTOLIC BLOOD PRESSURE - MUSE: 124 MMHG
SYSTOLIC BLOOD PRESSURE - MUSE: 134 MMHG
T AXIS - MUSE: -7 DEGREES
T AXIS - MUSE: 17 DEGREES
VENTRICULAR RATE- MUSE: 54 BPM
VENTRICULAR RATE- MUSE: 64 BPM
WBC # BLD AUTO: 7.3 10E3/UL (ref 4–11)

## 2024-11-27 PROCEDURE — 85027 COMPLETE CBC AUTOMATED: CPT | Performed by: FAMILY MEDICINE

## 2024-11-27 PROCEDURE — 80053 COMPREHEN METABOLIC PANEL: CPT | Performed by: FAMILY MEDICINE

## 2024-11-27 PROCEDURE — 36415 COLL VENOUS BLD VENIPUNCTURE: CPT | Performed by: FAMILY MEDICINE

## 2024-11-27 PROCEDURE — 84153 ASSAY OF PSA TOTAL: CPT | Performed by: FAMILY MEDICINE

## 2024-11-27 RX ORDER — FERROUS SULFATE 325(65) MG
325 TABLET ORAL
Qty: 90 TABLET | Refills: 0 | Status: SHIPPED | OUTPATIENT
Start: 2024-11-27

## 2024-11-27 NOTE — PROGRESS NOTES
Assessment & Plan     Chronic atrial fibrillation (H)      - Adult Cardiology Eval  Referral; Future  - CBC with platelets; Future  - Comprehensive metabolic panel; Future  - CBC with platelets  - Comprehensive metabolic panel    Gastrointestinal hemorrhage associated with anorectal source    - Adult GI  Referral - Consult Only; Future  - ferrous sulfate (FEROSUL) 325 (65 Fe) MG tablet; Take 1 tablet (325 mg) by mouth daily (with breakfast).    Malignant neoplasm of prostate (H)    - PSA tumor marker  - Testosterone total        MED REC REQUIRED  Post Medication Reconciliation Status:         Daja Barboza is a 83 year old, presenting for the following health issues:  Hospital F/U (Anemia- [facility] )      11/27/2024     8:08 AM   Additional Questions   Roomed by ALANIS Edouard   Accompanied by fritz MENDOZA Jabari presented to his oncologist hematologist and was found to be severely anemic with a hemoglobin under 6.  He was given a unit of blood but continued to bleed from his rectum.  He is undergoing proton radiation therapy for prostate cancer.  He has been leaking red blood from his rectum for a week or so prior to being seen by radiation oncology.  He was sent to the hospital seen in the emergency room admitted to the hospital given 2 more units of blood discharged with a hemoglobin of 7.7 told to follow-up here in primary care.  He is on Eliquis it with this was cut down to 2.5 mg twice daily because of the bleeding he does have chronic atrial fibrillation he is being considered or should be evaluated for a Watchman procedure.  I gave him the name of Dr. Antony Rodriguez and/or other cardiologist here in the area to see him and evaluate him in the next week or 2.  I assume he will have to get his hemoglobin up into normal range before this procedure is evaluated and considered.  He also needs follow-up with gastroenterology this was not placed during the hospital I placed it for GI bleeding  and radiation proctitis.  Clinically he is tired short of breath and a little bit washed out we will check his hemogram and metabolic profile today I gave him Dr. Shine's card he will go upstairs and see if he can get into see him in an expedited manner to get under the care of appropriate cardio.  Will follow with her specialists.              Review of Systems  CONSTITUTIONAL: NEGATIVE for fever, chills, change in weight  INTEGUMENTARY/SKIN: NEGATIVE for worrisome rashes, moles or lesions  EYES: NEGATIVE for vision changes or irritation  ENT/MOUTH: NEGATIVE for ear, mouth and throat problems  RESP: NEGATIVE for significant cough or SOB  BREAST: NEGATIVE for masses, tenderness or discharge  CV: NEGATIVE for chest pain, palpitations or peripheral edema  GI: NEGATIVE for nausea, abdominal pain, heartburn, or change in bowel habits  : NEGATIVE for frequency, dysuria, or hematuria  MUSCULOSKELETAL: NEGATIVE for significant arthralgias or myalgia  NEURO: NEGATIVE for weakness, dizziness or paresthesias  ENDOCRINE: NEGATIVE for temperature intolerance, skin/hair changes  HEME: NEGATIVE for bleeding problems  PSYCHIATRIC: NEGATIVE for changes in mood or affect      Objective    /68   Pulse 62   Temp 97.1  F (36.2  C) (Temporal)   Resp 14   Wt 77.8 kg (171 lb 8 oz)   SpO2 95%   BMI 26.86 kg/m    Body mass index is 26.86 kg/m .  Physical Exam   GENERAL: Appears tired a little bit washed out due to anemia EYES: Eyes grossly normal to inspection, PERRL and conjunctivae and sclerae normal  HENT: ear canals and TM's normal, nose and mouth without ulcers or lesions  NECK: no adenopathy, no asymmetry, masses, or scars  RESP: lungs clear to auscultation - no rales, rhonchi or wheezes  CV: regular rate and rhythm, normal S1 S2, no S3 or S4, no murmur, click or rub, no peripheral edema  ABDOMEN: soft, nontender, no hepatosplenomegaly, no masses and bowel sounds normal  MS: no gross musculoskeletal defects noted, no  edema  SKIN: no suspicious lesions or rashes  NEURO: Normal strength and tone, mentation intact and speech normal  PSYCH: mentation appears normal, affect normal/bright            Signed Electronically by: Mike Mcdowell MD

## 2024-11-29 DIAGNOSIS — M1A.00X0 IDIOPATHIC CHRONIC GOUT WITHOUT TOPHUS, UNSPECIFIED SITE: ICD-10-CM

## 2024-11-30 LAB — TESTOST SERPL-MCNC: 7 NG/DL (ref 240–950)

## 2024-12-02 RX ORDER — PROBENECID 500 MG/1
500 TABLET, FILM COATED ORAL 2 TIMES DAILY
Qty: 60 TABLET | Refills: 0 | Status: SHIPPED | OUTPATIENT
Start: 2024-12-02

## 2024-12-05 ENCOUNTER — TRANSFERRED RECORDS (OUTPATIENT)
Dept: HEALTH INFORMATION MANAGEMENT | Facility: CLINIC | Age: 84
End: 2024-12-05
Payer: COMMERCIAL

## 2024-12-23 DIAGNOSIS — F41.1 GENERALIZED ANXIETY DISORDER: Primary | ICD-10-CM

## 2024-12-23 RX ORDER — ESCITALOPRAM OXALATE 20 MG/1
20 TABLET ORAL EVERY MORNING
Qty: 90 TABLET | Refills: 0 | Status: SHIPPED | OUTPATIENT
Start: 2024-12-23

## 2024-12-28 DIAGNOSIS — I10 PRIMARY HYPERTENSION: ICD-10-CM

## 2024-12-30 ENCOUNTER — MYC MEDICAL ADVICE (OUTPATIENT)
Dept: FAMILY MEDICINE | Facility: CLINIC | Age: 84
End: 2024-12-30
Payer: COMMERCIAL

## 2024-12-30 DIAGNOSIS — I10 PRIMARY HYPERTENSION: ICD-10-CM

## 2024-12-30 RX ORDER — LISINOPRIL 40 MG/1
40 TABLET ORAL DAILY
Qty: 90 TABLET | Refills: 1 | Status: SHIPPED | OUTPATIENT
Start: 2024-12-30

## 2024-12-31 RX ORDER — LISINOPRIL 40 MG/1
40 TABLET ORAL DAILY
Qty: 90 TABLET | Refills: 1 | OUTPATIENT
Start: 2024-12-31

## 2025-01-04 ENCOUNTER — MYC MEDICAL ADVICE (OUTPATIENT)
Dept: FAMILY MEDICINE | Facility: CLINIC | Age: 85
End: 2025-01-04
Payer: COMMERCIAL

## 2025-01-04 DIAGNOSIS — D64.9 LOW HEMOGLOBIN: Primary | ICD-10-CM

## 2025-01-08 ENCOUNTER — LAB (OUTPATIENT)
Dept: LAB | Facility: CLINIC | Age: 85
End: 2025-01-08
Payer: COMMERCIAL

## 2025-01-08 DIAGNOSIS — D64.9 LOW HEMOGLOBIN: ICD-10-CM

## 2025-01-08 LAB
ERYTHROCYTE [DISTWIDTH] IN BLOOD BY AUTOMATED COUNT: 14.6 % (ref 10–15)
HCT VFR BLD AUTO: 29.6 % (ref 40–53)
HGB BLD-MCNC: 9.2 G/DL (ref 13.3–17.7)
MCH RBC QN AUTO: 29.8 PG (ref 26.5–33)
MCHC RBC AUTO-ENTMCNC: 31.1 G/DL (ref 31.5–36.5)
MCV RBC AUTO: 96 FL (ref 78–100)
PLATELET # BLD AUTO: 171 10E3/UL (ref 150–450)
RBC # BLD AUTO: 3.09 10E6/UL (ref 4.4–5.9)
WBC # BLD AUTO: 4.1 10E3/UL (ref 4–11)

## 2025-01-08 PROCEDURE — 36415 COLL VENOUS BLD VENIPUNCTURE: CPT

## 2025-01-08 PROCEDURE — 85027 COMPLETE CBC AUTOMATED: CPT

## 2025-01-09 DIAGNOSIS — D64.9 LOW HEMOGLOBIN: Primary | ICD-10-CM

## 2025-01-12 DIAGNOSIS — I10 BENIGN ESSENTIAL HYPERTENSION: ICD-10-CM

## 2025-01-13 RX ORDER — FUROSEMIDE 20 MG/1
20 TABLET ORAL DAILY
Qty: 90 TABLET | Refills: 0 | Status: SHIPPED | OUTPATIENT
Start: 2025-01-13

## 2025-01-16 ENCOUNTER — PATIENT OUTREACH (OUTPATIENT)
Dept: CARE COORDINATION | Facility: CLINIC | Age: 85
End: 2025-01-16
Payer: COMMERCIAL

## 2025-01-19 DIAGNOSIS — I48.91 NEW ONSET ATRIAL FIBRILLATION (H): ICD-10-CM

## 2025-01-21 RX ORDER — APIXABAN 2.5 MG/1
2.5 TABLET, FILM COATED ORAL 2 TIMES DAILY
Qty: 60 TABLET | Refills: 0 | Status: SHIPPED | OUTPATIENT
Start: 2025-01-21

## 2025-01-23 ENCOUNTER — LAB (OUTPATIENT)
Dept: LAB | Facility: CLINIC | Age: 85
End: 2025-01-23
Payer: COMMERCIAL

## 2025-01-23 DIAGNOSIS — D64.9 LOW HEMOGLOBIN: ICD-10-CM

## 2025-01-23 LAB
ERYTHROCYTE [DISTWIDTH] IN BLOOD BY AUTOMATED COUNT: 14.5 % (ref 10–15)
HCT VFR BLD AUTO: 26.4 % (ref 40–53)
HGB BLD-MCNC: 8 G/DL (ref 13.3–17.7)
MCH RBC QN AUTO: 28.6 PG (ref 26.5–33)
MCHC RBC AUTO-ENTMCNC: 30.3 G/DL (ref 31.5–36.5)
MCV RBC AUTO: 94 FL (ref 78–100)
PLATELET # BLD AUTO: 223 10E3/UL (ref 150–450)
RBC # BLD AUTO: 2.8 10E6/UL (ref 4.4–5.9)
WBC # BLD AUTO: 7.5 10E3/UL (ref 4–11)

## 2025-01-23 PROCEDURE — 36415 COLL VENOUS BLD VENIPUNCTURE: CPT

## 2025-01-23 PROCEDURE — 85027 COMPLETE CBC AUTOMATED: CPT

## 2025-01-30 ENCOUNTER — PATIENT OUTREACH (OUTPATIENT)
Dept: CARE COORDINATION | Facility: CLINIC | Age: 85
End: 2025-01-30
Payer: COMMERCIAL

## 2025-01-31 PROBLEM — Z92.3 HISTORY OF RADIATION THERAPY: Status: ACTIVE | Noted: 2025-01-31

## 2025-01-31 PROBLEM — R91.1 SOLITARY PULMONARY NODULE: Status: ACTIVE | Noted: 2025-01-31

## 2025-02-02 DIAGNOSIS — E11.69 TYPE 2 DIABETES MELLITUS WITH OTHER SPECIFIED COMPLICATION, WITHOUT LONG-TERM CURRENT USE OF INSULIN (H): ICD-10-CM

## 2025-02-02 DIAGNOSIS — M1A.00X0 IDIOPATHIC CHRONIC GOUT WITHOUT TOPHUS, UNSPECIFIED SITE: ICD-10-CM

## 2025-02-03 ENCOUNTER — MYC REFILL (OUTPATIENT)
Dept: FAMILY MEDICINE | Facility: CLINIC | Age: 85
End: 2025-02-03
Payer: COMMERCIAL

## 2025-02-03 DIAGNOSIS — E11.69 TYPE 2 DIABETES MELLITUS WITH OTHER SPECIFIED COMPLICATION, WITHOUT LONG-TERM CURRENT USE OF INSULIN (H): ICD-10-CM

## 2025-02-03 DIAGNOSIS — M1A.00X0 IDIOPATHIC CHRONIC GOUT WITHOUT TOPHUS, UNSPECIFIED SITE: ICD-10-CM

## 2025-02-03 RX ORDER — GLIMEPIRIDE 2 MG/1
2 TABLET ORAL
Qty: 90 TABLET | Refills: 0 | OUTPATIENT
Start: 2025-02-03

## 2025-02-03 RX ORDER — PROBENECID 500 MG/1
500 TABLET, FILM COATED ORAL 2 TIMES DAILY
Qty: 60 TABLET | Refills: 0 | OUTPATIENT
Start: 2025-02-03

## 2025-02-03 RX ORDER — GLIMEPIRIDE 2 MG/1
2 TABLET ORAL
Qty: 90 TABLET | Refills: 0 | Status: SHIPPED | OUTPATIENT
Start: 2025-02-03

## 2025-02-03 RX ORDER — PROBENECID 500 MG/1
500 TABLET, FILM COATED ORAL 2 TIMES DAILY
Qty: 60 TABLET | Refills: 0 | Status: SHIPPED | OUTPATIENT
Start: 2025-02-03

## 2025-02-05 DIAGNOSIS — E78.00 HYPERCHOLESTEREMIA: ICD-10-CM

## 2025-02-06 RX ORDER — EZETIMIBE 10 MG/1
10 TABLET ORAL DAILY
Qty: 90 TABLET | Refills: 0 | Status: SHIPPED | OUTPATIENT
Start: 2025-02-06

## 2025-02-17 ENCOUNTER — TRANSFERRED RECORDS (OUTPATIENT)
Dept: HEALTH INFORMATION MANAGEMENT | Facility: CLINIC | Age: 85
End: 2025-02-17
Payer: COMMERCIAL

## 2025-02-17 ENCOUNTER — MYC MEDICAL ADVICE (OUTPATIENT)
Dept: FAMILY MEDICINE | Facility: CLINIC | Age: 85
End: 2025-02-17
Payer: COMMERCIAL

## 2025-02-17 DIAGNOSIS — I48.91 NEW ONSET ATRIAL FIBRILLATION (H): ICD-10-CM

## 2025-02-17 RX ORDER — APIXABAN 2.5 MG/1
2.5 TABLET, FILM COATED ORAL 2 TIMES DAILY
Qty: 60 TABLET | Refills: 0 | Status: SHIPPED | OUTPATIENT
Start: 2025-02-17 | End: 2025-02-18

## 2025-02-18 ENCOUNTER — DOCUMENTATION ONLY (OUTPATIENT)
Dept: ANTICOAGULATION | Facility: CLINIC | Age: 85
End: 2025-02-18
Payer: COMMERCIAL

## 2025-02-18 NOTE — PROGRESS NOTES
Anticoagulant Therapeutic Duplication    Duplicate orders identified: identical order(s)    The duplicate anticoagulant order(s) has been discontinued    Active anticoagulant: apixaban (Eliquis)    Plan made per [facility] anticoagulation protocol.    Jadiel Shaver RN  2/18/2025

## 2025-02-25 ENCOUNTER — TELEPHONE (OUTPATIENT)
Dept: PHARMACY | Facility: OTHER | Age: 85
End: 2025-02-25
Payer: COMMERCIAL

## 2025-02-25 NOTE — TELEPHONE ENCOUNTER
[facility] Recruitment: [facility] insurance     Referral outreach attempt #1 on February 25, 2025      Outcome: patient opted out    Sue Montemayor [facility]  -[facility]  [phone]

## 2025-03-01 ENCOUNTER — HEALTH MAINTENANCE LETTER (OUTPATIENT)
Age: 85
End: 2025-03-01

## 2025-03-03 ENCOUNTER — HOSPITAL ENCOUNTER (OUTPATIENT)
Dept: ULTRASOUND IMAGING | Facility: CLINIC | Age: 85
Discharge: HOME | End: 2025-03-03
Attending: INTERNAL MEDICINE | Admitting: INTERNAL MEDICINE
Payer: COMMERCIAL

## 2025-03-03 DIAGNOSIS — C61 PROSTATE CANCER (H): ICD-10-CM

## 2025-03-03 PROCEDURE — 76536 US EXAM OF HEAD AND NECK: CPT

## 2025-03-05 ENCOUNTER — HOSPITAL ENCOUNTER (INPATIENT)
Facility: HOSPITAL | Age: 85
DRG: 393 | End: 2025-03-05
Attending: EMERGENCY MEDICINE | Admitting: HOSPITALIST
Payer: COMMERCIAL

## 2025-03-05 ENCOUNTER — APPOINTMENT (OUTPATIENT)
Dept: CT IMAGING | Facility: HOSPITAL | Age: 85
DRG: 393 | End: 2025-03-05
Attending: EMERGENCY MEDICINE
Payer: COMMERCIAL

## 2025-03-05 ENCOUNTER — TRANSFERRED RECORDS (OUTPATIENT)
Dept: HEALTH INFORMATION MANAGEMENT | Facility: CLINIC | Age: 85
End: 2025-03-05

## 2025-03-05 DIAGNOSIS — D64.9 ANEMIA, UNSPECIFIED TYPE: ICD-10-CM

## 2025-03-05 DIAGNOSIS — M06.4 INFLAMMATORY POLYARTHROPATHY (H): ICD-10-CM

## 2025-03-05 DIAGNOSIS — K62.5 BRBPR (BRIGHT RED BLOOD PER RECTUM): ICD-10-CM

## 2025-03-05 DIAGNOSIS — R06.09 DYSPNEA ON EXERTION: ICD-10-CM

## 2025-03-05 DIAGNOSIS — R91.1 SOLITARY PULMONARY NODULE: ICD-10-CM

## 2025-03-05 DIAGNOSIS — C61 PRIMARY MALIGNANT NEOPLASM OF PROSTATE (H): Primary | ICD-10-CM

## 2025-03-05 DIAGNOSIS — I48.91 NEW ONSET ATRIAL FIBRILLATION (H): ICD-10-CM

## 2025-03-05 DIAGNOSIS — I10 ESSENTIAL HYPERTENSION, BENIGN: ICD-10-CM

## 2025-03-05 LAB
ABO + RH BLD: NORMAL
ALBUMIN SERPL BCG-MCNC: 3.6 G/DL (ref 3.5–5.2)
ALP SERPL-CCNC: 106 U/L (ref 40–150)
ALT SERPL W P-5'-P-CCNC: 15 U/L (ref 0–70)
ANION GAP SERPL CALCULATED.3IONS-SCNC: 10 MMOL/L (ref 7–15)
APTT PPP: 42 SECONDS (ref 22–38)
AST SERPL W P-5'-P-CCNC: 29 U/L (ref 0–45)
BASOPHILS # BLD AUTO: 0 10E3/UL (ref 0–0.2)
BASOPHILS NFR BLD AUTO: 0 %
BILIRUB SERPL-MCNC: 0.4 MG/DL
BLD GP AB SCN SERPL QL: NEGATIVE
BLD PROD TYP BPU: NORMAL
BLOOD COMPONENT TYPE: NORMAL
BUN SERPL-MCNC: 14.6 MG/DL (ref 8–23)
CALCIUM SERPL-MCNC: 8.9 MG/DL (ref 8.8–10.4)
CHLORIDE SERPL-SCNC: 100 MMOL/L (ref 98–107)
CODING SYSTEM: NORMAL
CREAT SERPL-MCNC: 0.99 MG/DL (ref 0.67–1.17)
CROSSMATCH: NORMAL
EGFRCR SERPLBLD CKD-EPI 2021: 76 ML/MIN/1.73M2
EOSINOPHIL # BLD AUTO: 0.1 10E3/UL (ref 0–0.7)
EOSINOPHIL NFR BLD AUTO: 2 %
ERYTHROCYTE [DISTWIDTH] IN BLOOD BY AUTOMATED COUNT: 16.4 % (ref 10–15)
GLUCOSE SERPL-MCNC: 84 MG/DL (ref 70–99)
HCO3 SERPL-SCNC: 27 MMOL/L (ref 22–29)
HCT VFR BLD AUTO: 21.3 % (ref 40–53)
HCT VFR BLD AUTO: 24.7 % (ref 40–53)
HEMOCCULT STL QL: POSITIVE
HGB BLD-MCNC: 6.2 G/DL (ref 13.3–17.7)
HGB BLD-MCNC: 7.6 G/DL (ref 13.3–17.7)
IMM GRANULOCYTES # BLD: 0.1 10E3/UL
IMM GRANULOCYTES NFR BLD: 1 %
INR PPP: 1.62 (ref 0.85–1.15)
ISSUE DATE AND TIME: NORMAL
LYMPHOCYTES # BLD AUTO: 0.8 10E3/UL (ref 0.8–5.3)
LYMPHOCYTES NFR BLD AUTO: 11 %
MCH RBC QN AUTO: 24.6 PG (ref 26.5–33)
MCHC RBC AUTO-ENTMCNC: 29.1 G/DL (ref 31.5–36.5)
MCV RBC AUTO: 85 FL (ref 78–100)
MONOCYTES # BLD AUTO: 0.8 10E3/UL (ref 0–1.3)
MONOCYTES NFR BLD AUTO: 11 %
NEUTROPHILS # BLD AUTO: 5.6 10E3/UL (ref 1.6–8.3)
NEUTROPHILS NFR BLD AUTO: 77 %
NRBC # BLD AUTO: 0 10E3/UL
NRBC BLD AUTO-RTO: 0 /100
NT-PROBNP SERPL-MCNC: 2410 PG/ML (ref 0–1800)
PLATELET # BLD AUTO: 229 10E3/UL (ref 150–450)
POTASSIUM SERPL-SCNC: 4.2 MMOL/L (ref 3.4–5.3)
PROT SERPL-MCNC: 6.6 G/DL (ref 6.4–8.3)
RBC # BLD AUTO: 2.52 10E6/UL (ref 4.4–5.9)
SODIUM SERPL-SCNC: 137 MMOL/L (ref 135–145)
SPECIMEN EXP DATE BLD: NORMAL
UNIT ABO/RH: NORMAL
UNIT NUMBER: NORMAL
UNIT STATUS: NORMAL
UNIT TYPE ISBT: 6200
WBC # BLD AUTO: 7.3 10E3/UL (ref 4–11)

## 2025-03-05 PROCEDURE — 99223 1ST HOSP IP/OBS HIGH 75: CPT | Performed by: HOSPITALIST

## 2025-03-05 PROCEDURE — 86900 BLOOD TYPING SEROLOGIC ABO: CPT | Performed by: EMERGENCY MEDICINE

## 2025-03-05 PROCEDURE — 85730 THROMBOPLASTIN TIME PARTIAL: CPT | Performed by: EMERGENCY MEDICINE

## 2025-03-05 PROCEDURE — 86923 COMPATIBILITY TEST ELECTRIC: CPT | Performed by: EMERGENCY MEDICINE

## 2025-03-05 PROCEDURE — 86850 RBC ANTIBODY SCREEN: CPT | Performed by: EMERGENCY MEDICINE

## 2025-03-05 PROCEDURE — 80053 COMPREHEN METABOLIC PANEL: CPT | Performed by: EMERGENCY MEDICINE

## 2025-03-05 PROCEDURE — 82272 OCCULT BLD FECES 1-3 TESTS: CPT | Performed by: EMERGENCY MEDICINE

## 2025-03-05 PROCEDURE — 85018 HEMOGLOBIN: CPT | Performed by: HOSPITALIST

## 2025-03-05 PROCEDURE — 71275 CT ANGIOGRAPHY CHEST: CPT

## 2025-03-05 PROCEDURE — 85025 COMPLETE CBC W/AUTO DIFF WBC: CPT | Performed by: EMERGENCY MEDICINE

## 2025-03-05 PROCEDURE — 250N000011 HC RX IP 250 OP 636: Performed by: EMERGENCY MEDICINE

## 2025-03-05 PROCEDURE — 85610 PROTHROMBIN TIME: CPT | Performed by: EMERGENCY MEDICINE

## 2025-03-05 PROCEDURE — 99291 CRITICAL CARE FIRST HOUR: CPT | Mod: 25

## 2025-03-05 PROCEDURE — P9016 RBC LEUKOCYTES REDUCED: HCPCS | Performed by: EMERGENCY MEDICINE

## 2025-03-05 PROCEDURE — 86923 COMPATIBILITY TEST ELECTRIC: CPT | Performed by: INTERNAL MEDICINE

## 2025-03-05 PROCEDURE — 36415 COLL VENOUS BLD VENIPUNCTURE: CPT | Performed by: EMERGENCY MEDICINE

## 2025-03-05 PROCEDURE — 93005 ELECTROCARDIOGRAM TRACING: CPT | Performed by: EMERGENCY MEDICINE

## 2025-03-05 PROCEDURE — 36430 TRANSFUSION BLD/BLD COMPNT: CPT

## 2025-03-05 PROCEDURE — 83880 ASSAY OF NATRIURETIC PEPTIDE: CPT | Performed by: HOSPITALIST

## 2025-03-05 PROCEDURE — 120N000001 HC R&B MED SURG/OB

## 2025-03-05 PROCEDURE — 250N000011 HC RX IP 250 OP 636: Performed by: PHYSICIAN ASSISTANT

## 2025-03-05 PROCEDURE — 36415 COLL VENOUS BLD VENIPUNCTURE: CPT | Performed by: HOSPITALIST

## 2025-03-05 RX ORDER — AMOXICILLIN 250 MG
2 CAPSULE ORAL 2 TIMES DAILY PRN
Status: DISCONTINUED | OUTPATIENT
Start: 2025-03-05 | End: 2025-03-09 | Stop reason: HOSPADM

## 2025-03-05 RX ORDER — LIDOCAINE 40 MG/G
CREAM TOPICAL
Status: DISCONTINUED | OUTPATIENT
Start: 2025-03-05 | End: 2025-03-09 | Stop reason: HOSPADM

## 2025-03-05 RX ORDER — PROBENECID 500 MG/1
500 TABLET, FILM COATED ORAL EVERY MORNING
COMMUNITY

## 2025-03-05 RX ORDER — FUROSEMIDE 20 MG/1
20 TABLET ORAL DAILY
Status: DISCONTINUED | OUTPATIENT
Start: 2025-03-06 | End: 2025-03-09 | Stop reason: HOSPADM

## 2025-03-05 RX ORDER — HYDROXYZINE HYDROCHLORIDE 10 MG/5ML
4 SYRUP ORAL EVERY 6 HOURS PRN
Status: DISCONTINUED | OUTPATIENT
Start: 2025-03-05 | End: 2025-03-09 | Stop reason: HOSPADM

## 2025-03-05 RX ORDER — EZETIMIBE 10 MG/1
10 TABLET ORAL EVERY EVENING
Status: DISCONTINUED | OUTPATIENT
Start: 2025-03-06 | End: 2025-03-09 | Stop reason: HOSPADM

## 2025-03-05 RX ORDER — MULTIVIT WITH MINERALS/LUTEIN
250 TABLET ORAL DAILY
Status: DISCONTINUED | OUTPATIENT
Start: 2025-03-06 | End: 2025-03-09 | Stop reason: HOSPADM

## 2025-03-05 RX ORDER — SIMVASTATIN 10 MG
40 TABLET ORAL EVERY EVENING
Status: DISCONTINUED | OUTPATIENT
Start: 2025-03-06 | End: 2025-03-09 | Stop reason: HOSPADM

## 2025-03-05 RX ORDER — PLANT STANOL ESTER 450 MG
2.5 TABLET ORAL DAILY
Status: DISCONTINUED | OUTPATIENT
Start: 2025-03-06 | End: 2025-03-09 | Stop reason: HOSPADM

## 2025-03-05 RX ORDER — LISINOPRIL 20 MG/1
40 TABLET ORAL DAILY
Status: DISCONTINUED | OUTPATIENT
Start: 2025-03-06 | End: 2025-03-09 | Stop reason: HOSPADM

## 2025-03-05 RX ORDER — VITAMIN B COMPLEX
25 TABLET ORAL DAILY
Status: DISCONTINUED | OUTPATIENT
Start: 2025-03-06 | End: 2025-03-09 | Stop reason: HOSPADM

## 2025-03-05 RX ORDER — OMEPRAZOLE 40 MG/1
40 CAPSULE, DELAYED RELEASE ORAL DAILY
COMMUNITY

## 2025-03-05 RX ORDER — ACETAMINOPHEN 325 MG/1
650 TABLET ORAL EVERY 4 HOURS PRN
Status: DISCONTINUED | OUTPATIENT
Start: 2025-03-05 | End: 2025-03-09 | Stop reason: HOSPADM

## 2025-03-05 RX ORDER — IOPAMIDOL 755 MG/ML
90 INJECTION, SOLUTION INTRAVASCULAR ONCE
Status: COMPLETED | OUTPATIENT
Start: 2025-03-05 | End: 2025-03-05

## 2025-03-05 RX ORDER — PROCHLORPERAZINE MALEATE 5 MG/1
5 TABLET ORAL EVERY 6 HOURS PRN
Status: DISCONTINUED | OUTPATIENT
Start: 2025-03-05 | End: 2025-03-09 | Stop reason: HOSPADM

## 2025-03-05 RX ORDER — AMLODIPINE BESYLATE 5 MG/1
10 TABLET ORAL DAILY
Status: DISCONTINUED | OUTPATIENT
Start: 2025-03-06 | End: 2025-03-09 | Stop reason: HOSPADM

## 2025-03-05 RX ORDER — ATENOLOL 25 MG/1
50 TABLET ORAL DAILY
Status: DISCONTINUED | OUTPATIENT
Start: 2025-03-06 | End: 2025-03-09 | Stop reason: HOSPADM

## 2025-03-05 RX ORDER — VITAMIN E (DL,TOCOPHERYL ACET) 90 MG
400 CAPSULE ORAL DAILY
Status: DISCONTINUED | OUTPATIENT
Start: 2025-03-06 | End: 2025-03-09 | Stop reason: HOSPADM

## 2025-03-05 RX ORDER — CALCIUM CARBONATE 500 MG/1
1000 TABLET, CHEWABLE ORAL 4 TIMES DAILY PRN
Status: DISCONTINUED | OUTPATIENT
Start: 2025-03-05 | End: 2025-03-09 | Stop reason: HOSPADM

## 2025-03-05 RX ORDER — ESCITALOPRAM OXALATE 10 MG/1
20 TABLET ORAL EVERY MORNING
Status: DISCONTINUED | OUTPATIENT
Start: 2025-03-06 | End: 2025-03-09 | Stop reason: HOSPADM

## 2025-03-05 RX ORDER — PROBENECID 500 MG/1
500 TABLET, FILM COATED ORAL EVERY MORNING
Status: DISCONTINUED | OUTPATIENT
Start: 2025-03-06 | End: 2025-03-09 | Stop reason: HOSPADM

## 2025-03-05 RX ORDER — AMOXICILLIN 250 MG
1 CAPSULE ORAL 2 TIMES DAILY PRN
Status: DISCONTINUED | OUTPATIENT
Start: 2025-03-05 | End: 2025-03-09 | Stop reason: HOSPADM

## 2025-03-05 RX ADMIN — PANTOPRAZOLE SODIUM 40 MG: 40 INJECTION, POWDER, FOR SOLUTION INTRAVENOUS at 20:14

## 2025-03-05 RX ADMIN — IOPAMIDOL 90 ML: 755 INJECTION, SOLUTION INTRAVENOUS at 12:47

## 2025-03-05 ASSESSMENT — ACTIVITIES OF DAILY LIVING (ADL)
ADLS_ACUITY_SCORE: 56
ADLS_ACUITY_SCORE: 36
ADLS_ACUITY_SCORE: 36
ADLS_ACUITY_SCORE: 56
DEPENDENT_IADLS:: TRANSPORTATION;SHOPPING
ADLS_ACUITY_SCORE: 36
ADLS_ACUITY_SCORE: 56

## 2025-03-05 NOTE — CONSULTS
"Care Management Initial Consult    General Information  Assessment completed with: Jabari Suarez  Type of CM/SW Visit: Initial Assessment    Primary Care Provider verified and updated as needed: Yes   Readmission within the last 30 days: no previous admission in last 30 days      Reason for Consult: discharge planning  Advance Care Planning: Advance Care Planning Reviewed: no concerns identified (\"I think I have one at home. My doctor doesn't have a copy either\".)          Communication Assessment  Patient's communication style: spoken language (English or Bilingual)             Cognitive  Cognitive/Neuro/Behavioral: WDL                      Living Environment:   People in home: alone     Current living Arrangements: house (\"I do have to use about 12 steps in the house. I just stop and rest when I need to\".)      Able to return to prior arrangements: yes       Family/Social Support:  Care provided by: self  Provides care for: no one  Marital Status:   Support system: Children          Description of Support System: Supportive, Involved    Support Assessment: Adequate family and caregiver support, Adequate social supports, Patient communicates needs well met    Current Resources:   Patient receiving home care services: No        Community Resources: Other (see comment) ([facility] for cancer care treatment.)  Equipment currently used at home: cane, straight (\"I sometimes use my cane if I am out at the drug store, but that is about the only shopping I do anymore. I don't need the cane inside my house\".)  Supplies currently used at home: Other (\"dentures, glasses\")    Employment/Financial:  Employment Status: retired     Employment/ Comments: \"no  history\"  Financial Concerns: none   Referral to Financial Worker: No       Does the patient's insurance plan have a 3 day qualifying hospital stay waiver?  Yes     Which insurance plan 3 day waiver is available? Alternative insurance waiver    Will " the waiver be used for post-acute placement? Undetermined at this time    Lifestyle & Psychosocial Needs:  Social Drivers of Health     Food Insecurity: Low Risk  (11/19/2024)    Food Insecurity     Within the past 12 months, did you worry that your food would run out before you got money to buy more?: No     Within the past 12 months, did the food you bought just not last and you didn t have money to get more?: No   Depression: Not at risk (2/15/2024)    PHQ-2     PHQ-2 Score: 0   Housing Stability: Low Risk  (11/19/2024)    Housing Stability     Do you have housing? : Yes     Are you worried about losing your housing?: No   Tobacco Use: Medium Risk (11/27/2024)    Patient History     Smoking Tobacco Use: Former     Smokeless Tobacco Use: Never     Passive Exposure: Not on file   Financial Resource Strain: Low Risk  (11/19/2024)    Financial Resource Strain     Within the past 12 months, have you or your family members you live with been unable to get utilities (heat, electricity) when it was really needed?: No   Alcohol Use: Not on file   Transportation Needs: Low Risk  (11/19/2024)    Transportation Needs     Within the past 12 months, has lack of transportation kept you from medical appointments, getting your medicines, non-medical meetings or appointments, work, or from getting things that you need?: No   Physical Activity: Unknown (2/15/2024)    Exercise Vital Sign     Days of Exercise per Week: 0 days     Minutes of Exercise per Session: Not on file   Interpersonal Safety: Low Risk  (11/19/2024)    Interpersonal Safety     Do you feel physically and emotionally safe where you currently live?: Yes     Within the past 12 months, have you been hit, slapped, kicked or otherwise physically hurt by someone?: No     Within the past 12 months, have you been humiliated or emotionally abused in other ways by your partner or ex-partner?: No   Stress: No Stress Concern Present (2/15/2024)    [facility] of  "Occupational Health - Occupational Stress Questionnaire     Feeling of Stress : Not at all   Social Connections: Unknown (2/15/2024)    Social Connection and Isolation Panel [NHANES]     Frequency of Communication with Friends and Family: Not on file     Frequency of Social Gatherings with Friends and Family: Once a week     Attends Latter day Services: Not on file     Active Member of Clubs or Organizations: Not on file     Attends Club or Organization Meetings: Not on file     Marital Status: Not on file   Health Literacy: Not on file       Functional Status:  Prior to admission patient needed assistance:   Dependent ADLs:: Ambulation-cane, Independent  Dependent IADLs:: Transportation, Shopping (\"I do a little driving still, but family helps as needed with transportation and shopping\".)  Assesssment of Functional Status:  (unknown at this time)    Mental Health Status:  Mental Health Status: Past Concern  Mental Health Management: Medication    Chemical Dependency Status:  Chemical Dependency Status: No Current Concerns             Values/Beliefs:  Spiritual, Cultural Beliefs, Latter day Practices, Values that affect care: no       Cultural/Latter day Practices Patient Routinely Participates In: ceremony, prayer  Values/Beliefs Comment: Kori    Discussed  Partnership in Safe Discharge Planning  document with patient/family: No    Additional Information:  Jabari lives in a house alone. \"I do have to use about 12 steps in the house. I just stop and rest when I need to, but otherwise I use them ok\".    He is independent with ADLs and gets assistance with some IADLs. \"I do a little driving still, but family helps as needed with transportation and shopping. I sometimes use my cane if I am out at the drug store, but that is about the only shopping I do anymore. I don't need the cane inside my house\".    Ride: Family: Son    CM to follow for medical progression of care, discharge recommendations, and final discharge " "plan. Writer verified patient demographics and updated any changes needed in the patient chart.    Next Steps:   Medical plan/delay: GI consulted. Awaiting PT/OT recommendations.    Dispo: Home with unknown needs at this time. \"I hope I qualify for oxygen at discharge, because I think I need it at home\".    CM to do: Awaiting PT/OT recommendations to see if home care referral is needed.    Perla Sotelo RN    "

## 2025-03-05 NOTE — ED PROVIDER NOTES
EMERGENCY DEPARTMENT ENCOUNTER      NAME: Jabari Haddad  AGE: 84 year old male  YOB: 1940  MRN: [id number]  EVALUATION DATE & TIME: No admission date for patient encounter.    PCP: Mike Mcdowell    ED PROVIDER: Sandra Triana DO      Chief Complaint   Patient presents with    Abnormal Labs         FINAL IMPRESSION:  1. Anemia, unspecified type    2. Dyspnea on exertion          ED COURSE & MEDICAL DECISION MAKING:    Pertinent Labs & Imaging studies reviewed. (See chart for details)  11:06 AM I met the patient and performed my initial interview and exam.  11:58 AM Critical hemoglobin 6.2  12:00 PM Rectal performed, patient consented for blood  12:15 PM We are at capacity, transfer in system order placed  12:27 PM SOC called with report of no beds in system, will plan to board here.  1:35 PM Discussed admission with the Hospitalist, Dr. Vallejo.    84 year old male presents to the Emergency Department for evaluation of low hemoglobin.  Patient with history of prostate cancer on immunotherapy.  Notes progressive shortness of breath over the past several weeks.  Reportedly critical low hemoglobin at clinic today of 6.5.  He reports he takes iron supplementation.  Notes some dark stools but attributes that to his iron.  At times will have light pink liquid drainage from his rectum as well.  Patient admitted in November, this chart was reviewed.  His hemoglobin and rectal bleeding.  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  He held his Eliquis for 5 days and then resumed at 2.5 mg twice daily.  Last dose this morning.  Denies need for blood transfusion this admission.  No chest pain.  No cough or fever.  Hemoglobin here 6.2.  Patient is quite pale and dyspneic.  Saturating 98% on room air.  Given his history of cancer and dyspnea will make sure there is no evidence of PE or ACS.  EKG without evidence of arrhythmia or ischemia.  CT of the  chest without acute abnormality.  Outside of anemia, labs reassuring.  Rectal exam here with noted external hemorrhoids, no pain, no stool or gross blood in rectum.  Scant amount of pink tinged discharge.  Given his anemia and dyspnea and need for transfusion, will admit.    At the conclusion of the encounter I discussed the results of all of the tests and the disposition. The questions were answered. The patient or family acknowledged understanding and was agreeable with the care plan.     Medical Decision Making  Obtained supplemental history:Supplemental history obtained?: Documented in chart  Reviewed external records: External records reviewed?: Documented in chart  Care impacted by chronic illness:Documented in Chart  Did you consider but not order tests?: Work up considered but not performed and documented in chart, if applicable  Did you interpret images independently?: Independent interpretation of ECG and images noted in documentation, when applicable.  Consultation discussion with other provider:Did you involve another provider (consultant, , pharmacy, etc.)?: No  Admit.    MIPS (CTPE, Dental pain, Laguerre, Sinusitis, Asthma/COPD, Head Trauma): CT Pulmonary Angiogram:Patient is moderate to high risk for PE.        Critical Care     Performed by: Dr Bettye Triana  Authorized by: Dr Bettye Triana  Total critical care time: 31 minutes  Critical care was necessary to treat or prevent imminent or life-threatening deterioration of the following conditions: Anemia requiring blood transfusion  Critical care was time spent personally by me on the following activities: development of treatment plan with patient or surrogate, discussions with consultants, examination of patient, evaluation of patient's response to treatment, obtaining history from patient or surrogate, ordering and performing treatments and interventions, ordering and review of laboratory studies, ordering and review of radiographic studies,  re-evaluation of patient's condition and monitoring for potential decompensation.  Critical care time was exclusive of separately billable procedures and treating other patients.      MEDICATIONS GIVEN IN THE EMERGENCY:  Medications   iopamidol (ISOVUE-370) solution 90 mL (90 mLs Intravenous $Given 3/5/25 4000)       NEW PRESCRIPTIONS STARTED AT TODAY'S ER VISIT  New Prescriptions    No medications on file          =================================================================    HPI    Patient information was obtained from: Patient and chart review    Use of : N/A         Jabari Haddad is a 84 year old male with a pertinent history of atrial fibrillation, DM, prostate cancer, anemia who presents to this ED for evaluation of of breath and low hemoglobin.  Patient reports progressive shortness of breath over the past several weeks.  Now to the point he cannot walk more than 10 feet.  Notes swelling to both his legs.  No cough, fever.  Also notes he feels dizzy when he goes from sitting to standing.  Reports black stools but attributes that to iron use.  At times will have pink tinge liquid come out of his rectum.  Reports he went to [facility] today and they did blood work which showed hemoglobin of 6.5.      REVIEW OF SYSTEMS   Per HPI    PAST MEDICAL HISTORY:  Past Medical History:   Diagnosis Date    Anemia     Atrial fibrillation (H)     Depression     Diabetes mellitus (H)     History of blood transfusion     HLD (hyperlipidemia)     HTN (hypertension)     Prostate cancer (H)        PAST SURGICAL HISTORY:  Past Surgical History:   Procedure Laterality Date    BONY PELVIS SURGERY      COLONOSCOPY N/A 11/21/2024    Procedure: COLONOSCOPY;  Surgeon: Roma Richmond MD;  Location: [facility]    ESOPHAGOSCOPY, GASTROSCOPY, DUODENOSCOPY (EGD), COMBINED N/A 11/21/2024    Procedure: ESOPHAGOGASTRODUODENOSCOPY;  Surgeon: Roma Richmond MD;  Location: [facility] OR           CURRENT  MEDICATIONS:    acetaminophen (TYLENOL) 500 MG tablet  amLODIPine (NORVASC) 10 MG tablet  apixaban ANTICOAGULANT (ELIQUIS ANTICOAGULANT) 2.5 MG tablet  ascorbic acid (VITAMIN C) 250 MG CHEW chewable tablet  atenolol (TENORMIN) 50 MG tablet  chlorpheniramine (CHLOR-TRIMETON) 4 MG tablet  escitalopram (LEXAPRO) 20 MG tablet  ezetimibe (ZETIA) 10 MG tablet  ferrous sulfate (FEROSUL) 325 (65 Fe) MG tablet  furosemide (LASIX) 20 MG tablet  glimepiride (AMARYL) 2 MG tablet  leuprolide, 3 Month, (ELIGARD) 22.5 MG  lisinopril (ZESTRIL) 40 MG tablet  omeprazole (PRILOSEC) 40 MG DR capsule  potassium gluconate 2.5 MEQ tablet  pramox-pe-glycerin-petrolatum (PREPARATION H) 1-0.25-14.4-15 % CREA cream  probenecid (BENEMID) 500 MG tablet  simvastatin (ZOCOR) 40 MG tablet  sucralfate (CARAFATE) 1 GM/10ML suspension  Vitamin D3 (CHOLECALCIFEROL) 25 mcg (1000 units) tablet  vitamin E 400 units TABS         ALLERGIES:  No Known Allergies    FAMILY HISTORY:  Family History   Problem Relation Age of Onset    Arthritis Other     Rheumatoid Arthritis Other     Lupus Other     Osteoporosis Other     Cerebrovascular Disease Other     Hypertension Other        SOCIAL HISTORY:   Social History     Socioeconomic History    Marital status:    Tobacco Use    Smoking status: Former     Current packs/day: 0.00     Types: Cigarettes     Quit date: 1967     Years since quittin.2    Smokeless tobacco: Never   Vaping Use    Vaping status: Never Used   Substance and Sexual Activity    Alcohol use: Not Currently     Alcohol/week: 8.3 - 10.0 standard drinks of alcohol     Types: 8 - 10 Standard drinks or equivalent per week    Drug use: Never   Social History Narrative    1/13/15- The patient lives alone.      Social Drivers of Health     Financial Resource Strain: Low Risk  (2024)    Financial Resource Strain     Within the past 12 months, have you or your family members you live with been unable to get utilities (heat,  electricity) when it was really needed?: No   Food Insecurity: Low Risk  (11/19/2024)    Food Insecurity     Within the past 12 months, did you worry that your food would run out before you got money to buy more?: No     Within the past 12 months, did the food you bought just not last and you didn t have money to get more?: No   Transportation Needs: Low Risk  (11/19/2024)    Transportation Needs     Within the past 12 months, has lack of transportation kept you from medical appointments, getting your medicines, non-medical meetings or appointments, work, or from getting things that you need?: No   Physical Activity: Unknown (2/15/2024)    Exercise Vital Sign     Days of Exercise per Week: 0 days   Stress: No Stress Concern Present (2/15/2024)    Citizen of [place] of Occupational Health - Occupational Stress Questionnaire     Feeling of Stress : Not at all   Social Connections: Unknown (2/15/2024)    Social Connection and Isolation Panel [NHANES]     Frequency of Social Gatherings with Friends and Family: Once a week   Interpersonal Safety: Low Risk  (11/19/2024)    Interpersonal Safety     Do you feel physically and emotionally safe where you currently live?: Yes     Within the past 12 months, have you been hit, slapped, kicked or otherwise physically hurt by someone?: No     Within the past 12 months, have you been humiliated or emotionally abused in other ways by your partner or ex-partner?: No   Housing Stability: Low Risk  (11/19/2024)    Housing Stability     Do you have housing? : Yes     Are you worried about losing your housing?: No       VITALS:  /64   Pulse 70   Temp 98.3  F (36.8  C) (Oral)   Resp 24   Wt 79.4 kg (175 lb)   SpO2 94%   BMI 27.41 kg/m      PHYSICAL EXAM    Physical Exam  Constitutional:       General: He is not in acute distress.     Appearance: He is ill-appearing. He is not toxic-appearing.   HENT:      Head: Normocephalic and atraumatic.      Mouth/Throat:      Pharynx:  Oropharynx is clear.   Eyes:      Pupils: Pupils are equal, round, and reactive to light.   Cardiovascular:      Rate and Rhythm: Normal rate. Rhythm irregularly irregular.      Pulses: Normal pulses.      Heart sounds: Normal heart sounds.   Pulmonary:      Effort: Tachypnea present.      Breath sounds: Normal breath sounds.   Abdominal:      General: Abdomen is flat. Bowel sounds are normal.      Palpations: Abdomen is soft.      Tenderness: There is no abdominal tenderness.   Genitourinary:     Rectum: External hemorrhoid present. No mass, tenderness or anal fissure.      Comments: Scant amount of pink tinged discharge in rectum  Musculoskeletal:         General: Normal range of motion.      Right lower leg: Edema present.      Left lower leg: Edema present.   Skin:     General: Skin is warm and dry.      Capillary Refill: Capillary refill takes less than 2 seconds.      Coloration: Skin is pale.   Neurological:      General: No focal deficit present.      Mental Status: He is alert and oriented to person, place, and time.           LAB:  All pertinent labs reviewed and interpreted.  Labs Ordered and Resulted from Time of ED Arrival to Time of ED Departure   INR - Abnormal       Result Value    INR 1.62 (*)    PARTIAL THROMBOPLASTIN TIME - Abnormal    aPTT 42 (*)    OCCULT BLOOD STOOL - Abnormal    Occult Blood Positive (*)    CBC WITH PLATELETS AND DIFFERENTIAL - Abnormal    WBC Count 7.3      RBC Count 2.52 (*)     Hemoglobin 6.2 (*)     Hematocrit 21.3 (*)     MCV 85      MCH 24.6 (*)     MCHC 29.1 (*)     RDW 16.4 (*)     Platelet Count 229      % Neutrophils 77      % Lymphocytes 11      % Monocytes 11      % Eosinophils 2      % Basophils 0      % Immature Granulocytes 1      NRBCs per 100 WBC 0      Absolute Neutrophils 5.6      Absolute Lymphocytes 0.8      Absolute Monocytes 0.8      Absolute Eosinophils 0.1      Absolute Basophils 0.0      Absolute Immature Granulocytes 0.1      Absolute NRBCs 0.0      COMPREHENSIVE METABOLIC PANEL - Normal    Sodium 137      Potassium 4.2      Carbon Dioxide (CO2) 27      Anion Gap 10      Urea Nitrogen 14.6      Creatinine 0.99      GFR Estimate 76      Calcium 8.9      Chloride 100      Glucose 84      Alkaline Phosphatase 106      AST 29      ALT 15      Protein Total 6.6      Albumin 3.6      Bilirubin Total 0.4     TYPE AND SCREEN, ADULT    ABO/RH(D) A POS      Antibody Screen Negative      SPECIMEN EXPIRATION DATE [id number]     PREPARE RED BLOOD CELLS (UNIT)    Blood Component Type Red Blood Cells      Product Code S1682F04      Unit Status Transfused      Unit Number [id number]      CROSSMATCH Compatible      CODING SYSTEM [id number]      ISSUE DATE AND TIME [id number]      UNIT ABO/RH A+      UNIT TYPE ISBT 6200     PREPARE RED BLOOD CELLS (UNIT)   TRANSFUSE RED BLOOD CELLS (UNIT)   ABO/RH TYPE AND SCREEN       RADIOLOGY:  Reviewed all pertinent imaging. Please see official radiology report.  CT Chest Pulmonary Embolism w Contrast   Final Result   IMPRESSION:   1.  Negative for pulmonary embolus.   2.  No acute airspace opacity or pleural effusion.   3.  Decreasing size of the irregular shaped subpleural nodule left lower lobe. Given the patient's age and existing comorbidities, no follow-up is required.   4.  Additional pulmonary nodules are stable.   5.  Extensive calcified pleural plaques again noted.   6.  Stable compression deformities thoracic and upper lumbar spine.   7.  Cardiomegaly. Moderate coronary artery calcification.   8.  Sludge and/or stones within the gallbladder. No biliary dilatation.             EKG:    Performed at: 11:36 AM    Impression: Atrial fibrillation with slow ventricular response, right bundle branch block, no significant changes as compared to EKG obtained 11/19/2024    Rate: 57 bpm  Rhythm: Atrial fibrillation  DC Interval: n/a  QRS Interval: 120 ms  QTc Interval: 469 ms  ST Changes: T wave inversions in inferior leads    I  have independently reviewed and interpreted the EKG(s) documented above.      Sandra Triana DO  Emergency Medicine  [facility] EMERGENCY DEPARTMENT  [facility] [facility] [id number] [phone]  Dept: [phone]       Sandra Triana DO  03/05/25 [id number]

## 2025-03-05 NOTE — PLAN OF CARE
Goal Outcome Evaluation:      Plan of Care Reviewed With: patient          Outcome Evaluation: Likely home at discharge.

## 2025-03-05 NOTE — CONSULTS
"[facility] Consultation     Jabari Haddad  [address] [facility] [place] [address]  84 year old male     Admission Date/Time: 3/5/2025  Primary Care Provider: Mike Mcdowell  Referring / Attending Physician:  Yoni     We were asked to see the patient in consultation by Dr. Vallejo for evaluation of \"hematochezia, hgb 6.2, h/o bleeding radiation proctopathy.\"       CC: shortness of breath, pink/black stools     HPI:  Jabari Haddad is a 84 year old male with PMH HTN, HLD, type 2 diabetes, pulmonary hypertension, depression, prostate cancer s/p radiation completed March 2024 on immunotherapy, A fib on Eliquis, admission 11/2024 for GI bleed from radiation proctopathy s/p colonoscopy with APC who presented to ER earlier today for low hemoglobin and shortness of breath. He was seen at his Oncologist's office today for therapy and based on lab work medication was not given. He states that even without that he was going to come to ER from that appointment for shortness of breath he has been having for weeks now. He denies chest pain, abdominal pain, nausea/vomiting, rectal pain, or breakthrough GERD on omeprazole. He notes he feels like his throat has been tight but he blamed it on winter dry air. He denies esophageal/transfer dysphagia. He is on oral iron so states stools are black or combination of brown/black and then maybe two days per week he will see \"pink water\" in the toilet bowl that he thought was likely hemorrhoidal bleeding.     ROS: A comprehensive ten point review of systems was negative aside from those in mentioned in the HPI.      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/2024     Priority: Medium    Other osteoporosis without current pathological fracture 07/08/2024     Priority: Medium    Hormone resistant malignancy status 02/14/2024     Priority: Medium    Fatigue 02/14/2024     Priority: Medium    Depressed mood 07/31/2023     Priority: Medium    Alcohol use, unspecified, uncomplicated 07/27/2023     Priority: Medium    Compression fracture of T12 vertebra (H) 07/25/2023     Priority: Medium    Fall 07/25/2023     Priority: Medium    Multiple stable closed lateral compression fractures of pelvis (H) 07/24/2023     Priority: Medium     Formatting of this note might be different from the original. Added automatically from request for surgery [id number]      Primary malignant neoplasm of prostate (H) 06/27/2022     Priority: Medium    High prostate specific antigen (PSA) 05/04/2022     Priority: Medium    Depressive disorder 05/04/2022     Priority: Medium    Arteriosclerotic cardiovascular disease (ASCVD) 03/08/2022     Priority: Medium     Formatting of this note might be different from the original.  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  Asymptomatic and continuing on rate   control.  Declined trial of sinus.  [id number] score of 4 and Eliquis        Hyperlipidemia LDL goal <100      Priority: Medium     Created by Conversion        DM2 (diabetes mellitus, type 2) (H)      Priority: Medium     Created by Conversion        Hemorrhoids      Priority: Medium     Created by Conversion  Replacement Utility updated for latest IMO load        Osteoarthritis Of The Wrist       Priority: Medium     Created by Conversion  Replacement Utility updated for latest IMO load        Arteriosclerotic Cardiovascular Disease (ASCVD)      Priority: Medium     Created by Conversion  Replacement Utility updated for latest IMO load        Diverticulitis Of Colon      Priority: Medium     Created by Conversion  Replacement Utility updated for latest IMO load        Itching Of The Ears      Priority: Medium     Created by Conversion  Replacement Utility updated for latest IMO load        Chronic Gout      Priority: Medium     Created by Conversion  Replacement Utility updated for latest IMO load        Symmetric Polyarticular Inflammation      Priority: Medium     Created by Conversion  Replacement Utility updated for latest IMO load        Pseudogout 2016     Priority: Medium    Inflammatory arthritis 2016     Priority: Medium    Esophageal Reflux      Priority: Medium     Created by Conversion        Benign Essential Hypertension      Priority: Medium     Created by Conversion        Abdominal Pain In The Right Lower Belly (RLQ)      Priority: Medium     Created by Conversion        Exposed To Dusts - Asbestos      Priority: Medium     Created by Conversion        Hyperkalemia      Priority: Medium     Created by Conversion        Pharyngitis      Priority: Medium     Created by Conversion        Generalized Osteoarthritis Of Multiple Sites      Priority: Medium     Created by Conversion        Pseudogout      Priority: Medium     Created by Conversion        Gout      Priority: Medium     Created by Conversion        Lethargy      Priority: Medium     Created by Conversion        Male Erectile Disorder      Priority: Medium     Created by Conversion         SOCIAL HISTORY:  Social History     Tobacco Use    Smoking status: Former     Current packs/day: 0.00     Types: Cigarettes     Quit date: 1967     Years since quittin.2    Smokeless tobacco: Never   Vaping Use    Vaping  status: Never Used   Substance Use Topics    Alcohol use: Not Currently     Alcohol/week: 8.3 - 10.0 standard drinks of alcohol     Types: 8 - 10 Standard drinks or equivalent per week    Drug use: Never   Former tobacco, no current alcohol.     FAMILY HISTORY:  Family History   Problem Relation Age of Onset    Arthritis Other     Rheumatoid Arthritis Other     Lupus Other     Osteoporosis Other     Cerebrovascular Disease Other     Hypertension Other    No pertinent family history.     ALLERGIES: No Known Allergies  MEDICATIONS:   Current Facility-Administered Medications   Medication Dose Route Frequency Provider Last Rate Last Admin    acetaminophen (TYLENOL) tablet 650 mg  650 mg Oral Q4H PRN Nick Vallejo DO        calcium carbonate (TUMS) chewable tablet 1,000 mg  1,000 mg Oral 4x Daily PRN Nick Vallejo DO        lidocaine (LMX4) cream   Topical Q1H PRN Nick Vallejo DO        lidocaine 1 % 0.1-1 mL  0.1-1 mL Other Q1H PRN Nick Vallejo DO        prochlorperazine (COMPAZINE) injection 5 mg  5 mg Intravenous Q6H PRN Nick Vallejo DO        Or    prochlorperazine (COMPAZINE) tablet 5 mg  5 mg Oral Q6H PRN Nick Vallejo DO        senna-docusate (SENOKOT-S/PERICOLACE) 8.6-50 MG per tablet 1 tablet  1 tablet Oral BID PRN Nick Vallejo DO        Or    senna-docusate (SENOKOT-S/PERICOLACE) 8.6-50 MG per tablet 2 tablet  2 tablet Oral BID PRN Nick Vallejo DO        sodium chloride (PF) 0.9% PF flush 3 mL  3 mL Intracatheter Q8H Nick Vallejo DO        sodium chloride (PF) 0.9% PF flush 3 mL  3 mL Intracatheter q1 min prn Nick Vallejo DO         Current Outpatient Medications   Medication Sig Dispense Refill    acetaminophen (TYLENOL) 500 MG tablet Take 1,000 mg by mouth every 8 hours as needed for mild pain.      amLODIPine (NORVASC) 10 MG tablet Take 1 tablet by mouth once daily 90 tablet 2    apixaban ANTICOAGULANT (ELIQUIS ANTICOAGULANT) 2.5 MG tablet Take 1 tablet (2.5 mg) by mouth 2 times  daily. 60 tablet 0    ascorbic acid (VITAMIN C) 250 MG CHEW chewable tablet Take 250 mg by mouth daily.      atenolol (TENORMIN) 50 MG tablet Take 1 tablet (50 mg) by mouth daily 90 tablet 2    chlorpheniramine (CHLOR-TRIMETON) 4 MG tablet Take 4 mg by mouth every 6 hours as needed for allergies or rhinitis.      escitalopram (LEXAPRO) 20 MG tablet TAKE 1 TABLET BY MOUTH ONCE DAILY IN THE MORNING 90 tablet 0    ezetimibe (ZETIA) 10 MG tablet Take 1 tablet by mouth once daily 90 tablet 0    furosemide (LASIX) 20 MG tablet Take 1 tablet by mouth once daily 90 tablet 0    glimepiride (AMARYL) 2 MG tablet TAKE 1 TABLET BY MOUTH IN THE MORNING BEFORE BREAKFAST 90 tablet 0    leuprolide, 3 Month, (ELIGARD) 22.5 MG Inject 22.5 mg subcutaneously every 3 months.      lisinopril (ZESTRIL) 40 MG tablet TAKE 1 TABLET BY MOUTH ONCE DAILY . APPOINTMENT REQUIRED FOR FUTURE REFILLS 90 tablet 1    omeprazole (PRILOSEC) 40 MG DR capsule Take 40 mg by mouth daily.      potassium gluconate 2.5 MEQ tablet Take 2.5 mEq by mouth daily.      probenecid (BENEMID) 500 MG tablet Take 500 mg by mouth every morning.      simvastatin (ZOCOR) 40 MG tablet Take 1 tablet by mouth once daily 90 tablet 0    Vitamin D3 (CHOLECALCIFEROL) 25 mcg (1000 units) tablet Take 1 tablet by mouth daily.      vitamin E 400 units TABS Take 400 Units by mouth daily.       PHYSICAL EXAM:   /68   Pulse 65   Temp 98.2  F (36.8  C) (Oral)   Resp 18   Wt 79.4 kg (175 lb)   SpO2 94%   BMI 27.41 kg/m     GEN: NAD, male appears stated age sitting up in bed  HEENT: No icterus, no lymphadenopathy  HRT: RRR  LUNGS: CTA,O2 via NC  ABD: +BS, soft, nontender  SKIN: Pale, no rash, jaundice  MSKL: no LE edema, strength 5/5 all 4 extrems  NEURO: Alert and oriented, appropriate mood and affect     ADDITIONAL DATA:   I reviewed the patient's new clinical lab test results.   Recent Labs   Lab Test 03/05/25  1146 01/23/25  1001 01/08/25  0919   WBC 7.3 7.5 4.1   HGB 6.2*  8.0* 9.2*   MCV 85 94 96    223 171   INR 1.62*  --   --      Recent Labs   Lab Test 03/05/25  1146 11/27/24  0836 11/20/24  0509   POTASSIUM 4.2 4.7 4.1   CHLORIDE 100 102 105   CO2 27 28 25   BUN 14.6 9.6 10.5   ANIONGAP 10 9 8     Recent Labs   Lab Test 03/05/25  1146 11/27/24  0836 11/20/24  0509 01/12/23  1301 01/12/23  1248 03/10/22  0949 09/30/20  1220 09/30/20  1209 01/22/20  1028   ALBUMIN 3.6 3.8 3.4*   < >  --    < >  --    < > 3.8   BILITOTAL 0.4 0.4 0.5   < >  --    < >  --    < > 0.7   ALT 15 16 14   < >  --    < >  --    < > 38   AST 29 28 20   < >  --    < >  --    < > 37   PROTEIN  --   --   --   --  Negative  --  Negative  --  Negative    < > = values in this interval not displayed.        Imaging results:  CT chest PE 3/5/25:  FINDINGS:  ANGIOGRAM CHEST: Pulmonary arteries are normal caliber and negative for pulmonary emboli. Thoracic aorta is negative for dissection. No CT evidence of right heart strain.  LUNGS AND PLEURA: Decreasing prominence irregular shaped, subpleural nodule in the left lower lobe which now measures 7.6 x 9.1 mm, was 10 x 9 mm in February 2025. Stable 6 mm groundglass nodule in the right upper lobe (axial image 114) and the ovoid nodule adjacent to the right middle lobe pulmonary vein (axial image 181). No new pulmonary nodules. Extensive calcified pleural plaques again noted with that trace loculated pleural effusion in the left lateral costophrenic angle, unchanged.   MEDIASTINUM/AXILLAE: No lymphadenopathy. Small hiatal hernia. No significant pericardial effusion. Cardiomegaly. No thoracic aortic aneurysm.  CORONARY ARTERY CALCIFICATION: Moderate.  UPPER ABDOMEN: Layering high attenuation material within the gallbladder may represent stones and/or sludge. Peripelvic cyst right kidney. Exophytic cyst left kidney.  MUSCULOSKELETAL: Mild compression deformities at T5 and L1 stable. Moderate compression deformity at T12 stable. Mild to moderate thoracic spondylosis.                                                                    IMPRESSION:  1.  Negative for pulmonary embolus.  2.  No acute airspace opacity or pleural effusion.  3.  Decreasing size of the irregular shaped subpleural nodule left lower lobe. Given the patient's age and existing comorbidities, no follow-up is required.  4.  Additional pulmonary nodules are stable.  5.  Extensive calcified pleural plaques again noted.  6.  Stable compression deformities thoracic and upper lumbar spine.  7.  Cardiomegaly. Moderate coronary artery calcification.  8.  Sludge and/or stones within the gallbladder. No biliary dilatation.    Procedure results:  Colonoscopy 11/21/24 ([facility]):  Findings:       The perianal and digital rectal examinations were normal.        Multiple angioectasias with spontaneous bleeding consistent with        radiation proctopathy were found in the distal rectum. Coagulation for        hemostasis using argon plasma at 0.4 liters/minute and 60 jiang was        successful with no bleeding at the end of the procedure. In this area        there were also large superficial non-bleeding ulcerations. These areas        were avoided with APC to prevent any further tissue damage.        Multiple diverticula were found in the sigmoid colon, descending colon        and ascending colon.        Non-bleeding internal hemorrhoids were found during retroflexion.                                                                                    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                          - Superficial large non-bleeding ulcerations in the                          distal rectum within the area of radiation                          proctopathy, likely related to radiation vs solitary                          rectal ulcer syndrome. Less likely malignancy given                          appearance and clinical  history. No biopsies obtained                          given concern for adverse effects (ie fistula) with                          biopsy of radiation proctopathy.                          - Diverticulosis in the sigmoid colon, in the                          descending colon and in the ascending colon.                          - Non-bleeding small internal hemorrhoids.                          - No specimens collected.   Recommendation:        - Return patient to hospital [facility] for ongoing care.                          - Continue to hold anticoagulation and would have                          cardiology reassess need for ongoing anticoagulation                          for Afib in light of above findings                          - Consider sucralfate enemas                          - Agressively treat any constipation                          -daily stool softener     EGD 11/21/24 ([facility]):  Findings:       The examined esophagus was normal.        The Z-line was regular and was found 39 cm from the incisors.        Two friable erythematous polyps with surface eosion but no active        bleeding were found in the cardia. One was approximately 12 mm in size        and the other was approxiamtely 6 mm in size. Biopsies were taken with a        cold forceps for histology. There was significant oozing with the        biopsies that eventually stopped on its own.        A small hiatal hernia was present. The stomach was otherwise normal.        The examined duodenum was normal.        The cardia and gastric fundus were otherwise normal on retroflexion.                                                                                    Impression:   - No fresh or old blood                          - 2 friable erythematous polyps in the gastric cardia                          with surface eosion with no active bleeding or signs                          of recent bleeding but significant oozing with small                   "        biopsies that eventually stopped on its own.                          - Small hiatal hernia. Otherwise normal stomach.                          - Normal esophagus                          - Normal examined duodenum.   Recommendation:        - Await pathology results.                          - Continue BID PPI                          - Colonsocopy to follow       ASSESSMENT:    Anemia  This is an 83 y/o male with PMH HTN, HLD, type 2 diabetes, pulmonary hypertension, depression, prostate cancer s/p radiation on immunotherapy, A fib on Eliquis, admission 11/2024 for GI bleed from radiation proctopathy s/p colonoscopy with APC admitted earlier today for anemia after he was noted to have a hemoglobin of 6.5 at Oncology visit today and he notes shortness of breath for a few weeks now. He notes black stool with occasional \"pink water\" after a BM but no other GI symptoms. Differential includes recurrent radiation proctitis or PUD seen on previous endoscopies, Ernst's erosions, hemorrhoids, less likely diverticular bleed or any malignancy given recent endoscopic evaluation. He is very short of breath and requiring supplemental oxygen so will hold off on endoscopic evaluation until improved. Tentative plan for EGD/colon 3/7.    PLAN:  - Regular diet okay with GI  - Pantoprazole 40 mg IV BID  - Hemoglobin monitoring/transfusion and supportive cares per medicine  - Tentative EGD/colon 3/7 pending       [facility]  3/5/2025 3:06 PM  [phone] (office)    This case was discussed with Dr. Van who agrees to the above assessment and plan.    43 minutes of total time was spent today providing patient care, including patient evaluation, reviewing documentation/test result, and .   ________________________________________________________________________     _______________________________________________________________  [facility] Attending  Patient is seen anddiscussed " with the PA/CNP, see note above.  Severe anemia that is symptomatic, but unclear source as he has had some dark stools but has been on iron and minimal rectal bleeding.  Has had radiation proctitis.  Radiation therapy ended March 2024.  He is dyspneic  Exam: Abdomen soft nontender  Labs and imaging reviewed.  Assessment/Plan: 84-year-old with severe symptomatic anemia unclear source upper versus lower.  He is quite short of breath even at rest today needs some optimization before likely upper endoscopy and colonoscopy.Hold Eliquis for 2 days before colonoscopy.    15 minutes of total time was spent providing patient care, including patient evaluation, reviewing documentation/test results, coordination of care with other providers, and .    Yg Van MD  [facility]  Office: [phone]  Cell:[phone]

## 2025-03-05 NOTE — H&P
[facility]    History and Physical - Hospitalist Service       Date of Admission:  3/5/2025    Assessment & Plan    Patient is an 84-year-old male with history of prostate cancer on immunotherapy, A-fib on Eliquis and prior bleeding radiation proctitis who presents with Hgb 6.5 on outpatient labs, hematochezia and dyspnea.    #Hematochezia, possible melena vs dark stool 2/2 iron supplement  #GI bleed  #Symptomatic acute blood loss anemia on anemia of chronic disease  [place] 11/2024: Actively bleeding radiation proctopathy distal rectum successfully treated with APC.  Superficial large nonbleeding ulcerations distal rectum.  Diverticulosis.  Nonbleeding internal hemorrhoids.  Transfusing 1u PRBC  Recheck H&H this evening looks good, alison appropriately  Monitor hemodynamics  GI consulted- tentative EGD/[place] 3/7 after holding Eliquis x2 days    #Dyspnea  #Moderate pulmonary hypertension  #Hypertension  CTA: No PE, pleural effusion or pneumo. Extensive calcified pleural plaques unchanged.  Echo 11/2024: EF 55-60%, RV 2+ enlarged and mildly reduced fxn, 3+ biatrial enlargement, 2+ pulm HTN 56mmHg  Repeat echo, although anemia is likely causing dyspnea, right heart failure liekly also be contibuting  PTA lasix, amlodipine, lisinopril with holding parameters    #Prostate cancer  Currently on immunotherapy  Follows with [facility]    #Chronic atrial fibrillation  Rate controlled  PTA atenolol  Hold PTA Eliquis 2/2 possible GI bleed          Diet: Clear Liquid Diet    DVT Prophylaxis: Pneumatic Compression Devices  Laguerre Catheter: Not present  Lines: None     Cardiac Monitoring: ACTIVE order. Indication: Syncope- high cardiac risk (48 hours)  Code Status: Full Code      Clinically Significant Risk Factors Present on Admission                # Drug Induced Coagulation Defect: home medication list includes an anticoagulant medication    # Hypertension: Noted on problem list      # Anemia: based on hgb  <11           # Financial/Environmental Concerns:           Disposition Plan     Medically Ready for Discharge: Anticipated in 2-4 Days           Nick Vallejo DO  Hospitalist Service  [facility]  Securely message with [employer] (more info)  Text page via [employer] Paging/Directory     ______________________________________________________________________    Chief Complaint   Anemia, dyspnea    History is obtained from the patient, electronic health record, and emergency department physician    History of Present Illness   Patient is a very pleasant 84-year-old male with prostate cancer currently on immunotherapy who presents to the ED with fatigue and dyspnea with exertion.  Had labs through oncology today which showed hemoglobin 6.5.  Patient reports dark stools which are sometimes partly brown and partly black and has seen pink in the toilet bowl water.  Patient does take iron and is on Eliquis for atrial fibrillation.  Patient has history of bleeding radiation proctitis which was intervened with cautery in November.  Patient denies chest pain, syncope although he has felt lightheaded, fevers, chills, abdominal pain and no pain with having bowel movements.      Past Medical History    Past Medical History:   Diagnosis Date    Anemia     Atrial fibrillation (H)     Depression     Diabetes mellitus (H)     History of blood transfusion     HLD (hyperlipidemia)     HTN (hypertension)     Prostate cancer (H)        Past Surgical History   Past Surgical History:   Procedure Laterality Date    BONY PELVIS SURGERY      COLONOSCOPY N/A 11/21/2024    Procedure: COLONOSCOPY;  Surgeon: Roma Richmond MD;  Location: [facility] OR    ESOPHAGOSCOPY, GASTROSCOPY, DUODENOSCOPY (EGD), COMBINED N/A 11/21/2024    Procedure: ESOPHAGOGASTRODUODENOSCOPY;  Surgeon: Roma Richmond MD;  Location: [facility] OR       Prior to Admission Medications   Prior to Admission Medications   Prescriptions Last Dose  Informant Patient Reported? Taking?   Vitamin D3 (CHOLECALCIFEROL) 25 mcg (1000 units) tablet   Yes No   Sig: Take 1 tablet by mouth daily.   acetaminophen (TYLENOL) 500 MG tablet   Yes No   Sig: Take 1,000 mg by mouth every 8 hours as needed for mild pain.   amLODIPine (NORVASC) 10 MG tablet   No No   Sig: Take 1 tablet by mouth once daily   apixaban ANTICOAGULANT (ELIQUIS ANTICOAGULANT) 2.5 MG tablet   No No   Sig: Take 1 tablet (2.5 mg) by mouth 2 times daily.   ascorbic acid (VITAMIN C) 250 MG CHEW chewable tablet   Yes No   Sig: Take 250 mg by mouth daily.   atenolol (TENORMIN) 50 MG tablet   No No   Sig: Take 1 tablet (50 mg) by mouth daily   chlorpheniramine (CHLOR-TRIMETON) 4 MG tablet   Yes No   Sig: Take 4 mg by mouth every 6 hours as needed for allergies or rhinitis.   escitalopram (LEXAPRO) 20 MG tablet   No No   Sig: TAKE 1 TABLET BY MOUTH ONCE DAILY IN THE MORNING   ezetimibe (ZETIA) 10 MG tablet   No No   Sig: Take 1 tablet by mouth once daily   ferrous sulfate (FEROSUL) 325 (65 Fe) MG tablet   No No   Sig: Take 1 tablet (325 mg) by mouth daily (with breakfast).   furosemide (LASIX) 20 MG tablet   No No   Sig: Take 1 tablet by mouth once daily   glimepiride (AMARYL) 2 MG tablet   No No   Sig: TAKE 1 TABLET BY MOUTH IN THE MORNING BEFORE BREAKFAST   leuprolide, 3 Month, (ELIGARD) 22.5 MG   Yes No   Sig: Inject 22.5 mg subcutaneously every 3 months.   lisinopril (ZESTRIL) 40 MG tablet   No No   Sig: TAKE 1 TABLET BY MOUTH ONCE DAILY . APPOINTMENT REQUIRED FOR FUTURE REFILLS   omeprazole (PRILOSEC) 40 MG DR capsule   No No   Sig: Take 1 capsule (40 mg) by mouth 2 times daily.   potassium gluconate 2.5 MEQ tablet   Yes No   Sig: Take 2.5 mEq by mouth daily.   pramox-pe-glycerin-petrolatum (PREPARATION H) 1-0.25-14.4-15 % CREA cream   Yes No   Sig: Place rectally 4 times daily as needed for hemorrhoids.   probenecid (BENEMID) 500 MG tablet   No No   Sig: Take 1 tablet by mouth twice daily   simvastatin  (ZOCOR) 40 MG tablet   No No   Sig: Take 1 tablet by mouth once daily   sucralfate (CARAFATE) 1 GM/10ML suspension   No No   Sig: Place 20 mLs (2 g) rectally 2 times daily.   vitamin E 400 units TABS   Yes No   Sig: Take 400 Units by mouth daily.      Facility-Administered Medications: None           Physical Exam   Vital Signs: Temp: 98.3  F (36.8  C) Temp src: Oral BP: 125/64 Pulse: 70   Resp: 24 SpO2: 94 % O2 Device: None (Room air)    Weight: 175 lbs 0 oz    General Appearance:  No acute distress  Respiratory: Clear to auscultation bilaterally  Cardiovascular: Irregular rhythm, normal rate, no murmur appreciated  GI: Normal bowel sounds, abdomen is soft with no rebound  Extremities: No peripheral edema or cyanosis  Skin: Generalized pallor  Neuro: Alert and oriented x 3, normal speech      Medical Decision Making             Data

## 2025-03-05 NOTE — ED TRIAGE NOTES
Patient arrives by private car for evaluation of shortness of breath.  Patient was seen at oncology and found to have Hgb of 6.5.  Patient reports some pink and black stools, but does take iron.  Is on Eliquis

## 2025-03-05 NOTE — ED NOTES
"[facility] ED Handoff Report    ED Chief Complaint: SOB for \"a while\", hmg=6.2    ED Diagnosis:  (D64.9) Anemia, unspecified type  Comment: n/a  Plan: n/a    (R06.09) Dyspnea on exertion  Comment: n/a  Plan: n/a       PMH:    Past Medical History:   Diagnosis Date    Anemia     Atrial fibrillation (H)     Depression     Diabetes mellitus (H)     History of blood transfusion     HLD (hyperlipidemia)     HTN (hypertension)     Prostate cancer (H)         Code Status:  Full Code     Falls Risk: Yes Band: Applied    Current Living Situation/Residence: lives alone     Elimination Status: Continent: Yes     Activity Level: SBA    Patients Preferred Language:  English     Needed: No    Vital Signs:  BP (!) 146/93   Pulse 77   Temp 98  F (36.7  C) (Oral)   Resp (!) 31   Wt 79.4 kg (175 lb)   SpO2 98%   BMI 27.41 kg/m       Cardiac Rhythm: afib, controlled rate    Pain Score: 0/10    Is the Patient Confused:  No    Last Food or Drink: 03/05/25 at  1600 (liquids:lemon ice and apple juice)    Focused Assessment:  Pt is AAO x 3. Hx of prostate CA with last radiation last summer, gets injections every 3 months (was postponed today while in clinic). Came to ER d/t sob and Hmg 6.5).  Pt has been feeling very sob for \"a while\"(several weeks) and gets very sob with any exertions (standing at bedside, turning from side-to-side). Lungs are clear to uppers with some crackles to bases. Says his stool has been black to pink (takes iron). Hemocult was positive. PE was negative.Hx of \"bleeding colon last November\". GI will to colonoscopy/ endoscopy in a few days. Still needs ECHO. Received one unit of PRBC's. Started on oxygen at 2 L NC because he got up at bedside and got very sob (pt requested, provier aware).    Tests Performed: Done: Labs and Imaging    Treatments Provided:  one unit PRBC    Family Dynamics/Concerns: No    Family Updated On Visitor Policy: N/A    Plan of Care Communicated to Family: No    Pt " told son that he is beeing admitted    Belongings Checklist Done and Signed by Patient: Yes    Medications sent with patient: n/a    Covid: symptomatic, not done    Additional Information: Gets very SOB with any exertion    Annemarie Gunn RN 3/5/2025 4:05 PM

## 2025-03-05 NOTE — MEDICATION SCRIBE - ADMISSION MEDICATION HISTORY
Medication Scribe Admission Medication History    Admission medication history is complete. The information provided in this note is only as accurate as the sources available at the time of the update.    Information Source(s): Patient via in-person    Pertinent Information: patient reports self management of medications.     Patient reports no longer taking: Ferosul, Preparation H, Carafate     Changes made to PTA medication list:  Added: None  Deleted: Ferosul, Preparation H, Carafate   Changed: Omeprazole from BID to every day, Benemid from BID to every day (both per patient)    Allergies reviewed with patient and updates made in EHR: yes    Medication History Completed By: Alejandro Snider 3/5/2025 2:15 PM    PTA Med List   Medication Sig Note Last Dose/Taking    acetaminophen (TYLENOL) 500 MG tablet Take 1,000 mg by mouth every 8 hours as needed for mild pain.  Taking As Needed    amLODIPine (NORVASC) 10 MG tablet Take 1 tablet by mouth once daily  3/5/2025 Morning    apixaban ANTICOAGULANT (ELIQUIS ANTICOAGULANT) 2.5 MG tablet Take 1 tablet (2.5 mg) by mouth 2 times daily.  3/5/2025 Morning    ascorbic acid (VITAMIN C) 250 MG CHEW chewable tablet Take 250 mg by mouth daily.  3/4/2025 Morning    atenolol (TENORMIN) 50 MG tablet Take 1 tablet (50 mg) by mouth daily  3/5/2025 Morning    chlorpheniramine (CHLOR-TRIMETON) 4 MG tablet Take 4 mg by mouth every 6 hours as needed for allergies or rhinitis.  Taking As Needed    escitalopram (LEXAPRO) 20 MG tablet TAKE 1 TABLET BY MOUTH ONCE DAILY IN THE MORNING  3/5/2025 Morning    ezetimibe (ZETIA) 10 MG tablet Take 1 tablet by mouth once daily  3/4/2025 Evening    furosemide (LASIX) 20 MG tablet Take 1 tablet by mouth once daily  3/5/2025 Morning    glimepiride (AMARYL) 2 MG tablet TAKE 1 TABLET BY MOUTH IN THE MORNING BEFORE BREAKFAST  3/5/2025 Morning    leuprolide, 3 Month, (ELIGARD) 22.5 MG Inject 22.5 mg subcutaneously every 3 months. 3/5/2025: Was due for it today.  Did not receive.  Taking    lisinopril (ZESTRIL) 40 MG tablet TAKE 1 TABLET BY MOUTH ONCE DAILY . APPOINTMENT REQUIRED FOR FUTURE REFILLS  3/5/2025 Morning    omeprazole (PRILOSEC) 40 MG DR capsule Take 40 mg by mouth daily.  3/5/2025 Morning    potassium gluconate 2.5 MEQ tablet Take 2.5 mEq by mouth daily.  3/4/2025 Morning    probenecid (BENEMID) 500 MG tablet Take 500 mg by mouth every morning.  3/5/2025 Morning    simvastatin (ZOCOR) 40 MG tablet Take 1 tablet by mouth once daily  3/4/2025 Evening    Vitamin D3 (CHOLECALCIFEROL) 25 mcg (1000 units) tablet Take 1 tablet by mouth daily.  3/4/2025 Morning    vitamin E 400 units TABS Take 400 Units by mouth daily.  3/5/2025 Morning

## 2025-03-06 ENCOUNTER — APPOINTMENT (OUTPATIENT)
Dept: PHYSICAL THERAPY | Facility: HOSPITAL | Age: 85
DRG: 393 | End: 2025-03-06
Attending: HOSPITALIST
Payer: COMMERCIAL

## 2025-03-06 ENCOUNTER — APPOINTMENT (OUTPATIENT)
Dept: OCCUPATIONAL THERAPY | Facility: HOSPITAL | Age: 85
DRG: 393 | End: 2025-03-06
Attending: HOSPITALIST
Payer: COMMERCIAL

## 2025-03-06 ENCOUNTER — APPOINTMENT (OUTPATIENT)
Dept: CARDIOLOGY | Facility: HOSPITAL | Age: 85
DRG: 393 | End: 2025-03-06
Attending: HOSPITALIST
Payer: COMMERCIAL

## 2025-03-06 VITALS
HEART RATE: 75 BPM | WEIGHT: 175 LBS | SYSTOLIC BLOOD PRESSURE: 134 MMHG | RESPIRATION RATE: 16 BRPM | OXYGEN SATURATION: 97 % | BODY MASS INDEX: 27.41 KG/M2 | DIASTOLIC BLOOD PRESSURE: 80 MMHG | TEMPERATURE: 98.1 F

## 2025-03-06 LAB
ANION GAP SERPL CALCULATED.3IONS-SCNC: 8 MMOL/L (ref 7–15)
BASOPHILS # BLD AUTO: 0 10E3/UL (ref 0–0.2)
BASOPHILS NFR BLD AUTO: 0 %
BUN SERPL-MCNC: 12.6 MG/DL (ref 8–23)
CALCIUM SERPL-MCNC: 8.8 MG/DL (ref 8.8–10.4)
CHLORIDE SERPL-SCNC: 104 MMOL/L (ref 98–107)
CREAT SERPL-MCNC: 0.96 MG/DL (ref 0.67–1.17)
EGFRCR SERPLBLD CKD-EPI 2021: 78 ML/MIN/1.73M2
EOSINOPHIL # BLD AUTO: 0.2 10E3/UL (ref 0–0.7)
EOSINOPHIL NFR BLD AUTO: 4 %
ERYTHROCYTE [DISTWIDTH] IN BLOOD BY AUTOMATED COUNT: 15.9 % (ref 10–15)
GLUCOSE SERPL-MCNC: 112 MG/DL (ref 70–99)
HCO3 SERPL-SCNC: 23 MMOL/L (ref 22–29)
HCT VFR BLD AUTO: 24.8 % (ref 40–53)
HGB BLD-MCNC: 7.2 G/DL (ref 13.3–17.7)
IMM GRANULOCYTES # BLD: 0 10E3/UL
IMM GRANULOCYTES NFR BLD: 1 %
LVEF ECHO: NORMAL
LYMPHOCYTES # BLD AUTO: 0.9 10E3/UL (ref 0.8–5.3)
LYMPHOCYTES NFR BLD AUTO: 16 %
MCH RBC QN AUTO: 24.5 PG (ref 26.5–33)
MCHC RBC AUTO-ENTMCNC: 29 G/DL (ref 31.5–36.5)
MCV RBC AUTO: 84 FL (ref 78–100)
MONOCYTES # BLD AUTO: 0.7 10E3/UL (ref 0–1.3)
MONOCYTES NFR BLD AUTO: 12 %
NEUTROPHILS # BLD AUTO: 3.9 10E3/UL (ref 1.6–8.3)
NEUTROPHILS NFR BLD AUTO: 67 %
NRBC # BLD AUTO: 0 10E3/UL
NRBC BLD AUTO-RTO: 0 /100
PLATELET # BLD AUTO: 226 10E3/UL (ref 150–450)
POTASSIUM SERPL-SCNC: 4.6 MMOL/L (ref 3.4–5.3)
RBC # BLD AUTO: 2.94 10E6/UL (ref 4.4–5.9)
SODIUM SERPL-SCNC: 135 MMOL/L (ref 135–145)
WBC # BLD AUTO: 5.8 10E3/UL (ref 4–11)

## 2025-03-06 PROCEDURE — 250N000011 HC RX IP 250 OP 636: Performed by: PHYSICIAN ASSISTANT

## 2025-03-06 PROCEDURE — 80048 BASIC METABOLIC PNL TOTAL CA: CPT | Performed by: HOSPITALIST

## 2025-03-06 PROCEDURE — 93306 TTE W/DOPPLER COMPLETE: CPT | Mod: 26 | Performed by: INTERNAL MEDICINE

## 2025-03-06 PROCEDURE — 255N000002 HC RX 255 OP 636: Performed by: HOSPITALIST

## 2025-03-06 PROCEDURE — 120N000001 HC R&B MED SURG/OB

## 2025-03-06 PROCEDURE — 250N000013 HC RX MED GY IP 250 OP 250 PS 637: Performed by: PHYSICIAN ASSISTANT

## 2025-03-06 PROCEDURE — 99232 SBSQ HOSP IP/OBS MODERATE 35: CPT | Performed by: INTERNAL MEDICINE

## 2025-03-06 PROCEDURE — 97165 OT EVAL LOW COMPLEX 30 MIN: CPT | Mod: GO

## 2025-03-06 PROCEDURE — C8929 TTE W OR WO FOL WCON,DOPPLER: HCPCS

## 2025-03-06 PROCEDURE — 85049 AUTOMATED PLATELET COUNT: CPT | Performed by: HOSPITALIST

## 2025-03-06 PROCEDURE — 250N000013 HC RX MED GY IP 250 OP 250 PS 637: Performed by: HOSPITALIST

## 2025-03-06 PROCEDURE — 97116 GAIT TRAINING THERAPY: CPT | Mod: GP

## 2025-03-06 PROCEDURE — 36415 COLL VENOUS BLD VENIPUNCTURE: CPT | Performed by: HOSPITALIST

## 2025-03-06 PROCEDURE — 85014 HEMATOCRIT: CPT | Performed by: HOSPITALIST

## 2025-03-06 PROCEDURE — 85004 AUTOMATED DIFF WBC COUNT: CPT | Performed by: HOSPITALIST

## 2025-03-06 PROCEDURE — 97161 PT EVAL LOW COMPLEX 20 MIN: CPT | Mod: GP

## 2025-03-06 PROCEDURE — 97535 SELF CARE MNGMENT TRAINING: CPT | Mod: GO

## 2025-03-06 PROCEDURE — 97530 THERAPEUTIC ACTIVITIES: CPT | Mod: GP

## 2025-03-06 RX ORDER — BISACODYL 5 MG
10 TABLET, DELAYED RELEASE (ENTERIC COATED) ORAL ONCE
Status: COMPLETED | OUTPATIENT
Start: 2025-03-06 | End: 2025-03-06

## 2025-03-06 RX ORDER — MAGNESIUM CARB/ALUMINUM HYDROX 105-160MG
296 TABLET,CHEWABLE ORAL ONCE
Status: COMPLETED | OUTPATIENT
Start: 2025-03-07 | End: 2025-03-07

## 2025-03-06 RX ORDER — POLYETHYLENE GLYCOL 3350 17 G/17G
238 POWDER, FOR SOLUTION ORAL ONCE
Status: COMPLETED | OUTPATIENT
Start: 2025-03-06 | End: 2025-03-06

## 2025-03-06 RX ADMIN — Medication 25 MCG: at 10:03

## 2025-03-06 RX ADMIN — Medication 400 UNITS: at 10:00

## 2025-03-06 RX ADMIN — PROBENECID 500 MG: 500 TABLET, FILM COATED ORAL at 10:03

## 2025-03-06 RX ADMIN — Medication 250 MG: at 10:03

## 2025-03-06 RX ADMIN — PERFLUTREN 3 ML: 6.52 INJECTION, SUSPENSION INTRAVENOUS at 09:22

## 2025-03-06 RX ADMIN — ATENOLOL 50 MG: 25 TABLET ORAL at 10:01

## 2025-03-06 RX ADMIN — LISINOPRIL 40 MG: 20 TABLET ORAL at 10:03

## 2025-03-06 RX ADMIN — PANTOPRAZOLE SODIUM 40 MG: 40 INJECTION, POWDER, FOR SOLUTION INTRAVENOUS at 10:05

## 2025-03-06 RX ADMIN — ACETAMINOPHEN 650 MG: 325 TABLET ORAL at 15:50

## 2025-03-06 RX ADMIN — PANTOPRAZOLE SODIUM 40 MG: 40 INJECTION, POWDER, FOR SOLUTION INTRAVENOUS at 21:12

## 2025-03-06 RX ADMIN — SIMVASTATIN 40 MG: 10 TABLET, FILM COATED ORAL at 21:12

## 2025-03-06 RX ADMIN — POLYETHYLENE GLYCOL 3350 238 G: 17 POWDER, FOR SOLUTION ORAL at 17:48

## 2025-03-06 RX ADMIN — ESCITALOPRAM OXALATE 20 MG: 20 TABLET ORAL at 10:03

## 2025-03-06 RX ADMIN — FUROSEMIDE 20 MG: 20 TABLET ORAL at 10:03

## 2025-03-06 RX ADMIN — Medication 2.5 MEQ: at 10:04

## 2025-03-06 RX ADMIN — BISACODYL 10 MG: 5 TABLET, COATED ORAL at 15:50

## 2025-03-06 RX ADMIN — EZETIMIBE 10 MG: 10 TABLET ORAL at 21:11

## 2025-03-06 RX ADMIN — AMLODIPINE BESYLATE 10 MG: 5 TABLET ORAL at 10:00

## 2025-03-06 ASSESSMENT — ACTIVITIES OF DAILY LIVING (ADL)
ADLS_ACUITY_SCORE: 36
ADLS_ACUITY_SCORE: 35
ADLS_ACUITY_SCORE: 36
ADLS_ACUITY_SCORE: 36
ADLS_ACUITY_SCORE: 35
ADLS_ACUITY_SCORE: 36
ADLS_ACUITY_SCORE: 35
ADLS_ACUITY_SCORE: 36
ADLS_ACUITY_SCORE: 36
ADLS_ACUITY_SCORE: 35
ADLS_ACUITY_SCORE: 36
ADLS_ACUITY_SCORE: 35
ADLS_ACUITY_SCORE: 36

## 2025-03-06 NOTE — PLAN OF CARE
Occupational Therapy Discharge Summary    Reason for therapy discharge:    All goals and outcomes met, no further needs identified.    Progress towards therapy goal(s). See goals on Care Plan in [facility] electronic health record for goal details.  Goals met    Therapy recommendation(s):    No further therapy is recommended.    Tati Dong, OTR/L 3/6/25

## 2025-03-06 NOTE — PROGRESS NOTES
"   03/06/25 0840   Appointment Info   Signing Clinician's Name / Credentials (PT) Gracie Barlow, PT, DPT   Living Environment   People in Home alone   Current Living Arrangements house   Home Accessibility stairs to enter home   Number of Stairs, Main Entrance   (6 then 7)   Stair Railings, Main Entrance railings on both sides of stairs   Self-Care   Equipment Currently Used at Home cane, straight  (cane for longer distances - car to parking lot, electric scooter for distances within store.)   Fall history within last six months no   Activity/Exercise/Self-Care Comment pt reports being independent with functional mobility at baseline   General Information   Onset of Illness/Injury or Date of Surgery 03/05/25   Referring Physician Nick Vallejo, DO   Patient/Family Therapy Goals Statement (PT) Return to home   Pertinent History of Current Problem (include personal factors and/or comorbidities that impact the POC) Per Chart Review -\"84-year-old male with history of prostate cancer on immunotherapy, A-fib on Eliquis and prior bleeding radiation proctitis who presents with Hgb 6.5 on outpatient labs, hematochezia and dyspnea.\"   Existing Precautions/Restrictions fall;oxygen therapy device and L/min  (2L)   Range of Motion (ROM)   Range of Motion ROM is WFL   Strength (Manual Muscle Testing)   Strength (Manual Muscle Testing) Deficits observed during functional mobility   Bed Mobility   Bed Mobility supine-sit   Supine-Sit Advance (Bed Mobility) contact guard   Transfers   Transfers sit-stand transfer   Maintains Weight-bearing Status (Transfers) able to maintain   Sit-Stand Transfer   Sit-Stand Advance (Transfers) contact guard   Assistive Device (Sit-Stand Transfers) walker, front-wheeled   Gait/Stairs (Locomotion)   Advance Level (Gait) contact guard   Assistive Device (Gait) walker, front-wheeled   Pattern (Gait) step-through;swing-through   Deviations/Abnormal Patterns (Gait) sinan decreased;gait " speed decreased   Maintains Weight-bearing Status (Gait) able to maintain   Negotiation (Stairs) stairs independence;stairs assistive device;handrail location;number of steps;ascending technique;descending technique;maintains weight-bearing status   [place] Level (Stairs) contact guard   Handrail Location (Stairs) both sides   Number of Steps (Stairs) 3   Ascending Technique (Stairs) step-to-step   Descending Technique (Stairs) step-to-step   Maintains Weight-bearing Status (Stairs) able to maintain   Clinical Impression   Criteria for Skilled Therapeutic Intervention Yes, treatment indicated   PT Diagnosis (PT) Impaired Functional Mobility   Influenced by the following impairments weakness, impaired balance   Functional limitations due to impairments gait, transfers, stairs, bed mobility   Clinical Presentation (PT Evaluation Complexity) stable   Clinical Presentation Rationale pt presents as medically diagnosed   Clinical Decision Making (Complexity) low complexity   Planned Therapy Interventions (PT) balance training;gait training;home exercise program   Risk & Benefits of therapy have been explained evaluation/treatment results reviewed;patient   PT Total Evaluation Time   PT Eval, Low Complexity Minutes (11095) 10   Physical Therapy Goals   PT Frequency Daily   PT Predicted Duration/Target Date for Goal Attainment 03/13/25   PT Goals Bed Mobility;Transfers;Gait;Stairs   PT: Bed Mobility Modified independent;Supine to/from sit   PT: Transfers Modified independent;Sit to/from stand   PT: Gait Modified independent;150 feet;Straight cane   PT: Stairs Modified independent;7 stairs;Rail on both sides   Interventions   Interventions Quick Adds Gait Training;Therapeutic Activity   Therapeutic Activity   Therapeutic Activities: dynamic activities to improve functional performance Minutes (20711) 8   Symptoms Noted During/After Treatment Fatigue   Treatment Detail/Skilled Intervention Sit to/from stand: cueing for  safety/technique/hand placement on stable surface, SBA; sitting balance: cueing for safety/posture, SBA; Reviewed deep breathing to assist with shortness of breath management & taking therapeutic breaks. O2 sats 100 on 2 L at end of session after activity - RN notified. bed alarm on & call light within reach   Gait Training   Gait Training Minutes (61506) 9   Symptoms Noted During/After Treatment (Gait Training) fatigue   Treatment Detail/Skilled Intervention pt ambulated in hallway with FWW & no device. cueing for safety/technique/FWW management. pt initially ambulating with FWW for added stability. pt able to ambulate SBA so transitioned to no device. pt able to ambulate CGA to close SBA with no device. STAIRS: ascend/descend additional 3 then 6 stairs with R railing, cueing for safety/technique/breathing techniques. - pt able to complete stairs CGA/close SBA with reciprocol step pattern.   Distance in Feet 90- FWW, 60- no device   [place] Level (Gait Training)   (CGA- close SBA)   Physical Assistance Level (Gait Training) supervision;verbal cues   Weight Bearing (Gait Training) full weight-bearing   Assistive Device (Gait Training) rolling walker   Pattern Analysis (Gait Training) swing-through gait   Gait Analysis Deviations decreased sinan;decreased step length   Impairments (Gait Analysis/Training) balance impaired;strength decreased   Stair Railings present on right side   Physical Assist/Nonphysical Assist (Stairs) supervision;verbal cues   Level of [place] (Stairs)   (CGA-close SBA)   PT Discharge Planning   PT Plan gait with cane, strengthening, HEP   PT Discharge Recommendation (DC Rec) home with assist;home with home care physical therapy   PT Rationale for DC Rec Anticipate pt will be able to complete required functional mobility at home with assist as needed. Recommend Home PT for progressing strength/balance/activity tolerance   PT Brief overview of current status Close SBA/CGA   PT Total  Distance Amb During Session (feet) 155   Physical Therapy Time and Intention   Timed Code Treatment Minutes 17   Total Session Time (sum of timed and untimed services) 27

## 2025-03-06 NOTE — PROGRESS NOTES
[facility] Progress Note       SUBJECTIVE:  Patient is still very short of breath and does not think it is any better than yesterday. He had one black BM since arrival with a little red/pink in it. No abdominal pain, tolerating regular diet.        OBJECTIVE:  BP (!) 150/84 (BP Location: Right arm)   Pulse 80   Temp 98  F (36.7  C) (Oral)   Resp 18   Wt 79.4 kg (175 lb)   SpO2 94%   BMI 27.41 kg/m    Temp (24hrs), Av.9  F (36.6  C), Min:96.9  F (36.1  C), Max:98.4  F (36.9  C)    Patient Vitals for the past 72 hrs:   Weight   25 1106 79.4 kg (175 lb)       Intake/Output Summary (Last 24 hours) at 3/6/2025 0904  Last data filed at 3/6/2025 0555  Gross per 24 hour   Intake 836 ml   Output 470 ml   Net 366 ml        PHYSICAL EXAM  GEN: NAD, male appears stated age sitting up in bed  HRT: no LE edema  RESP: unlabored  ABD: soft, nontender  SKIN: Pale, no rash or jaundice      Additional Data:  I have reviewed the patient's new clinical lab results:     Recent Labs   Lab Test 25  0611 25  1818 25  1146 25  1001   WBC 5.8  --  7.3 7.5   HGB 7.2* 7.6* 6.2* 8.0*   MCV 84  --  85 94     --  229 223   INR  --   --  1.62*  --      Recent Labs   Lab Test 25  0611 25  1146 24  0836   POTASSIUM 4.6 4.2 4.7   CHLORIDE 104 100 102   CO2 23 27 28   BUN 12.6 14.6 9.6   ANIONGAP 8 10 9     Recent Labs   Lab Test 25  1146 24  0836 24  0509 23  1301 23  1248 03/10/22  0949 20  1220 20  1209 20  1028   ALBUMIN 3.6 3.8 3.4*   < >  --    < >  --    < > 3.8   BILITOTAL 0.4 0.4 0.5   < >  --    < >  --    < > 0.7   ALT 15 16 14   < >  --    < >  --    < > 38   AST 29 28 20   < >  --    < >  --    < > 37   PROTEIN  --   --   --   --  Negative  --  Negative  --  Negative    < > = values in this interval not displayed.     FOBT 3/5/25: positive    Imaging results:  CT chest PE 3/5/25:  FINDINGS:  ANGIOGRAM CHEST: Pulmonary  arteries are normal caliber and negative for pulmonary emboli. Thoracic aorta is negative for dissection. No CT evidence of right heart strain.  LUNGS AND PLEURA: Decreasing prominence irregular shaped, subpleural nodule in the left lower lobe which now measures 7.6 x 9.1 mm, was 10 x 9 mm in February 2025. Stable 6 mm groundglass nodule in the right upper lobe (axial image 114) and the ovoid nodule adjacent to the right middle lobe pulmonary vein (axial image 181). No new pulmonary nodules. Extensive calcified pleural plaques again noted with that trace loculated pleural effusion in the left lateral costophrenic angle, unchanged.   MEDIASTINUM/AXILLAE: No lymphadenopathy. Small hiatal hernia. No significant pericardial effusion. Cardiomegaly. No thoracic aortic aneurysm.  CORONARY ARTERY CALCIFICATION: Moderate.  UPPER ABDOMEN: Layering high attenuation material within the gallbladder may represent stones and/or sludge. Peripelvic cyst right kidney. Exophytic cyst left kidney.  MUSCULOSKELETAL: Mild compression deformities at T5 and L1 stable. Moderate compression deformity at T12 stable. Mild to moderate thoracic spondylosis.                                                                   IMPRESSION:  1.  Negative for pulmonary embolus.  2.  No acute airspace opacity or pleural effusion.  3.  Decreasing size of the irregular shaped subpleural nodule left lower lobe. Given the patient's age and existing comorbidities, no follow-up is required.  4.  Additional pulmonary nodules are stable.  5.  Extensive calcified pleural plaques again noted.  6.  Stable compression deformities thoracic and upper lumbar spine.  7.  Cardiomegaly. Moderate coronary artery calcification.  8.  Sludge and/or stones within the gallbladder. No biliary dilatation.    Procedure results:  Colonoscopy 11/21/24 (Basilio):  Findings:       The perianal and digital rectal examinations were normal.        Multiple angioectasias with spontaneous  bleeding consistent with        radiation proctopathy were found in the distal rectum. Coagulation for        hemostasis using argon plasma at 0.4 liters/minute and 60 jiang was        successful with no bleeding at the end of the procedure. In this area        there were also large superficial non-bleeding ulcerations. These areas        were avoided with APC to prevent any further tissue damage.        Multiple diverticula were found in the sigmoid colon, descending colon        and ascending colon.        Non-bleeding internal hemorrhoids were found during retroflexion.                                                                                    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                          - Superficial large non-bleeding ulcerations in the                          distal rectum within the area of radiation                          proctopathy, likely related to radiation vs solitary                          rectal ulcer syndrome. Less likely malignancy given                          appearance and clinical history. No biopsies obtained                          given concern for adverse effects (ie fistula) with                          biopsy of radiation proctopathy.                          - Diverticulosis in the sigmoid colon, in the                          descending colon and in the ascending colon.                          - Non-bleeding small internal hemorrhoids.                          - No specimens collected.   Recommendation:        - Return patient to hospital [facility] for ongoing care.                          - Continue to hold anticoagulation and would have                          cardiology reassess need for ongoing anticoagulation                          for Afib in light of above findings                          - Consider sucralfate enemas                          -  Agressively treat any constipation                          -daily stool softener      EGD 11/21/24 (Basilio):  Findings:       The examined esophagus was normal.        The Z-line was regular and was found 39 cm from the incisors.        Two friable erythematous polyps with surface eosion but no active        bleeding were found in the cardia. One was approximately 12 mm in size        and the other was approxiamtely 6 mm in size. Biopsies were taken with a        cold forceps for histology. There was significant oozing with the        biopsies that eventually stopped on its own.        A small hiatal hernia was present. The stomach was otherwise normal.        The examined duodenum was normal.        The cardia and gastric fundus were otherwise normal on retroflexion.                                                                                    Impression:   - No fresh or old blood                          - 2 friable erythematous polyps in the gastric cardia                          with surface eosion with no active bleeding or signs                          of recent bleeding but significant oozing with small                          biopsies that eventually stopped on its own.                          - Small hiatal hernia. Otherwise normal stomach.                          - Normal esophagus                          - Normal examined duodenum.   Recommendation:        - Await pathology results.                          - Continue BID PPI                          - Colonsocopy to follow      IMPRESSION:  Anemia  This is an 83 y/o male with PMH HTN, HLD, type 2 diabetes, pulmonary hypertension, depression, prostate cancer s/p radiation on immunotherapy, A fib on Eliquis, admission 11/2024 for GI bleed from radiation proctopathy s/p colonoscopy with APC admitted 3/5 for anemia after he was noted to have a hemoglobin of 6.5 at Oncology visit yesterday and he noted shortness of breath for a few weeks now. He  "notes black stool with occasional \"pink water\" after a BM but no other GI symptoms. Differential includes recurrent radiation proctitis or PUD seen on previous endoscopies, Ernst's erosions, hemorrhoids, less likely diverticular bleed or any malignancy given recent endoscopic evaluation. He has been on Eliquis, last dose 3/5 in AM, and requiring supplemental O2 so will plan EGD/colon 3/7.     PLAN:  - Clear liquid diet today  - Prep this evening for EGD/colon 3/7  - Hemoglobin monitoring per medicine      (Dr. Van)  Stormy Parker PA-C  [facility]  3/6/2025 9:04 AM  [phone] (office)    40 minutes of total time was spent providing patient care, including patient evaluation, reviewing documentation/test results, , and documentation.  ________________________________________________________________________    "

## 2025-03-06 NOTE — PLAN OF CARE
Goal Outcome Evaluation:      Plan of Care Reviewed With: patient    Overall Patient Progress: improving    Headache reported. PRN tylenol given. Diet changed to clears to prep for colonoscopy tomorrow. Bowel prep started. Pt refusing bed/chair alarms and is up independently now. VSS on RA. Hgb 7.2 today.    Debi Florence RN     Problem: Adult Inpatient Plan of Care  Goal: Plan of Care Review  Outcome: Progressing  Flowsheets (Taken 3/6/2025 1615)  Plan of Care Reviewed With: patient  Overall Patient Progress: improving     Problem: Pain Acute  Goal: Optimal Pain Control and Function  Outcome: Progressing     Problem: Anemia  Goal: Anemia Symptom Improvement  Outcome: Progressing

## 2025-03-06 NOTE — PROGRESS NOTES
[facility]    Medicine Progress Note - Hospitalist Service    Date of Admission:  3/5/2025    Assessment & Plan    Patient is an 84-year-old male with history of prostate cancer on immunotherapy, A-fib on Eliquis and prior bleeding radiation proctitis who presents with Hgb 6.5 on outpatient labs, hematochezia and dyspnea.    #Hematochezia, possible melena vs dark stool 2/2 iron supplement  #GI bleed  #Symptomatic acute blood loss anemia on anemia of chronic disease  Colonoscopy 11/2024: Actively bleeding radiation proctopathy distal rectum successfully treated with APC.  Superficial large nonbleeding ulcerations distal rectum.  Diverticulosis.  Nonbleeding internal hemorrhoids.  Transfused with 1 units of PRBC on 3/5/2025.    Monitor hemoglobin and transfuse as needed  Monitor hemodynamics  Pantoprazole 40 mg IV twice daily for now  GI consulted- tentative EGD/[place] 3/7 after holding Eliquis x2 days  GI follow-appreciate assistance    #Dyspnea  #Moderate pulmonary hypertension  #Hypertension  CTA: No PE, pleural effusion or pneumo. Extensive calcified pleural plaques unchanged.  Echo 11/2024: EF 55-60%, RV 2+ enlarged and mildly reduced fxn, 3+ biatrial enlargement, 2+ pulm HTN 56mmHg  Repeat echocardiogram 3/6/2025 showed borderline LVH with LVEF of 55%, severely dilated RV, flattened septum consistent with RV overload and evidence of elevated RA pressure.  PTA lasix, amlodipine, lisinopril with holding parameters    #Prostate cancer  Currently on immunotherapy  Follows with [facility]    #Chronic atrial fibrillation  Rate controlled  PTA atenolol  Hold PTA Eliquis due to possible GI bleed          Diet: Clear Liquid Diet    DVT Prophylaxis: Pneumatic Compression Devices  Laguerre Catheter: Not present  Lines: None     Cardiac Monitoring: ACTIVE order. Indication: Syncope- high cardiac risk (48 hours)  Code Status: Full Code      Clinically Significant Risk Factors                # Coagulation  Defect: INR = 1.62 (Ref range: 0.85 - 1.15) and/or PTT = 42 Seconds (Ref range: 22 - 38 Seconds), will monitor for bleeding    # Hypertension: Noted on problem list                # Financial/Environmental Concerns: none         Social Drivers of Health    Tobacco Use: Medium Risk (11/27/2024)    Patient History     Smoking Tobacco Use: Former     Smokeless Tobacco Use: Never   Physical Activity: Unknown (2/15/2024)    Exercise Vital Sign     Days of Exercise per Week: 0 days   Social Connections: Unknown (2/15/2024)    Social Connection and Isolation Panel [NHANES]     Frequency of Social Gatherings with Friends and Family: Once a week          Disposition Plan     Medically Ready for Discharge: Anticipated in 2-4 Days      Prince Molina MD  Hospitalist Service  [facility]  Securely message with [employer] (more info)  Text page via [facility] Paging/Directory   ______________________________________________________________________    Interval History   No new complaints today and no acute events overnight.  He described his stool as dark-colored with pinkish fluid in the toilet yesterday afternoon.  Shortness of breath and generalized weakness have improved. He does not require supplemental oxygen at baseline. Overnight, he received he was transfused with 1 units of PRBC. He has a history of hematochezia, attributed to possible radiation proctitis.     Physical Exam   Vital Signs: Temp: 98.2  F (36.8  C) Temp src: Oral BP: 131/71 Pulse: 64   Resp: 18 SpO2: 92 % O2 Device: None (Room air) Oxygen Delivery: 2 LPM  Weight: 175 lbs 0 oz    General appearance: Frail elderly man, awake, Alert, Cooperative, not in any obvious distress and appears stated age   HEENT: Normocephalic, atraumatic, conjunctiva clear without icterus and ears without discharge  Lungs: Clear to auscultation bilaterally, no wheezing, good air exchange, normal work of breathing  Cardiovascular: Regular Rate and Rythm, normal  apical impulse, normal S1 and S2, +1 lower extremity edema bilaterally  Abdomen: Soft, non-tender and Non-distended, active bowel sounds  Skin: Skin color, texture normal and bruising or bleeding. No rashes or lesions over face, neck, arms and legs, turgor normal.  Musculoskeletal: No bony deformities or joint tenderness. Normal ROM upon flexion & extension.   Neurologic: Alert & Oriented X 3, Facial symmetry preserved and upper & lower extremities moving well with symmetry  Psychiatric: Calm, normal eye contact and normal affect      Medical Decision Making       45 MINUTES SPENT BY ME on the date of service doing chart review, history, exam, documentation & further activities per the note.      Data     I have personally reviewed the following data over the past 24 hrs:    5.8  \   7.2 (L)   / 226     135 104 12.6 /  112 (H)   4.6 23 0.96 \     Trop: N/A BNP: N/A       Imaging results reviewed over the past 24 hrs:   Recent Results (from the past 24 hours)   Echocardiogram Complete   Result Value    LVEF  55%    Narrative    [id number]  [id number]  [id number]  [id number]^EDUARD^DEBBIE^[place] [address]     Name: GO CONTRERAS  MRN: [id number]  : 1940  Study Date: 2025 09:04 AM  Age: 84 yrs  Gender: Male  Patient Location: [facility]  Reason For Study: Pulmonary HTN  Ordering Physician: DEBBIE CHAPA  Performed By: BP     BSA: 1.9 m2  Height: 67 in  Weight: 175 lb  HR: 73  BP: 150/84 mmHg  ______________________________________________________________________________  Procedure  Echocardiogram with two-dimensional, color and spectral Doppler. Definity (NDC  #14679-280) given intravenously.  ______________________________________________________________________________  Interpretation Summary     The left ventricle is normal in size.  There is borderline concentric left ventricular hypertrophy.  The visual ejection fraction is estimated at 55%.  The right ventricle  is severely dilated.  Moderately decreased right ventricular systolic function  The left atrium is severely dilated.  The right atrium is severely dilated.  There is moderate (2+) tricuspid regurgitation.  Right ventricular systolic pressure estimated at 50 mmHg.  Compared to prior study dated 11/19/2024, RV function is further reduced.  ______________________________________________________________________________  Left Ventricle  The left ventricle is normal in size. There is borderline concentric left  ventricular hypertrophy. The visual ejection fraction is estimated at 55%.  Flattened septum is consistent with RV pressure overload.     Right Ventricle  The right ventricle is severely dilated. Moderately decreased right  ventricular systolic function.     Atria  The left atrium is severely dilated. The right atrium is severely dilated.     Mitral Valve  The mitral valve leaflets appear thickened, but open well. There is mild (1+)  mitral regurgitation.     Tricuspid Valve  There is moderate (2+) tricuspid regurgitation. Right ventricular systolic  pressure estimated at 50 mmHg.     Aortic Valve  Aortic valve leaflets appear normal. There is no evidence of aortic stenosis  or clinically significant aortic regurgitation.     Pulmonic Valve  There is trace to mild pulmonic valvular regurgitation.     Vessels  The aorta root is normal. IVC diameter >2.1 cm collapsing <50% with sniff  suggests a high RA pressure estimated at 15 mmHg or greater.     ______________________________________________________________________________  MMode/2D Measurements & Calculations  IVSd: 1.1 cm  LVIDd: 5.3 cm  LVIDs: 4.2 cm  LVPWd: 1.1 cm  FS: 19.8 %  LV mass(C)d: 234.1 grams  LV mass(C)dI: 122.5 grams/m2  Ao root diam: 4.0 cm  LA dimension: 4.5 cm  LA/Ao: 1.1  LVOT diam: 2.0 cm  LVOT area: 3.1 cm2     Ao root diam index Ht(cm/m): 2.4  Ao root diam index BSA (cm/m2): 2.1  EF Biplane: 52.1 %  LA Volume (BP): 103.0 ml  LA Volume Index  (BP): 53.9 ml/m2     LA Volume Indexed (AL/bp): 58.3 ml/m2  RV Base: 4.9 cm  RWT: 0.43  TAPSE: 1.1 cm     Time Measurements  MM HR: 73.0 BPM     Doppler Measurements & Calculations  MV E max silvino: 85.9 cm/sec  MV dec time: 0.24 sec  Ao V2 max: 122.0 cm/sec  Ao max P.0 mmHg  Ao V2 mean: 81.4 cm/sec  Ao mean PG: 3.0 mmHg  Ao V2 VTI: 24.7 cm  SUSIE(I,D): 2.6 cm2  SUSIE(V,D): 2.4 cm2  LV V1 max PG: 3.5 mmHg  LV V1 max: 94.2 cm/sec  LV V1 VTI: 20.3 cm  SV(LVOT): 63.8 ml  SI(LVOT): 33.4 ml/m2     PA acc time: 0.08 sec  PI end-d silvino: 141.0 cm/sec  TR max silvino: 297.0 cm/sec  TR max P.3 mmHg  AV Silvino Ratio (DI): 0.77  SUSIE Index (cm2/m2): 1.4  E/E': 10.9  E/E' av.8  Lateral E/e': 12.7  Medial E/e': 10.8  Peak E' Silvino: 7.9 cm/sec  RV S Silvino: 10.6 cm/sec     ______________________________________________________________________________  Report approved by: Vladimir Hicks on 2025 10:27 AM

## 2025-03-06 NOTE — PLAN OF CARE
Problem: Pain Acute  Goal: Optimal Pain Control and Function  Outcome: Progressing   Goal Outcome Evaluation:       Pt denies any pain overnight. VSS. Pt tele is a-fib. Pt is up with sba to the bathroom. No stools this shift. Pt has been resting when rounded upon.  Keira Oliveira RN

## 2025-03-06 NOTE — PROGRESS NOTES
"   03/06/25 1015   Appointment Info   Signing Clinician's Name / Credentials (OT) Tati Iker, OTR/L   Living Environment   People in Home alone   Current Living Arrangements house   Home Accessibility stairs to enter home   Number of Stairs, Main Entrance 6;7   Stair Railings, Main Entrance railings on both sides of stairs   Living Environment Comments Has a cat   Self-Care   Equipment Currently Used at Home cane, straight  (only uses cane outside the home, no device inside the home)   Fall history within last six months no   Activity/Exercise/Self-Care Comment Pt independent with ADLs at baseline.   Instrumental Activities of Daily Living (IADL)   IADL Comments Pt independent with all IADLs at baseline.   General Information   Onset of Illness/Injury or Date of Surgery 03/05/25   Referring Physician Nick Vallejo, DO   Patient/Family Therapy Goal Statement (OT) to go home   Additional Occupational Profile Info/Pertinent History of Current Problem Per chart review, pt \"is an 84-year-old male with history of prostate cancer on immunotherapy, A-fib on Eliquis and prior bleeding radiation proctitis who presents with Hgb 6.5 on outpatient labs, hematochezia and dyspnea.\"   Existing Precautions/Restrictions fall   General Observations and Info SOB with minimal activity, on RA   Cognitive Status Examination   Orientation Status orientation to person, place and time  (A&Ox4)   Pain Assessment   Patient Currently in Pain No   Range of Motion Comprehensive   General Range of Motion no range of motion deficits identified   Strength Comprehensive (MMT)   General Manual Muscle Testing (MMT) Assessment no strength deficits identified   Transfers   Transfers sit-stand transfer;toilet transfer   Sit-Stand Transfer   Sit-Stand [place] (Transfers) independent   Toilet Transfer   Type (Toilet Transfer) sit-stand;stand-sit   [place] Level (Toilet Transfer) independent   Balance   Balance Comments No balance deficits noted " ambulating in room with no device SBA   Activities of Daily Living   BADL Assessment/Intervention lower body dressing;grooming;other (see comments)  (Per clinical reasoning, anticipate pt's ability to perform high-level IADLs will be impacted by low endurance.)   Lower Body Dressing Assessment/Training   Position (Lower Body Dressing) supported sitting   [place] Level (Lower Body Dressing) independent   Grooming Assessment/Training   Position (Grooming) sink side;unsupported standing   [place] Level (Grooming) supervision   Clinical Impression   Criteria for Skilled Therapeutic Interventions Met (OT) Yes, treatment indicated   OT Diagnosis Impaired functional endurance needed for ADLs, IADLs, and functional mobility.   Influenced by the following impairments anemia   OT Problem List-Impairments impacting ADL problems related to;activity tolerance impaired   Assessment of Occupational Performance 1-3 Performance Deficits   Identified Performance Deficits functional endurance, IADLs   Planned Therapy Interventions (OT) IADL retraining;progressive activity/exercise;risk factor education   Clinical Decision Making Complexity (OT) problem focused assessment/low complexity   Risk & Benefits of therapy have been explained evaluation/treatment results reviewed;care plan/treatment goals reviewed;risks/benefits reviewed;current/potential barriers reviewed;participants voiced agreement with care plan;participants included;patient   OT Total Evaluation Time   OT Eval, Low Complexity Minutes (79317) 9   OT Goals   Therapy Frequency (OT) One time eval and treatment   OT Predicted Duration/Target Date for Goal Attainment 03/13/25   OT Goals Aerobic Activity;OT Goal 1   OT: Perform aerobic activity with stable cardiovascular response continuous activity;10 minutes;Completed  (defer to PT)   OT: Goal 1 Pt will verbalize understanding of energy conservation techniques for IADLs.  (Goal met)   Self-Care/Home Management    Self-Care/Home Mgmt/ADL, Compensatory, Meal Prep Minutes (13179) 8   Symptoms Noted During/After Treatment (Meal Preparation/Planning Training) fatigue;shortness of breath   Treatment Detail/Skilled Intervention Pt noted to be SOB on RA following ambulation in room/participation in ADL tasks. Provided cueing for PLB tech, O2 sat 91% after ~1 min of seated rest break. After 3 additional min, pt had recovered to 93-94% on RA. Provided education on energy conservation techniques for home, especially with heavy IADLs as pt lives alone (rest breaks, sitting during tasks as needed, etc.). Pt very receptive, no further concerns reported.   OT Discharge Planning   OT Plan discharge OT, defer to PT for endurance training   OT Discharge Recommendation (DC Rec) home   OT Rationale for DC Rec Pt performing ADLs with SBA-Ind   OT Brief overview of current status SBA-Ind, SOB with minimal activity   OT Total Distance Amb During Session (feet) 30   Total Session Time   Timed Code Treatment Minutes 8   Total Session Time (sum of timed and untimed services) 17

## 2025-03-06 NOTE — PROGRESS NOTES
Dual Skin Assessment Note:    Patient admitted from ED to P2.     Comprehensive skin inspection completed by myself and Stephanie ACOSTA Initiated for skin breakdown/non-blanchable redness: No    Provider notified: No     If yes, WOC Consult order obtained: No

## 2025-03-06 NOTE — ED NOTES
Called lab about 1800 hmg & hematocrit. Labs says it was drawn at 1815 or so but do not see that it is pending. She will call back for problems

## 2025-03-06 NOTE — PLAN OF CARE
Goal Outcome Evaluation:    Problem: Adult Inpatient Plan of Care  Goal: Optimal Comfort and Wellbeing  Outcome: Progressing     Problem: Pain Acute  Goal: Optimal Pain Control and Function  Outcome: Progressing     Problem: Fall Injury Risk  Goal: Absence of Fall and Fall-Related Injury  Outcome: Progressing        Pt AO, denies pain, VSS on 2L NC. 1 instance of dark stool. Able to communicate needs. Afib on tele.

## 2025-03-07 LAB
ATRIAL RATE - MUSE: 55 BPM
BLD PROD TYP BPU: NORMAL
BLOOD COMPONENT TYPE: NORMAL
CODING SYSTEM: NORMAL
COLONOSCOPY: NORMAL
CROSSMATCH: NORMAL
DIASTOLIC BLOOD PRESSURE - MUSE: NORMAL MMHG
ERYTHROCYTE [DISTWIDTH] IN BLOOD BY AUTOMATED COUNT: 15.5 % (ref 10–15)
HCT VFR BLD AUTO: 23.4 % (ref 40–53)
HGB BLD-MCNC: 6.7 G/DL (ref 13.3–17.7)
INTERPRETATION ECG - MUSE: NORMAL
ISSUE DATE AND TIME: NORMAL
MCH RBC QN AUTO: 24.1 PG (ref 26.5–33)
MCHC RBC AUTO-ENTMCNC: 28.6 G/DL (ref 31.5–36.5)
MCV RBC AUTO: 84 FL (ref 78–100)
P AXIS - MUSE: NORMAL DEGREES
PLATELET # BLD AUTO: 196 10E3/UL (ref 150–450)
PR INTERVAL - MUSE: NORMAL MS
QRS DURATION - MUSE: 120 MS
QT - MUSE: 482 MS
QTC - MUSE: 469 MS
R AXIS - MUSE: 74 DEGREES
RBC # BLD AUTO: 2.78 10E6/UL (ref 4.4–5.9)
SYSTOLIC BLOOD PRESSURE - MUSE: NORMAL MMHG
T AXIS - MUSE: -42 DEGREES
UNIT ABO/RH: NORMAL
UNIT NUMBER: NORMAL
UNIT STATUS: NORMAL
UNIT TYPE ISBT: 6200
UPPER GI ENDOSCOPY: NORMAL
VENTRICULAR RATE- MUSE: 57 BPM
WBC # BLD AUTO: 6.2 10E3/UL (ref 4–11)

## 2025-03-07 PROCEDURE — 370N000017 HC ANESTHESIA TECHNICAL FEE, PER MIN: Performed by: INTERNAL MEDICINE

## 2025-03-07 PROCEDURE — 360N000075 HC SURGERY LEVEL 2, PER MIN: Performed by: INTERNAL MEDICINE

## 2025-03-07 PROCEDURE — 999N000141 HC STATISTIC PRE-PROCEDURE NURSING ASSESSMENT: Performed by: INTERNAL MEDICINE

## 2025-03-07 PROCEDURE — P9016 RBC LEUKOCYTES REDUCED: HCPCS | Performed by: INTERNAL MEDICINE

## 2025-03-07 PROCEDURE — 120N000001 HC R&B MED SURG/OB

## 2025-03-07 PROCEDURE — 258N000003 HC RX IP 258 OP 636: Performed by: PAIN MEDICINE

## 2025-03-07 PROCEDURE — 250N000009 HC RX 250: Performed by: INTERNAL MEDICINE

## 2025-03-07 PROCEDURE — 250N000013 HC RX MED GY IP 250 OP 250 PS 637: Performed by: HOSPITALIST

## 2025-03-07 PROCEDURE — 250N000011 HC RX IP 250 OP 636: Performed by: PHYSICIAN ASSISTANT

## 2025-03-07 PROCEDURE — 710N000009 HC RECOVERY PHASE 1, LEVEL 1, PER MIN: Performed by: INTERNAL MEDICINE

## 2025-03-07 PROCEDURE — 99232 SBSQ HOSP IP/OBS MODERATE 35: CPT | Performed by: INTERNAL MEDICINE

## 2025-03-07 PROCEDURE — 85027 COMPLETE CBC AUTOMATED: CPT | Performed by: INTERNAL MEDICINE

## 2025-03-07 PROCEDURE — 36415 COLL VENOUS BLD VENIPUNCTURE: CPT | Performed by: INTERNAL MEDICINE

## 2025-03-07 PROCEDURE — 272N000001 HC OR GENERAL SUPPLY STERILE: Performed by: INTERNAL MEDICINE

## 2025-03-07 PROCEDURE — 0DB68ZZ EXCISION OF STOMACH, VIA NATURAL OR ARTIFICIAL OPENING ENDOSCOPIC: ICD-10-PCS | Performed by: INTERNAL MEDICINE

## 2025-03-07 PROCEDURE — 250N000013 HC RX MED GY IP 250 OP 250 PS 637: Performed by: PHYSICIAN ASSISTANT

## 2025-03-07 PROCEDURE — 88305 TISSUE EXAM BY PATHOLOGIST: CPT | Mod: TC | Performed by: INTERNAL MEDICINE

## 2025-03-07 PROCEDURE — 0W3P8ZZ CONTROL BLEEDING IN GASTROINTESTINAL TRACT, VIA NATURAL OR ARTIFICIAL OPENING ENDOSCOPIC: ICD-10-PCS | Performed by: INTERNAL MEDICINE

## 2025-03-07 RX ORDER — OXYCODONE HYDROCHLORIDE 5 MG/1
5 TABLET ORAL
Status: DISCONTINUED | OUTPATIENT
Start: 2025-03-07 | End: 2025-03-07 | Stop reason: HOSPADM

## 2025-03-07 RX ORDER — ONDANSETRON 2 MG/ML
4 INJECTION INTRAMUSCULAR; INTRAVENOUS EVERY 30 MIN PRN
Status: DISCONTINUED | OUTPATIENT
Start: 2025-03-07 | End: 2025-03-07 | Stop reason: HOSPADM

## 2025-03-07 RX ORDER — ONDANSETRON 2 MG/ML
4 INJECTION INTRAMUSCULAR; INTRAVENOUS
Status: DISCONTINUED | OUTPATIENT
Start: 2025-03-07 | End: 2025-03-07 | Stop reason: HOSPADM

## 2025-03-07 RX ORDER — LIDOCAINE 40 MG/G
CREAM TOPICAL
Status: DISCONTINUED | OUTPATIENT
Start: 2025-03-07 | End: 2025-03-07 | Stop reason: HOSPADM

## 2025-03-07 RX ORDER — NALOXONE HYDROCHLORIDE 0.4 MG/ML
0.1 INJECTION, SOLUTION INTRAMUSCULAR; INTRAVENOUS; SUBCUTANEOUS
Status: DISCONTINUED | OUTPATIENT
Start: 2025-03-07 | End: 2025-03-07 | Stop reason: HOSPADM

## 2025-03-07 RX ORDER — SODIUM CHLORIDE, SODIUM LACTATE, POTASSIUM CHLORIDE, CALCIUM CHLORIDE 600; 310; 30; 20 MG/100ML; MG/100ML; MG/100ML; MG/100ML
INJECTION, SOLUTION INTRAVENOUS CONTINUOUS
Status: DISCONTINUED | OUTPATIENT
Start: 2025-03-07 | End: 2025-03-07 | Stop reason: HOSPADM

## 2025-03-07 RX ORDER — OXYCODONE HYDROCHLORIDE 5 MG/1
10 TABLET ORAL
Status: DISCONTINUED | OUTPATIENT
Start: 2025-03-07 | End: 2025-03-07 | Stop reason: HOSPADM

## 2025-03-07 RX ORDER — ONDANSETRON 4 MG/1
4 TABLET, ORALLY DISINTEGRATING ORAL EVERY 30 MIN PRN
Status: DISCONTINUED | OUTPATIENT
Start: 2025-03-07 | End: 2025-03-07 | Stop reason: HOSPADM

## 2025-03-07 RX ORDER — DEXAMETHASONE SODIUM PHOSPHATE 4 MG/ML
4 INJECTION, SOLUTION INTRA-ARTICULAR; INTRALESIONAL; INTRAMUSCULAR; INTRAVENOUS; SOFT TISSUE
Status: DISCONTINUED | OUTPATIENT
Start: 2025-03-07 | End: 2025-03-07 | Stop reason: HOSPADM

## 2025-03-07 RX ADMIN — PANTOPRAZOLE SODIUM 40 MG: 40 INJECTION, POWDER, FOR SOLUTION INTRAVENOUS at 21:10

## 2025-03-07 RX ADMIN — EZETIMIBE 10 MG: 10 TABLET ORAL at 21:11

## 2025-03-07 RX ADMIN — MAGNESIUM CITRATE 296 ML: 1.75 LIQUID ORAL at 03:54

## 2025-03-07 RX ADMIN — SIMVASTATIN 40 MG: 10 TABLET, FILM COATED ORAL at 21:10

## 2025-03-07 RX ADMIN — SODIUM CHLORIDE, SODIUM LACTATE, POTASSIUM CHLORIDE, AND CALCIUM CHLORIDE: .6; .31; .03; .02 INJECTION, SOLUTION INTRAVENOUS at 07:16

## 2025-03-07 NOTE — PLAN OF CARE
Goal Outcome Evaluation:      Problem: Adult Inpatient Plan of Care  Goal: Absence of Hospital-Acquired Illness or Injury  Outcome: Progressing  Intervention: Identify and Manage Fall Risk  Recent Flowsheet Documentation  Taken 3/7/2025 0943 by Rica Nova RN  Safety Promotion/Fall Prevention:   activity supervised   assistive device/personal items within reach   clutter free environment maintained   nonskid shoes/slippers when out of bed   patient and family education   safety round/check completed     Problem: Pain Acute  Goal: Optimal Pain Control and Function  Outcome: Progressing  Intervention: Prevent or Manage Pain  Recent Flowsheet Documentation  Taken 3/7/2025 0943 by Rica Nova, ANALI  Medication Review/Management: medications reviewed  Taken 3/7/2025 0940 by Rica Nova RN  Sensory Stimulation Regulation: care clustered     Problem: Fall Injury Risk  Goal: Absence of Fall and Fall-Related Injury  Outcome: Progressing  Intervention: Identify and Manage Contributors  Recent Flowsheet Documentation  Taken 3/7/2025 0943 by Rica Nova RN  Medication Review/Management: medications reviewed  Intervention: Promote Injury-Free Environment  Recent Flowsheet Documentation  Taken 3/7/2025 0943 by Rica Nova RN  Safety Promotion/Fall Prevention:   activity supervised   assistive device/personal items within reach   clutter free environment maintained   nonskid shoes/slippers when out of bed   patient and family education   safety round/check completed    Pt hgb 6.7, got 1 unit of blood. No s/s adverse reaction evidenced. VSS. O2 sat % on 3 L n/c. On RA 89-91%. Then, 98% -100% on 2 L n/c. Pt had no resp distress. Pt is resting quietly.

## 2025-03-07 NOTE — INTERVAL H&P NOTE
I have reviewed the surgical (or preoperative) H&P that is linked to this encounter, and examined the patient. There are no significant changes    Pre-procedure Note    Reason for procedure: anemia, bleeding    History and Physical Reviewed: Reviewed, no changes.    Pre-sedation assessment:    General: alert, appears stated age, and cooperative  Airway: normal  Heart: regular rate and rhythm  Lungs: clear to auscultation bilaterally    Sedation Plan based on assessment: mac    Mallampati score: Class III (visualization of the soft palate and base of uvula)          ASA Classification: ASA 3 - Patient with moderate systemic disease with functional limitations    Impression: Patient deemed adequate candidate for sedation    Risks, benefits and alternatives were discussed with the patient and informed consent was obtained.    Plan: colonoscopy and esophagogastroduodenoscopy    Thank you for the opportunity to participate in the care of this patient. Please feel free to call with any questions or concerns.     Yg Van MD   Cell [phone]  After 5 PM, please call [phone]      Clinical Conditions Present on Arrival:  Clinically Significant Risk Factors Present on Admission

## 2025-03-07 NOTE — PROGRESS NOTES
[facility]    Medicine Progress Note - Hospitalist Service    Date of Admission:  3/5/2025    Assessment & Plan    Patient is an 84-year-old male with history of prostate cancer s/p radiation therapy complicated by radiation proctitis, on immunotherapy, and A-fib on Eliquis admitted on 3/5/2025 with hematochezia and shortness of breath and found to have severe anemia with hemoglobin of 6.5.    He was transfused with 1 unit of PRBC on admission. Colonoscopy performed on 3/7/2025 revealed diverticulosis in the entire examined colon, multiple nonbleeding colonic angiectasia consistent with radiation proctitis for which argon plasma coagulation was performed.  Surgical pathology report is pending.  Gastroenterologist recommends restarting PTA Eliquis tomorrow EGD showed 2 gastric polyps with signs of mild bleeding benign-appearing polyp, 4 cm hiatal hernia and normal esophagus.  Surgical pathology report is pending.  #Hematochezia, possible melena vs dark stool 2/2 iron supplement  #GI bleed  #Symptomatic acute blood loss anemia on anemia of chronic disease  He was transfused with 1 unit of PRBC on admission.  Colonoscopy performed on 3/7/2025 revealed diverticulosis in the entire examined colon, multiple nonbleeding colonic angiectasia consistent with radiation proctitis for which argon plasma coagulation was performed.  Surgical pathology report is pending.  Gastroenterologist recommends restarting PTA Eliquis on 3/8/25 EGD showed 2 gastric polyps with signs of mild bleeding benign-appearing polyp, 4 cm hiatal hernia and normal esophagus.  Surgical pathology report is pending.     Monitor hemoglobin and transfuse as needed  Monitor hemodynamics  Pantoprazole 40 mg IV twice daily for now  Transfused with 1 unit of PRBC on 3/7/2025  GI follow-appreciate assistance    #Dyspnea  #Moderate pulmonary hypertension  #Hypertension  CTA: No PE, pleural effusion or pneumo. Extensive calcified pleural  plaques unchanged.  Echo 11/2024: EF 55-60%, RV 2+ enlarged and mildly reduced fxn, 3+ biatrial enlargement, 2+ pulm HTN 56mmHg  Repeat echocardiogram 3/6/2025 showed borderline LVH with LVEF of 55%, severely dilated RV, flattened septum consistent with RV overload and evidence of elevated RA pressure.  PTA lasix, amlodipine, lisinopril with holding parameters    #Prostate cancer  Currently on immunotherapy  Follows with [facility]    #Chronic atrial fibrillation  Rate controlled  PTA atenolol  Hold PTA Eliquis due to possible GI bleed          Diet: Low Saturated Fat Na <2400 mg    DVT Prophylaxis: Pneumatic Compression Devices  Laguerre Catheter: Not present  Lines: None     Cardiac Monitoring: None  Code Status: Full Code      Clinically Significant Risk Factors                # Coagulation Defect: INR = 1.62 (Ref range: 0.85 - 1.15) and/or PTT = 42 Seconds (Ref range: 22 - 38 Seconds), will monitor for bleeding    # Hypertension: Noted on problem list                # Financial/Environmental Concerns: none         Social Drivers of Health    Tobacco Use: Medium Risk (3/7/2025)    Patient History     Smoking Tobacco Use: Former     Smokeless Tobacco Use: Never   Physical Activity: Unknown (2/15/2024)    Exercise Vital Sign     Days of Exercise per Week: 0 days   Social Connections: Unknown (2/15/2024)    Social Connection and Isolation Panel [NHANES]     Frequency of Social Gatherings with Friends and Family: Once a week          Disposition Plan     Medically Ready for Discharge: Anticipated in 2-4 Days      Prince Molina MD  Hospitalist Service  [facility]  Securely message with [employer] (more info)  Text page via [employer] Paging/Directory   ______________________________________________________________________    Interval History   No new complaints today and no acute events overnight.  Seen postprocedure.  Denies abdominal pain, shortness of breath or chest discomfort.  Colonoscopy  findings and plans discussed.    Addendum 03/07/25, 11:52 AM  Hemoglobin dropped to 6.7.  Ordered transfusion with 1 unit of PRBC.    Physical Exam   Vital Signs: Temp: 98  F (36.7  C) Temp src: Oral BP: (!) 146/80 Pulse: 63   Resp: 20 SpO2: 96 % O2 Device: Nasal cannula Oxygen Delivery: 3 LPM  Weight: 175 lbs 0 oz    General appearance: Frail elderly man, awake, Alert, Cooperative, not in any obvious distress and appears stated age   HEENT: Normocephalic, atraumatic, conjunctiva clear without icterus and ears without discharge  Lungs: Clear to auscultation bilaterally, no wheezing, good air exchange, normal work of breathing  Cardiovascular: Regular Rate and Rythm, normal apical impulse, normal S1 and S2, +1 lower extremity edema bilaterally  Abdomen: Soft, non-tender and Non-distended, active bowel sounds  Skin: Skin color, texture normal and bruising or bleeding. No rashes or lesions over face, neck, arms and legs, turgor normal.  Musculoskeletal: No bony deformities or joint tenderness. Normal ROM upon flexion & extension.   Neurologic: Alert & Oriented X 3, Facial symmetry preserved and upper & lower extremities moving well with symmetry  Psychiatric: Calm, normal eye contact and normal affect      Medical Decision Making       45 MINUTES SPENT BY ME on the date of service doing chart review, history, exam, documentation & further activities per the note.      Data         Imaging results reviewed over the past 24 hrs:   No results found for this or any previous visit (from the past 24 hours).

## 2025-03-07 NOTE — PLAN OF CARE
Problem: Anemia  Goal: Anemia Symptom Improvement  Outcome: Progressing  Intervention: Monitor and Manage Anemia  Recent Flowsheet Documentation  Taken 3/7/2025 0636 by Darren Olmedo, RN  Safety Promotion/Fall Prevention:   nonskid shoes/slippers when out of bed   lighting adjusted   clutter free environment maintained  Taken 3/7/2025 0031 by Darren Olmedo, RN  Safety Promotion/Fall Prevention:   nonskid shoes/slippers when out of bed   lighting adjusted   clutter free environment maintained   Goal Outcome Evaluation:       Patient is alert and oriented, denies pain, had multiple BM overnight from loose to watery. Patient is ready for scheduled colonoscopy at [id number].

## 2025-03-08 LAB
ANION GAP SERPL CALCULATED.3IONS-SCNC: 10 MMOL/L (ref 7–15)
BUN SERPL-MCNC: 8.7 MG/DL (ref 8–23)
CALCIUM SERPL-MCNC: 8.7 MG/DL (ref 8.8–10.4)
CHLORIDE SERPL-SCNC: 97 MMOL/L (ref 98–107)
CREAT SERPL-MCNC: 0.75 MG/DL (ref 0.67–1.17)
EGFRCR SERPLBLD CKD-EPI 2021: 90 ML/MIN/1.73M2
ERYTHROCYTE [DISTWIDTH] IN BLOOD BY AUTOMATED COUNT: 15.3 % (ref 10–15)
GLUCOSE SERPL-MCNC: 202 MG/DL (ref 70–99)
HCO3 SERPL-SCNC: 24 MMOL/L (ref 22–29)
HCT VFR BLD AUTO: 26.7 % (ref 40–53)
HGB BLD-MCNC: 7.8 G/DL (ref 13.3–17.7)
HGB BLD-MCNC: 8 G/DL (ref 13.3–17.7)
HOLD SPECIMEN: NORMAL
MCH RBC QN AUTO: 25.1 PG (ref 26.5–33)
MCHC RBC AUTO-ENTMCNC: 30 G/DL (ref 31.5–36.5)
MCV RBC AUTO: 84 FL (ref 78–100)
PLATELET # BLD AUTO: 200 10E3/UL (ref 150–450)
POTASSIUM SERPL-SCNC: 3.5 MMOL/L (ref 3.4–5.3)
POTASSIUM SERPL-SCNC: 4.6 MMOL/L (ref 3.4–5.3)
RBC # BLD AUTO: 3.19 10E6/UL (ref 4.4–5.9)
SODIUM SERPL-SCNC: 131 MMOL/L (ref 135–145)
WBC # BLD AUTO: 7.9 10E3/UL (ref 4–11)

## 2025-03-08 PROCEDURE — 80048 BASIC METABOLIC PNL TOTAL CA: CPT | Performed by: INTERNAL MEDICINE

## 2025-03-08 PROCEDURE — 36415 COLL VENOUS BLD VENIPUNCTURE: CPT | Performed by: INTERNAL MEDICINE

## 2025-03-08 PROCEDURE — 85018 HEMOGLOBIN: CPT | Performed by: INTERNAL MEDICINE

## 2025-03-08 PROCEDURE — 84132 ASSAY OF SERUM POTASSIUM: CPT | Performed by: INTERNAL MEDICINE

## 2025-03-08 PROCEDURE — 120N000001 HC R&B MED SURG/OB

## 2025-03-08 PROCEDURE — 250N000013 HC RX MED GY IP 250 OP 250 PS 637: Performed by: HOSPITALIST

## 2025-03-08 PROCEDURE — 99232 SBSQ HOSP IP/OBS MODERATE 35: CPT | Performed by: INTERNAL MEDICINE

## 2025-03-08 PROCEDURE — 250N000011 HC RX IP 250 OP 636: Performed by: PHYSICIAN ASSISTANT

## 2025-03-08 RX ADMIN — ESCITALOPRAM OXALATE 20 MG: 20 TABLET ORAL at 09:49

## 2025-03-08 RX ADMIN — PANTOPRAZOLE SODIUM 40 MG: 40 INJECTION, POWDER, FOR SOLUTION INTRAVENOUS at 09:49

## 2025-03-08 RX ADMIN — SIMVASTATIN 40 MG: 10 TABLET, FILM COATED ORAL at 21:09

## 2025-03-08 RX ADMIN — Medication 25 MCG: at 09:49

## 2025-03-08 RX ADMIN — Medication 250 MG: at 09:49

## 2025-03-08 RX ADMIN — PROBENECID 500 MG: 500 TABLET, FILM COATED ORAL at 09:54

## 2025-03-08 RX ADMIN — Medication 400 UNITS: at 09:54

## 2025-03-08 RX ADMIN — AMLODIPINE BESYLATE 10 MG: 5 TABLET ORAL at 09:49

## 2025-03-08 RX ADMIN — FUROSEMIDE 20 MG: 20 TABLET ORAL at 09:49

## 2025-03-08 RX ADMIN — ATENOLOL 50 MG: 25 TABLET ORAL at 09:54

## 2025-03-08 RX ADMIN — LISINOPRIL 40 MG: 20 TABLET ORAL at 09:54

## 2025-03-08 RX ADMIN — EZETIMIBE 10 MG: 10 TABLET ORAL at 21:09

## 2025-03-08 RX ADMIN — PANTOPRAZOLE SODIUM 40 MG: 40 INJECTION, POWDER, FOR SOLUTION INTRAVENOUS at 21:09

## 2025-03-08 NOTE — PROGRESS NOTES
It is noted in the chart that home health PT and OT is recommended and that patient is agreeable. He does not have a preference for agency. Referral sent to [facility] for review.    Patient accepted by [facility] for home PT and OT services.    CM to follow for medical progression and to continue discharge planning.    PATRICIA Snow

## 2025-03-08 NOTE — PLAN OF CARE
Problem: Anemia  Goal: Anemia Symptom Improvement  Intervention: Monitor and Manage Anemia  Recent Flowsheet Documentation  Taken 3/8/2025 0000 by Darren Olmedo RN  Safety Promotion/Fall Prevention:   activity supervised   assistive device/personal items within reach   clutter free environment maintained   nonskid shoes/slippers when out of bed   safety round/check completed     Problem: Gas Exchange Impaired  Goal: Optimal Gas Exchange  Intervention: Optimize Oxygenation and Ventilation  Recent Flowsheet Documentation  Taken 3/8/2025 0000 by Darren Olmedo RN  Head of Bed (HOB) Positioning: HOB at 30-45 degrees   Goal Outcome Evaluation:    Patient denies pain, slept through the night. Ambulate independently in the room, no significant event over night. Vss on 2LPM

## 2025-03-08 NOTE — PLAN OF CARE
Goal Outcome Evaluation:      Problem: Adult Inpatient Plan of Care  Goal: Absence of Hospital-Acquired Illness or Injury  Outcome: Progressing     Problem: Adult Inpatient Plan of Care  Goal: Optimal Comfort and Wellbeing  Outcome: Progressing     Problem: Pain Acute  Goal: Optimal Pain Control and Function  Outcome: Progressing  Intervention: Prevent or Manage Pain  Recent Flowsheet Documentation  Taken 3/8/2025 6109 by Rica Nova RN  Sensory Stimulation Regulation: television on     Pt denied pain this shift. Independent in the room. No s/s resp distress. O2 sat 95% 2 L n/c. Then, 93-94% on RA. Pt reported feeling short of breath with activity. RN monitoring. MD notified. Pt is resting quietly.

## 2025-03-08 NOTE — PROGRESS NOTES
[facility] Progress Note       SUBJECTIVE:  No significant overnight events.  On 2 L nasal cannula.  No abdominal pain.  Had a good night of sleep.  Status post EGD and colonoscopy yesterday which showed 2 gastric polyps with signs of mild bleeding, resected.  Radiation proctitis were found status post APC.  Hemoglobin stable this a.m.       OBJECTIVE:  /74 (BP Location: Right arm)   Pulse 78   Temp 98  F (36.7  C) (Oral)   Resp 18   Wt 79.4 kg (175 lb)   SpO2 95%   BMI 27.41 kg/m    Temp (24hrs), Av.9  F (36.6  C), Min:96.9  F (36.1  C), Max:98.4  F (36.9  C)    Patient Vitals for the past 72 hrs:   Weight   25 1106 79.4 kg (175 lb)       Intake/Output Summary (Last 24 hours) at 3/6/2025 0904  Last data filed at 3/6/2025 0555  Gross per 24 hour   Intake 836 ml   Output 470 ml   Net 366 ml        PHYSICAL EXAM  GEN: NAD, male appears stated age sitting up in bed  HRT: no LE edema  RESP: unlabored  ABD: soft, nontender  SKIN: Pale, no rash or jaundice      Additional Data:  I have reviewed the patient's new clinical lab results:     Recent Labs   Lab Test 25  0527 25  1100 25  0611 25  1818 25  1146   WBC  --  6.2 5.8  --  7.3   HGB 7.8* 6.7* 7.2*   < > 6.2*   MCV  --  84 84  --  85   PLT  --  196 226  --  229   INR  --   --   --   --  1.62*    < > = values in this interval not displayed.     Recent Labs   Lab Test 25  0611 25  1146 24  0836   POTASSIUM 4.6 4.2 4.7   CHLORIDE 104 100 102   CO2  28   BUN 12.6 14.6 9.6   ANIONGAP 8 10 9     Recent Labs   Lab Test 25  1146 24  0836 24  0509 23  1301 23  1248 03/10/22  0949 20  1220 20  1209 20  1028   ALBUMIN 3.6 3.8 3.4*   < >  --    < >  --    < > 3.8   BILITOTAL 0.4 0.4 0.5   < >  --    < >  --    < > 0.7   ALT 15 16 14   < >  --    < >  --    < > 38   AST 29 28 20   < >  --    < >  --    < > 37   PROTEIN  --   --   --   --  Negative   --  Negative  --  Negative    < > = values in this interval not displayed.     FOBT 3/5/25: positive    Imaging results:  CT chest PE 3/5/25:  FINDINGS:  ANGIOGRAM CHEST: Pulmonary arteries are normal caliber and negative for pulmonary emboli. Thoracic aorta is negative for dissection. No CT evidence of right heart strain.  LUNGS AND PLEURA: Decreasing prominence irregular shaped, subpleural nodule in the left lower lobe which now measures 7.6 x 9.1 mm, was 10 x 9 mm in February 2025. Stable 6 mm groundglass nodule in the right upper lobe (axial image 114) and the ovoid nodule adjacent to the right middle lobe pulmonary vein (axial image 181). No new pulmonary nodules. Extensive calcified pleural plaques again noted with that trace loculated pleural effusion in the left lateral costophrenic angle, unchanged.   MEDIASTINUM/AXILLAE: No lymphadenopathy. Small hiatal hernia. No significant pericardial effusion. Cardiomegaly. No thoracic aortic aneurysm.  CORONARY ARTERY CALCIFICATION: Moderate.  UPPER ABDOMEN: Layering high attenuation material within the gallbladder may represent stones and/or sludge. Peripelvic cyst right kidney. Exophytic cyst left kidney.  MUSCULOSKELETAL: Mild compression deformities at T5 and L1 stable. Moderate compression deformity at T12 stable. Mild to moderate thoracic spondylosis.                                                                   IMPRESSION:  1.  Negative for pulmonary embolus.  2.  No acute airspace opacity or pleural effusion.  3.  Decreasing size of the irregular shaped subpleural nodule left lower lobe. Given the patient's age and existing comorbidities, no follow-up is required.  4.  Additional pulmonary nodules are stable.  5.  Extensive calcified pleural plaques again noted.  6.  Stable compression deformities thoracic and upper lumbar spine.  7.  Cardiomegaly. Moderate coronary artery calcification.  8.  Sludge and/or stones within the gallbladder. No biliary  dilatation.    Procedure results:  Colonoscopy 11/21/24 (Basilio):  Findings:       The perianal and digital rectal examinations were normal.        Multiple angioectasias with spontaneous bleeding consistent with        radiation proctopathy were found in the distal rectum. Coagulation for        hemostasis using argon plasma at 0.4 liters/minute and 60 jiang was        successful with no bleeding at the end of the procedure. In this area        there were also large superficial non-bleeding ulcerations. These areas        were avoided with APC to prevent any further tissue damage.        Multiple diverticula were found in the sigmoid colon, descending colon        and ascending colon.        Non-bleeding internal hemorrhoids were found during retroflexion.                                                                                    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                          - Superficial large non-bleeding ulcerations in the                          distal rectum within the area of radiation                          proctopathy, likely related to radiation vs solitary                          rectal ulcer syndrome. Less likely malignancy given                          appearance and clinical history. No biopsies obtained                          given concern for adverse effects (ie fistula) with                          biopsy of radiation proctopathy.                          - Diverticulosis in the sigmoid colon, in the                          descending colon and in the ascending colon.                          - Non-bleeding small internal hemorrhoids.                          - No specimens collected.   Recommendation:        - Return patient to hospital [facility] for ongoing care.                          - Continue to hold anticoagulation and would have                          cardiology  reassess need for ongoing anticoagulation                          for Afib in light of above findings                          - Consider sucralfate enemas                          - Agressively treat any constipation                          -daily stool softener      EGD 11/21/24 ([facility]ilio):  Findings:       The examined esophagus was normal.        The Z-line was regular and was found 39 cm from the incisors.        Two friable erythematous polyps with surface eosion but no active        bleeding were found in the cardia. One was approximately 12 mm in size        and the other was approxiamtely 6 mm in size. Biopsies were taken with a        cold forceps for histology. There was significant oozing with the        biopsies that eventually stopped on its own.        A small hiatal hernia was present. The stomach was otherwise normal.        The examined duodenum was normal.        The cardia and gastric fundus were otherwise normal on retroflexion.                                                                                    Impression:   - No fresh or old blood                          - 2 friable erythematous polyps in the gastric cardia                          with surface eosion with no active bleeding or signs                          of recent bleeding but significant oozing with small                          biopsies that eventually stopped on its own.                          - Small hiatal hernia. Otherwise normal stomach.                          - Normal esophagus                          - Normal examined duodenum.   Recommendation:        - Await pathology results.                          - Continue BID PPI                          - Colonsocopy to follow        IMPRESSION:  Anemia  This is an 83 y/o male with PMH HTN, HLD, type 2 diabetes, pulmonary hypertension, depression, prostate cancer s/p radiation on immunotherapy, A fib on Eliquis, admission 11/2024 for GI bleed from radiation  "proctopathy s/p colonoscopy with APC admitted 3/5 for anemia after he was noted to have a hemoglobin of 6.5 at Oncology visit yesterday and he noted shortness of breath for a few weeks now. He notes black stool with occasional \"pink water\" after a BM but no other GI symptoms. Differential includes recurrent radiation proctitis or PUD seen on previous endoscopies, Ernst's erosions, hemorrhoids, less likely diverticular bleed or any malignancy given recent endoscopic evaluation.     Status post EGD and colonoscopy on 3/7/2025:  Impression:            - Normal esophagus.                          - 4 cm hiatal hernia.                          - Two gastric polyps with signs of mild bleeding.                          Benign appearing polyps, but removed because they                          likely are contributing to ongoing anemia. Resected                          and retrieved.                          - Normal examined duodenum.     Impression:            - Diverticulosis in the entire examined colon.                          - Multiple non-bleeding colonic angioectasias                          Consistent with radiation proctitis. Treated with                          argon plasma coagulation (APC).                          - The examination was otherwise normal.                          - No specimens collected.       PLAN:  -Diet as tolerated  -Hemoglobin monitoring per medicine  -Await pathology results  -We will sign off for now      36 minutes of total time was spent providing patient care, including patient evaluation, reviewing documentation/test results and .     Martin Ramos MD  [facility]   [phone]                                                 Martin Ramos M.D.  Thank you for the opportunity to participate in the care of this patient.   Please feel free to call me with any questions or concerns.  Phone number [phone].            "

## 2025-03-08 NOTE — PLAN OF CARE
Problem: Pain Acute  Goal: Optimal Pain Control and Function  3/7/2025 2148 by Dasha Urena RN  Outcome: Progressing  3/7/2025 2147 by Dasha Urena RN  Outcome: Progressing     Problem: Fall Injury Risk  Goal: Absence of Fall and Fall-Related Injury  Outcome: Progressing     Problem: Anemia  Goal: Anemia Symptom Improvement  3/7/2025 2148 by Dasha Urena RN  Outcome: Progressing  3/7/2025 2147 by Dasha Urena RN  Outcome: Progressing     Problem: Gas Exchange Impaired  Goal: Optimal Gas Exchange  Outcome: Progressing   Goal Outcome Evaluation:       Patient denies pain or discomfort, has been steady on his feet, has been asked to call for help as needed, no transfusion reaction post transfusion, cross cover texted as to when to recheck hgb and ordered hgb recheck tomorrow am, was de-sating on room air, has been sating in upper 90's on 2L, has dyspnea on exertion, read A-Fib on tele, alert and oriented.

## 2025-03-08 NOTE — PROGRESS NOTES
[facility]    Medicine Progress Note - Hospitalist Service    Date of Admission:  3/5/2025    Assessment & Plan    Patient is an 84-year-old male with history of prostate cancer s/p radiation therapy complicated by radiation proctitis, on immunotherapy, and A-fib on Eliquis admitted on 3/5/2025 with hematochezia and shortness of breath and found to have severe anemia with hemoglobin of 6.5.    He was transfused with 1 unit of PRBC on admission. Colonoscopy performed on 3/7/2025 revealed diverticulosis in the entire examined colon, multiple nonbleeding colonic angiectasia consistent with radiation proctitis for which argon plasma coagulation was performed.  Surgical pathology report is pending.  Gastroenterologist recommends restarting PTA Eliquis tomorrow EGD showed 2 gastric polyps with signs of mild bleeding benign-appearing polyp, 4 cm hiatal hernia and normal esophagus.  Surgical pathology report is pending.      #Hematochezia, possible melena vs dark stool 2/2 iron supplement  #GI bleed  #Symptomatic acute blood loss anemia on anemia of chronic disease  He was transfused with 1 unit of PRBC on admission.  Colonoscopy performed on 3/7/2025 revealed diverticulosis in the entire examined colon, multiple nonbleeding colonic angiectasia consistent with radiation proctitis for which argon plasma coagulation was performed.  Surgical pathology report is pending.  Gastroenterologist recommends restarting PTA Eliquis on 3/8/25 EGD showed 2 gastric polyps with signs of mild bleeding benign-appearing polyp, 4 cm hiatal hernia and normal esophagus.  Surgical pathology report is pending.     Monitor hemoglobin and transfuse as needed  Monitor hemodynamics  Pantoprazole 40 mg IV twice daily for now  Transfused with 1 unit of PRBC on 3/7/2025  GI follow-appreciate assistance    #Dyspnea  #Moderate pulmonary hypertension  #Hypertension  CTA: No PE, pleural effusion or pneumo. Extensive calcified pleural  plaques unchanged.  Echo 11/2024: EF 55-60%, RV 2+ enlarged and mildly reduced fxn, 3+ biatrial enlargement, 2+ pulm HTN 56mmHg  Repeat echocardiogram 3/6/2025 showed borderline LVH with LVEF of 55%, severely dilated RV, flattened septum consistent with RV overload and evidence of elevated RA pressure.  PTA lasix, amlodipine, lisinopril with holding parameters    #Prostate cancer  Currently on immunotherapy  Follows with [facility]    #Chronic atrial fibrillation  Rate controlled  PTA atenolol  Hold PTA Eliquis due to possible GI bleed          Diet: Low Saturated Fat Na <2400 mg    DVT Prophylaxis: Pneumatic Compression Devices  Laguerre Catheter: Not present  Lines: None     Cardiac Monitoring: None  Code Status: Full Code      Clinically Significant Risk Factors                # Coagulation Defect: INR = 1.62 (Ref range: 0.85 - 1.15) and/or PTT = 42 Seconds (Ref range: 22 - 38 Seconds), will monitor for bleeding    # Hypertension: Noted on problem list                # Financial/Environmental Concerns: none         Social Drivers of Health    Tobacco Use: Medium Risk (3/7/2025)    Patient History     Smoking Tobacco Use: Former     Smokeless Tobacco Use: Never   Physical Activity: Unknown (2/15/2024)    Exercise Vital Sign     Days of Exercise per Week: 0 days   Social Connections: Unknown (2/15/2024)    Social Connection and Isolation Panel [NHANES]     Frequency of Social Gatherings with Friends and Family: Once a week          Disposition Plan     Medically Ready for Discharge: Anticipated in 2-4 Days      Prince Molina MD  Hospitalist Service  [facility]  Securely message with [employer] (more info)  Text page via [employer] Paging/Directory   ______________________________________________________________________    Interval History   He was transfused with 1 unit PRBC yesterday due to severe anemia. He reports noticing hematochezia yesterday afternoon; however, he had bowel movement this  morning without any visible blood in his stool. He denies dizziness, shortness of breath, or palpitations. Anticoagulation therapy has not been restarted due to anemia. His hemoglobin improved to 7.8 g/dL post-transfusion.    Physical Exam   Vital Signs: Temp: 98  F (36.7  C) Temp src: Oral BP: 136/74 Pulse: 78   Resp: 18 SpO2: 95 % O2 Device: None (Room air) Oxygen Delivery: 2 LPM  Weight: 175 lbs 0 oz    General appearance: Frail elderly man, awake, Alert, Cooperative, not in any obvious distress and appears stated age   HEENT: Normocephalic, atraumatic, conjunctiva clear without icterus and ears without discharge  Lungs: Clear to auscultation bilaterally, no wheezing, good air exchange, normal work of breathing  Cardiovascular: Regular Rate and Rythm, normal apical impulse, normal S1 and S2, +1 lower extremity edema bilaterally  Abdomen: Soft, non-tender and Non-distended, active bowel sounds  Skin: Skin color, texture normal and bruising or bleeding. No rashes or lesions over face, neck, arms and legs, turgor normal.  Musculoskeletal: No bony deformities or joint tenderness. Normal ROM upon flexion & extension.   Neurologic: Alert & Oriented X 3, Facial symmetry preserved and upper & lower extremities moving well with symmetry  Psychiatric: Calm, normal eye contact and normal affect      Medical Decision Making       45 MINUTES SPENT BY ME on the date of service doing chart review, history, exam, documentation & further activities per the note.      Data     I have personally reviewed the following data over the past 24 hrs:    N/A  \   7.8 (L)   / N/A     N/A N/A N/A /  N/A   4.6 N/A N/A \       Imaging results reviewed over the past 24 hrs:   No results found for this or any previous visit (from the past 24 hours).

## 2025-03-09 VITALS
HEART RATE: 80 BPM | BODY MASS INDEX: 27.41 KG/M2 | OXYGEN SATURATION: 95 % | RESPIRATION RATE: 16 BRPM | DIASTOLIC BLOOD PRESSURE: 75 MMHG | WEIGHT: 175 LBS | SYSTOLIC BLOOD PRESSURE: 136 MMHG | TEMPERATURE: 98 F

## 2025-03-09 LAB — POTASSIUM SERPL-SCNC: 4.2 MMOL/L (ref 3.4–5.3)

## 2025-03-09 PROCEDURE — 250N000013 HC RX MED GY IP 250 OP 250 PS 637: Performed by: HOSPITALIST

## 2025-03-09 PROCEDURE — 84132 ASSAY OF SERUM POTASSIUM: CPT | Performed by: INTERNAL MEDICINE

## 2025-03-09 PROCEDURE — 99239 HOSP IP/OBS DSCHRG MGMT >30: CPT | Performed by: INTERNAL MEDICINE

## 2025-03-09 PROCEDURE — 250N000011 HC RX IP 250 OP 636: Performed by: PHYSICIAN ASSISTANT

## 2025-03-09 PROCEDURE — 36415 COLL VENOUS BLD VENIPUNCTURE: CPT | Performed by: INTERNAL MEDICINE

## 2025-03-09 RX ADMIN — Medication 400 UNITS: at 09:00

## 2025-03-09 RX ADMIN — LISINOPRIL 40 MG: 20 TABLET ORAL at 09:00

## 2025-03-09 RX ADMIN — ATENOLOL 50 MG: 25 TABLET ORAL at 09:00

## 2025-03-09 RX ADMIN — Medication 25 MCG: at 09:00

## 2025-03-09 RX ADMIN — PANTOPRAZOLE SODIUM 40 MG: 40 INJECTION, POWDER, FOR SOLUTION INTRAVENOUS at 09:00

## 2025-03-09 RX ADMIN — FUROSEMIDE 20 MG: 20 TABLET ORAL at 09:00

## 2025-03-09 RX ADMIN — AMLODIPINE BESYLATE 10 MG: 5 TABLET ORAL at 09:00

## 2025-03-09 RX ADMIN — ESCITALOPRAM OXALATE 20 MG: 20 TABLET ORAL at 09:00

## 2025-03-09 RX ADMIN — PROBENECID 500 MG: 500 TABLET, FILM COATED ORAL at 09:00

## 2025-03-09 RX ADMIN — Medication 250 MG: at 09:00

## 2025-03-09 ASSESSMENT — ACTIVITIES OF DAILY LIVING (ADL)
ADLS_ACUITY_SCORE: 35

## 2025-03-09 NOTE — DISCHARGE SUMMARY
[facility]  Hospitalist Discharge Summary      Date of Admission:  3/5/2025  Date of Discharge:  3/9/2025  Discharging Provider: Prince Molina MD  Discharge Service: Hospitalist Service    Discharge Diagnoses   Hematochezia  GI bleed  Symptomatic acute blood loss anemia on anemia of chronic disease  Diverticulosis   Colonic angiectasia  Radiation proctitis   Hiatal hernia  Moderate pulmonary hypertension  Chronic atrial fibrillation    Clinically Significant Risk Factors          Follow-ups Needed After Discharge   Follow-up Appointments       Hospital Follow-up with Existing Primary Care Provider (PCP)      Please see details below.   Follow up biopsy pathology report.   Hiatal hernia  Transfused for severe anemia; monitor hgb closely and consider holding apixaban  Monitor BMP and adjust dosage of lasix as needed   Resume outpatient urology and oncology follow up for prostate cancer  Chronic atrial fibrillation - monitor hgb and consider discontinuation of apixaban vs left atrial appendage occlusion procedure if bleeding perists    Schedule Primary Care visit within: 7 Days             Unresulted Labs Ordered in the Past 30 Days of this Admission       Date and Time Order Name Status Description    3/7/2025  8:02 AM Surgical Pathology Exam In process         These results will be followed up by Mike Mcdowell and GI team      Discharge Disposition   Discharged to home  Condition at discharge: Stable    Hospital Course   Patient is an 84-year-old male with history of prostate cancer s/p radiation therapy complicated by radiation proctitis, on immunotherapy, and A-fib on Eliquis admitted on 3/5/2025 with hematochezia and shortness of breath and found to have severe anemia with hemoglobin of 6.5.    He was transfused with 2 units of PRBC during hospital stay. Colonoscopy performed on 3/7/2025 revealed diverticulosis in the entire examined colon, multiple nonbleeding colonic angiectasia  consistent with radiation proctitis for which argon plasma coagulation was performed.  Surgical pathology report is pending.  EGD showed 2 gastric polyps with signs of mild bleeding benign-appearing polyp, 4 cm hiatal hernia and normal esophagus.  Surgical pathology report is pending.    Symptoms have improved and hemoglobin has remained stable posttransfusion.  He is clinically stable for discharge this time.    Consultations This Hospital Stay   GASTROENTEROLOGY IP CONSULT  PHYSICAL THERAPY ADULT IP CONSULT  OCCUPATIONAL THERAPY ADULT IP CONSULT  CARE MANAGEMENT / SOCIAL WORK IP CONSULT  GASTROENTEROLOGY IP CONSULT  CARE MANAGEMENT / SOCIAL WORK IP CONSULT    Code Status   Full Code    Time Spent on this Encounter   I, Prince Molina MD, personally saw the patient today and spent greater than 30 minutes discharging this patient.       Prince Molina MD  [address] [address]  Phone: [phone]  Fax: [phone]  ______________________________________________________________________    Physical Exam   Vital Signs: Temp: 97.8  F (36.6  C) Temp src: Oral BP: (!) 140/76 Pulse: 82   Resp: 18 SpO2: 96 % O2 Device: None (Room air)    Weight: 175 lbs 0 oz    General appearance: Frail elderly man, awake, Alert, Cooperative, not in any obvious distress and appears stated age   HEENT: Normocephalic, atraumatic, conjunctiva clear without icterus and ears without discharge  Lungs: Clear to auscultation bilaterally, no wheezing, good air exchange, normal work of breathing  Cardiovascular: Regular Rate and Rythm, normal apical impulse, normal S1 and S2, +1 lower extremity edema bilaterally  Abdomen: Soft, non-tender and Non-distended, active bowel sounds  Skin: Skin color, texture normal and bruising or bleeding. No rashes or lesions over face, neck, arms and legs, turgor normal.  Musculoskeletal: No bony deformities or joint tenderness. Normal ROM upon  flexion & extension.   Neurologic: Alert & Oriented X 3, Facial symmetry preserved and upper & lower extremities moving well with symmetry  Psychiatric: Calm, normal eye contact and normal affect         Primary Care Physician   Mike Mcdowell    Discharge Orders      Home Care Referral      Reason for your hospital stay    Hematochezia  GI bleed  Symptomatic acute blood loss anemia on anemia of chronic disease  Diverticulosis   Colonic angiectasia  Radiation proctitis   Follow up biopsy pathology report.   Hiatal hernia  Moderate pulmonary hypertension  Chronic atrial fibrillation     Activity    Your activity upon discharge: activity as tolerated     Diet    Follow this diet upon discharge: Current Diet:Orders Placed This Encounter      Low Saturated Fat Na <2400 mg     Hospital Follow-up with Existing Primary Care Provider (PCP)    Please see details below.   Follow up biopsy pathology report.   Hiatal hernia  Transfused for severe anemia; monitor hgb closely and consider holding apixaban  Monitor BMP and adjust dosage of lasix as needed   Resume outpatient urology and oncology follow up for prostate cancer  Chronic atrial fibrillation - monitor hgb and consider discontinuation of apixaban vs left atrial appendage occlusion procedure if bleeding perists       Significant Results and Procedures   Most Recent 3 CBC's:  Recent Labs   Lab Test 03/08/25  1501 03/08/25  0527 03/07/25  1100 03/06/25  0611   WBC 7.9  --  6.2 5.8   HGB 8.0* 7.8* 6.7* 7.2*   MCV 84  --  84 84     --  196 226   ,   Results for orders placed or performed during the hospital encounter of 03/05/25   CT Chest Pulmonary Embolism w Contrast    Narrative    EXAM: CT CHEST PULMONARY EMBOLISM W CONTRAST  LOCATION: [facility]  DATE: 3/5/2025    INDICATION: SOB  COMPARISON: 2/10/2025  TECHNIQUE: CT chest pulmonary angiogram during arterial phase injection of IV contrast. Multiplanar reformats and MIP reconstructions  were performed. Dose reduction techniques were used.   CONTRAST: IsoVue 370 90mL    FINDINGS:  ANGIOGRAM CHEST: Pulmonary arteries are normal caliber and negative for pulmonary emboli. Thoracic aorta is negative for dissection. No CT evidence of right heart strain.    LUNGS AND PLEURA: Decreasing prominence irregular shaped, subpleural nodule in the left lower lobe which now measures 7.6 x 9.1 mm, was 10 x 9 mm in February 2025. Stable 6 mm groundglass nodule in the right upper lobe (axial image 114) and the ovoid   nodule adjacent to the right middle lobe pulmonary vein (axial image 181). No new pulmonary nodules. Extensive calcified pleural plaques again noted with that trace loculated pleural effusion in the left lateral costophrenic angle, unchanged.     MEDIASTINUM/AXILLAE: No lymphadenopathy. Small hiatal hernia. No significant pericardial effusion. Cardiomegaly. No thoracic aortic aneurysm.    CORONARY ARTERY CALCIFICATION: Moderate.    UPPER ABDOMEN: Layering high attenuation material within the gallbladder may represent stones and/or sludge. Peripelvic cyst right kidney. Exophytic cyst left kidney.    MUSCULOSKELETAL: Mild compression deformities at T5 and L1 stable. Moderate compression deformity at T12 stable. Mild to moderate thoracic spondylosis.      Impression    IMPRESSION:  1.  Negative for pulmonary embolus.  2.  No acute airspace opacity or pleural effusion.  3.  Decreasing size of the irregular shaped subpleural nodule left lower lobe. Given the patient's age and existing comorbidities, no follow-up is required.  4.  Additional pulmonary nodules are stable.  5.  Extensive calcified pleural plaques again noted.  6.  Stable compression deformities thoracic and upper lumbar spine.  7.  Cardiomegaly. Moderate coronary artery calcification.  8.  Sludge and/or stones within the gallbladder. No biliary dilatation.     Echocardiogram Complete     Value    LVEF  55%    Narrative     [id number]  [id number]  [id number]  [id number]^EDUARD^DEBBIE^[place] [address]     Name: GO CONTRERAS  MRN: [id number]  : 1940  Study Date: 2025 09:04 AM  Age: 84 yrs  Gender: Male  Patient Location: [facility]  Reason For Study: Pulmonary HTN  Ordering Physician: DEBBIE CHAPA  Performed By: BP     BSA: 1.9 m2  Height: 67 in  Weight: 175 lb  HR: 73  BP: 150/84 mmHg  ______________________________________________________________________________  Procedure  Echocardiogram with two-dimensional, color and spectral Doppler. Definity (NDC  #[id number]) given intravenously.  ______________________________________________________________________________  Interpretation Summary     The left ventricle is normal in size.  There is borderline concentric left ventricular hypertrophy.  The visual ejection fraction is estimated at 55%.  The right ventricle is severely dilated.  Moderately decreased right ventricular systolic function  The left atrium is severely dilated.  The right atrium is severely dilated.  There is moderate (2+) tricuspid regurgitation.  Right ventricular systolic pressure estimated at 50 mmHg.  Compared to prior study dated 2024, RV function is further reduced.  ______________________________________________________________________________  Left Ventricle  The left ventricle is normal in size. There is borderline concentric left  ventricular hypertrophy. The visual ejection fraction is estimated at 55%.  Flattened septum is consistent with RV pressure overload.     Right Ventricle  The right ventricle is severely dilated. Moderately decreased right  ventricular systolic function.     Atria  The left atrium is severely dilated. The right atrium is severely dilated.     Mitral Valve  The mitral valve leaflets appear thickened, but open well. There is mild (1+)  mitral regurgitation.     Tricuspid Valve  There is moderate (2+) tricuspid regurgitation.  Right ventricular systolic  pressure estimated at 50 mmHg.     Aortic Valve  Aortic valve leaflets appear normal. There is no evidence of aortic stenosis  or clinically significant aortic regurgitation.     Pulmonic Valve  There is trace to mild pulmonic valvular regurgitation.     Vessels  The aorta root is normal. IVC diameter >2.1 cm collapsing <50% with sniff  suggests a high RA pressure estimated at 15 mmHg or greater.     ______________________________________________________________________________  MMode/2D Measurements & Calculations  IVSd: 1.1 cm  LVIDd: 5.3 cm  LVIDs: 4.2 cm  LVPWd: 1.1 cm  FS: 19.8 %  LV mass(C)d: 234.1 grams  LV mass(C)dI: 122.5 grams/m2  Ao root diam: 4.0 cm  LA dimension: 4.5 cm  LA/Ao: 1.1  LVOT diam: 2.0 cm  LVOT area: 3.1 cm2     Ao root diam index Ht(cm/m): 2.4  Ao root diam index BSA (cm/m2): 2.1  EF Biplane: 52.1 %  LA Volume (BP): 103.0 ml  LA Volume Index (BP): 53.9 ml/m2     LA Volume Indexed (AL/bp): 58.3 ml/m2  RV Base: 4.9 cm  RWT: 0.43  TAPSE: 1.1 cm     Time Measurements  MM HR: 73.0 BPM     Doppler Measurements & Calculations  MV E max silvino: 85.9 cm/sec  MV dec time: 0.24 sec  Ao V2 max: 122.0 cm/sec  Ao max P.0 mmHg  Ao V2 mean: 81.4 cm/sec  Ao mean PG: 3.0 mmHg  Ao V2 VTI: 24.7 cm  SUSIE(I,D): 2.6 cm2  SUSIE(V,D): 2.4 cm2  LV V1 max PG: 3.5 mmHg  LV V1 max: 94.2 cm/sec  LV V1 VTI: 20.3 cm  SV(LVOT): 63.8 ml  SI(LVOT): 33.4 ml/m2     PA acc time: 0.08 sec  PI end-d silvino: 141.0 cm/sec  TR max silvino: 297.0 cm/sec  TR max P.3 mmHg  AV Silvino Ratio (DI): 0.77  SUSIE Index (cm2/m2): 1.4  E/E': 10.9  E/E' av.8  Lateral E/e': 12.7  Medial E/e': 10.8  Peak E' Silvino: 7.9 cm/sec  RV S Silvino: 10.6 cm/sec     ______________________________________________________________________________  Report approved by: Vladimir Hicks on 2025 10:27 AM             Discharge Medications   Current Discharge Medication List        CONTINUE these medications which have CHANGED    Details    apixaban ANTICOAGULANT (ELIQUIS ANTICOAGULANT) 2.5 MG tablet Take 1 tablet (2.5 mg) by mouth 2 times daily.  Qty: 60 tablet, Refills: 0    Associated Diagnoses: New onset atrial fibrillation (H)           CONTINUE these medications which have NOT CHANGED    Details   acetaminophen (TYLENOL) 500 MG tablet Take 1,000 mg by mouth every 8 hours as needed for mild pain.      amLODIPine (NORVASC) 10 MG tablet Take 1 tablet by mouth once daily  Qty: 90 tablet, Refills: 2    Associated Diagnoses: Essential hypertension, benign; Cardiovascular disease      ascorbic acid (VITAMIN C) 250 MG CHEW chewable tablet Take 250 mg by mouth daily.      atenolol (TENORMIN) 50 MG tablet Take 1 tablet (50 mg) by mouth daily  Qty: 90 tablet, Refills: 2    Associated Diagnoses: Essential hypertension, benign      chlorpheniramine (CHLOR-TRIMETON) 4 MG tablet Take 4 mg by mouth every 6 hours as needed for allergies or rhinitis.      escitalopram (LEXAPRO) 20 MG tablet TAKE 1 TABLET BY MOUTH ONCE DAILY IN THE MORNING  Qty: 90 tablet, Refills: 0    Associated Diagnoses: Generalized anxiety disorder      ezetimibe (ZETIA) 10 MG tablet Take 1 tablet by mouth once daily  Qty: 90 tablet, Refills: 0    Comments: Medication filled one time only as pt is due for a follow-up with PCP for further refills.  Associated Diagnoses: Hypercholesteremia      furosemide (LASIX) 20 MG tablet Take 1 tablet by mouth once daily  Qty: 90 tablet, Refills: 0    Associated Diagnoses: Benign essential hypertension      glimepiride (AMARYL) 2 MG tablet TAKE 1 TABLET BY MOUTH IN THE MORNING BEFORE BREAKFAST  Qty: 90 tablet, Refills: 0    Associated Diagnoses: Type 2 diabetes mellitus with other specified complication, without long-term current use of insulin (H)      leuprolide, 3 Month, (ELIGARD) 22.5 MG Inject 22.5 mg subcutaneously every 3 months.      lisinopril (ZESTRIL) 40 MG tablet TAKE 1 TABLET BY MOUTH ONCE DAILY . APPOINTMENT REQUIRED FOR FUTURE REFILLS  Qty:  90 tablet, Refills: 1    Associated Diagnoses: Primary hypertension      omeprazole (PRILOSEC) 40 MG DR capsule Take 40 mg by mouth daily.      potassium gluconate 2.5 MEQ tablet Take 2.5 mEq by mouth daily.      probenecid (BENEMID) 500 MG tablet Take 500 mg by mouth every morning.      simvastatin (ZOCOR) 40 MG tablet Take 1 tablet by mouth once daily  Qty: 90 tablet, Refills: 0    Associated Diagnoses: Mixed hyperlipidemia      Vitamin D3 (CHOLECALCIFEROL) 25 mcg (1000 units) tablet Take 1 tablet by mouth daily.      vitamin E 400 units TABS Take 400 Units by mouth daily.           Allergies   No Known Allergies

## 2025-03-09 NOTE — PROGRESS NOTES
"Care Management Discharge Note    Discharge Date: 03/09/2025     Discharge Disposition: Home with home care    Discharge Services: None    Discharge DME: Oxygen (\"I hope I qualify for oxygen at discharge, because I think I need it at home\".)    Discharge Transportation: family or friend will provide    Private pay costs discussed: Not applicable    Does the patient's insurance plan have a 3 day qualifying hospital stay waiver?  No    PAS Confirmation Code:  n/a  Patient/family educated on Medicare website which has current facility and service quality ratings: yes    Education Provided on the Discharge Plan: Yes  Persons Notified of Discharge Plans: patient  Patient/Family in Agreement with the Plan:  yes    Handoff Referral Completed: No, handoff not indicated or clinically appropriate    Additional Information:  Per hospitalist, patient is ready for discharge today. He will have home health care services through [facility]. This writer sent orders via Epic. Anticipate family transport.    AVELINA Whitfield     "

## 2025-03-09 NOTE — PLAN OF CARE
Goal Outcome Evaluation:      Problem: Adult Inpatient Plan of Care  Goal: Absence of Hospital-Acquired Illness or Injury  Outcome: Adequate for Care Transition     Problem: Adult Inpatient Plan of Care  Goal: Optimal Comfort and Wellbeing  Outcome: Adequate for Care Transition     Problem: Pain Acute  Goal: Optimal Pain Control and Function  Outcome: Adequate for Care Transition     Problem: Gas Exchange Impaired  Goal: Optimal Gas Exchange  Outcome: Adequate for Care Transition    Pt A/O. Denied pain.   Discharge instruction given. PIV removed. Pt's son picked at 2:30 pm; pt left unit with wheelchair assist.

## 2025-03-09 NOTE — CARE PLAN
03/08/25 1800   Home Oxygen Assessment (RN/RT ONLY)   Does patient have oxygen at home? No   1. SpO2 on room air at rest while awake 94       Oxygen LPM at rest 2   3. SpO2 on room air during Activity/with exercise 88   4. SpO2 with oxygen during activity/with exercise 95       Oxygen LPM during activity/with exercise 2   Does patient qualify for Home O2? Yes

## 2025-03-09 NOTE — PLAN OF CARE
Problem: Adult Inpatient Plan of Care  Goal: Optimal Comfort and Wellbeing  Outcome: Progressing     Problem: Pain Acute  Goal: Optimal Pain Control and Function  Outcome: Progressing  Intervention: Prevent or Manage Pain  Recent Flowsheet Documentation  Taken 3/9/2025 0020 by Dominic Cramer RN  Medication Review/Management: medications reviewed     Problem: Fall Injury Risk  Goal: Absence of Fall and Fall-Related Injury  Outcome: Progressing  Intervention: Identify and Manage Contributors  Recent Flowsheet Documentation  Taken 3/9/2025 0020 by Dominic Cramer RN  Medication Review/Management: medications reviewed  Intervention: Promote Injury-Free Environment  Recent Flowsheet Documentation  Taken 3/9/2025 0020 by Dominic Cramer, RN  Safety Promotion/Fall Prevention:   assistive device/personal items within reach   clutter free environment maintained   nonskid shoes/slippers when out of bed   safety round/check completed     Problem: Gas Exchange Impaired  Goal: Optimal Gas Exchange  Outcome: Progressing  Intervention: Optimize Oxygenation and Ventilation  Recent Flowsheet Documentation  Taken 3/9/2025 0020 by Dominic Cramer, RN  Head of Bed (HOB) Positioning: HOB at 20-30 degrees   Goal Outcome Evaluation:    Denied pain.     Stats stable on room air, some dyspnea on exertion.     Independent in room. Slept between cares.     No acute events tonight.           Dominic Cramer RN

## 2025-03-09 NOTE — PLAN OF CARE
Problem: Pain Acute  Goal: Optimal Pain Control and Function  Outcome: Progressing     Problem: Anemia  Goal: Anemia Symptom Improvement  Outcome: Progressing     Problem: Gas Exchange Impaired  Goal: Optimal Gas Exchange  Outcome: Progressing   Goal Outcome Evaluation:       Patient denies pain or discomfort, hgb this pm was 8.0, has been sating in the lower and mid 90's on room air, home 02 assessments done, was de-sating on room air with activity and had dyspnea with exertion, vitals stable, alert and oriented, independent in room.

## 2025-03-09 NOTE — PROGRESS NOTES
Physical Therapy Discharge Summary    Reason for therapy discharge:    Discharged to home with home therapy.    Progress towards therapy goal(s). See goals on Care Plan in [facility] electronic health record for goal details.  Goals partially met.  Barriers to achieving goals:   discharge from facility.    Therapy recommendation(s):    Continued therapy is recommended.  Rationale/Recommendations:  Home PT.

## 2025-03-10 ENCOUNTER — TELEPHONE (OUTPATIENT)
Dept: FAMILY MEDICINE | Facility: CLINIC | Age: 85
End: 2025-03-10

## 2025-03-10 DIAGNOSIS — Z09 HOSPITAL DISCHARGE FOLLOW-UP: ICD-10-CM

## 2025-03-10 NOTE — TELEPHONE ENCOUNTER
"  General Call      Reason for Call:  home care staff  [facility] [phone]    What are your questions or concerns:  pt does not want home care ordered by hospital staff. [facility] visit- discharged 3/9/25. Pt and son confirmed pt is doing well are stating no home care is needed. FYI for DR Mcdowell. If home needed in the future, please place new referral    Home care order was for skilled nursing.    Date of last appointment with provider:  future appt 3/18/25    Could we send this information to you in [employer] or would you prefer to receive a phone call?:         [facility]- GI bleed. Discharge 3/9/25. Does not want home care;skilled nursing.   Anything changes, send new referral.   Son also confirmed he's doing \"fine\"      "

## 2025-03-11 ENCOUNTER — TELEPHONE (OUTPATIENT)
Dept: FAMILY MEDICINE | Facility: CLINIC | Age: 85
End: 2025-03-11
Payer: COMMERCIAL

## 2025-03-11 NOTE — TELEPHONE ENCOUNTER
MTM referral from: Transitions of Care (recent hospital discharge, TCU discharge, or ED visit)    MTM referral outreach attempt #1 on March 11, 2025 at 10:30 AM      Outcome: Spoke with patient not interested    Use [employer] part [facility] for the carrier/Plan on the flowsheet          See Maik COLBERT   [phone]

## 2025-03-15 LAB
PATH REPORT.COMMENTS IMP SPEC: NORMAL
PATH REPORT.COMMENTS IMP SPEC: NORMAL
PATH REPORT.FINAL DX SPEC: NORMAL
PATH REPORT.GROSS SPEC: NORMAL
PATH REPORT.MICROSCOPIC SPEC OTHER STN: NORMAL
PATH REPORT.RELEVANT HX SPEC: NORMAL
PHOTO IMAGE: NORMAL

## 2025-03-15 PROCEDURE — 88305 TISSUE EXAM BY PATHOLOGIST: CPT | Mod: 26 | Performed by: PATHOLOGY

## 2025-03-18 ENCOUNTER — TELEPHONE (OUTPATIENT)
Dept: FAMILY MEDICINE | Facility: CLINIC | Age: 85
End: 2025-03-18

## 2025-03-18 ENCOUNTER — OFFICE VISIT (OUTPATIENT)
Dept: FAMILY MEDICINE | Facility: CLINIC | Age: 85
End: 2025-03-18
Attending: INTERNAL MEDICINE
Payer: COMMERCIAL

## 2025-03-18 VITALS
DIASTOLIC BLOOD PRESSURE: 51 MMHG | OXYGEN SATURATION: 100 % | SYSTOLIC BLOOD PRESSURE: 106 MMHG | HEART RATE: 63 BPM | WEIGHT: 162.6 LBS | HEIGHT: 65 IN | BODY MASS INDEX: 27.09 KG/M2 | TEMPERATURE: 96.8 F

## 2025-03-18 DIAGNOSIS — D63.0 ANEMIA IN NEOPLASTIC DISEASE: ICD-10-CM

## 2025-03-18 DIAGNOSIS — Z79.899 MEDICATION MANAGEMENT: Primary | ICD-10-CM

## 2025-03-18 DIAGNOSIS — R06.09 DYSPNEA ON EXERTION: ICD-10-CM

## 2025-03-18 DIAGNOSIS — C61 MALIGNANT NEOPLASM OF PROSTATE (H): ICD-10-CM

## 2025-03-18 DIAGNOSIS — K62.5 BRBPR (BRIGHT RED BLOOD PER RECTUM): ICD-10-CM

## 2025-03-18 LAB
ERYTHROCYTE [DISTWIDTH] IN BLOOD BY AUTOMATED COUNT: 15.8 % (ref 10–15)
EST. AVERAGE GLUCOSE BLD GHB EST-MCNC: 120 MG/DL
HBA1C MFR BLD: 5.8 % (ref 0–5.6)
HCT VFR BLD AUTO: 29.9 % (ref 40–53)
HGB BLD-MCNC: 9 G/DL (ref 13.3–17.7)
MCH RBC QN AUTO: 24.8 PG (ref 26.5–33)
MCHC RBC AUTO-ENTMCNC: 30.1 G/DL (ref 31.5–36.5)
MCV RBC AUTO: 82 FL (ref 78–100)
PLATELET # BLD AUTO: 298 10E3/UL (ref 150–450)
RBC # BLD AUTO: 3.63 10E6/UL (ref 4.4–5.9)
WBC # BLD AUTO: 6.9 10E3/UL (ref 4–11)

## 2025-03-18 PROCEDURE — 83036 HEMOGLOBIN GLYCOSYLATED A1C: CPT | Performed by: FAMILY MEDICINE

## 2025-03-18 PROCEDURE — 85027 COMPLETE CBC AUTOMATED: CPT | Performed by: FAMILY MEDICINE

## 2025-03-18 PROCEDURE — 36415 COLL VENOUS BLD VENIPUNCTURE: CPT | Performed by: FAMILY MEDICINE

## 2025-03-18 ASSESSMENT — PAIN SCALES - GENERAL: PAINLEVEL_OUTOF10: NO PAIN (0)

## 2025-03-18 NOTE — PROGRESS NOTES
Daja Barboza is a 84 year old, presenting for the following health issues:  Hospital F/U (COLONOSCOPY WITH ARGON PLASMA COAGULATION (Rectum) 3/7/2025)        3/18/2025    11:27 AM   Additional Questions   Roomed by judd MENDOZA    Patient with a history of prostate cancer treated with radiation therapy developed radiation small vessel disease with rectal bleeding complicated by history of diverticulosis hospitalized for anemia with hemoglobin of 6.0.  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  Discharged with a hemoglobin of 8 follow-up with your primary care other multiple medical problems including diabetes mellitus hypertension hypercholesterol anemia polyarticular arthritis.  Today we will repeat his CBC metabolic profile A1c I want to follow him weekly as he is fragile.  His energy level has improved his ankle edema stabilized we will continue him on Lasix 20 mg daily see him again in 1 week time spent face-to-face non-face-to-face reviewing charts hospitalizations 40 minutes      Hospital Follow-up Visit:    Hospital/Nursing Home/IP Rehab Facility: [facility]  Date of Admission: [id number]  Date of Discharge: 03122025  Reason(s) for Admission: colonoscopy   Was the patient in the ICU or did the patient experience delirium during hospitalization?  No  Do you have any other stressors you would like to discuss with your provider? OTHER:      Problems taking medications regularly:  None  Medication changes since discharge: None  Problems adhering to non-medication therapy:  None    Summary of hospitalization:  [facility] discharge summary reviewed  Diagnostic Tests/Treatments reviewed.  Follow up needed:   Other Healthcare Providers Involved in Patient s Care:          None  Update since discharge: improved.         Plan of care communicated with patient                   Review of Systems  CONSTITUTIONAL: NEGATIVE for fever, chills, change in weight  INTEGUMENTARY/SKIN: NEGATIVE for worrisome rashes, moles or lesions  EYES: NEGATIVE for vision changes or irritation  ENT/MOUTH: NEGATIVE for ear, mouth and throat problems  RESP: NEGATIVE for significant cough or SOB  BREAST: NEGATIVE for masses, tenderness or discharge  CV: NEGATIVE for chest pain, palpitations or peripheral edema  GI: NEGATIVE for nausea, abdominal pain, heartburn, or change in bowel habits  : NEGATIVE for frequency, dysuria, or hematuria  MUSCULOSKELETAL: NEGATIVE for significant arthralgias or myalgia  NEURO: NEGATIVE for weakness, dizziness or paresthesias  ENDOCRINE: NEGATIVE for temperature intolerance, skin/hair changes  HEME: NEGATIVE for bleeding problems  PSYCHIATRIC: NEGATIVE for changes in mood or affect      Objective    There were no vitals taken for this visit.  There is no height or weight on file to calculate BMI.  Physical Exam   GENERAL: alert and no distress  EYES: Eyes grossly normal to inspection, PERRL and conjunctivae and sclerae normal  HENT: ear canals and TM's normal, nose and mouth without ulcers or lesions  NECK: no adenopathy, no asymmetry, masses, or scars  RESP: lungs clear to auscultation - no rales, rhonchi or wheezes  CV: regular rate and rhythm, normal S1 S2, no S3 or S4, no murmur, click or rub, no peripheral edema no signs of congestive heart failure at this exam  ABDOMEN: soft, nontender, no hepatosplenomegaly, no masses and bowel sounds normal  MS: no gross musculoskeletal defects noted, no edema  SKIN: no suspicious lesions or rashes  NEURO: Normal strength and tone, mentation intact and speech normal  PSYCH: mentation appears normal, affect normal/bright    Patient appears moderately anemic conjunctiva shows some injection however; bowel sounds normal passing some slight  dark stool no active bleeding noted 2+ pedal edema left side which appears chronic right side 1+ also chronic        Signed Electronically by: Mike Mcdowell MD

## 2025-03-18 NOTE — TELEPHONE ENCOUNTER
"See lab result note, from the PCP, to the patient, on 3/18/25:    \"Mike Mcdowell MD  [facility] - Primary Care  Blood has come up nicely; I will see you in a week\"    Writer called the patient and left a message to return call.    When the patient calls back, please relay the above lab result note to the patient.    Please route to the RN queue for any questions or concerns.    Cheri Sung RN, BSN  [facility]             "

## 2025-03-19 ENCOUNTER — TRANSFERRED RECORDS (OUTPATIENT)
Dept: HEALTH INFORMATION MANAGEMENT | Facility: CLINIC | Age: 85
End: 2025-03-19
Payer: COMMERCIAL

## 2025-03-19 LAB
ALBUMIN SERPL BCG-MCNC: 3.7 G/DL (ref 3.5–5.2)
ALP SERPL-CCNC: 125 U/L (ref 40–150)
ALT SERPL W P-5'-P-CCNC: 28 U/L (ref 0–70)
ANION GAP SERPL CALCULATED.3IONS-SCNC: 11 MMOL/L (ref 7–15)
AST SERPL W P-5'-P-CCNC: 46 U/L (ref 0–45)
BILIRUB SERPL-MCNC: 0.4 MG/DL
BUN SERPL-MCNC: 12.6 MG/DL (ref 8–23)
CALCIUM SERPL-MCNC: 9.2 MG/DL (ref 8.8–10.4)
CHLORIDE SERPL-SCNC: 98 MMOL/L (ref 98–107)
CREAT SERPL-MCNC: 0.88 MG/DL (ref 0.67–1.17)
EGFRCR SERPLBLD CKD-EPI 2021: 85 ML/MIN/1.73M2
GLUCOSE SERPL-MCNC: 120 MG/DL (ref 70–99)
HCO3 SERPL-SCNC: 26 MMOL/L (ref 22–29)
POTASSIUM SERPL-SCNC: 5.3 MMOL/L (ref 3.4–5.3)
PROT SERPL-MCNC: 7.6 G/DL (ref 6.4–8.3)
PSA SERPL DL<=0.01 NG/ML-MCNC: 0.01 NG/ML
SODIUM SERPL-SCNC: 135 MMOL/L (ref 135–145)

## 2025-03-20 NOTE — TELEPHONE ENCOUNTER
Called and relayed message from Dr. Mcdowell, patient verbalized understanding and has no further questions.     Ludwin Morales RN  [facility]

## 2025-03-23 ENCOUNTER — HEALTH MAINTENANCE LETTER (OUTPATIENT)
Age: 85
End: 2025-03-23

## 2025-03-25 ENCOUNTER — OFFICE VISIT (OUTPATIENT)
Dept: FAMILY MEDICINE | Facility: CLINIC | Age: 85
End: 2025-03-25
Payer: COMMERCIAL

## 2025-03-25 VITALS
TEMPERATURE: 97.1 F | WEIGHT: 166.3 LBS | DIASTOLIC BLOOD PRESSURE: 64 MMHG | RESPIRATION RATE: 20 BRPM | SYSTOLIC BLOOD PRESSURE: 112 MMHG | BODY MASS INDEX: 27.71 KG/M2 | HEART RATE: 71 BPM | OXYGEN SATURATION: 97 % | HEIGHT: 65 IN

## 2025-03-25 DIAGNOSIS — D50.0 IRON DEFICIENCY ANEMIA DUE TO CHRONIC BLOOD LOSS: ICD-10-CM

## 2025-03-25 DIAGNOSIS — Z79.899 MEDICATION MANAGEMENT: Primary | ICD-10-CM

## 2025-03-25 LAB — HGB BLD-MCNC: 8.4 G/DL (ref 13.3–17.7)

## 2025-03-25 PROCEDURE — 3078F DIAST BP <80 MM HG: CPT | Performed by: FAMILY MEDICINE

## 2025-03-25 PROCEDURE — 36415 COLL VENOUS BLD VENIPUNCTURE: CPT | Performed by: FAMILY MEDICINE

## 2025-03-25 PROCEDURE — 1126F AMNT PAIN NOTED NONE PRSNT: CPT | Performed by: FAMILY MEDICINE

## 2025-03-25 PROCEDURE — 99214 OFFICE O/P EST MOD 30 MIN: CPT | Performed by: FAMILY MEDICINE

## 2025-03-25 PROCEDURE — 3074F SYST BP LT 130 MM HG: CPT | Performed by: FAMILY MEDICINE

## 2025-03-25 PROCEDURE — 85018 HEMOGLOBIN: CPT | Performed by: FAMILY MEDICINE

## 2025-03-25 ASSESSMENT — PAIN SCALES - GENERAL: PAINLEVEL_OUTOF10: NO PAIN (0)

## 2025-03-25 NOTE — PROGRESS NOTES
"  Assessment & Plan     Medication management  No additions or subtractions to date he has been okayed for his infusion on April 2, 2025    Iron deficiency anemia due to chronic blood loss  Repeat hemoglobin today hemoglobin went up 1 g last week  - Hemoglobin; Future  - Hemoglobin        MED REC REQUIRED  Post Medication Reconciliation Status:   BMI  Estimated body mass index is 27.46 kg/m  as calculated from the following:    Height as of this encounter: 1.657 m (5' 5.25\").    Weight as of this encounter: 75.4 kg (166 lb 4.8 oz).             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  We will await today's result he is scheduled for an iron infusion on April 2, 2025  1 week follow up (Patient unsure of the reason for follow up)    History of Present Illness       Reason for visit:  Checking bloodHe consumes 2 sweetened beverage(s) daily. He exercises with enough effort to increase his heart rate 3 or less days per week.   He is taking medications regularly.                  Review of Systems  CONSTITUTIONAL: NEGATIVE for fever, chills, change in weight  ENT/MOUTH: NEGATIVE for ear, mouth and throat problems  RESP: NEGATIVE for significant cough or SOB  CV: NEGATIVE for chest pain, palpitations or peripheral edema      Objective    /64   Pulse 71   Temp 97.1  F (36.2  C) (Oral)   Resp 20   Ht 1.657 m (5' 5.25\")   Wt 75.4 kg (166 lb 4.8 oz)   SpO2 97%   BMI 27.46 kg/m    Body mass index is 27.46 kg/m .  Physical Exam   GENERAL: alert and no distress  NECK: no adenopathy, no asymmetry, masses, or scars  RESP: lungs clear to auscultation - no rales, rhonchi or wheezes  CV: regular rate and rhythm, normal S1 S2, no S3 or S4, no murmur, click or rub, no peripheral " edema  ABDOMEN: soft, nontender, no hepatosplenomegaly, no masses and bowel sounds normal  MS: no gross musculoskeletal defects noted, no edema    Patient remains a little bit lethargic; we will await his hemoglobin results and anticipate his iron infusion in the next 10 days      Signed Electronically by: Mike Mcdowell MD

## 2025-04-02 DIAGNOSIS — M1A.00X0 IDIOPATHIC CHRONIC GOUT, UNSPECIFIED SITE, WITHOUT TOPHUS (TOPHI): ICD-10-CM

## 2025-04-02 RX ORDER — PROBENECID 500 MG/1
500 TABLET, FILM COATED ORAL 2 TIMES DAILY
Qty: 60 TABLET | Refills: 0 | Status: SHIPPED | OUTPATIENT
Start: 2025-04-02

## 2025-04-03 ENCOUNTER — TRANSFERRED RECORDS (OUTPATIENT)
Dept: HEALTH INFORMATION MANAGEMENT | Facility: CLINIC | Age: 85
End: 2025-04-03
Payer: COMMERCIAL

## 2025-04-09 LAB — UPPER GI ENDOSCOPY: NORMAL

## 2025-04-14 DIAGNOSIS — I10 BENIGN ESSENTIAL HYPERTENSION: ICD-10-CM

## 2025-04-14 RX ORDER — FUROSEMIDE 20 MG/1
20 TABLET ORAL DAILY
Qty: 90 TABLET | Refills: 2 | Status: SHIPPED | OUTPATIENT
Start: 2025-04-14

## 2025-05-03 DIAGNOSIS — I10 ESSENTIAL HYPERTENSION, BENIGN: ICD-10-CM

## 2025-05-05 RX ORDER — ATENOLOL 50 MG/1
50 TABLET ORAL DAILY
Qty: 90 TABLET | Refills: 2 | Status: SHIPPED | OUTPATIENT
Start: 2025-05-05

## 2025-05-11 DIAGNOSIS — E11.69 TYPE 2 DIABETES MELLITUS WITH OTHER SPECIFIED COMPLICATION, WITHOUT LONG-TERM CURRENT USE OF INSULIN (H): ICD-10-CM

## 2025-05-11 DIAGNOSIS — E78.00 HYPERCHOLESTEREMIA: ICD-10-CM

## 2025-05-12 ENCOUNTER — MYC REFILL (OUTPATIENT)
Dept: FAMILY MEDICINE | Facility: CLINIC | Age: 85
End: 2025-05-12
Payer: COMMERCIAL

## 2025-05-12 DIAGNOSIS — E78.00 HYPERCHOLESTEREMIA: ICD-10-CM

## 2025-05-12 RX ORDER — GLIMEPIRIDE 2 MG/1
2 TABLET ORAL
Qty: 90 TABLET | Refills: 2 | Status: SHIPPED | OUTPATIENT
Start: 2025-05-12

## 2025-05-13 RX ORDER — EZETIMIBE 10 MG/1
10 TABLET ORAL DAILY
Qty: 90 TABLET | Refills: 0 | Status: SHIPPED | OUTPATIENT
Start: 2025-05-13

## 2025-05-13 RX ORDER — EZETIMIBE 10 MG/1
TABLET ORAL
Qty: 90 TABLET | Refills: 0 | Status: SHIPPED | OUTPATIENT
Start: 2025-05-13

## 2025-06-01 DIAGNOSIS — I48.91 NEW ONSET ATRIAL FIBRILLATION (H): ICD-10-CM

## 2025-06-02 DIAGNOSIS — M1A.00X0 IDIOPATHIC CHRONIC GOUT, UNSPECIFIED SITE, WITHOUT TOPHUS (TOPHI): ICD-10-CM

## 2025-06-03 RX ORDER — APIXABAN 2.5 MG/1
2.5 TABLET, FILM COATED ORAL 2 TIMES DAILY
Qty: 60 TABLET | Refills: 2 | Status: SHIPPED | OUTPATIENT
Start: 2025-06-03

## 2025-06-03 RX ORDER — PROBENECID 500 MG/1
500 TABLET, FILM COATED ORAL 2 TIMES DAILY
Qty: 60 TABLET | Refills: 2 | Status: SHIPPED | OUTPATIENT
Start: 2025-06-03

## 2025-06-11 ENCOUNTER — TRANSFERRED RECORDS (OUTPATIENT)
Dept: HEALTH INFORMATION MANAGEMENT | Facility: CLINIC | Age: 85
End: 2025-06-11
Payer: COMMERCIAL

## 2025-07-11 DIAGNOSIS — K21.9 GASTROESOPHAGEAL REFLUX DISEASE WITHOUT ESOPHAGITIS: Primary | ICD-10-CM

## 2025-07-14 ENCOUNTER — MYC MEDICAL ADVICE (OUTPATIENT)
Dept: FAMILY MEDICINE | Facility: CLINIC | Age: 85
End: 2025-07-14
Payer: COMMERCIAL

## 2025-07-14 DIAGNOSIS — I10 BENIGN ESSENTIAL HYPERTENSION: ICD-10-CM

## 2025-07-14 DIAGNOSIS — K21.9 GASTROESOPHAGEAL REFLUX DISEASE WITHOUT ESOPHAGITIS: ICD-10-CM

## 2025-07-15 RX ORDER — FUROSEMIDE 20 MG/1
20 TABLET ORAL DAILY
Qty: 90 TABLET | Refills: 0 | Status: SHIPPED | OUTPATIENT
Start: 2025-07-15

## 2025-07-15 RX ORDER — OMEPRAZOLE 40 MG/1
40 CAPSULE, DELAYED RELEASE ORAL 2 TIMES DAILY
Qty: 180 CAPSULE | Refills: 0 | Status: SHIPPED | OUTPATIENT
Start: 2025-07-15

## 2025-07-15 RX ORDER — OMEPRAZOLE 40 MG/1
40 CAPSULE, DELAYED RELEASE ORAL 2 TIMES DAILY
Qty: 180 CAPSULE | Refills: 1 | Status: SHIPPED | OUTPATIENT
Start: 2025-07-15 | End: 2025-07-15

## 2025-07-31 DIAGNOSIS — I10 ESSENTIAL HYPERTENSION, BENIGN: ICD-10-CM

## 2025-07-31 DIAGNOSIS — I25.10 CARDIOVASCULAR DISEASE: ICD-10-CM

## 2025-07-31 RX ORDER — AMLODIPINE BESYLATE 10 MG/1
10 TABLET ORAL DAILY
Qty: 90 TABLET | Refills: 2 | OUTPATIENT
Start: 2025-07-31

## 2025-07-31 NOTE — TELEPHONE ENCOUNTER
Pending Prescriptions:                       Disp   Refills    amLODIPine (NORVASC) 10 MG tablet         90 tab*2            Sig: Take 1 tablet (10 mg) by mouth daily.

## 2025-08-02 SDOH — HEALTH STABILITY: PHYSICAL HEALTH: ON AVERAGE, HOW MANY DAYS PER WEEK DO YOU ENGAGE IN MODERATE TO STRENUOUS EXERCISE (LIKE A BRISK WALK)?: 1 DAY

## 2025-08-02 SDOH — HEALTH STABILITY: PHYSICAL HEALTH: ON AVERAGE, HOW MANY MINUTES DO YOU ENGAGE IN EXERCISE AT THIS LEVEL?: 10 MIN

## 2025-08-02 ASSESSMENT — SOCIAL DETERMINANTS OF HEALTH (SDOH): HOW OFTEN DO YOU GET TOGETHER WITH FRIENDS OR RELATIVES?: ONCE A WEEK

## 2025-08-04 DIAGNOSIS — I25.10 CARDIOVASCULAR DISEASE: ICD-10-CM

## 2025-08-04 DIAGNOSIS — I10 ESSENTIAL HYPERTENSION, BENIGN: ICD-10-CM

## 2025-08-04 RX ORDER — AMLODIPINE BESYLATE 10 MG/1
10 TABLET ORAL DAILY
Qty: 90 TABLET | Refills: 1 | Status: SHIPPED | OUTPATIENT
Start: 2025-08-04

## 2025-08-06 PROBLEM — Z87.891 FORMER SMOKER, STOPPED SMOKING IN DISTANT PAST: Status: ACTIVE | Noted: 2025-08-06

## 2025-08-06 PROBLEM — Z87.891 FORMER SMOKER, STOPPED SMOKING IN DISTANT PAST: Chronic | Status: ACTIVE | Noted: 2025-08-06

## 2025-08-07 ENCOUNTER — RESULTS FOLLOW-UP (OUTPATIENT)
Dept: INTERNAL MEDICINE | Facility: CLINIC | Age: 85
End: 2025-08-07

## 2025-08-07 ENCOUNTER — OFFICE VISIT (OUTPATIENT)
Dept: INTERNAL MEDICINE | Facility: CLINIC | Age: 85
End: 2025-08-07
Payer: COMMERCIAL

## 2025-08-07 VITALS
HEART RATE: 67 BPM | OXYGEN SATURATION: 96 % | WEIGHT: 163 LBS | TEMPERATURE: 98.2 F | RESPIRATION RATE: 20 BRPM | SYSTOLIC BLOOD PRESSURE: 118 MMHG | DIASTOLIC BLOOD PRESSURE: 58 MMHG | BODY MASS INDEX: 27.16 KG/M2 | HEIGHT: 65 IN

## 2025-08-07 DIAGNOSIS — E78.2 MIXED HYPERLIPIDEMIA: ICD-10-CM

## 2025-08-07 DIAGNOSIS — Z00.00 MEDICARE ANNUAL WELLNESS VISIT, SUBSEQUENT: Primary | ICD-10-CM

## 2025-08-07 DIAGNOSIS — D62 ANEMIA DUE TO BLOOD LOSS, ACUTE: ICD-10-CM

## 2025-08-07 DIAGNOSIS — R06.02 SOB (SHORTNESS OF BREATH): ICD-10-CM

## 2025-08-07 DIAGNOSIS — K62.7 RADIATION PROCTITIS: ICD-10-CM

## 2025-08-07 DIAGNOSIS — M10.9 GOUT OF MULTIPLE SITES, UNSPECIFIED CAUSE, UNSPECIFIED CHRONICITY: ICD-10-CM

## 2025-08-07 DIAGNOSIS — I10 PRIMARY HYPERTENSION: ICD-10-CM

## 2025-08-07 DIAGNOSIS — F41.1 GAD (GENERALIZED ANXIETY DISORDER): ICD-10-CM

## 2025-08-07 DIAGNOSIS — J61 ASBESTOSIS (H): ICD-10-CM

## 2025-08-07 DIAGNOSIS — C61 PRIMARY MALIGNANT NEOPLASM OF PROSTATE (H): ICD-10-CM

## 2025-08-07 DIAGNOSIS — Z87.891 FORMER SMOKER, STOPPED SMOKING IN DISTANT PAST: ICD-10-CM

## 2025-08-07 DIAGNOSIS — E11.9 TYPE 2 DIABETES MELLITUS WITHOUT COMPLICATION, WITHOUT LONG-TERM CURRENT USE OF INSULIN (H): ICD-10-CM

## 2025-08-07 DIAGNOSIS — I48.19 PERSISTENT ATRIAL FIBRILLATION (H): ICD-10-CM

## 2025-08-07 LAB
ERYTHROCYTE [DISTWIDTH] IN BLOOD BY AUTOMATED COUNT: 13.5 % (ref 10–15)
EST. AVERAGE GLUCOSE BLD GHB EST-MCNC: 120 MG/DL
HBA1C MFR BLD: 5.8 % (ref 0–5.6)
HCT VFR BLD AUTO: 36.8 % (ref 40–53)
HGB BLD-MCNC: 12.6 G/DL (ref 13.3–17.7)
MCH RBC QN AUTO: 32.2 PG (ref 26.5–33)
MCHC RBC AUTO-ENTMCNC: 34.2 G/DL (ref 31.5–36.5)
MCV RBC AUTO: 94 FL (ref 78–100)
PLATELET # BLD AUTO: 247 10E3/UL (ref 150–450)
RBC # BLD AUTO: 3.91 10E6/UL (ref 4.4–5.9)
WBC # BLD AUTO: 8.3 10E3/UL (ref 4–11)

## 2025-08-07 ASSESSMENT — ANXIETY QUESTIONNAIRES
5. BEING SO RESTLESS THAT IT IS HARD TO SIT STILL: NOT AT ALL
2. NOT BEING ABLE TO STOP OR CONTROL WORRYING: NOT AT ALL
3. WORRYING TOO MUCH ABOUT DIFFERENT THINGS: NOT AT ALL
6. BECOMING EASILY ANNOYED OR IRRITABLE: NOT AT ALL
GAD7 TOTAL SCORE: 0
GAD7 TOTAL SCORE: 0
7. FEELING AFRAID AS IF SOMETHING AWFUL MIGHT HAPPEN: NOT AT ALL
1. FEELING NERVOUS, ANXIOUS, OR ON EDGE: NOT AT ALL

## 2025-08-07 ASSESSMENT — PATIENT HEALTH QUESTIONNAIRE - PHQ9: 5. POOR APPETITE OR OVEREATING: NOT AT ALL

## 2025-08-10 ENCOUNTER — MYC MEDICAL ADVICE (OUTPATIENT)
Dept: INTERNAL MEDICINE | Facility: CLINIC | Age: 85
End: 2025-08-10
Payer: COMMERCIAL

## 2025-08-10 DIAGNOSIS — E78.00 HYPERCHOLESTEREMIA: ICD-10-CM

## 2025-08-11 RX ORDER — EZETIMIBE 10 MG/1
10 TABLET ORAL DAILY
Qty: 90 TABLET | Refills: 3 | Status: SHIPPED | OUTPATIENT
Start: 2025-08-11

## 2025-08-12 LAB — TESTOST SERPL-MCNC: 6 NG/DL (ref 240–950)

## (undated) DEVICE — TUBING SUCTION MEDI-VAC 1/4"X20' N620A

## (undated) DEVICE — PROBE ARGON 2.3MM X 230CM

## (undated) DEVICE — SOL WATER IRRIG 1000ML BOTTLE 2F7114

## (undated) DEVICE — CONNECTOR ARGON ARCONNECT

## (undated) DEVICE — SUCTION MANIFOLD NEPTUNE 2 SYS 1 PORT 702-025-000

## (undated) DEVICE — FORCEP BIOPSY 2.3MM DISP COATED 000388

## (undated) DEVICE — SNARE OVAL SMALL DISP 100600